# Patient Record
Sex: FEMALE | Race: WHITE | NOT HISPANIC OR LATINO | Employment: OTHER | URBAN - METROPOLITAN AREA
[De-identification: names, ages, dates, MRNs, and addresses within clinical notes are randomized per-mention and may not be internally consistent; named-entity substitution may affect disease eponyms.]

---

## 2017-01-24 ENCOUNTER — OFFICE VISIT (OUTPATIENT)
Dept: UROLOGY | Facility: CLINIC | Age: 62
End: 2017-01-24
Payer: COMMERCIAL

## 2017-01-24 VITALS
WEIGHT: 143.94 LBS | HEART RATE: 72 BPM | DIASTOLIC BLOOD PRESSURE: 64 MMHG | SYSTOLIC BLOOD PRESSURE: 108 MMHG | BODY MASS INDEX: 26.33 KG/M2

## 2017-01-24 DIAGNOSIS — R35.0 FREQUENCY OF URINATION: Primary | ICD-10-CM

## 2017-01-24 DIAGNOSIS — R30.0 DYSURIA: ICD-10-CM

## 2017-01-24 DIAGNOSIS — R39.89 SENSATION OF PRESSURE IN BLADDER AREA: ICD-10-CM

## 2017-01-24 DIAGNOSIS — R35.1 NOCTURIA: ICD-10-CM

## 2017-01-24 DIAGNOSIS — R39.14 FEELING OF INCOMPLETE BLADDER EMPTYING: ICD-10-CM

## 2017-01-24 PROCEDURE — 87086 URINE CULTURE/COLONY COUNT: CPT

## 2017-01-24 PROCEDURE — 99999 PR PBB SHADOW E&M-EST. PATIENT-LVL III: CPT | Mod: PBBFAC,,, | Performed by: PHYSICIAN ASSISTANT

## 2017-01-24 PROCEDURE — 51701 INSERT BLADDER CATHETER: CPT | Mod: S$GLB,,, | Performed by: PHYSICIAN ASSISTANT

## 2017-01-24 PROCEDURE — 1159F MED LIST DOCD IN RCRD: CPT | Mod: S$GLB,,, | Performed by: PHYSICIAN ASSISTANT

## 2017-01-24 PROCEDURE — 99203 OFFICE O/P NEW LOW 30 MIN: CPT | Mod: 25,S$GLB,, | Performed by: PHYSICIAN ASSISTANT

## 2017-01-24 PROCEDURE — 87186 SC STD MICRODIL/AGAR DIL: CPT

## 2017-01-24 PROCEDURE — 87088 URINE BACTERIA CULTURE: CPT

## 2017-01-24 PROCEDURE — 87077 CULTURE AEROBIC IDENTIFY: CPT

## 2017-01-24 RX ORDER — NITROFURANTOIN 25; 75 MG/1; MG/1
100 CAPSULE ORAL 2 TIMES DAILY
Qty: 14 CAPSULE | Refills: 0 | Status: SHIPPED | OUTPATIENT
Start: 2017-01-24 | End: 2017-01-31

## 2017-01-24 NOTE — PROGRESS NOTES
CHIEF COMPLAINT:    Mrs. Andre is a 61 y.o. female presenting for UTI symptoms.  PRESENTING ILLNESS:    Jolene Andre is a 61 y.o. female who presents for uti symptoms.  She reports frequency, incomplete bladder emptying, bladder pressure, nocturia x 4, dysuria, malodorous urine and chills x 2 weeks.  It has gotten progressively worse.      She went to an urgent care 8 weeks ago and was treated with antibiotics for an UTI.  Her symptoms resolved but then returned about 2 weeks ago.  She went to an urgent care last week.  Her culture was negative.  She was given pyridium and has been taking it over the course of 2-3 days.  Last night was the last time she took it.    She has a history of recurrent urinary tract infections.  Prior to 8 weeks ago, she hadn't had an infection in a while.  She typically gets an uti after taking a decongestant or antihistamine.      She is a previous Dr. Patino patient.  Has history of gross hematuria.  Hematuria work up in 2013 her renal ultrasound of 10/12/13 which showed normal kidneys and some thickening of bladder wall.  Cystoscopy which showed cystitis cystica and small cystocele, no evidence of bladder cancer.  She has a history of a bladder lift.     REVIEW OF SYSTEMS:  Constitutional: Positive for chills. Negative for fever.   HENT: Negative for hearing loss.   Eyes: Negative for visual disturbance.   Respiratory: Negative for shortness of breath.   Cardiovascular: Negative for chest pain.   Gastrointestinal: Negative for vomiting, and constipation.   Genitourinary: Positive for frequency, nocturia, incomplete bladder emptying, dysuria, incomplete bladder emptying  Negative for urgency, difficulty urinating, hematuria, intermittency, hesitancy, and decreased urine volume.   Neurological: Negative for dizziness.   Hematological: Does not bruise/bleed easily.   Psychiatric/Behavioral: Negative for confusion.     PATIENT HISTORY:    Past Medical History   Diagnosis Date    Allergy      Arthritis     Bipolar disorder     Headache(784.0)     Herniated disc      x4 in cervical area    History of migraine headaches     Hypothyroidism     Infections of kidney      has had multiple kidney infections in the past.    Low back pain     Migraine headache     Moderate mood disorder     Neck pain     Rotator cuff tear     Thyroid disease        Past Surgical History   Procedure Laterality Date    Hysterectomy      Tonsillectomy      Cosmetic surgery       face lift     Bladder suspension      Cystoscopy      Oophorectomy      Colonoscopy N/A 5/5/2016     Procedure: COLONOSCOPY;  Surgeon: Daron Scruggs MD;  [facility] [place] [address]);  Service: Endoscopy;  Laterality: N/A;       Family History   Problem Relation Age of Onset    Hypertension Mother     Migraines Mother     Tremor Mother     Hypertension Sister     Cancer Maternal Grandmother      uterine       Social History     Social History    Marital status:      Spouse name: N/A    Number of children: 2    Years of education: 18     Occupational History    Homemaker/Retired      Social History Main Topics    Smoking status: Never Smoker    Smokeless tobacco: Never Used    Alcohol use 1.2 oz/week     1 Glasses of wine, 1 Cans of beer per week      Comment: occasional maybe every 3 months    Drug use: No    Sexual activity: Yes     Partners: Male     Other Topics Concern    Not on file     Social History Narrative       Allergies:  Rocephin [ceftriaxone]; Codeine; Demerol [meperidine]; Erythromycin; Fioricet  [butalbital-acetaminophen-caff]; Iodine; Nsaids (non-steroidal anti-inflammatory drug); and Shellfish containing products    Medications:    Current Outpatient Prescriptions:     estradiol (VIVELLE-DOT) 0.0375 mg/24 hr, UNWRAP AND APPLY 1 PATCH TO SKIN TWICE A WEEK AS DIRECTED, Disp: 8 patch, Rfl: 0    levothyroxine (SYNTHROID) 100 MCG tablet, Take 1 tablet (100 mcg total) by mouth once daily.,  Disp: 30 tablet, Rfl: 6    lithium (LITHOBID) 300 MG CR tablet, Take 750 mg by mouth once daily. , Disp: , Rfl:     LUNESTA 2 mg Tab, , Disp: , Rfl: 2    rizatriptan (MAXALT) 10 MG tablet, TAKE 1 TABLET BY MOUTH EVERY 2 HOURS AS NEEDED FOR MIGRAINE., Disp: 20 tablet, Rfl: 2    nitrofurantoin, macrocrystal-monohydrate, (MACROBID) 100 MG capsule, Take 1 capsule (100 mg total) by mouth 2 (two) times daily., Disp: 14 capsule, Rfl: 0    PHYSICAL EXAMINATION:    Constitutional: She is oriented to person, place, and time. She appears well-developed and well-nourished.  She is in no apparent distress.    Neck: No tracheal deviation present.     Cardiovascular: Normal rate.      Pulmonary/Chest: Effort normal. No respiratory distress.     Abdominal:  She exhibits no distension.  There is no CVA tenderness.     Lymphadenopathy:          Right: No supraclavicular adenopathy present.        Left: No supraclavicular adenopathy present.     Neurological: She is alert and oriented to person, place, and time.     Skin: Skin is warm and dry.     Extremities: No pitting edema noted in lower extremities bilaterally    Psych: Cooperative with normal affect.    Genitourinary:  Normal external female genitalia  Urethral meatus is normal  Urethra and bladder are nontender to bimanual exam  Well supported anteriorly and posteriorly   No adnexal masses  Consent verbally obtained.  Hibiclens was applied to the urethral meatus. An in and out cath was performed after voiding.  The PVR was 40 ml.    Physical Exam      LABS:    U/a indicated + nitrites however, may be false positive given she took azo last night     IMPRESSION:    Encounter Diagnoses   Name Primary?    Frequency of urination Yes    Dysuria     Nocturia     Feeling of incomplete bladder emptying     Sensation of pressure in bladder area        PLAN:    Urine culture.    Will start macrobid until culture results are back.  Patient informed that antibiotics may have to be  changed if culture proves it to be resistant.    Follow up based on culture.   Monika Arndt PA-C

## 2017-01-26 ENCOUNTER — TELEPHONE (OUTPATIENT)
Dept: UROLOGY | Facility: CLINIC | Age: 62
End: 2017-01-26

## 2017-01-26 LAB — BACTERIA UR CULT: NORMAL

## 2017-01-26 NOTE — TELEPHONE ENCOUNTER
Called to inform patient that her urine culture was positive and to continue macrobid.  Left message.

## 2017-01-26 NOTE — TELEPHONE ENCOUNTER
Patient informed that culture was positive and to continue macrobid. She will follow up in 3 weeks.  She voiced understanding.

## 2017-02-01 ENCOUNTER — TELEPHONE (OUTPATIENT)
Dept: UROLOGY | Facility: CLINIC | Age: 62
End: 2017-02-01

## 2017-02-01 NOTE — TELEPHONE ENCOUNTER
----- Message from Monika Arndt PA-C sent at 1/26/2017  4:19 PM CST -----  Please schedule 3 week appt

## 2017-02-02 ENCOUNTER — LAB VISIT (OUTPATIENT)
Dept: LAB | Facility: HOSPITAL | Age: 62
End: 2017-02-02
Attending: UROLOGY
Payer: COMMERCIAL

## 2017-02-02 DIAGNOSIS — N39.0 URINARY TRACT INFECTION WITHOUT HEMATURIA, SITE UNSPECIFIED: Primary | ICD-10-CM

## 2017-02-02 DIAGNOSIS — N39.0 URINARY TRACT INFECTION WITHOUT HEMATURIA, SITE UNSPECIFIED: ICD-10-CM

## 2017-02-02 PROCEDURE — 87186 SC STD MICRODIL/AGAR DIL: CPT

## 2017-02-02 PROCEDURE — 87088 URINE BACTERIA CULTURE: CPT

## 2017-02-02 PROCEDURE — 87086 URINE CULTURE/COLONY COUNT: CPT

## 2017-02-02 PROCEDURE — 87077 CULTURE AEROBIC IDENTIFY: CPT

## 2017-02-04 DIAGNOSIS — A49.9 BACTERIAL UTI: Primary | ICD-10-CM

## 2017-02-04 DIAGNOSIS — N39.0 BACTERIAL UTI: Primary | ICD-10-CM

## 2017-02-04 LAB — BACTERIA UR CULT: NORMAL

## 2017-02-04 RX ORDER — CIPROFLOXACIN 500 MG/1
500 TABLET ORAL 2 TIMES DAILY
Qty: 14 TABLET | Refills: 0 | Status: SHIPPED | OUTPATIENT
Start: 2017-02-04 | End: 2017-02-11

## 2017-02-05 NOTE — PROGRESS NOTES
Patient informed that her urine culture is positive and that cipro was called to pharmacy.  Pharmacy verified.  She was instructed on how to take medication.

## 2017-02-14 ENCOUNTER — OFFICE VISIT (OUTPATIENT)
Dept: UROLOGY | Facility: CLINIC | Age: 62
End: 2017-02-14
Payer: COMMERCIAL

## 2017-02-14 VITALS
HEIGHT: 63 IN | SYSTOLIC BLOOD PRESSURE: 87 MMHG | WEIGHT: 143 LBS | HEART RATE: 56 BPM | BODY MASS INDEX: 25.34 KG/M2 | TEMPERATURE: 98 F | DIASTOLIC BLOOD PRESSURE: 62 MMHG

## 2017-02-14 DIAGNOSIS — N39.0 BACTERIAL UTI: Primary | ICD-10-CM

## 2017-02-14 DIAGNOSIS — A49.9 BACTERIAL UTI: Primary | ICD-10-CM

## 2017-02-14 PROCEDURE — 99999 PR PBB SHADOW E&M-EST. PATIENT-LVL III: CPT | Mod: PBBFAC,,, | Performed by: PHYSICIAN ASSISTANT

## 2017-02-14 PROCEDURE — 81002 URINALYSIS NONAUTO W/O SCOPE: CPT | Mod: S$GLB,,, | Performed by: PHYSICIAN ASSISTANT

## 2017-02-14 PROCEDURE — 99213 OFFICE O/P EST LOW 20 MIN: CPT | Mod: 25,S$GLB,, | Performed by: PHYSICIAN ASSISTANT

## 2017-02-14 NOTE — PROGRESS NOTES
CHIEF COMPLAINT:    Mrs. Andre is a 61 y.o. female presenting for UTI follow up.  PRESENTING ILLNESS:    Jolene Andre is a 61 y.o. female who presents for UTI follow up.  She was seen in clinic on 1/24/17 with UTI symptoms.  Culture was positive for e. Coli and she was treated with macrobid.  However her symptoms did not resolve.  Repeat culture was positive and she was treated with cipro. She completed the entire course of cipro.  She reports feeling better.  She was experiencing frequency, incomplete bladder emptying, bladder pressure, nocturia x 4, dysuria, malodorous urine.  All of her symptoms have resolved.  She only gets up once at night.   She still is having chills.  She has not taken her temperature.  She covers up with a blanket and she feels better.  She doesn't have any urinary complaints today.   She reports a good urinary stream and complete bladder emptying.    Eight weeks prior to the visit on 1/24/17 she was treated for UTI.  She has a history of recurrent urinary tract infections.  But had not had one in a while prior to the infection she had end of last year.     She is a previous Dr. Patino patient. Has history of gross hematuria. Hematuria work up in 2013 her renal ultrasound of 10/12/13 which showed normal kidneys and some thickening of bladder wall. Cystoscopy which showed cystitis cystica and small cystocele, no evidence of bladder cancer. She has a history of a bladder lift.     Urine culture:   1/24/17: E. Coli  2/2/17: E. Coli     REVIEW OF SYSTEMS:    Constitutional: positive for chills.  Negative for fever.   HENT: Negative for hearing loss.   Eyes: Negative for visual disturbance.   Respiratory: Negative for shortness of breath.   Cardiovascular: Negative for chest pain.   Gastrointestinal: Negative for vomiting, and constipation.   Genitourinary:  Negative for urgency, frequency, difficulty urinating, nocturia, incontinence, dysuria, hematuria, intermittency, hesitancy, incomplete  bladder emptying, and decreased urine volume.   Neurological: Negative for dizziness.   Hematological: Does not bruise/bleed easily.   Psychiatric/Behavioral: Negative for confusion.     PATIENT HISTORY:    Past Medical History   Diagnosis Date    Allergy     Arthritis     Bipolar disorder     Headache(784.0)     Herniated disc      x4 in cervical area    History of migraine headaches     Hypothyroidism     Infections of kidney      has had multiple kidney infections in the past.    Low back pain     Migraine headache     Moderate mood disorder     Neck pain     Rotator cuff tear     Thyroid disease        Past Surgical History   Procedure Laterality Date    Hysterectomy      Tonsillectomy      Cosmetic surgery       face lift     Bladder suspension      Cystoscopy      Oophorectomy      Colonoscopy N/A 5/5/2016     Procedure: COLONOSCOPY;  Surgeon: Daron Scruggs MD;  Location: [facility] ([address] [place] [address]);  Service: Endoscopy;  Laterality: N/A;       Family History   Problem Relation Age of Onset    Hypertension Mother     Migraines Mother     Tremor Mother     Hypertension Sister     Cancer Maternal Grandmother      uterine       Social History     Social History    Marital status:      Spouse name: N/A    Number of children: 2    Years of education: 18     Occupational History    Homemaker/Retired      Social History Main Topics    Smoking status: Never Smoker    Smokeless tobacco: Never Used    Alcohol use 1.2 oz/week     1 Glasses of wine, 1 Cans of beer per week      Comment: occasional maybe every 3 months    Drug use: No    Sexual activity: Yes     Partners: Male     Other Topics Concern    Not on file     Social History Narrative       Allergies:  Rocephin [ceftriaxone]; Codeine; Demerol [meperidine]; Erythromycin; Fioricet  [butalbital-acetaminophen-caff]; Iodine; Nsaids (non-steroidal anti-inflammatory drug); and Shellfish containing  products    Medications:    Current Outpatient Prescriptions:     estradiol (VIVELLE-DOT) 0.0375 mg/24 hr, UNWRAP AND APPLY 1 PATCH TO SKIN TWICE A WEEK AS DIRECTED, Disp: 8 patch, Rfl: 0    levothyroxine (SYNTHROID) 100 MCG tablet, Take 1 tablet (100 mcg total) by mouth once daily., Disp: 30 tablet, Rfl: 6    lithium (LITHOBID) 300 MG CR tablet, Take 750 mg by mouth once daily. , Disp: , Rfl:     LUNESTA 2 mg Tab, , Disp: , Rfl: 2    rizatriptan (MAXALT) 10 MG tablet, TAKE 1 TABLET BY MOUTH EVERY 2 HOURS AS NEEDED FOR MIGRAINE., Disp: 20 tablet, Rfl: 2    PHYSICAL EXAMINATION:    Constitutional: She is oriented to person, place, and time. She appears well-developed and well-nourished.  She is in no apparent distress.  She is afebrile.     Neck: No tracheal deviation present.     Cardiovascular: Normal rate.      Pulmonary/Chest: Effort normal. No respiratory distress.     Abdominal:  She exhibits no distension.  There is no CVA tenderness.     Lymphadenopathy:          Right: No supraclavicular adenopathy present.        Left: No supraclavicular adenopathy present.     Neurological: She is alert and oriented to person, place, and time.     Skin: Skin is warm and dry.     Extremities: No pitting edema noted in lower extremities bilaterally    Psych: Cooperative with normal affect.    Physical Exam      LABS:    U/A: 1.015, pH 5, negative    IMPRESSION:    Encounter Diagnoses   Name Primary?    Bacterial UTI Yes       PLAN:    UTI resolved.    Discussed today's BP.  Patient reports that today's BP is her norm.    She was instructed to follow up with her pcp for chills.      Follow up in 1 year or sooner if needed.      Monika Arndt PA-C

## 2017-04-13 RX ORDER — LEVOTHYROXINE SODIUM 100 UG/1
TABLET ORAL
Qty: 30 TABLET | Refills: 6 | Status: SHIPPED | OUTPATIENT
Start: 2017-04-13 | End: 2017-11-20 | Stop reason: SDUPTHER

## 2017-05-09 ENCOUNTER — OFFICE VISIT (OUTPATIENT)
Dept: UROLOGY | Facility: CLINIC | Age: 62
End: 2017-05-09
Payer: COMMERCIAL

## 2017-05-09 VITALS
SYSTOLIC BLOOD PRESSURE: 104 MMHG | HEIGHT: 63 IN | BODY MASS INDEX: 25.34 KG/M2 | HEART RATE: 57 BPM | DIASTOLIC BLOOD PRESSURE: 75 MMHG | WEIGHT: 143 LBS

## 2017-05-09 DIAGNOSIS — R35.1 NOCTURIA: ICD-10-CM

## 2017-05-09 DIAGNOSIS — N39.0 RECURRENT UTI: Primary | ICD-10-CM

## 2017-05-09 PROCEDURE — 99999 PR PBB SHADOW E&M-EST. PATIENT-LVL III: CPT | Mod: PBBFAC,,, | Performed by: UROLOGY

## 2017-05-09 PROCEDURE — 87086 URINE CULTURE/COLONY COUNT: CPT

## 2017-05-09 PROCEDURE — 1160F RVW MEDS BY RX/DR IN RCRD: CPT | Mod: S$GLB,,, | Performed by: UROLOGY

## 2017-05-09 PROCEDURE — 99214 OFFICE O/P EST MOD 30 MIN: CPT | Mod: 25,S$GLB,, | Performed by: UROLOGY

## 2017-05-09 PROCEDURE — 81002 URINALYSIS NONAUTO W/O SCOPE: CPT | Mod: S$GLB,,, | Performed by: UROLOGY

## 2017-05-09 RX ORDER — ESCITALOPRAM OXALATE 20 MG/1
TABLET ORAL
COMMUNITY
Start: 2017-05-08 | End: 2018-12-07

## 2017-05-09 RX ORDER — SUVOREXANT 10 MG/1
TABLET, FILM COATED ORAL
Refills: 0 | COMMUNITY
Start: 2017-03-23 | End: 2017-07-14

## 2017-05-09 RX ORDER — ALPRAZOLAM 0.25 MG/1
TABLET ORAL
Refills: 1 | COMMUNITY
Start: 2017-03-07 | End: 2019-10-21

## 2017-05-09 RX ORDER — CLONAZEPAM 0.5 MG/1
TABLET ORAL
COMMUNITY
Start: 2017-05-07 | End: 2019-10-21

## 2017-05-09 RX ORDER — ESCITALOPRAM OXALATE 10 MG/1
TABLET ORAL
Refills: 1 | COMMUNITY
Start: 2017-02-09 | End: 2017-05-09

## 2017-05-09 RX ORDER — ZOLPIDEM TARTRATE 5 MG/1
TABLET ORAL
Refills: 0 | COMMUNITY
Start: 2017-03-19 | End: 2017-11-02

## 2017-05-09 RX ORDER — LAMOTRIGINE 25 MG/1
TABLET ORAL
Refills: 0 | COMMUNITY
Start: 2017-02-09 | End: 2017-07-14

## 2017-05-09 NOTE — PATIENT INSTRUCTIONS
Limit fluids 2 hours before bedtime.   Will send urine for culture.   Drop off urine culture for future UTI symptoms.

## 2017-05-09 NOTE — PROGRESS NOTES
Subjective:       Patient ID: Jolene Andre is a 61 y.o. female.    Chief Complaint: Nocturia    HPI Comments: Jolene Andre is a 61 y.o. Female here for nocturia 6-7 times at night.   She has increased her lexapro and increase zantac.   Urinary frequency every 1 hour.     She drinks a lot of water.   No gross hematuria.   Doesn't limit fluids 2 hours before bedtime.   No sleep apnea.   No swelling in legs.   No alcohol. No caffeine late in the day.       1/17 UTI, 2/17 UTI December UTI.  Dysuria, gross hematuria, frequency    No smoker history.   CECY/BSO. Pre-cancercous.    Plays tennis at [facility]            Past Surgical History:   Procedure Laterality Date    BLADDER SUSPENSION      COLONOSCOPY N/A 5/5/2016    Procedure: COLONOSCOPY;  Surgeon: Daron Scruggs MD;  Location: [address]);  Service: Endoscopy;  Laterality: N/A;    COSMETIC SURGERY      face lift     CYSTOSCOPY      HYSTERECTOMY      OOPHORECTOMY      TONSILLECTOMY         Past Medical History:   Diagnosis Date    Allergy     Arthritis     Bipolar disorder     Headache     Herniated disc     x4 in cervical area    History of migraine headaches     Hypothyroidism     Infections of kidney     has had multiple kidney infections in the past.    Low back pain     Migraine headache     Moderate mood disorder     Neck pain     Rotator cuff tear     Thyroid disease        Social History     Social History    Marital status:      Spouse name: N/A    Number of children: 2    Years of education: 18     Occupational History    Homemaker/Retired      Social History Main Topics    Smoking status: Never Smoker    Smokeless tobacco: Never Used    Alcohol use 1.2 oz/week     1 Glasses of wine, 1 Cans of beer per week      Comment: occasional maybe every 3 months    Drug use: No    Sexual activity: Yes     Partners: Male     Other Topics Concern    Not on file     Social History Narrative       Family History   Problem  Relation Age of Onset    Hypertension Mother     Migraines Mother     Tremor Mother     Hypertension Sister     Cancer Maternal Grandmother      uterine       Current Outpatient Prescriptions   Medication Sig Dispense Refill    ranitidine (ZANTAC) 150 MG capsule Take 150 mg by mouth 3 (three) times daily.      alprazolam (XANAX) 0.25 MG tablet TK 1 TO 2 TS QID PRF ANXIETY  1    BELSOMRA 10 mg Tab TK ONE TO TWO TS PO QHS PRN  0    clonazePAM (KLONOPIN) 0.5 MG tablet       escitalopram oxalate (LEXAPRO) 20 MG tablet       estradiol (VIVELLE-DOT) 0.0375 mg/24 hr UNWRAP AND APPLY 1 PATCH TO SKIN TWICE A WEEK AS DIRECTED 8 patch 0    lamotrigine (LAMICTAL) 25 MG tablet TK 1 T PO  QD  0    lithium (LITHOBID) 300 MG CR tablet Take 750 mg by mouth once daily.       LUNESTA 2 mg Tab   2    rizatriptan (MAXALT) 10 MG tablet TAKE 1 TABLET BY MOUTH EVERY 2 HOURS AS NEEDED FOR MIGRAINE. 20 tablet 2    SYNTHROID 100 mcg tablet TAKE 1 TABLET(100 MCG) BY MOUTH EVERY DAY 30 tablet 6    zolpidem (AMBIEN) 5 MG Tab TK ONE T PO HS PRF INSOMNIA  0     No current facility-administered medications for this visit.        Review of patient's allergies indicates:   Allergen Reactions    Rocephin [ceftriaxone]      Severe migraine    Codeine      Other reaction(s): Unknown    Demerol [meperidine]      Interacts with meds    Erythromycin      Other reaction(s): Unknown    Fioricet  [butalbital-acetaminophen-caff] Other (See Comments)    Iodine      Other reaction(s): Unknown    Nsaids (non-steroidal anti-inflammatory drug)      Other reaction(s): Hives    Shellfish containing products      Other reaction(s): Unknown  Other reaction(s): Unknown       Review of Systems   Constitutional: Negative for chills, fever and unexpected weight change.   HENT: Negative for nosebleeds.    Eyes: Negative for visual disturbance.   Respiratory: Negative for chest tightness.    Cardiovascular: Negative for chest pain.    Gastrointestinal: Negative for diarrhea.   Genitourinary: Positive for frequency and nocturia. Negative for dysuria, hematuria and urgency.   Musculoskeletal: Negative for myalgias.   Skin: Negative for rash.   Neurological: Negative for seizures.   Hematological: Does not bruise/bleed easily.   Psychiatric/Behavioral: Negative for behavioral problems.       Objective:      Physical Exam   Vitals reviewed.  Constitutional: She is oriented to person, place, and time. She appears well-developed and well-nourished.   HENT:   Head: Normocephalic and atraumatic.   Eyes: No scleral icterus.   Cardiovascular: Normal rate and regular rhythm.    Pulmonary/Chest: Effort normal. No respiratory distress.   Abdominal: Soft. She exhibits no mass.   No cva ttp.   Musculoskeletal: She exhibits no tenderness.   Lymphadenopathy:     She has no cervical adenopathy.   Neurological: She is alert and oriented to person, place, and time.   Skin: Skin is warm and dry. No rash noted.     Psychiatric: She has a normal mood and affect.     urine dip clear.   Assessment:       1. Recurrent UTI    2. Nocturia        Plan:       Cysto, renal ultrasound.  Limit fluids 2 hours before bedtime.   Discussed ddavp. Would like to hold off for now.   Consider vaginal estrogen if having recurrent UTI.     I spent 25 minutes with the patient of which more than half was spent in direct consultation with the patient in regards to our treatment and plan.

## 2017-05-10 LAB — BACTERIA UR CULT: NO GROWTH

## 2017-05-11 ENCOUNTER — TELEPHONE (OUTPATIENT)
Dept: UROLOGY | Facility: CLINIC | Age: 62
End: 2017-05-11

## 2017-05-23 DIAGNOSIS — G43.109 MIGRAINE WITH AURA AND WITHOUT STATUS MIGRAINOSUS, NOT INTRACTABLE: ICD-10-CM

## 2017-05-24 ENCOUNTER — HOSPITAL ENCOUNTER (OUTPATIENT)
Dept: RADIOLOGY | Facility: HOSPITAL | Age: 62
Discharge: HOME | End: 2017-05-24
Attending: UROLOGY
Payer: COMMERCIAL

## 2017-05-24 ENCOUNTER — HOSPITAL ENCOUNTER (OUTPATIENT)
Dept: UROLOGY | Facility: HOSPITAL | Age: 62
Discharge: HOME | End: 2017-05-24
Attending: UROLOGY
Payer: COMMERCIAL

## 2017-05-24 VITALS
HEART RATE: 47 BPM | WEIGHT: 143.31 LBS | HEIGHT: 67 IN | BODY MASS INDEX: 22.49 KG/M2 | TEMPERATURE: 98 F | DIASTOLIC BLOOD PRESSURE: 71 MMHG | SYSTOLIC BLOOD PRESSURE: 102 MMHG

## 2017-05-24 DIAGNOSIS — N39.0 RECURRENT UTI: ICD-10-CM

## 2017-05-24 PROCEDURE — 76770 US EXAM ABDO BACK WALL COMP: CPT | Mod: TC

## 2017-05-24 PROCEDURE — 52000 CYSTOURETHROSCOPY: CPT | Mod: ,,, | Performed by: UROLOGY

## 2017-05-24 PROCEDURE — 76770 US EXAM ABDO BACK WALL COMP: CPT | Mod: 26,,, | Performed by: RADIOLOGY

## 2017-05-24 PROCEDURE — 52000 CYSTOURETHROSCOPY: CPT

## 2017-05-24 RX ORDER — DOXYCYCLINE HYCLATE 100 MG
100 TABLET ORAL
Status: DISCONTINUED | OUTPATIENT
Start: 2017-05-24 | End: 2017-05-24

## 2017-05-24 RX ORDER — LIDOCAINE HYDROCHLORIDE 20 MG/ML
JELLY TOPICAL
Status: COMPLETED | OUTPATIENT
Start: 2017-05-24 | End: 2017-05-24

## 2017-05-24 RX ORDER — CIPROFLOXACIN 500 MG/1
500 TABLET ORAL 2 TIMES DAILY
Qty: 6 TABLET | Refills: 0 | Status: SHIPPED | OUTPATIENT
Start: 2017-05-24 | End: 2017-05-27

## 2017-05-24 RX ORDER — CIPROFLOXACIN 500 MG/1
500 TABLET ORAL ONCE
Status: COMPLETED | OUTPATIENT
Start: 2017-05-24 | End: 2017-05-24

## 2017-05-24 RX ADMIN — Medication 100 MG: at 10:05

## 2017-05-24 RX ADMIN — CIPROFLOXACIN 500 MG: 500 TABLET ORAL at 11:05

## 2017-05-24 RX ADMIN — LIDOCAINE HYDROCHLORIDE: 20 JELLY TOPICAL at 08:05

## 2017-05-24 NOTE — PROCEDURES
Procedure: Flexible cysto-uretheroscopy    Pre Procedure Diagnosis:uti recurrent  Post Procedure Diagnosis:Normal cystoscopy    Surgeon: Sylwia Young MD    Anesthesia: 2% uro-jet lidocaine jelly for local analgesia    Flexible cysto-urethroscopy was performed after consent was obtained.  The risks and benefits were explained.    2% lidocaine urojet was used for local analgesia.  The genitalia was prepped and draped in the sterile fashion with betadine.    The flexible scope was advanced into the urethra and into the bladder.  Bilateral ureteral orifice were evaluated and noted to be normal with clear efflux.  The bladder was completely surveyed in a systematic fashion.   No bladder tumors or lesions were seen.  No strictures were noted.    The patient tolerated the procedure well without complication.    They will follow up in 1 year  cipro script for future. Black box warning discussed with patient. She wants cipro for future UTI.   3 day voiding diary to access frequency and nocturia  Consider vaginal estrogen if still having recurrent UTI

## 2017-05-24 NOTE — H&P (VIEW-ONLY)
Subjective:       Patient ID: Jolene Andre is a 61 y.o. female.    Chief Complaint: Nocturia    HPI Comments: Jolene Andre is a 61 y.o. Female here for nocturia 6-7 times at night.   She has increased her lexapro and increase zantac.   Urinary frequency every 1 hour.     She drinks a lot of water.   No gross hematuria.   Doesn't limit fluids 2 hours before bedtime.   No sleep apnea.   No swelling in legs.   No alcohol. No caffeine late in the day.       1/17 UTI, 2/17 UTI December UTI.  Dysuria, gross hematuria, frequency    No smoker history.   CECY/BSO. Pre-cancercous.    Plays tennis at [facility]            Past Surgical History:   Procedure Laterality Date    BLADDER SUSPENSION      COLONOSCOPY N/A 5/5/2016    Procedure: COLONOSCOPY;  Surgeon: Daron Scruggs MD;  Location: [address]);  Service: Endoscopy;  Laterality: N/A;    COSMETIC SURGERY      face lift     CYSTOSCOPY      HYSTERECTOMY      OOPHORECTOMY      TONSILLECTOMY         Past Medical History:   Diagnosis Date    Allergy     Arthritis     Bipolar disorder     Headache     Herniated disc     x4 in cervical area    History of migraine headaches     Hypothyroidism     Infections of kidney     has had multiple kidney infections in the past.    Low back pain     Migraine headache     Moderate mood disorder     Neck pain     Rotator cuff tear     Thyroid disease        Social History     Social History    Marital status:      Spouse name: N/A    Number of children: 2    Years of education: 18     Occupational History    Homemaker/Retired      Social History Main Topics    Smoking status: Never Smoker    Smokeless tobacco: Never Used    Alcohol use 1.2 oz/week     1 Glasses of wine, 1 Cans of beer per week      Comment: occasional maybe every 3 months    Drug use: No    Sexual activity: Yes     Partners: Male     Other Topics Concern    Not on file     Social History Narrative       Family History   Problem  Relation Age of Onset    Hypertension Mother     Migraines Mother     Tremor Mother     Hypertension Sister     Cancer Maternal Grandmother      uterine       Current Outpatient Prescriptions   Medication Sig Dispense Refill    ranitidine (ZANTAC) 150 MG capsule Take 150 mg by mouth 3 (three) times daily.      alprazolam (XANAX) 0.25 MG tablet TK 1 TO 2 TS QID PRF ANXIETY  1    BELSOMRA 10 mg Tab TK ONE TO TWO TS PO QHS PRN  0    clonazePAM (KLONOPIN) 0.5 MG tablet       escitalopram oxalate (LEXAPRO) 20 MG tablet       estradiol (VIVELLE-DOT) 0.0375 mg/24 hr UNWRAP AND APPLY 1 PATCH TO SKIN TWICE A WEEK AS DIRECTED 8 patch 0    lamotrigine (LAMICTAL) 25 MG tablet TK 1 T PO  QD  0    lithium (LITHOBID) 300 MG CR tablet Take 750 mg by mouth once daily.       LUNESTA 2 mg Tab   2    rizatriptan (MAXALT) 10 MG tablet TAKE 1 TABLET BY MOUTH EVERY 2 HOURS AS NEEDED FOR MIGRAINE. 20 tablet 2    SYNTHROID 100 mcg tablet TAKE 1 TABLET(100 MCG) BY MOUTH EVERY DAY 30 tablet 6    zolpidem (AMBIEN) 5 MG Tab TK ONE T PO HS PRF INSOMNIA  0     No current facility-administered medications for this visit.        Review of patient's allergies indicates:   Allergen Reactions    Rocephin [ceftriaxone]      Severe migraine    Codeine      Other reaction(s): Unknown    Demerol [meperidine]      Interacts with meds    Erythromycin      Other reaction(s): Unknown    Fioricet  [butalbital-acetaminophen-caff] Other (See Comments)    Iodine      Other reaction(s): Unknown    Nsaids (non-steroidal anti-inflammatory drug)      Other reaction(s): Hives    Shellfish containing products      Other reaction(s): Unknown  Other reaction(s): Unknown       Review of Systems   Constitutional: Negative for chills, fever and unexpected weight change.   HENT: Negative for nosebleeds.    Eyes: Negative for visual disturbance.   Respiratory: Negative for chest tightness.    Cardiovascular: Negative for chest pain.    Gastrointestinal: Negative for diarrhea.   Genitourinary: Positive for frequency and nocturia. Negative for dysuria, hematuria and urgency.   Musculoskeletal: Negative for myalgias.   Skin: Negative for rash.   Neurological: Negative for seizures.   Hematological: Does not bruise/bleed easily.   Psychiatric/Behavioral: Negative for behavioral problems.       Objective:      Physical Exam   Vitals reviewed.  Constitutional: She is oriented to person, place, and time. She appears well-developed and well-nourished.   HENT:   Head: Normocephalic and atraumatic.   Eyes: No scleral icterus.   Cardiovascular: Normal rate and regular rhythm.    Pulmonary/Chest: Effort normal. No respiratory distress.   Abdominal: Soft. She exhibits no mass.   No cva ttp.   Musculoskeletal: She exhibits no tenderness.   Lymphadenopathy:     She has no cervical adenopathy.   Neurological: She is alert and oriented to person, place, and time.   Skin: Skin is warm and dry. No rash noted.     Psychiatric: She has a normal mood and affect.     urine dip clear.   Assessment:       1. Recurrent UTI    2. Nocturia        Plan:       Cysto, renal ultrasound.  Limit fluids 2 hours before bedtime.   Discussed ddavp. Would like to hold off for now.   Consider vaginal estrogen if having recurrent UTI.     I spent 25 minutes with the patient of which more than half was spent in direct consultation with the patient in regards to our treatment and plan.

## 2017-05-24 NOTE — PATIENT INSTRUCTIONS
What to Expect After a Cystoscopy  For the next 24-48 hours, you may feel a mild burning when you urinate. This burning is normal and expected. Drink plenty of water to dilute the urine to help relieve the burning sensation. You may also see a small amount of blood in your urine after the procedure.    Unless you are already taking antibiotics, you may be given an antibiotic after the test to prevent infection.    Signs and Symptoms to Report  Call the [facility] at [phone] if you develop any of the following:  · Fever of 101 degrees or higher  · Chills or persistent bleeding  · Inability to urinate

## 2017-05-25 RX ORDER — RIZATRIPTAN BENZOATE 10 MG/1
TABLET ORAL
Qty: 20 TABLET | Refills: 1 | Status: SHIPPED | OUTPATIENT
Start: 2017-05-25 | End: 2017-08-29 | Stop reason: SDUPTHER

## 2017-07-13 ENCOUNTER — TELEPHONE (OUTPATIENT)
Dept: UROLOGY | Facility: CLINIC | Age: 62
End: 2017-07-13

## 2017-07-13 NOTE — TELEPHONE ENCOUNTER
----- Message from Anh Morton MA sent at 7/13/2017  8:56 AM CDT -----  Contact: [phone]  Pt is on day 2 of her voiding diary, asking how long does she need to continue doing this. Ok to leave the info on her voice mail

## 2017-07-13 NOTE — TELEPHONE ENCOUNTER
Spoke with pt. She states she was never given any papers for the voiding diary after her cysto with Dr. Young.  Patient was advised that  I would mail her a 3 day voiding diary .

## 2017-07-14 ENCOUNTER — OFFICE VISIT (OUTPATIENT)
Dept: INTERNAL MEDICINE | Facility: CLINIC | Age: 62
End: 2017-07-14
Payer: COMMERCIAL

## 2017-07-14 VITALS
OXYGEN SATURATION: 99 % | DIASTOLIC BLOOD PRESSURE: 60 MMHG | HEART RATE: 76 BPM | SYSTOLIC BLOOD PRESSURE: 120 MMHG | WEIGHT: 146.38 LBS | HEIGHT: 67 IN | BODY MASS INDEX: 22.98 KG/M2

## 2017-07-14 DIAGNOSIS — G43.909 MIGRAINE WITHOUT STATUS MIGRAINOSUS, NOT INTRACTABLE, UNSPECIFIED MIGRAINE TYPE: ICD-10-CM

## 2017-07-14 DIAGNOSIS — E03.9 UNSPECIFIED HYPOTHYROIDISM: ICD-10-CM

## 2017-07-14 DIAGNOSIS — Z00.00 WELLNESS EXAMINATION: ICD-10-CM

## 2017-07-14 DIAGNOSIS — G89.29 CHRONIC NECK PAIN: Primary | ICD-10-CM

## 2017-07-14 DIAGNOSIS — Z12.31 VISIT FOR SCREENING MAMMOGRAM: ICD-10-CM

## 2017-07-14 DIAGNOSIS — M54.2 CHRONIC NECK PAIN: Primary | ICD-10-CM

## 2017-07-14 PROCEDURE — 99999 PR PBB SHADOW E&M-EST. PATIENT-LVL IV: CPT | Mod: PBBFAC,,, | Performed by: INTERNAL MEDICINE

## 2017-07-14 PROCEDURE — 99214 OFFICE O/P EST MOD 30 MIN: CPT | Mod: S$GLB,,, | Performed by: INTERNAL MEDICINE

## 2017-07-14 RX ORDER — PREDNISONE 5 MG/1
TABLET ORAL
Refills: 0 | COMMUNITY
Start: 2017-06-20 | End: 2017-07-14

## 2017-07-14 RX ORDER — CYCLOBENZAPRINE HCL 10 MG
TABLET ORAL
Refills: 0 | COMMUNITY
Start: 2017-06-20 | End: 2017-11-02

## 2017-07-23 NOTE — PROGRESS NOTES
Subjective:       Patient ID: Jolene Andre is a 61 y.o. female.    Chief Complaint: Annual Exam    HPIPt is feeling well except for mild neck pain.  No new CP or SOB.  Still plays tennis.    Review of Systems   Respiratory: Negative for shortness of breath (PND or orthopnea).    Cardiovascular: Negative for chest pain (arm pain or jaw pain).   Gastrointestinal: Negative for abdominal pain, diarrhea, nausea and vomiting.   Genitourinary: Negative for dysuria.   Neurological: Negative for seizures, syncope and headaches.       Objective:      Physical Exam   Constitutional: She is oriented to person, place, and time. She appears well-developed and well-nourished. No distress.   HENT:   Head: Normocephalic.   Mouth/Throat: Oropharynx is clear and moist.   Neck: Neck supple. No JVD present. No thyromegaly present.   Cardiovascular: Normal rate, regular rhythm, normal heart sounds and intact distal pulses.  Exam reveals no gallop and no friction rub.    No murmur heard.  Pulmonary/Chest: Effort normal and breath sounds normal. She has no wheezes. She has no rales.   Abdominal: Soft. Bowel sounds are normal. She exhibits no distension and no mass. There is no tenderness. There is no rebound and no guarding.   Musculoskeletal: She exhibits no edema.   Lymphadenopathy:     She has no cervical adenopathy.   Neurological: She is alert and oriented to person, place, and time.   Skin: Skin is warm and dry.   Psychiatric: She has a normal mood and affect. Her behavior is normal. Judgment and thought content normal.       Assessment:       1. Chronic neck pain    2. Migraine without status migrainosus, not intractable, unspecified migraine type    3. Unspecified hypothyroidism    4. Wellness examination    5. Visit for screening mammogram        Plan:   Chronic neck pain  stable  She declines anything for it now - ?getting PT elsewhere  Migraine without status migrainosus, not intractable, unspecified migraine type  Controlled -  continue current meds    Unspecified hypothyroidism  Check lab  Wellness examination  -     Cancel: CBC auto differential; Future; Expected date: 07/14/2017  -     Cancel: Comprehensive metabolic panel; Future; Expected date: 07/14/2017  -     Cancel: Lipid panel; Future; Expected date: 07/14/2017  -     Cancel: TSH; Future; Expected date: 07/14/2017  -     Cancel: Hemoglobin A1c; Future; Expected date: 07/14/2017  -     Cancel: Vitamin D; Future; Expected date: 07/14/2017  -     Cancel: Lithium level; Future; Expected date: 07/14/2017  -     CBC auto differential; Future; Expected date: 07/14/2017  -     Comprehensive metabolic panel; Future; Expected date: 07/14/2017  -     Lipid panel; Future; Expected date: 07/14/2017  -     TSH; Future; Expected date: 07/14/2017  -     Hemoglobin A1c; Future; Expected date: 07/14/2017  -     Vitamin D; Future; Expected date: 07/14/2017  -     Lithium level; Future; Expected date: 07/14/2017  [facility]  Visit for screening mammogram  -     Mammo Digital Screening Bilat with CAD; Future; Expected date: 07/14/2017    Started with tardive dyskinesia with abilify - resolved off of it - she is doing well from mental health standpoint.

## 2017-08-15 ENCOUNTER — TELEPHONE (OUTPATIENT)
Dept: UROLOGY | Facility: CLINIC | Age: 62
End: 2017-08-15

## 2017-08-15 NOTE — TELEPHONE ENCOUNTER
----- Message from Malini Alvarado LPN sent at 8/14/2017  2:56 PM CDT -----  Contact: self  [phone]      ----- Message -----  From: Arlet Vega MA  Sent: 8/14/2017   1:22 PM  To: [facility] Staff    Knows that this may need to be called back on Tuesday, 8-15-17.    States she dropped off an urinary chart about 3-4 weeks ago and is calling for the results.      3 day voiding diary  Day 1  80 ounces in.   2-10 oz voids. 53 oz AM/26oz PM (5-7 oz)    Day 2  50 oz in  2-10 oz voids. 41 oz AM/ 50 oz PM (10 oz voids)    Day 3  58 oz in  5-10 oz voids. 44oz AM/ 40 oz PM (1- oz voids)    So nocturia 4-6 times at night.    Daytime frequency 6-7 times a day.     Blood work pending. Patient will let me know to review.   Leaning towards trying anticholinergic first.   ddavp in the future?

## 2017-08-16 LAB
25(OH)D3 SERPL-MCNC: 23 NG/ML (ref 30–100)
ALBUMIN SERPL-MCNC: 4.6 G/DL (ref 3.6–5.1)
ALBUMIN/GLOB SERPL: 2.1 (CALC) (ref 1–2.5)
ALP SERPL-CCNC: 44 U/L (ref 33–130)
ALT SERPL-CCNC: 12 U/L (ref 6–29)
AST SERPL-CCNC: 19 U/L (ref 10–35)
BASOPHILS # BLD AUTO: 48 CELLS/UL (ref 0–200)
BASOPHILS NFR BLD AUTO: 1 %
BILIRUB SERPL-MCNC: 2.1 MG/DL (ref 0.2–1.2)
BUN SERPL-MCNC: 12 MG/DL (ref 7–25)
BUN/CREAT SERPL: ABNORMAL (CALC) (ref 6–22)
CALCIUM SERPL-MCNC: 9.9 MG/DL (ref 8.6–10.4)
CHLORIDE SERPL-SCNC: 102 MMOL/L (ref 98–110)
CHOLEST SERPL-MCNC: 231 MG/DL (ref 125–200)
CHOLEST/HDLC SERPL: 2.9 (CALC)
CO2 SERPL-SCNC: 28 MMOL/L (ref 20–31)
CREAT SERPL-MCNC: 0.63 MG/DL (ref 0.5–0.99)
EOSINOPHIL # BLD AUTO: 120 CELLS/UL (ref 15–500)
EOSINOPHIL NFR BLD AUTO: 2.5 %
ERYTHROCYTE [DISTWIDTH] IN BLOOD BY AUTOMATED COUNT: 11.6 % (ref 11–15)
GFR SERPL CREATININE-BSD FRML MDRD: 97 ML/MIN/1.73M2
GLOBULIN SER CALC-MCNC: 2.2 G/DL (CALC) (ref 1.9–3.7)
GLUCOSE SERPL-MCNC: 86 MG/DL (ref 65–99)
HBA1C MFR BLD: 5 % OF TOTAL HGB
HCT VFR BLD AUTO: 44.3 % (ref 35–45)
HDLC SERPL-MCNC: 80 MG/DL
HGB BLD-MCNC: 14.5 G/DL (ref 11.7–15.5)
LDLC SERPL CALC-MCNC: 137 MG/DL (CALC)
LITHIUM SERPL-SCNC: 0.5 MMOL/L (ref 0.6–1.2)
LYMPHOCYTES # BLD AUTO: 1123 CELLS/UL (ref 850–3900)
LYMPHOCYTES NFR BLD AUTO: 23.4 %
MCH RBC QN AUTO: 30 PG (ref 27–33)
MCHC RBC AUTO-ENTMCNC: 32.7 G/DL (ref 32–36)
MCV RBC AUTO: 91.5 FL (ref 80–100)
MONOCYTES # BLD AUTO: 360 CELLS/UL (ref 200–950)
MONOCYTES NFR BLD AUTO: 7.5 %
NEUTROPHILS # BLD AUTO: 3149 CELLS/UL (ref 1500–7800)
NEUTROPHILS NFR BLD AUTO: 65.6 %
NONHDLC SERPL-MCNC: 151 MG/DL (CALC)
PLATELET # BLD AUTO: 340 THOUSAND/UL (ref 140–400)
PMV BLD REES-ECKER: 9.6 FL (ref 7.5–12.5)
POTASSIUM SERPL-SCNC: 4.7 MMOL/L (ref 3.5–5.3)
PROT SERPL-MCNC: 6.8 G/DL (ref 6.1–8.1)
RBC # BLD AUTO: 4.84 MILLION/UL (ref 3.8–5.1)
SODIUM SERPL-SCNC: 138 MMOL/L (ref 135–146)
TRIGL SERPL-MCNC: 72 MG/DL
TSH SERPL-ACNC: 2.25 MIU/L (ref 0.4–4.5)
WBC # BLD AUTO: 4.8 THOUSAND/UL (ref 3.8–10.8)

## 2017-08-21 ENCOUNTER — TELEPHONE (OUTPATIENT)
Dept: INTERNAL MEDICINE | Facility: CLINIC | Age: 62
End: 2017-08-21

## 2017-08-21 NOTE — TELEPHONE ENCOUNTER
----- Message from Eveline Rodgers sent at 8/18/2017  2:04 PM CDT -----  Contact: self/[phone]  Pt called in regards to getting her test results that she done at [facility].        Please advise

## 2017-08-29 DIAGNOSIS — G43.109 MIGRAINE WITH AURA AND WITHOUT STATUS MIGRAINOSUS, NOT INTRACTABLE: ICD-10-CM

## 2017-08-29 RX ORDER — RIZATRIPTAN BENZOATE 10 MG/1
TABLET ORAL
Qty: 20 TABLET | Refills: 0 | Status: SHIPPED | OUTPATIENT
Start: 2017-08-29 | End: 2017-10-11 | Stop reason: SDUPTHER

## 2017-08-29 NOTE — TELEPHONE ENCOUNTER
----- Message from Leigha Butt sent at 8/29/2017 12:24 PM CDT -----  Contact: Self/ [phone]   Type: Test Results    What test was performed? Blood Work     Who ordered the test? Dr. Ramirez     When and where were the test performed? 08/15/2017 at [facility]     Comments: pt is calling to receive the test results.    Type: Rx    Name of medication(s): rizatriptan (MAXALT) 10 MG tablet    Is this a refill? New rx? Refill     Who prescribed medication? Dr. Ramirez     Pharmacy Name, Phone, & Location: [facility] on file     Comments: pt is calling to have a refill on the Rx above. Please call and advise     Thank you

## 2017-09-01 ENCOUNTER — PATIENT MESSAGE (OUTPATIENT)
Dept: INTERNAL MEDICINE | Facility: CLINIC | Age: 62
End: 2017-09-01

## 2017-09-06 ENCOUNTER — TELEPHONE (OUTPATIENT)
Dept: INTERNAL MEDICINE | Facility: CLINIC | Age: 62
End: 2017-09-06

## 2017-09-06 NOTE — TELEPHONE ENCOUNTER
----- Message from Armando Tang sent at 9/5/2017 10:04 AM CDT -----  Contact: Self [phone]  The above pt is requesting a call back regarding her lab results, stated that she's made multiple calls, advice    Thanks

## 2017-09-07 NOTE — TELEPHONE ENCOUNTER
----- Message from Fabiola Staley sent at 9/6/2017 12:40 PM CDT -----  Contact: Self/[phone]  Type: Returning a call    Who left a message?Meche    When did the practice call?today 9/6/2017    Comments:pt said that she does not have a [facility] acct and would like to get her test results. Please advise        Thanks!!!

## 2017-10-11 DIAGNOSIS — G43.109 MIGRAINE WITH AURA AND WITHOUT STATUS MIGRAINOSUS, NOT INTRACTABLE: ICD-10-CM

## 2017-10-11 RX ORDER — RIZATRIPTAN BENZOATE 10 MG/1
TABLET ORAL
Qty: 20 TABLET | Refills: 0 | Status: SHIPPED | OUTPATIENT
Start: 2017-10-11 | End: 2018-05-11

## 2017-10-11 NOTE — TELEPHONE ENCOUNTER
----- Message from Leigha Butt sent at 10/11/2017 10:53 AM CDT -----  Contact: Self/ [phone]   Type: Rx    Name of medication(s): rizatriptan (MAXALT) 10 MG tablet    Is this a refill? New rx? Refill     Who prescribed medication? Dr. Ramirez     [facility] [address] [place] [address] [employer] [facility]  [phone] (Phone)  [phone] (Fax)    Comments: pt is calling to have a refill on the Rx above.     Type: Test Results    What test was performed? Blood Test     Who ordered the test? Dr. Ramirez     When and where were the test performed? 07/14/2017     Comments: pt want to go over the blood work. Please call and advise     Thank you

## 2017-10-25 ENCOUNTER — HOSPITAL ENCOUNTER (EMERGENCY)
Facility: HOSPITAL | Age: 62
Discharge: HOME | End: 2017-10-25
Attending: FAMILY MEDICINE
Payer: COMMERCIAL

## 2017-10-25 VITALS
RESPIRATION RATE: 16 BRPM | WEIGHT: 145 LBS | OXYGEN SATURATION: 100 % | DIASTOLIC BLOOD PRESSURE: 78 MMHG | HEIGHT: 67 IN | TEMPERATURE: 98 F | SYSTOLIC BLOOD PRESSURE: 121 MMHG | HEART RATE: 70 BPM | BODY MASS INDEX: 22.76 KG/M2

## 2017-10-25 DIAGNOSIS — R11.2 NAUSEA AND VOMITING, INTRACTABILITY OF VOMITING NOT SPECIFIED, UNSPECIFIED VOMITING TYPE: ICD-10-CM

## 2017-10-25 DIAGNOSIS — R42 DIZZINESS: Primary | ICD-10-CM

## 2017-10-25 DIAGNOSIS — R07.9 CHEST PAIN: ICD-10-CM

## 2017-10-25 LAB
ALBUMIN SERPL BCP-MCNC: 3.6 G/DL
ALP SERPL-CCNC: 51 U/L
ALT SERPL W/O P-5'-P-CCNC: 15 U/L
ANION GAP SERPL CALC-SCNC: 8 MMOL/L
AST SERPL-CCNC: 26 U/L
BASOPHILS # BLD AUTO: 0.04 K/UL
BASOPHILS NFR BLD: 0.4 %
BILIRUB SERPL-MCNC: 1.7 MG/DL
BNP SERPL-MCNC: 21 PG/ML
BUN SERPL-MCNC: 14 MG/DL
CALCIUM SERPL-MCNC: 9 MG/DL
CHLORIDE SERPL-SCNC: 109 MMOL/L
CO2 SERPL-SCNC: 20 MMOL/L
CREAT SERPL-MCNC: 0.7 MG/DL
D DIMER PPP IA.FEU-MCNC: 0.97 MG/L FEU
DIFFERENTIAL METHOD: ABNORMAL
EOSINOPHIL # BLD AUTO: 0 K/UL
EOSINOPHIL NFR BLD: 0.2 %
ERYTHROCYTE [DISTWIDTH] IN BLOOD BY AUTOMATED COUNT: 11.9 %
EST. GFR  (AFRICAN AMERICAN): >60 ML/MIN/1.73 M^2
EST. GFR  (NON AFRICAN AMERICAN): >60 ML/MIN/1.73 M^2
GLUCOSE SERPL-MCNC: 98 MG/DL
HCT VFR BLD AUTO: 40.8 %
HGB BLD-MCNC: 13 G/DL
IMM GRANULOCYTES # BLD AUTO: 0.06 K/UL
IMM GRANULOCYTES NFR BLD AUTO: 0.5 %
INR PPP: 1
LYMPHOCYTES # BLD AUTO: 0.5 K/UL
LYMPHOCYTES NFR BLD: 4.8 %
MCH RBC QN AUTO: 30.7 PG
MCHC RBC AUTO-ENTMCNC: 31.9 G/DL
MCV RBC AUTO: 96 FL
MONOCYTES # BLD AUTO: 0.4 K/UL
MONOCYTES NFR BLD: 3.3 %
NEUTROPHILS # BLD AUTO: 10 K/UL
NEUTROPHILS NFR BLD: 90.8 %
NRBC BLD-RTO: 0 /100 WBC
PLATELET # BLD AUTO: 266 K/UL
PMV BLD AUTO: 10 FL
POTASSIUM SERPL-SCNC: 4.8 MMOL/L
PROT SERPL-MCNC: 6.8 G/DL
PROTHROMBIN TIME: 10.3 SEC
RBC # BLD AUTO: 4.24 M/UL
SODIUM SERPL-SCNC: 137 MMOL/L
TROPONIN I SERPL DL<=0.01 NG/ML-MCNC: <0.006 NG/ML
WBC # BLD AUTO: 11 K/UL

## 2017-10-25 PROCEDURE — 85025 COMPLETE CBC W/AUTO DIFF WBC: CPT

## 2017-10-25 PROCEDURE — 63600175 PHARM REV CODE 636 W HCPCS: Performed by: FAMILY MEDICINE

## 2017-10-25 PROCEDURE — 85610 PROTHROMBIN TIME: CPT

## 2017-10-25 PROCEDURE — 99284 EMERGENCY DEPT VISIT MOD MDM: CPT | Mod: 25

## 2017-10-25 PROCEDURE — 25000003 PHARM REV CODE 250: Performed by: FAMILY MEDICINE

## 2017-10-25 PROCEDURE — 93010 ELECTROCARDIOGRAM REPORT: CPT | Mod: ,,, | Performed by: INTERNAL MEDICINE

## 2017-10-25 PROCEDURE — 96361 HYDRATE IV INFUSION ADD-ON: CPT

## 2017-10-25 PROCEDURE — 93005 ELECTROCARDIOGRAM TRACING: CPT

## 2017-10-25 PROCEDURE — 96374 THER/PROPH/DIAG INJ IV PUSH: CPT

## 2017-10-25 PROCEDURE — 96376 TX/PRO/DX INJ SAME DRUG ADON: CPT

## 2017-10-25 PROCEDURE — 84484 ASSAY OF TROPONIN QUANT: CPT

## 2017-10-25 PROCEDURE — 99284 EMERGENCY DEPT VISIT MOD MDM: CPT | Mod: ,,, | Performed by: PHYSICIAN ASSISTANT

## 2017-10-25 PROCEDURE — 80053 COMPREHEN METABOLIC PANEL: CPT

## 2017-10-25 PROCEDURE — 85379 FIBRIN DEGRADATION QUANT: CPT

## 2017-10-25 PROCEDURE — 63600175 PHARM REV CODE 636 W HCPCS: Performed by: PHYSICIAN ASSISTANT

## 2017-10-25 PROCEDURE — 83880 ASSAY OF NATRIURETIC PEPTIDE: CPT

## 2017-10-25 RX ORDER — ONDANSETRON 2 MG/ML
4 INJECTION INTRAMUSCULAR; INTRAVENOUS
Status: COMPLETED | OUTPATIENT
Start: 2017-10-25 | End: 2017-10-25

## 2017-10-25 RX ORDER — ONDANSETRON 4 MG/1
4 TABLET, FILM COATED ORAL EVERY 6 HOURS
Qty: 20 TABLET | Refills: 0 | Status: SHIPPED | OUTPATIENT
Start: 2017-10-25 | End: 2018-07-23 | Stop reason: ALTCHOICE

## 2017-10-25 RX ADMIN — ONDANSETRON 4 MG: 2 INJECTION, SOLUTION INTRAMUSCULAR; INTRAVENOUS at 11:10

## 2017-10-25 RX ADMIN — ONDANSETRON 4 MG: 2 INJECTION INTRAMUSCULAR; INTRAVENOUS at 03:10

## 2017-10-25 RX ADMIN — SODIUM CHLORIDE 1000 ML: 0.9 INJECTION, SOLUTION INTRAVENOUS at 11:10

## 2017-10-25 NOTE — ED NOTES
Rounding on the patient has been done. The patient has been updated on the plan of care and current status. Denies chest pain at this time.   Comfort positioning and restroom needs were addressed. Necessary items were placed with in reach and was advised when a reassessment would take place. The call bell remains at the bedside for any additional patient needs. The patient is resting comfortably in recliner.  Daughter at bedside. , respirations are even and unlabored, skin warm and dry. Will continue to monitor.

## 2017-10-25 NOTE — ED TRIAGE NOTES
While playing tennis today became very dizzy and started having chest pain.    GENERAL: The patient is well-developed and well-nourished in no apparent distress. Alert and oriented x4.                                                HEENT: Head is normocephalic and atraumatic. Extraocular muscles are intact. Pupils are equal, round, and reactive to light and accommodation. Nares appeared normal. Mouth is well hydrated and without lesions. Mucous membranes are moist. Posterior pharynx clear of any exudate or lesions.    NECK: Supple. No carotid bruits. No lymphadenopathy or thyromegaly.    LUNGS: Clear to auscultation.    HEART: Regular rate and rhythm without murmur.     ABDOMEN: Soft, nontender, and nondistended. Positive bowel sounds. No hepatosplenomegaly was noted.     EXTREMITIES: Without any cyanosis, clubbing, rash, lesions or edema.     NEUROLOGIC: Cranial nerves II through XII are grossly intact.     PSYCHIATRIC: Flat affect, but denies suicidal or homicidal ideations.    SKIN: No ulceration or induration present.

## 2017-10-25 NOTE — ED PROVIDER NOTES
Encounter Date: 10/25/2017       History     Chief Complaint   Patient presents with    Dizziness     started about 0915, room is spinning very nauseated,     Chest Pain     constant pain to chest about 10am, denies cardiac hx     Patient is a 61-year-old female with history of arthritis, bipolar depression, migraines, hypothyroidism who presents to the emergency department with dizziness.  Patient states she woke up this morning feeling fine.  Patient states she walked onto the Court to play tennis, when suddenly she became very dizzy.  She states she felt very off balance and as if the court was spinning.  She states she then got a weird sensation at the back of her head radiating to her for head.  She states it felt like a rush of water.  She states she then sat down and became very nauseated.  She denies any tinnitus or hearing loss.  She denies any visual disturbance.  She denies facial asymmetry, speech slur, or extremity weakness.  She states she then had a couple of episodes of vomiting.  She states she feels very shaky.  She does report intermittent chest pain since the vomiting has occurred.  She reports recent travel to [facility] in a car over the weekend.  She denies any new lower extremity edema.  She does report being on exogenous estrogen.  She denies recent illness, fevers, or chills.  She denies alcohol use.  She denies drug use.          Review of patient's allergies indicates:   Allergen Reactions    Rocephin [ceftriaxone]      Severe migraine    Abilify [aripiprazole] Other (See Comments)     Tardive dyskinesia  - do not give any other meds that affect dopamine    Codeine      Other reaction(s): Unknown    Demerol [meperidine]      Interacts with meds    Erythromycin      Other reaction(s): Unknown    Fioricet  [butalbital-acetaminophen-caff] Other (See Comments)    Iodine      Other reaction(s): Unknown    Nsaids (non-steroidal anti-inflammatory drug)      Other reaction(s): Hives     Shellfish containing products      Other reaction(s): Unknown  Other reaction(s): Unknown     Past Medical History:   Diagnosis Date    Allergy     Arthritis     Bipolar disorder     Headache(784.0)     Herniated disc     x4 in cervical area    History of migraine headaches     Hypothyroidism     Infections of kidney     has had multiple kidney infections in the past.    Low back pain     Migraine headache     Moderate mood disorder     Neck pain     Rotator cuff tear     Thyroid disease      Past Surgical History:   Procedure Laterality Date    BLADDER SUSPENSION      COLONOSCOPY N/A 5/5/2016    Procedure: COLONOSCOPY;  Surgeon: Daron Scruggs MD;  Location: [address]);  Service: Endoscopy;  Laterality: N/A;    COSMETIC SURGERY      face lift     CYSTOSCOPY      HYSTERECTOMY      OOPHORECTOMY      TONSILLECTOMY       Family History   Problem Relation Age of Onset    Hypertension Mother     Migraines Mother     Tremor Mother     Hypertension Sister     Cancer Maternal Grandmother      uterine     Social History   Substance Use Topics    Smoking status: Never Smoker    Smokeless tobacco: Never Used    Alcohol use 1.2 oz/week     1 Glasses of wine, 1 Cans of beer per week      Comment: occasional maybe every 3 months     Review of Systems   Constitutional: Negative for activity change, appetite change, chills, fatigue and fever.   HENT: Negative for congestion, ear discharge, ear pain, postnasal drip, rhinorrhea, sore throat and trouble swallowing.    Eyes: Negative for photophobia and visual disturbance.   Respiratory: Negative for cough and shortness of breath.    Cardiovascular: Positive for chest pain. Negative for palpitations and leg swelling.   Gastrointestinal: Positive for nausea and vomiting. Negative for abdominal pain, blood in stool, constipation and diarrhea.   Genitourinary: Negative for dysuria, flank pain, hematuria and vaginal bleeding.    Musculoskeletal: Negative for back pain, neck pain and neck stiffness.   Skin: Negative for rash and wound.   Neurological: Positive for dizziness, weakness (generalized), light-headedness and headaches. Negative for syncope, facial asymmetry, speech difficulty and numbness.       Physical Exam     Initial Vitals [10/25/17 1046]   BP Pulse Resp Temp SpO2   116/71 75 18 98 °F (36.7 °C) 98 %      MAP       86         Physical Exam    Nursing note and vitals reviewed.  Constitutional: She appears well-developed and well-nourished. She is not diaphoretic.  Non-toxic appearance. She appears distressed (and tremulous).   HENT:   Head: Normocephalic.   Right Ear: Hearing and external ear normal.   Left Ear: Hearing and external ear normal.   Nose: Nose normal.   Mouth/Throat: Uvula is midline, oropharynx is clear and moist and mucous membranes are normal. Mucous membranes are not pale. No trismus in the jaw. No uvula swelling. No oropharyngeal exudate.   Eyes: Conjunctivae and EOM are normal. Pupils are equal, round, and reactive to light.   Pink conjunctiva   Neck: Normal range of motion.   Cardiovascular: Normal rate, regular rhythm and normal heart sounds. Exam reveals no gallop and no friction rub.    No murmur heard.  No lower extremity edema   Pulmonary/Chest: Breath sounds normal. No respiratory distress. She has no wheezes. She has no rhonchi. She has no rales. She exhibits no tenderness.   Abdominal: Soft. Bowel sounds are normal. She exhibits no distension and no mass. There is no tenderness. There is no rebound and no guarding.   Lymphadenopathy:     She has no cervical adenopathy.   Neurological: She is alert and oriented to person, place, and time. She has normal strength. No cranial nerve deficit or sensory deficit. She displays a negative Romberg sign. Coordination and gait normal.   Mildly tremulous; normal finger to nose; normal rapid hand movements   Skin: Skin is warm and dry. Capillary refill takes  less than 2 seconds.   Psychiatric: Her mood appears anxious.         ED Course   Procedures  Labs Reviewed   D DIMER, QUANTITATIVE   CBC W/ AUTO DIFFERENTIAL   COMPREHENSIVE METABOLIC PANEL   TROPONIN I   B-TYPE NATRIURETIC PEPTIDE   PROTIME-INR             Medical Decision Making:   Initial Assessment:   Urgent evaluation of a 61-year-old female with history of arthritis, bipolar depression, migraines, hypothyroidism who presents to the emergency department with dizziness.  Patient is afebrile, nontoxic appearing, and hemodynamically stable.  She is mildly tremulous and anxious state on exam.  She does have one episode of vomiting on initial exam.  Benign abdominal exam.  Normal neuro exam.  No focal deficits.  My differential diagnosis includes but is not limited to BPV, vasovagal response, PE, ACS, CVA, TIA other intracranial abnormality.  EKG and lab work will be obtained.  CT head will be obtained.  Will obtain d-dimer.  Patient be given fluids and Zofran.  Patient be placed on cardiac monitor.  ED Management:  2:38 PM  EKG reveals no acute ischemia.  Troponin is within normal limits. Dimer is slightly elevated at 0.97.  Will obtain CTA at this time.  No kidney insufficiency or electrolyte disturbance.  No significant anemia.      Head CT reveals no acute intracranial abnormality.      3:12 PM  Pt is allergic to dye.  Will obtain VQ study instead.    5:05 PM  VQ study reveals no evidence of PE at this time.  I suspect patient had vertigo symptoms vs vasovagal response.  She is advised to follow up with PCP or return to ED with any worsening symptoms or concerns.  Case is discussed in depth with supervising physician who agrees with plan.  Other:   I have discussed this case with another health care provider.       <> Summary of the Discussion: Dr. Rain                   ED Course      Clinical Impression:   The primary encounter diagnosis was Dizziness. Diagnoses of Chest pain and Nausea and vomiting,  intractability of vomiting not specified, unspecified vomiting type were also pertinent to this visit.                           Ashanti Barrientos PA-C  10/25/17 5827

## 2017-10-26 ENCOUNTER — TELEPHONE (OUTPATIENT)
Dept: INTERNAL MEDICINE | Facility: CLINIC | Age: 62
End: 2017-10-26

## 2017-10-26 NOTE — TELEPHONE ENCOUNTER
Pt went to ED for SOB and chest pain , pt want to see cardiology and neurology please advise thanks

## 2017-10-26 NOTE — TELEPHONE ENCOUNTER
----- Message from Chelsea Christine sent at 10/26/2017  8:53 AM CDT -----  Contact: Patient [phone]  Requesting a Referral    Requesting to see: Cardiology, ENT, Neurology    Reason for appt: Went to ED for chest pain, vertigo and shortness of breath    Please call to schedule appt when referral has been placed. Patient also needs a ED follow up within the next week.    Thank You

## 2017-10-27 ENCOUNTER — TELEPHONE (OUTPATIENT)
Dept: NEUROLOGY | Facility: CLINIC | Age: 62
End: 2017-10-27

## 2017-10-27 NOTE — TELEPHONE ENCOUNTER
----- Message from Bobbi Gimenez sent at 10/27/2017 12:41 PM CDT -----  Contact: Pt. [phone]  The patient would like to speak to you regarding scheduling a hospital follow up appointment. She needs to be seen for vertigo. She was instructed to be seen within a week. Please contact the patient to discuss further.

## 2017-11-01 ENCOUNTER — TELEPHONE (OUTPATIENT)
Dept: UROLOGY | Facility: CLINIC | Age: 62
End: 2017-11-01

## 2017-11-01 NOTE — TELEPHONE ENCOUNTER
----- Message from Sylwia Young MD sent at 11/1/2017  2:15 PM CDT -----  Contact: pt [phone]  I'm not sure what she needs. Please give her a call and find out.   ----- Message -----  From: Malini Alvarado LPN  Sent: 10/30/2017   1:54 PM  To: Sylwia Young MD        ----- Message -----  From: Kathya Tang  Sent: 10/30/2017   1:01 PM  To: Hector LESLIE Staff    Pt states this summer she had a UA done and Dr Young also needed blood work done. Pt states her blood work was done in ER on Wednesday, 10/25/17. Pt states Dr Young can access results online. Please call pt

## 2017-11-02 ENCOUNTER — OFFICE VISIT (OUTPATIENT)
Dept: CARDIOLOGY | Facility: CLINIC | Age: 62
End: 2017-11-02
Payer: COMMERCIAL

## 2017-11-02 ENCOUNTER — HOSPITAL ENCOUNTER (OUTPATIENT)
Dept: CARDIOLOGY | Facility: CLINIC | Age: 62
Discharge: HOME | End: 2017-11-02
Payer: COMMERCIAL

## 2017-11-02 VITALS
BODY MASS INDEX: 24.19 KG/M2 | HEIGHT: 67 IN | WEIGHT: 154.13 LBS | DIASTOLIC BLOOD PRESSURE: 70 MMHG | OXYGEN SATURATION: 96 % | HEART RATE: 81 BPM | SYSTOLIC BLOOD PRESSURE: 104 MMHG

## 2017-11-02 DIAGNOSIS — R06.09 DOE (DYSPNEA ON EXERTION): Primary | ICD-10-CM

## 2017-11-02 DIAGNOSIS — R07.9 CHEST PAIN, UNSPECIFIED TYPE: ICD-10-CM

## 2017-11-02 LAB
DIASTOLIC DYSFUNCTION: NO
ESTIMATED PA SYSTOLIC PRESSURE: 25.47
MITRAL VALVE REGURGITATION: NORMAL
RETIRED EF AND QEF - SEE NOTES: 55 (ref 55–65)
TRICUSPID VALVE REGURGITATION: NORMAL

## 2017-11-02 PROCEDURE — 93325 DOPPLER ECHO COLOR FLOW MAPG: CPT | Mod: S$GLB,,, | Performed by: INTERNAL MEDICINE

## 2017-11-02 PROCEDURE — 99203 OFFICE O/P NEW LOW 30 MIN: CPT | Mod: S$GLB,,, | Performed by: INTERNAL MEDICINE

## 2017-11-02 PROCEDURE — 93320 DOPPLER ECHO COMPLETE: CPT | Mod: S$GLB,,, | Performed by: INTERNAL MEDICINE

## 2017-11-02 PROCEDURE — 93351 STRESS TTE COMPLETE: CPT | Mod: S$GLB,,, | Performed by: INTERNAL MEDICINE

## 2017-11-02 PROCEDURE — 99999 PR PBB SHADOW E&M-EST. PATIENT-LVL IV: CPT | Mod: PBBFAC,,, | Performed by: INTERNAL MEDICINE

## 2017-11-02 NOTE — PROGRESS NOTES
I have personally interviewed and examine the patient. I have reviewed the notes, assessments and plans performed by Dr Wolf and I CONCUR with her documentation of Jolene Andre.

## 2017-11-02 NOTE — PROGRESS NOTES
" Patient ID:  Jolene Andre is a 61 y.o. female who presents for follow-up of ED visit for atypical chest pain after vomiting, during an episode of vertigo.    HPI   Ms. Fernández is a 62 y/o F with arthritis, bipolar, depression, migraines, hypothyroidism and hypercholesterolemia who follow follow up after she presented to ER last week with vertigo and chest pain.   Last week, as pt walked onto the court to play tennis, she felt light headed and dizzy. This progressed as she started to play, so she stopped and went to the ER. At the ER, she had nausea and 1 episode of vomiting and subsequently developed shortness of breath and chest tightness in the center of her chest. In the ER, work up was negative for ACS (Trop < 0.006, ECG wnl), PE (negative V/Q scan), Head CT negative, CBC and CMP within normal limits. She was given IV fluids and zofran in the ER and asked to follow up with Cardiology, Neurology and ENT.     Since discharge home, pt developed some urinary frequency and urgency, for which she was prescribed macrobid. Otherwise, denies any further episodes of lightheadedness or dizziness. She has not gone back to the Court. She continues to have 4/10 "chest tightness" in the mid-sternum which is persistent, unchanged with respiration or external compression. Denies any trauma to the area, palpitations, tinnitus, hearing loss, visual disturbance, recent illness, fevers, chills, drug or alcohol use. She reports some PRO with exercise that has been present for few months, resolves with rest, and not associated with chest pain.    Mother has high BP. No other significant family history of CAD.     EKG reveals no acute ischemia.  Troponin is within normal limits  VQ study reveals no evidence of PE at this time.  I suspect patient had vertigo symptoms vs vasovagal response    Lab Results   Component Value Date     10/25/2017    K 4.8 10/25/2017     10/25/2017    CO2 20 (L) 10/25/2017    BUN 14 10/25/2017    " "CREATININE 0.7 10/25/2017    GLU 98 10/25/2017    HGBA1C 5.3 01/12/2010    AST 26 10/25/2017    ALT 15 10/25/2017    ALBUMIN 3.6 10/25/2017    PROT 6.8 10/25/2017    BILITOT 1.7 (H) 10/25/2017    WBC 11.00 10/25/2017    HGB 13.0 10/25/2017    HCT 40.8 10/25/2017    MCV 96 10/25/2017     10/25/2017    INR 1.0 10/25/2017    TSH 2.25 08/15/2017     Lab Results   Component Value Date    CHOL 231 (H) 08/15/2017    HDL 80 08/15/2017    TRIG 72 08/15/2017     Lab Results   Component Value Date    LDLCALC 137 (H) 08/15/2017     Past Medical History:   Diagnosis Date    Allergy     Arthritis     Bipolar disorder     Headache(784.0)     Herniated disc     x4 in cervical area    History of migraine headaches     Hypothyroidism     Infections of kidney     has had multiple kidney infections in the past.    Low back pain     Migraine headache     Moderate mood disorder     Neck pain     Rotator cuff tear     Thyroid disease      Review of Systems   Constitution: Negative for chills, decreased appetite and fever.   HENT: Negative for congestion.    Eyes: Negative for visual disturbance.   Cardiovascular: Positive for chest pain. Negative for dyspnea on exertion, leg swelling, palpitations and paroxysmal nocturnal dyspnea.   Respiratory: Negative for cough, hemoptysis and shortness of breath.    Musculoskeletal: Negative for arthritis.   Gastrointestinal: Negative for abdominal pain, constipation, diarrhea, heartburn, melena, nausea and vomiting.   Neurological: Negative for dizziness, focal weakness, headaches, light-headedness and loss of balance.   Psychiatric/Behavioral: Negative for altered mental status.       Objective:   /70 (BP Location: Left arm, Patient Position: Sitting, BP Method: Large (Automatic))   Pulse 81   Ht 5' 7" (1.702 m)   Wt 69.9 kg (154 lb 1.6 oz)   SpO2 96%   BMI 24.14 kg/m²    Physical Exam   Constitutional: She is oriented to person, place, and time. She appears " well-developed and well-nourished.   HENT:   Head: Normocephalic and atraumatic.   Eyes: Pupils are equal, round, and reactive to light.   Neck: No JVD present.   Cardiovascular: Normal rate, regular rhythm and normal heart sounds.    No murmur heard.  Pulmonary/Chest: Effort normal and breath sounds normal. No respiratory distress.   Abdominal: Soft. Bowel sounds are normal. She exhibits no distension. There is no tenderness.   Musculoskeletal: Normal range of motion. She exhibits no edema.   Neurological: She is alert and oriented to person, place, and time.   Skin: Skin is warm.   Psychiatric: She has a normal mood and affect. Her behavior is normal. Judgment and thought content normal.   Vitals reviewed.    ECG (25-OCT-2017)  Normal sinus rhythm   Rightward axis  Borderline Abnormal ECG  When compared with ECG of 12-JAN-2010 12:06,  Nonspecific T wave abnormality no longer evident in Inferior leads    I have reviewed the following:     Details / Date    [x]   Labs     [x]   Imaging     [x]   Cardiology Procedures     []   Other      Assessment and Plan:       1. PRO (dyspnea on exertion)    2. Chest pain, unspecified type - atypical for cardiac chest pain; likely strain 2/2 emesis vs. musculoskeletal strain.     Jolene Andre was seen today for chest pain    PRO (dyspnea on exertion)    Chest pain, unspecified type  -     Exercise stress echo with color flow; Future    Patient seen and plan of care discussed with Dr. Nickerson.     Katerin Wolf MD  Internal Medicine, PGY-2  [phone]

## 2017-11-03 ENCOUNTER — OFFICE VISIT (OUTPATIENT)
Dept: INTERNAL MEDICINE | Facility: CLINIC | Age: 62
End: 2017-11-03
Payer: COMMERCIAL

## 2017-11-03 ENCOUNTER — PATIENT MESSAGE (OUTPATIENT)
Dept: CARDIOLOGY | Facility: CLINIC | Age: 62
End: 2017-11-03

## 2017-11-03 VITALS
WEIGHT: 154 LBS | DIASTOLIC BLOOD PRESSURE: 71 MMHG | BODY MASS INDEX: 24.17 KG/M2 | SYSTOLIC BLOOD PRESSURE: 96 MMHG | HEIGHT: 67 IN | HEART RATE: 72 BPM

## 2017-11-03 DIAGNOSIS — N39.0 URINARY TRACT INFECTION WITHOUT HEMATURIA, SITE UNSPECIFIED: Primary | ICD-10-CM

## 2017-11-03 LAB
BILIRUB UR QL STRIP: NEGATIVE
CLARITY UR REFRACT.AUTO: CLEAR
COLOR UR AUTO: YELLOW
GLUCOSE UR QL STRIP: NEGATIVE
HGB UR QL STRIP: NEGATIVE
KETONES UR QL STRIP: NEGATIVE
LEUKOCYTE ESTERASE UR QL STRIP: NEGATIVE
NITRITE UR QL STRIP: NEGATIVE
PH UR STRIP: 5 [PH] (ref 5–8)
PROT UR QL STRIP: NEGATIVE
SP GR UR STRIP: 1.01 (ref 1–1.03)
URN SPEC COLLECT METH UR: NORMAL
UROBILINOGEN UR STRIP-ACNC: NEGATIVE EU/DL

## 2017-11-03 PROCEDURE — 81003 URINALYSIS AUTO W/O SCOPE: CPT

## 2017-11-03 PROCEDURE — 99999 PR PBB SHADOW E&M-EST. PATIENT-LVL III: CPT | Mod: PBBFAC,,, | Performed by: INTERNAL MEDICINE

## 2017-11-03 PROCEDURE — 87086 URINE CULTURE/COLONY COUNT: CPT

## 2017-11-03 PROCEDURE — 99213 OFFICE O/P EST LOW 20 MIN: CPT | Mod: S$GLB,,, | Performed by: INTERNAL MEDICINE

## 2017-11-04 ENCOUNTER — PATIENT MESSAGE (OUTPATIENT)
Dept: INTERNAL MEDICINE | Facility: CLINIC | Age: 62
End: 2017-11-04

## 2017-11-04 LAB — BACTERIA UR CULT: NO GROWTH

## 2017-11-06 ENCOUNTER — OFFICE VISIT (OUTPATIENT)
Dept: NEUROLOGY | Facility: CLINIC | Age: 62
End: 2017-11-06
Payer: COMMERCIAL

## 2017-11-06 VITALS
BODY MASS INDEX: 24.12 KG/M2 | SYSTOLIC BLOOD PRESSURE: 91 MMHG | HEART RATE: 67 BPM | WEIGHT: 153.69 LBS | DIASTOLIC BLOOD PRESSURE: 68 MMHG | HEIGHT: 67 IN

## 2017-11-06 DIAGNOSIS — G89.29 CHRONIC NECK PAIN: ICD-10-CM

## 2017-11-06 DIAGNOSIS — G43.009 MIGRAINE WITHOUT AURA AND WITHOUT STATUS MIGRAINOSUS, NOT INTRACTABLE: Primary | ICD-10-CM

## 2017-11-06 DIAGNOSIS — M54.2 CHRONIC NECK PAIN: ICD-10-CM

## 2017-11-06 PROCEDURE — 99999 PR PBB SHADOW E&M-EST. PATIENT-LVL II: CPT | Mod: PBBFAC,,, | Performed by: NEUROMUSCULOSKELETAL MEDICINE & OMM

## 2017-11-06 PROCEDURE — 99204 OFFICE O/P NEW MOD 45 MIN: CPT | Mod: S$GLB,,, | Performed by: NEUROMUSCULOSKELETAL MEDICINE & OMM

## 2017-11-06 RX ORDER — SUMATRIPTAN SUCCINATE 100 MG/1
100 TABLET ORAL
Qty: 10 TABLET | Refills: 3 | Status: SHIPPED | OUTPATIENT
Start: 2017-11-06 | End: 2017-12-06

## 2017-11-06 RX ORDER — AMITRIPTYLINE HYDROCHLORIDE 10 MG/1
10 TABLET, FILM COATED ORAL NIGHTLY PRN
Qty: 30 TABLET | Refills: 3 | Status: SHIPPED | OUTPATIENT
Start: 2017-11-06 | End: 2018-05-11

## 2017-11-06 NOTE — PROGRESS NOTES
Jolene Andre  1955  Review of patient's allergies indicates:   Allergen Reactions    Rocephin [ceftriaxone]      Severe migraine    Abilify [aripiprazole] Other (See Comments)     Tardive dyskinesia  - do not give any other meds that affect dopamine    Codeine      Other reaction(s): Unknown    Demerol [meperidine]      Interacts with meds    Erythromycin      Other reaction(s): Unknown    Fioricet  [butalbital-acetaminophen-caff] Other (See Comments)    Iodine      Other reaction(s): Unknown    Nsaids (non-steroidal anti-inflammatory drug)      Other reaction(s): Hives    Shellfish containing products      Other reaction(s): Unknown  Other reaction(s): Unknown     [unfilled]    Past Medical History:   Diagnosis Date    Allergy     Arthritis     Bipolar disorder     Headache(784.0)     Herniated disc     x4 in cervical area    History of migraine headaches     Hypothyroidism     Infections of kidney     has had multiple kidney infections in the past.    Low back pain     Migraine headache     Moderate mood disorder     Neck pain     Rotator cuff tear     Thyroid disease      Social History     Social History    Marital status:      Spouse name: N/A    Number of children: 2    Years of education: 18     Occupational History    Homemaker/Retired      Social History Main Topics    Smoking status: Never Smoker    Smokeless tobacco: Never Used    Alcohol use 1.2 oz/week     1 Glasses of wine, 1 Cans of beer per week      Comment: occasional maybe every 3 months    Drug use: No    Sexual activity: Yes     Partners: Male     Other Topics Concern    Not on file     Social History Narrative    No narrative on file     Family History   Problem Relation Age of Onset    Hypertension Mother     Migraines Mother     Tremor Mother     Hyperlipidemia Mother     Hypertension Sister     Cancer Maternal Grandmother      uterine    Heart attack Neg Hx     Heart disease Neg Hx         Review of systems:  Constitutional-negative  Eyes-negative  ENT, mouth-negative  Cardiovascular-negative  Respiratory-negative  GI-negative  - negative  Musculoskeletal-negative  Skin-negative  Neurologic-negative  Psychiatric-negative  Endocrine-negative  Hematology/lymph nodes-negative  Allergies/immunology-negative  Jolene Andre  1955  Review of patient's allergies indicates:   Allergen Reactions    Rocephin [ceftriaxone]      Severe migraine    Abilify [aripiprazole] Other (See Comments)     Tardive dyskinesia  - do not give any other meds that affect dopamine    Codeine      Other reaction(s): Unknown    Demerol [meperidine]      Interacts with meds    Erythromycin      Other reaction(s): Unknown    Fioricet  [butalbital-acetaminophen-caff] Other (See Comments)    Iodine      Other reaction(s): Unknown    Nsaids (non-steroidal anti-inflammatory drug)      Other reaction(s): Hives    Shellfish containing products      Other reaction(s): Unknown  Other reaction(s): Unknown     [unfilled]    Past Medical History:   Diagnosis Date    Allergy     Arthritis     Bipolar disorder     Headache(784.0)     Herniated disc     x4 in cervical area    History of migraine headaches     Hypothyroidism     Infections of kidney     has had multiple kidney infections in the past.    Low back pain     Migraine headache     Moderate mood disorder     Neck pain     Rotator cuff tear     Thyroid disease      Social History     Social History    Marital status:      Spouse name: N/A    Number of children: 2    Years of education: 18     Occupational History    Homemaker/Retired      Social History Main Topics    Smoking status: Never Smoker    Smokeless tobacco: Never Used    Alcohol use 1.2 oz/week     1 Glasses of wine, 1 Cans of beer per week      Comment: occasional maybe every 3 months    Drug use: No    Sexual activity: Yes     Partners: Male     Other Topics Concern    Not  on file     Social History Narrative    No narrative on file     Family History   Problem Relation Age of Onset    Hypertension Mother     Migraines Mother     Tremor Mother     Hyperlipidemia Mother     Hypertension Sister     Cancer Maternal Grandmother      uterine    Heart attack Neg Hx     Heart disease Neg Hx        Review of systems:  Constitutional-negative  Eyes-negative  ENT, mouth-negative  Cardiovascular-negative  Respiratory-negative  GI-negative  - negative  Musculoskeletal-negative  Skin-negative  Neurologic-negative  Psychiatric-negative  Endocrine-negative  Hematology/lymph nodes-negative  Allergies/immunology-negative  Gen. Appearance: Well-developed with no obvious deformities  Carotid arteries symmetrical pulses  Peripheral vascular shows symmetrical pulses with no obvious edema or tenderness  Social History : Patient is a retired .  Spends much of her time playing tournament tennis.  Present history:   This is a 61-year-old white female who presents with a history of migraine headaches since childhood.  During her teenage years she would have bad headaches with her menstrual cycle.  She presently is having 8-10 headaches per month typically responding to Maxalt in the past however no longer does this work.  Headaches very from 1-2 days.  She has had the present headache for 2-1/2 days.  She describes a headache is stabbing pain in the left eye radiating to the back of the head with for-5/10 intensity associated with photophobia, sonophobia, and occasionally nausea.  She was in the emergency room last week for dizziness, vomiting and chest pain.  CT scan was reportedly normal..  She notes that wine triggers her headaches.  She is presently on birth control patch with a history of a hysterectomy total at 33 years old.  She has occasional aura prior to the headache in the left eye described as blurriness.  She does suffer from low blood pressure and notes that she has  orthostatic hypotension     Neurological Exam:  Mental status-alert and oriented to person, place, and time; attention span and concentration is good. Fund of knowledge-patient is aware of current events and able to give detailed history of the current problem.recent and remote memory seems intact. Language function is normal with no evidence of aphasia  Cranial nerves:Visual acuity to hand chart -normal; visual fields to confrontation normal;pupils were equal and reactive to light ;no evidence of ptosis ;  funduscopic examination was normal with sharp disc margins. external ocular movements were full with no nystagmus. Facial sensation to pinprick : normal ; corneal reflexes intact; Facial muscles were symmetrical. Hearing is unimpaired symmetrical finger rub; Tongue movements - normal ; palate movements - normal ;Swallowing unimpaired. Shoulder shrug was intact with good strength Speech was normal  Motor examination: Upper : normal                                      Lower extremities - Normal;muscle tone was normal ;                  Right-handed  Sensory examination:   Upper; normal pinprick and soft touch ;   Lower extremities - normal and symmetrical.   Vibration sense: 15-20 seconds @ toes  Deep tendon reflexes: upper extremities :1-2+ symmetrical ;     lower extremities KJ- 1-2 +; AJ - 1-2+ Both plantar responses were flexor  Cerebellar examination upper: Normal finger to nose and rapid alternating movements  Gait: Steady with no ataxia;      heel and toe walk normal  Romberg test: negative       Tandem gait: Normal    Involuntary movements: Negative  TMJ - no tenderness  Cervical examination:For lateral  range of motion with mild  pain Cervical tenderness  Left positiver examination: Low back tenderness-negative                  Sciatic notchtenderness-negative            Straight leg raising : negative    Impression:Migraine with occasional aura in the left eye; Orthostatic hypotension; cervical syndrome;  history of degenerative disc disease     Recommendations/Plan Trial of Imitrex 100 mg half tablet at onset of headache.  Start amitriptyline 10 mg at night; discuss cervical syndrome in the use of Flexeril however do not want to start 2 different medications  at same time.  Keep headache diary; follow-up 1 month: wants

## 2017-11-09 NOTE — PROGRESS NOTES
Subjective:       Patient ID: Jolene Andre is a 61 y.o. female.    Chief Complaint: Follow-up    Pt felt fine but had been lifting heavy boxes in the few days prior - had also taken a 5 hour trip one way without many stops prior and did not drink much fluid and felt like she had a UTI - went to [email or web address] tennis in the heat and got progressively more dizzy then started with chest discomfort.  Went to ED - got fuids they did not eval for UTI.  No evidence of MI or PE.  - saw cardiology had an negative stress echo - heart looks great.  Probably was dehydration and UTI that started all the symptoms - she feels fine today - wants to recheck her urine - seeing Neuro for her worsening migraines.      Review of Systems   Respiratory: Negative for shortness of breath (PND or orthopnea).    Cardiovascular: Negative for chest pain (arm pain or jaw pain).   Gastrointestinal: Negative for abdominal pain, diarrhea, nausea and vomiting.   Genitourinary: Negative for dysuria.   Neurological: Positive for headaches. Negative for seizures and syncope.       Objective:      Physical Exam   Constitutional: She is oriented to person, place, and time. She appears well-developed and well-nourished. No distress.   HENT:   Head: Normocephalic.   Mouth/Throat: Oropharynx is clear and moist.   Neck: Neck supple. No JVD present. No thyromegaly present.   Cardiovascular: Normal rate, regular rhythm, normal heart sounds and intact distal pulses.  Exam reveals no gallop and no friction rub.    No murmur heard.  Pulmonary/Chest: Effort normal and breath sounds normal. She has no wheezes. She has no rales.   Abdominal: Soft. Bowel sounds are normal. She exhibits no distension and no mass. There is no tenderness. There is no rebound and no guarding.   Musculoskeletal: She exhibits no edema.   Lymphadenopathy:     She has no cervical adenopathy.   Neurological: She is alert and oriented to person, place, and time. She has normal reflexes.   Skin: Skin is  warm and dry.   Psychiatric: She has a normal mood and affect. Her behavior is normal. Judgment and thought content normal.       Assessment:       1. Urinary tract infection without hematuria, site unspecified        Plan:   Urinary tract infection without hematuria, site unspecified  -     Urinalysis  -     Urine culture    Reassured that heart and lungs are normal.  Keep Neuro appt.

## 2017-11-21 RX ORDER — LEVOTHYROXINE SODIUM 100 UG/1
TABLET ORAL
Qty: 30 TABLET | Refills: 6 | Status: SHIPPED | OUTPATIENT
Start: 2017-11-21 | End: 2018-05-11

## 2017-11-21 RX ORDER — LEVOTHYROXINE SODIUM 100 UG/1
TABLET ORAL
Qty: 30 TABLET | Refills: 0 | OUTPATIENT
Start: 2017-11-21

## 2018-01-09 ENCOUNTER — HOSPITAL ENCOUNTER (OUTPATIENT)
Dept: RADIOLOGY | Facility: HOSPITAL | Age: 63
Discharge: HOME | End: 2018-01-09
Attending: INTERNAL MEDICINE
Payer: COMMERCIAL

## 2018-01-09 ENCOUNTER — OFFICE VISIT (OUTPATIENT)
Dept: OBSTETRICS AND GYNECOLOGY | Facility: CLINIC | Age: 63
End: 2018-01-09
Payer: COMMERCIAL

## 2018-01-09 VITALS
SYSTOLIC BLOOD PRESSURE: 102 MMHG | DIASTOLIC BLOOD PRESSURE: 76 MMHG | HEIGHT: 67 IN | WEIGHT: 156.75 LBS | BODY MASS INDEX: 24.6 KG/M2

## 2018-01-09 DIAGNOSIS — Z12.31 VISIT FOR SCREENING MAMMOGRAM: ICD-10-CM

## 2018-01-09 DIAGNOSIS — Z79.890 POSTMENOPAUSAL HORMONE REPLACEMENT THERAPY: ICD-10-CM

## 2018-01-09 DIAGNOSIS — Z79.890 SURGICAL MENOPAUSE ON HORMONE REPLACEMENT THERAPY: ICD-10-CM

## 2018-01-09 DIAGNOSIS — Z86.69 HX OF MIGRAINE HEADACHES: ICD-10-CM

## 2018-01-09 DIAGNOSIS — Z01.419 VISIT FOR GYNECOLOGIC EXAMINATION: Primary | ICD-10-CM

## 2018-01-09 DIAGNOSIS — E89.40 SURGICAL MENOPAUSE ON HORMONE REPLACEMENT THERAPY: ICD-10-CM

## 2018-01-09 DIAGNOSIS — N76.2 ACUTE VULVITIS: ICD-10-CM

## 2018-01-09 DIAGNOSIS — N39.0 URINARY TRACT INFECTION WITHOUT HEMATURIA, SITE UNSPECIFIED: ICD-10-CM

## 2018-01-09 LAB
BACTERIA #/AREA URNS AUTO: ABNORMAL /HPF
BILIRUB UR QL STRIP: NEGATIVE
CLARITY UR REFRACT.AUTO: ABNORMAL
COLOR UR AUTO: YELLOW
GLUCOSE UR QL STRIP: NEGATIVE
HGB UR QL STRIP: ABNORMAL
KETONES UR QL STRIP: NEGATIVE
LEUKOCYTE ESTERASE UR QL STRIP: ABNORMAL
MICROSCOPIC COMMENT: ABNORMAL
NITRITE UR QL STRIP: POSITIVE
PH UR STRIP: 5 [PH] (ref 5–8)
PROT UR QL STRIP: NEGATIVE
RBC #/AREA URNS AUTO: 5 /HPF (ref 0–4)
SP GR UR STRIP: 1.01 (ref 1–1.03)
SQUAMOUS #/AREA URNS AUTO: 4 /HPF
URN SPEC COLLECT METH UR: ABNORMAL
UROBILINOGEN UR STRIP-ACNC: NEGATIVE EU/DL
WBC #/AREA URNS AUTO: >100 /HPF (ref 0–5)

## 2018-01-09 PROCEDURE — 99999 PR PBB SHADOW E&M-EST. PATIENT-LVL III: CPT | Mod: PBBFAC,,, | Performed by: NURSE PRACTITIONER

## 2018-01-09 PROCEDURE — 77067 SCR MAMMO BI INCL CAD: CPT | Mod: TC

## 2018-01-09 PROCEDURE — 87186 SC STD MICRODIL/AGAR DIL: CPT

## 2018-01-09 PROCEDURE — 77067 SCR MAMMO BI INCL CAD: CPT | Mod: 26,,, | Performed by: RADIOLOGY

## 2018-01-09 PROCEDURE — 99386 PREV VISIT NEW AGE 40-64: CPT | Mod: S$GLB,,, | Performed by: NURSE PRACTITIONER

## 2018-01-09 PROCEDURE — 87077 CULTURE AEROBIC IDENTIFY: CPT

## 2018-01-09 PROCEDURE — 81001 URINALYSIS AUTO W/SCOPE: CPT

## 2018-01-09 PROCEDURE — 87086 URINE CULTURE/COLONY COUNT: CPT

## 2018-01-09 PROCEDURE — 87088 URINE BACTERIA CULTURE: CPT

## 2018-01-09 RX ORDER — ESTRADIOL 0.04 MG/D
1 FILM, EXTENDED RELEASE TRANSDERMAL
Qty: 24 PATCH | Refills: 4 | Status: SHIPPED | OUTPATIENT
Start: 2018-01-11 | End: 2019-05-15 | Stop reason: SDUPTHER

## 2018-01-09 RX ORDER — FLUCONAZOLE 150 MG/1
150 TABLET ORAL ONCE
Qty: 1 TABLET | Refills: 1 | Status: SHIPPED | OUTPATIENT
Start: 2018-01-09 | End: 2018-01-09

## 2018-01-09 RX ORDER — SUMATRIPTAN SUCCINATE 100 MG/1
TABLET ORAL
Refills: 2 | COMMUNITY
Start: 2017-12-30 | End: 2018-03-12 | Stop reason: SDUPTHER

## 2018-01-09 RX ORDER — PHENAZOPYRIDINE HYDROCHLORIDE 200 MG/1
200 TABLET, FILM COATED ORAL 3 TIMES DAILY PRN
Qty: 20 TABLET | Refills: 0 | Status: SHIPPED | OUTPATIENT
Start: 2018-01-09 | End: 2018-05-11

## 2018-01-09 RX ORDER — NITROFURANTOIN 25; 75 MG/1; MG/1
100 CAPSULE ORAL 2 TIMES DAILY
Qty: 14 CAPSULE | Refills: 0 | Status: SHIPPED | OUTPATIENT
Start: 2018-01-09 | End: 2018-01-16

## 2018-01-09 NOTE — PROGRESS NOTES
HISTORY OF PRESENT ILLNESS:    Jolene Andre is a 62 y.o. female, [id number]  presents today for her routine visit, ERT refills and may have a UTI.  -Here to establish care.  -Is working with her Neurologist to find a better treatment for her migraines, but seems like every time she doesn't change her ERT patch on time, she gets a migraine.   -Needs refills.   -Also c/o vaginal burning, dysuria and frequency not associated with fever, back or pelvic pain, odor, itching, vaginal discharge, bleeding.   -Gets frequent UTIs and has been worked up by Dr. Young.      Past Medical History:   Diagnosis Date    Allergy     Arthritis     Bipolar disorder     Headache(784.0)     Herniated disc     x4 in cervical area    History of migraine headaches     Hypothyroidism     Infections of kidney     has had multiple kidney infections in the past.    Low back pain     Migraine headache     Moderate mood disorder     Neck pain     Rotator cuff tear     Thyroid disease        Past Surgical History:   Procedure Laterality Date    BLADDER SUSPENSION      COLONOSCOPY N/A 2016    Procedure: COLONOSCOPY;  Surgeon: Daron Scruggs MD;  Location: [address];  Service: Endoscopy;  Laterality: N/A;    COSMETIC SURGERY      face lift     CYSTOSCOPY      HYSTERECTOMY      age 33 CECY/BSO (pain, bleeding)    OOPHORECTOMY      TONSILLECTOMY         MEDICATIONS AND ALLERGIES:      Current Outpatient Prescriptions:     escitalopram oxalate (LEXAPRO) 20 MG tablet, , Disp: , Rfl:     [START ON 2018] estradiol (VIVELLE-DOT) 0.0375 mg/24 hr, Place 1 patch onto the skin twice a week., Disp: 24 patch, Rfl: 4    levothyroxine (SYNTHROID) 100 MCG tablet, TAKE 1 TABLET(100 MCG) BY MOUTH EVERY DAY, Disp: 30 tablet, Rfl: 6    lithium (LITHOBID) 300 MG CR tablet, Take 750 mg by mouth once daily. , Disp: , Rfl:     sumatriptan (IMITREX) 100 MG tablet, TK 1 T PO Q 2 H PRF MIGRAINE, Disp: , Rfl: 2    alprazolam  (XANAX) 0.25 MG tablet, TK 1 TO 2 TS QID PRF ANXIETY, Disp: , Rfl: 1    amitriptyline (ELAVIL) 10 MG tablet, Take 1 tablet (10 mg total) by mouth nightly as needed for Insomnia., Disp: 30 tablet, Rfl: 3    clonazePAM (KLONOPIN) 0.5 MG tablet, , Disp: , Rfl:     fluconazole (DIFLUCAN) 150 MG Tab, Take 1 tablet (150 mg total) by mouth once. REFILL AND RE-DOSE IF SYMPTOMS RECUR, Disp: 1 tablet, Rfl: 1    nitrofurantoin, macrocrystal-monohydrate, (MACROBID) 100 MG capsule, Take 1 capsule (100 mg total) by mouth 2 (two) times daily., Disp: 14 capsule, Rfl: 0    ondansetron (ZOFRAN) 4 MG tablet, Take 1 tablet (4 mg total) by mouth every 6 (six) hours., Disp: 20 tablet, Rfl: 0    phenazopyridine (PYRIDIUM) 200 MG tablet, Take 1 tablet (200 mg total) by mouth 3 (three) times daily as needed for Pain., Disp: 20 tablet, Rfl: 0    ranitidine (ZANTAC) 150 MG capsule, Take 150 mg by mouth 3 (three) times daily., Disp: , Rfl:     rizatriptan (MAXALT) 10 MG tablet, TAKE 1 TABLET BY MOUTH EVERY 2 HOURS AS NEEDED FOR MIGRAINE, Disp: 20 tablet, Rfl: 0    Review of patient's allergies indicates:   Allergen Reactions    Rocephin [ceftriaxone]      Severe migraine    Abilify [aripiprazole] Other (See Comments)     Tardive dyskinesia  - do not give any other meds that affect dopamine    Codeine      Other reaction(s): Unknown    Demerol [meperidine]      Interacts with meds    Erythromycin      Other reaction(s): Unknown    Fioricet  [butalbital-acetaminophen-caff] Other (See Comments)    Iodine      Other reaction(s): Unknown    Nsaids (non-steroidal anti-inflammatory drug)      Other reaction(s): Hives    Shellfish containing products      Other reaction(s): Unknown  Other reaction(s): Unknown       Family History   Problem Relation Age of Onset    Hypertension Mother     Migraines Mother     Tremor Mother     Hyperlipidemia Mother     Hypertension Sister     Uterine cancer Maternal Grandmother     Breast cancer  "Maternal Aunt     Colon cancer Neg Hx     Ovarian cancer Neg Hx        Social History     Social History    Marital status:      Spouse name: N/A    Number of children: 2    Years of education: 18     Occupational History    Homemaker/Retired      Social History Main Topics    Smoking status: Never Smoker    Smokeless tobacco: Never Used    Alcohol use 1.2 oz/week     1 Glasses of wine, 1 Cans of beer per week      Comment: occasional maybe every 3 months    Drug use: No    Sexual activity: Yes     Partners: Male     Other Topics Concern    Not on file     Social History Narrative    No narrative on file       OB HISTORY: Number of vaginal deliveries:2     COMPREHENSIVE GYN HISTORY:  PAP History: Denies abnormal Paps.  Infection History: Denies STDs. Denies PID.  Benign History: Denies uterine fibroids. Denies ovarian cysts. Denies endometriosis. Denies other conditions.  Cancer History: Denies cervical cancer. Denies uterine cancer or hyperplasia. Denies ovarian cancer. Denies vulvar cancer or pre-cancer. Denies vaginal cancer or pre-cancer. Denies breast cancer. Denies colon cancer.  Sexual Activity History: Reports currently being sexually active  Menstrual History: Denies menses. Pt is  not on ERT.     ROS:  GENERAL: No weight changes. No swelling. No fatigue. No fever. No vasomotor symptoms.  CARDIOVASCULAR: No chest pain. No shortness of breath. No leg cramps.   NEUROLOGICAL: No headaches. No vision changes.  BREASTS: No pain. No lumps. No discharge.  ABDOMEN: No pain. No nausea. No vomiting. No diarrhea. No constipation.  REPRODUCTIVE: No abnormal bleeding.  VULVA: No pain. No lesions. + BURNING.  VAGINA: No relaxation. No itching. No odor. No discharge. No lesions.  URINARY: No incontinence. No nocturia.+ FREQUENCY AND DYSURIA.    /76   Ht 5' 7" (1.702 m)   Wt 71.1 kg (156 lb 12 oz)   BMI 24.55 kg/m²     PE:  APPEARANCE: Well nourished, well developed, in no acute " distress.  AFFECT: WNL, alert and oriented x 3.  SKIN: No acne or hirsutism.  NECK: Neck symmetric without masses or thyromegaly.  NODES: No inguinal, cervical, axillary or femoral lymph node enlargement.  CHEST: Good respiratory effort.   ABDOMEN: Soft. No tenderness or masses. No CVA tenderness.  BREASTS: Symmetrical, no skin changes or visible lesions. No palpable masses, nipple discharge bilaterally.  PELVIC: ERYTHEMATOUS ATROPHIC EXTERNAL FEMALE GENITALIA without lesions. Normal hair distribution. Adequate perineal body, normal urethral meatus. VAGINA DRY without lesions or discharge. No significant cystocele or rectocele. Bimanual exam shows CERVIX and UTERUS to be SURGICALLY ABSENT. Adnexa without masses or tenderness.  EXTREMITIES: No edema.    PROCEDURES:  Urine dipstick with + Nitrites    DIAGNOSIS:  1. Visit for gynecologic examination    2. Surgical menopause    3. Hx of migraine headaches    4. Postmenopausal hormone replacement therapy    5. Urinary tract infection without hematuria, site unspecified    6. Acute vulvitis        Orders Placed This Encounter    Urine culture    Urinalysis    estradiol (VIVELLE-DOT) 0.0375 mg/24 hr    nitrofurantoin, macrocrystal-monohydrate, (MACROBID) 100 MG capsule    phenazopyridine (PYRIDIUM) 200 MG tablet    fluconazole (DIFLUCAN) 150 MG Tab   Up to date on mammogram, BMD and colonoscopy    COUNSELING:  The patient was counseled today on:  -HRT risks and benefits including the increased risks of CVD, MI, VTE, CVA , and Invasive Breast Cancer on Prempro and the increased risk of CVA with Premarin as reported by the [employer] Studies;  benefits of HRT/ERT including improvement in vasomotor and other climacteric symptoms such as possible improvements in sleep and mood and reduction of risk for osteoporosis; her personal risks;  types and routes of HRT and that the transdermal route has a decreased stroke risk due to avoidance of 1st pass through the liver; ACOG's  recommendation to use hormone replacement therapy for the relief of hot flashes alone and to be on the lowest dose possible for the shortest amount of time; and alternative therapies for vasomotor symptoms (not FDA approved) including soy, black cohosh, Vit E and avoidance of triggers such as cigarette smoking, alcohol, humidity, stress and caffeine and alternative Rxs for treatment of menopause symptoms such as antidepressants or Clonidine;  -UTI prevention including   a. perineal hygiene, wipe front to back,  b. drink water prior to intercourse and urinate afterwards and avoid certain positions which could increase likelyhood of UTIs,  c. establish regular bladder habits,   d. avoid vulvovaginal irritants,   e. increase fluids and avoid caffeine and alcohol;  -signs of pylenephritis and to go to the ED if develops fever, chills, n/v, back pain, worsening dyuria, hematuria;   -pelvic rest until symptoms resolve;  -Macrobid, Pyridium, Diflucan use and potential side effects;  -osteoporosis prevention, calcium supplementation, regular weight bearing exercise;  -ACS PAP guidelines (no paps), with recommendations for pelvic exams every 3-5 years as she had her cervix, uterus and ovaries removed for benign reasons;  -recommendation for yearly mammogram;  -to see her primary care physician for all other health maintenance.    FOLLOW-UP with me for next routine visit.

## 2018-01-11 LAB — BACTERIA UR CULT: NORMAL

## 2018-01-26 ENCOUNTER — OFFICE VISIT (OUTPATIENT)
Dept: NEUROLOGY | Facility: CLINIC | Age: 63
End: 2018-01-26
Payer: COMMERCIAL

## 2018-01-26 VITALS
HEIGHT: 67 IN | WEIGHT: 159.38 LBS | SYSTOLIC BLOOD PRESSURE: 92 MMHG | HEART RATE: 91 BPM | BODY MASS INDEX: 25.01 KG/M2 | DIASTOLIC BLOOD PRESSURE: 65 MMHG

## 2018-01-26 DIAGNOSIS — G43.009 MIGRAINE WITHOUT AURA AND WITHOUT STATUS MIGRAINOSUS, NOT INTRACTABLE: Primary | ICD-10-CM

## 2018-01-26 PROCEDURE — 99999 PR PBB SHADOW E&M-EST. PATIENT-LVL III: CPT | Mod: PBBFAC,,, | Performed by: NEUROMUSCULOSKELETAL MEDICINE & OMM

## 2018-01-26 PROCEDURE — 99213 OFFICE O/P EST LOW 20 MIN: CPT | Mod: S$GLB,,, | Performed by: NEUROMUSCULOSKELETAL MEDICINE & OMM

## 2018-01-26 NOTE — PROGRESS NOTES
Social History : Patient is a retired .  Spends much of her time playing tournament tennis.  Present history: Patient presents for follow-up for her migraines.  She typically takes half of a 100 mg Imitrex with good results most of the time however occasionally needs to repeat a half.  She complains of amitriptyline 10 mg at night has caused her to gain 10 pounds and she no with longer wishes to take that.  She does notice triggers include alcohol and high sugar content.  Previous note: 11-6-17: This is a 61-year-old white female who presents with a history of migraine headaches since childhood.  During her teenage years she would have bad headaches with her menstrual cycle.  She presently is having 8-10 headaches per month typically responding to Maxalt in the past however no longer does this work.  Headaches very from 1-2 days.  She has had the present headache for 2-1/2 days.  She describes a headache is stabbing pain in the left eye radiating to the back of the head with for-5/10 intensity associated with photophobia, sonophobia, and occasionally nausea.  She was in the emergency room last week for dizziness, vomiting and chest pain.  CT scan was reportedly normal..  She notes that wine triggers her headaches.  She is presently on birth control patch with a history of a hysterectomy total at 33 years old.  She has occasional aura prior to the headache in the left eye described as blurriness.  She does suffer from low blood pressure and notes that she has orthostatic hypotension      Neurological Exam:  Mental status-alert and oriented to person, place, and time; attention span and concentration is good. Fund of knowledge-patient is aware of current events and able to give detailed history of the current problem.recent and remote memory seems intact. Language function is normal with no evidence of aphasia  Cranial nerves:Visual acuity to hand chart -normal; visual fields to confrontation normal;pupils  were equal and reactive to light ;no evidence of ptosis ;  funduscopic examination was normal with sharp disc margins. external ocular movements were full with no nystagmus. Facial sensation to pinprick : normal ; corneal reflexes intact; Facial muscles were symmetrical. Hearing is unimpaired symmetrical finger rub; Tongue movements - normal ; palate movements - normal ;Swallowing unimpaired. Shoulder shrug was intact with good strength Speech was normal  Motor examination: Upper : normal                                      Lower extremities - Normal;muscle tone was normal ;                  Right-handed  Sensory examination:   Upper; normal pinprick and soft touch ;   Lower extremities - normal and symmetrical.   Vibration sense: 15-20 seconds @ toes  Deep tendon reflexes: upper extremities :1-2+ symmetrical ;     lower extremities KJ- 1-2 +; AJ - 1-2+ Both plantar responses were flexor  Cerebellar examination upper: Normal finger to nose and rapid alternating movements  Gait: Steady with no ataxia;      heel and toe walk normal  Romberg test: negative       Tandem gait: Normal    Involuntary movements: Negative  TMJ - no tenderness  Cervical examination:For lateral  range of motion with mild  pain Cervical tenderness  Left positiver examination: Low back tenderness-negative                  Sciatic notchtenderness-negative            Straight leg raising : negative     Impression:Migraine with occasional aura in the left eye; Orthostatic hypotension; cervical syndrome; history of degenerative disc disease      Recommendations/Plan increase to Imitrex 100 mg  tablet at onset of headache.   stop amitriptyline 10 mg every other night for 3 nights and then stop.  Discussed possibility of using Trokendi however suggested getting off the amitriptyline for a week or so first and keep a headache diary as to the frequency of the headaches to determine whether or not she should be on preventive medication at this point..   Keep headache diary; follow-u 2 month:

## 2018-01-30 ENCOUNTER — TELEPHONE (OUTPATIENT)
Dept: UROLOGY | Facility: CLINIC | Age: 63
End: 2018-01-30

## 2018-01-30 NOTE — TELEPHONE ENCOUNTER
----- Message from Kathya Tang sent at 1/30/2018  8:54 AM CST -----  Contact: pt [phone]  Pt states she is is still having the same UTI symptoms even after being treated. Pt scheduled an appointment 02/05/18 with Monika but states she would rather see Dr Young. Pt is asking to speak with the nurse regarding being seen sooner than first available appointment on 03/06/18.

## 2018-02-05 ENCOUNTER — OFFICE VISIT (OUTPATIENT)
Dept: UROLOGY | Facility: CLINIC | Age: 63
End: 2018-02-05
Payer: COMMERCIAL

## 2018-02-05 VITALS — BODY MASS INDEX: 24.86 KG/M2 | WEIGHT: 158.75 LBS

## 2018-02-05 DIAGNOSIS — R82.90 MALODOROUS URINE: ICD-10-CM

## 2018-02-05 DIAGNOSIS — R30.0 DYSURIA: Primary | ICD-10-CM

## 2018-02-05 DIAGNOSIS — R35.0 FREQUENCY OF URINATION: ICD-10-CM

## 2018-02-05 PROCEDURE — 87186 SC STD MICRODIL/AGAR DIL: CPT

## 2018-02-05 PROCEDURE — 87088 URINE BACTERIA CULTURE: CPT

## 2018-02-05 PROCEDURE — 3008F BODY MASS INDEX DOCD: CPT | Mod: S$GLB,,, | Performed by: PHYSICIAN ASSISTANT

## 2018-02-05 PROCEDURE — 51701 INSERT BLADDER CATHETER: CPT | Mod: S$GLB,,, | Performed by: PHYSICIAN ASSISTANT

## 2018-02-05 PROCEDURE — 99213 OFFICE O/P EST LOW 20 MIN: CPT | Mod: 25,S$GLB,, | Performed by: PHYSICIAN ASSISTANT

## 2018-02-05 PROCEDURE — 81002 URINALYSIS NONAUTO W/O SCOPE: CPT | Mod: S$GLB,,, | Performed by: PHYSICIAN ASSISTANT

## 2018-02-05 PROCEDURE — 99999 PR PBB SHADOW E&M-EST. PATIENT-LVL III: CPT | Mod: PBBFAC,,, | Performed by: PHYSICIAN ASSISTANT

## 2018-02-05 PROCEDURE — 87086 URINE CULTURE/COLONY COUNT: CPT

## 2018-02-05 PROCEDURE — 87077 CULTURE AEROBIC IDENTIFY: CPT

## 2018-02-05 NOTE — PROGRESS NOTES
CHIEF COMPLAINT:    Mrs. Andre is a 62 y.o. female presenting for UTI.  PRESENTING ILLNESS:    Jolene Andre is a 62 y.o. female with a PMH of recurrent UTI who presents for UTI.  She reports being treated for an UTI last month with macrobid.  She denies improvement.  She reports dysuria, malodorous urine and frequency.  Prior to last month she presented to the urgent care twice for UTI symptoms.   She denies gross hematuria.      She denies fevers and chills.    She reports left sided back pain.      She had a recurrent UTI work up May 2017.    Cysto 05/2017:   Bilateral ureteral orifice were evaluated and noted to be normal with clear efflux.  The bladder was completely surveyed in a systematic fashion.   No bladder tumors or lesions were seen.  No strictures were noted.    Renal u/s 5/24/17: There is no focal abnormality, nephrolithiasis, or hydronephrosis bilaterally.      Urine cultures:   1/9/2018: e. coli  2/2/2017: E. coli   1/24/2017: E. Coli    REVIEW OF SYSTEMS:    Constitutional: Negative for fever and chills.   HENT: Negative for hearing loss.   Eyes: Negative for visual disturbance.   Respiratory: Negative for shortness of breath.   Cardiovascular: Negative for chest pain.   Gastrointestinal: Negative for vomiting, and constipation.   Genitourinary:  See HPI.   Neurological: Negative for dizziness.   Hematological: Does not bruise/bleed easily.   Psychiatric/Behavioral: Negative for confusion.     PATIENT HISTORY:    Past Medical History:   Diagnosis Date    Allergy     Arthritis     Bipolar disorder     Headache(784.0)     Herniated disc     x4 in cervical area    History of migraine headaches     Hypothyroidism     Infections of kidney     has had multiple kidney infections in the past.    Low back pain     Migraine headache     Moderate mood disorder     Neck pain     Rotator cuff tear     Thyroid disease        Past Surgical History:   Procedure Laterality Date    BLADDER SUSPENSION       COLONOSCOPY N/A 5/5/2016    Procedure: COLONOSCOPY;  Surgeon: Daron Scruggs MD;  Location: [facility] ([address] [place] [address]);  Service: Endoscopy;  Laterality: N/A;    COSMETIC SURGERY      face lift     CYSTOSCOPY      HYSTERECTOMY  1989    age 33 CECY/BSO (pain, bleeding)    OOPHORECTOMY      TONSILLECTOMY         Family History   Problem Relation Age of Onset    Hypertension Mother     Migraines Mother     Tremor Mother     Hyperlipidemia Mother     Hypertension Sister     Uterine cancer Maternal Grandmother     Breast cancer Maternal Aunt     Colon cancer Neg Hx     Ovarian cancer Neg Hx        Social History     Social History    Marital status:      Spouse name: N/A    Number of children: 2    Years of education: 18     Occupational History    Homemaker/Retired      Social History Main Topics    Smoking status: Never Smoker    Smokeless tobacco: Never Used    Alcohol use 1.2 oz/week     1 Glasses of wine, 1 Cans of beer per week      Comment: occasional maybe every 3 months    Drug use: No    Sexual activity: Yes     Partners: Male     Other Topics Concern    Not on file     Social History Narrative    No narrative on file       Allergies:  Rocephin [ceftriaxone]; Abilify [aripiprazole]; Codeine; Demerol [meperidine]; Erythromycin; Fioricet  [butalbital-acetaminophen-caff]; Iodine; Nsaids (non-steroidal anti-inflammatory drug); and Shellfish containing products    Medications:    Current Outpatient Prescriptions:     alprazolam (XANAX) 0.25 MG tablet, TK 1 TO 2 TS QID PRF ANXIETY, Disp: , Rfl: 1    clonazePAM (KLONOPIN) 0.5 MG tablet, , Disp: , Rfl:     escitalopram oxalate (LEXAPRO) 20 MG tablet, , Disp: , Rfl:     estradiol (VIVELLE-DOT) 0.0375 mg/24 hr, Place 1 patch onto the skin twice a week., Disp: 24 patch, Rfl: 4    levothyroxine (SYNTHROID) 100 MCG tablet, TAKE 1 TABLET(100 MCG) BY MOUTH EVERY DAY, Disp: 30 tablet, Rfl: 6    lithium (LITHOBID) 300 MG CR tablet, Take  750 mg by mouth once daily. , Disp: , Rfl:     ranitidine (ZANTAC) 150 MG capsule, Take 150 mg by mouth 3 (three) times daily., Disp: , Rfl:     rizatriptan (MAXALT) 10 MG tablet, TAKE 1 TABLET BY MOUTH EVERY 2 HOURS AS NEEDED FOR MIGRAINE, Disp: 20 tablet, Rfl: 0    sumatriptan (IMITREX) 100 MG tablet, TK 1 T PO Q 2 H PRF MIGRAINE, Disp: , Rfl: 2    amitriptyline (ELAVIL) 10 MG tablet, Take 1 tablet (10 mg total) by mouth nightly as needed for Insomnia., Disp: 30 tablet, Rfl: 3    doxycycline (VIBRAMYCIN) 100 MG Cap, Take 1 capsule (100 mg total) by mouth every 12 (twelve) hours. Take with food, Disp: 14 capsule, Rfl: 0    ondansetron (ZOFRAN) 4 MG tablet, Take 1 tablet (4 mg total) by mouth every 6 (six) hours., Disp: 20 tablet, Rfl: 0    phenazopyridine (PYRIDIUM) 200 MG tablet, Take 1 tablet (200 mg total) by mouth 3 (three) times daily as needed for Pain., Disp: 20 tablet, Rfl: 0    PHYSICAL EXAMINATION:    Constitutional: She appears well-developed and well-nourished.  She is in no apparent distress.    Eyes: No scleral icterus noted bilaterally.  No discharge noted bilaterally.    Cardiovascular: Normal rate.  No pitting edema noted in lower extremities bilaterally    Pulmonary/Chest: Effort normal. No respiratory distress.     Abdominal:  She exhibits no distension.  There is no CVA tenderness.     Lymphadenopathy:          Right: No supraclavicular adenopathy present.        Left: No supraclavicular adenopathy present.     Neurological: She is alert and oriented to person, place, and time.     Skin: Skin is warm and dry.     Psych: Cooperative with normal affect.    Genitourinary:  Normal external female genitalia  Urethral meatus is normal  Consent verbally obtained.  Hibiclens was applied to the urethral meatus. An in and out cath was performed after voiding.  The PVR was 120 ml.        Physical Exam   Musculoskeletal:        Back:          LABS:    U/a: 1.0202, pH 5,  negative.    IMPRESSION:    Encounter Diagnoses   Name Primary?    Dysuria Yes    Malodorous urine     Frequency of urination        PLAN:  U/a negative for infection.  However given her persistent urinary symptoms.  Will send urine for urine culture.    follow up based on culture.    Monika Arndt PA-C

## 2018-02-07 LAB — BACTERIA UR CULT: NORMAL

## 2018-02-08 ENCOUNTER — TELEPHONE (OUTPATIENT)
Dept: UROLOGY | Facility: CLINIC | Age: 63
End: 2018-02-08

## 2018-02-08 DIAGNOSIS — N39.0 BACTERIAL UTI: Primary | ICD-10-CM

## 2018-02-08 DIAGNOSIS — A49.9 BACTERIAL UTI: Primary | ICD-10-CM

## 2018-02-08 RX ORDER — DOXYCYCLINE 100 MG/1
100 CAPSULE ORAL EVERY 12 HOURS
Qty: 14 CAPSULE | Refills: 0 | Status: SHIPPED | OUTPATIENT
Start: 2018-02-08 | End: 2018-02-15

## 2018-02-08 NOTE — TELEPHONE ENCOUNTER
----- Message from Tiffanie Iglesias LPN sent at 2/8/2018  8:49 AM CST -----  Contact: pt [phone]      ----- Message -----  From: Kathya Tang  Sent: 2/7/2018   4:43 PM  To: Galen Frakn Staff    Pt states she is returning Monika's call. Pt states you can leave her a vm with the reason for the call and she will call back if she has anymore questions.

## 2018-02-08 NOTE — TELEPHONE ENCOUNTER
Returning call to clinic. Message left regarding positive urine culture and antibiotics sent to pharmacy on file.

## 2018-02-19 ENCOUNTER — TELEPHONE (OUTPATIENT)
Dept: UROLOGY | Facility: CLINIC | Age: 63
End: 2018-02-19

## 2018-02-19 NOTE — TELEPHONE ENCOUNTER
I sent her culture on 2/5/18 and it was positive.  I treated her with doxycycline.    I can call her to discuss starting topical estrogen and D-mannose.    Do you want me to have her to come in to see you?

## 2018-02-19 NOTE — TELEPHONE ENCOUNTER
Left message for patient to call back.   I don't see where a culture got sent.   Should we do vaginal estrogen? That was my plan last time.   D Mannose 2grams daily would be good to add.   You might need to have her come back and get a repeat culture or I can see her as well.

## 2018-02-22 ENCOUNTER — LAB VISIT (OUTPATIENT)
Dept: LAB | Facility: HOSPITAL | Age: 63
End: 2018-02-22
Attending: UROLOGY
Payer: COMMERCIAL

## 2018-02-22 DIAGNOSIS — N39.0 RECURRENT UTI: ICD-10-CM

## 2018-02-22 PROCEDURE — 87086 URINE CULTURE/COLONY COUNT: CPT

## 2018-02-23 LAB — BACTERIA UR CULT: NO GROWTH

## 2018-02-26 ENCOUNTER — PATIENT MESSAGE (OUTPATIENT)
Dept: UROLOGY | Facility: CLINIC | Age: 63
End: 2018-02-26

## 2018-03-12 RX ORDER — SUMATRIPTAN SUCCINATE 100 MG/1
TABLET ORAL
Qty: 10 TABLET | Refills: 2 | Status: SHIPPED | OUTPATIENT
Start: 2018-03-12 | End: 2018-05-11 | Stop reason: SDUPTHER

## 2018-03-18 ENCOUNTER — OFFICE VISIT (OUTPATIENT)
Dept: URGENT CARE | Facility: CLINIC | Age: 63
End: 2018-03-18
Payer: COMMERCIAL

## 2018-03-18 VITALS
HEART RATE: 80 BPM | OXYGEN SATURATION: 96 % | RESPIRATION RATE: 18 BRPM | HEIGHT: 67 IN | BODY MASS INDEX: 24.8 KG/M2 | TEMPERATURE: 99 F | WEIGHT: 158 LBS | SYSTOLIC BLOOD PRESSURE: 102 MMHG | DIASTOLIC BLOOD PRESSURE: 75 MMHG

## 2018-03-18 DIAGNOSIS — R05.9 COUGH: ICD-10-CM

## 2018-03-18 DIAGNOSIS — G43.909 MIGRAINE WITHOUT STATUS MIGRAINOSUS, NOT INTRACTABLE, UNSPECIFIED MIGRAINE TYPE: ICD-10-CM

## 2018-03-18 DIAGNOSIS — R30.0 DYSURIA: ICD-10-CM

## 2018-03-18 DIAGNOSIS — J06.9 VIRAL URI WITH COUGH: ICD-10-CM

## 2018-03-18 DIAGNOSIS — N30.00 ACUTE CYSTITIS WITHOUT HEMATURIA: Primary | ICD-10-CM

## 2018-03-18 LAB
BILIRUB UR QL STRIP: NEGATIVE
CTP QC/QA: YES
FLUAV AG NPH QL: NEGATIVE
FLUBV AG NPH QL: NEGATIVE
GLUCOSE UR QL STRIP: NEGATIVE
KETONES UR QL STRIP: NEGATIVE
LEUKOCYTE ESTERASE UR QL STRIP: POSITIVE
PH, POC UA: 6 (ref 5–8)
POC BLOOD, URINE: NEGATIVE
POC NITRATES, URINE: POSITIVE
PROT UR QL STRIP: NEGATIVE
SP GR UR STRIP: 1.01 (ref 1–1.03)
UROBILINOGEN UR STRIP-ACNC: NORMAL (ref 0.1–1.1)

## 2018-03-18 PROCEDURE — 87804 INFLUENZA ASSAY W/OPTIC: CPT | Mod: 59,QW,S$GLB, | Performed by: PHYSICIAN ASSISTANT

## 2018-03-18 PROCEDURE — 96372 THER/PROPH/DIAG INJ SC/IM: CPT | Mod: S$GLB,,, | Performed by: EMERGENCY MEDICINE

## 2018-03-18 PROCEDURE — 99215 OFFICE O/P EST HI 40 MIN: CPT | Mod: 25,S$GLB,, | Performed by: PHYSICIAN ASSISTANT

## 2018-03-18 PROCEDURE — 81003 URINALYSIS AUTO W/O SCOPE: CPT | Mod: QW,S$GLB,, | Performed by: PHYSICIAN ASSISTANT

## 2018-03-18 RX ORDER — BETAMETHASONE SODIUM PHOSPHATE AND BETAMETHASONE ACETATE 3; 3 MG/ML; MG/ML
6 INJECTION, SUSPENSION INTRA-ARTICULAR; INTRALESIONAL; INTRAMUSCULAR; SOFT TISSUE
Status: COMPLETED | OUTPATIENT
Start: 2018-03-18 | End: 2018-03-18

## 2018-03-18 RX ORDER — NITROFURANTOIN 25; 75 MG/1; MG/1
100 CAPSULE ORAL 2 TIMES DAILY
Qty: 14 CAPSULE | Refills: 0 | Status: SHIPPED | OUTPATIENT
Start: 2018-03-18 | End: 2018-03-25

## 2018-03-18 RX ORDER — KETOROLAC TROMETHAMINE 30 MG/ML
30 INJECTION, SOLUTION INTRAMUSCULAR; INTRAVENOUS
Status: COMPLETED | OUTPATIENT
Start: 2018-03-18 | End: 2018-03-18

## 2018-03-18 RX ADMIN — KETOROLAC TROMETHAMINE 30 MG: 30 INJECTION, SOLUTION INTRAMUSCULAR; INTRAVENOUS at 11:03

## 2018-03-18 RX ADMIN — BETAMETHASONE SODIUM PHOSPHATE AND BETAMETHASONE ACETATE 6 MG: 3; 3 INJECTION, SUSPENSION INTRA-ARTICULAR; INTRALESIONAL; INTRAMUSCULAR; SOFT TISSUE at 11:03

## 2018-03-18 NOTE — PROGRESS NOTES
"Subjective:       Patient ID: Jolene Andre is a 62 y.o. female.    Vitals:  height is 5' 7" (1.702 m) and weight is 71.7 kg (158 lb). Her oral temperature is 98.9 °F (37.2 °C). Her blood pressure is 102/75 and her pulse is 80. Her respiration is 18 and oxygen saturation is 96%.     Chief Complaint: Cough    Cough   This is a new problem. The current episode started yesterday. The problem has been gradually worsening. The cough is non-productive. Associated symptoms include ear pain, headaches, myalgias, nasal congestion and a sore throat. Pertinent negatives include no chest pain, chills, eye redness, fever, shortness of breath, sweats or wheezing. She has tried nothing for the symptoms. Her past medical history is significant for environmental allergies.   Migraine    This is a new problem. The current episode started today. The problem occurs constantly. The problem has been unchanged. The pain is located in the frontal region. The pain does not radiate. The pain quality is similar to prior headaches. The quality of the pain is described as throbbing. The pain is at a severity of 6/10. The pain is moderate. Associated symptoms include coughing, ear pain and a sore throat. Pertinent negatives include no abdominal pain, blurred vision, dizziness, eye redness, facial sweating, fever, hearing loss, insomnia, loss of balance, muscle aches, nausea, numbness, scalp tenderness, seizures, sinus pressure, tingling, tinnitus, vomiting or weakness. The symptoms are aggravated by noise and bright light. She has tried nothing for the symptoms.   Dysuria    This is a new problem. The current episode started acute onset. The problem occurs every urination. The quality of the pain is described as burning. The pain is at a severity of 6/10. The pain is moderate. There has been no fever. Associated symptoms include frequency, hesitancy and urgency. Pertinent negatives include no chills, hematuria, nausea, sweats or vomiting. She has " tried nothing for the symptoms. Her past medical history is significant for recurrent UTIs.     Review of Systems   Constitution: Negative for chills, fever, weakness and malaise/fatigue.   HENT: Positive for congestion, ear pain and sore throat. Negative for hearing loss, hoarse voice, sinus pressure and tinnitus.    Eyes: Negative for blurred vision, discharge and redness.   Cardiovascular: Negative for chest pain, dyspnea on exertion and leg swelling.   Respiratory: Positive for cough. Negative for shortness of breath, sputum production and wheezing.    Musculoskeletal: Positive for myalgias.   Gastrointestinal: Negative for abdominal pain, nausea and vomiting.   Genitourinary: Positive for dysuria, frequency, hesitancy and urgency. Negative for hematuria.   Neurological: Positive for headaches. Negative for dizziness, loss of balance, numbness, seizures and tingling.   Psychiatric/Behavioral: The patient does not have insomnia.    Allergic/Immunologic: Positive for environmental allergies.       Objective:      Physical Exam   Constitutional: She is oriented to person, place, and time. She appears well-developed and well-nourished. She is cooperative.  Non-toxic appearance. She does not appear ill. No distress.   HENT:   Head: Normocephalic and atraumatic.   Right Ear: Hearing, tympanic membrane, external ear and ear canal normal.   Left Ear: Hearing, tympanic membrane, external ear and ear canal normal.   Nose: Rhinorrhea present. No mucosal edema or nasal deformity. No epistaxis. Right sinus exhibits no maxillary sinus tenderness and no frontal sinus tenderness. Left sinus exhibits no maxillary sinus tenderness and no frontal sinus tenderness.   Mouth/Throat: Uvula is midline, oropharynx is clear and moist and mucous membranes are normal. No trismus in the jaw. Normal dentition. No uvula swelling. No posterior oropharyngeal erythema.   No temporal TTP    No scalp tenderness   Eyes: Conjunctivae, EOM and lids  are normal. Pupils are equal, round, and reactive to light. No scleral icterus.   Sclera clear bilat   Neck: Trachea normal, normal range of motion, full passive range of motion without pain and phonation normal. Neck supple. No neck rigidity.   Cardiovascular: Normal rate, regular rhythm, normal heart sounds, intact distal pulses and normal pulses.    Pulmonary/Chest: Effort normal and breath sounds normal. No respiratory distress.   Abdominal: Soft. Normal appearance and bowel sounds are normal. She exhibits no distension, no abdominal bruit and no mass. There is no hepatosplenomegaly. There is no tenderness. There is no rigidity, no rebound, no guarding, no CVA tenderness, no tenderness at McBurney's point and negative Richards's sign.   Musculoskeletal: Normal range of motion. She exhibits no edema or deformity.   Neurological: She is alert and oriented to person, place, and time. No cranial nerve deficit or sensory deficit. She exhibits normal muscle tone. Coordination normal.   Skin: Skin is warm, dry and intact. She is not diaphoretic. No pallor.   Psychiatric: She has a normal mood and affect. Her speech is normal and behavior is normal. Judgment and thought content normal. Cognition and memory are normal.   Nursing note and vitals reviewed.      Assessment:       1. Acute cystitis without hematuria    2. Viral URI with cough    3. Migraine without status migrainosus, not intractable, unspecified migraine type    4. Cough    5. Dysuria        Plan:         Acute cystitis without hematuria  -     nitrofurantoin, macrocrystal-monohydrate, (MACROBID) 100 MG capsule; Take 1 capsule (100 mg total) by mouth 2 (two) times daily.  Dispense: 14 capsule; Refill: 0    Viral URI with cough  -     betamethasone acetate-betamethasone sodium phosphate injection 6 mg; Inject 1 mL (6 mg total) into the muscle one time.    Migraine without status migrainosus, not intractable, unspecified migraine type  -     ketorolac injection  30 mg; Inject 1 mL (30 mg total) into the muscle one time.    Cough  -     POCT Influenza A/B    Dysuria  -     POCT Urinalysis, Dipstick, Automated, W/O Scope  -     Urine culture  -     Urine culture        Viral Upper Respiratory Illness (Adult)  You have a viral upper respiratory illness (URI), which is another term for the common cold. This illness is contagious during the first few days. It is spread through the air by coughing and sneezing. It may also be spread by direct contact (touching the sick person and then touching your own eyes, nose, or mouth). Frequent handwashing will decrease risk of spread. Most viral illnesses go away within 7 to 10 days with rest and simple home remedies. Sometimes the illness may last for several weeks. Antibiotics will not kill a virus, and they are generally not prescribed for this condition.    Home care  · If symptoms are severe, rest at home for the first 2 to 3 days. When you resume activity, don't let yourself get too tired.  · Avoid being exposed to cigarette smoke (yours or others).  · You may use acetaminophen or ibuprofen to control pain and fever, unless another medicine was prescribed. (Note: If you have chronic liver or kidney disease, have ever had a stomach ulcer or gastrointestinal bleeding, or are taking blood-thinning medicines, talk with your healthcare provider before using these medicines.) Aspirin should never be given to anyone under 18 years of age who is ill with a viral infection or fever. It may cause severe liver or brain damage.  · Your appetite may be poor, so a light diet is fine. Avoid dehydration by drinking 6 to 8 glasses of fluids per day (water, soft drinks, juices, tea, or soup). Extra fluids will help loosen secretions in the nose and lungs.  · Over-the-counter cold medicines will not shorten the length of time youre sick, but they may be helpful for the following symptoms: cough, sore throat, and nasal and sinus congestion. (Note: Do  not use decongestants if you have high blood pressure.)  Follow-up care  Follow up with your healthcare provider, or as advised.  When to seek medical advice  Call your healthcare provider right away if any of these occur:  · Cough with lots of colored sputum (mucus)  · Severe headache; face, neck, or ear pain  · Difficulty swallowing due to throat pain  · Fever of 100.4°F (38°C)  Call 911, or get immediate medical care  Call emergency services right away if any of these occur:  · Chest pain, shortness of breath, wheezing, or difficulty breathing  · Coughing up blood  · Inability to swallow due to throat pain  Date Last Reviewed: 9/13/2015  © 5744-9949 [employer]. [address] [place] [address], [place] [address]. All rights reserved. This information is not intended as a substitute for professional medical care. Always follow your healthcare professional's instructions.        Preventing Migraine Headaches: Medicines and Lifestyle Changes     Going to bed and getting up at the same time each day, including weekends, may help prevent migraines.     A migraine is a type of severe headache. Having a migraine can be very painful. But there are steps you can take to help prevent migraines.  Medicines to help prevent migraines  · Your healthcare provider may prescribe certain medicines to help prevent migraines. These medicines may need to be taken daily. Or they may only need to be taken at times when youre likely to have a migraine.  · Common medicines used to help prevent migraines include:  ¨ Triptans (serotonin receptor agonists)  ¨ Nonsteroidal anti-inflammatory drugs (available over-the-counter)  ¨ Beta-blockers  ¨ Anticonvulsants  ¨ Tricyclic antidepressants  ¨ Calcium channel blockers  ¨ Certain vitamins, minerals, and plant extracts  ¨ Botulinum toxin injection (Botox) for certain chronic migraines   ¨ CGRP (calcitonin gene-related peptide) agnonists are being reviewed by the Food and Drug  "Administration (FDA)  Lifestyle changes for long-term prevention  Here are some suggestions:  · Exercise. Regular exercise can help prevent migraines and improve your health. (If exercise triggers your migraines, talk to your healthcare provider.)  · Keep regular habits. Dont skip or delay meals. Drink plenty of water. And go to bed and get up at about the same time each day. This includes weekends.  · Try alternative treatments. These are treatments that do not involve the use of medicines or surgery. They may help relieve symptoms and prevent migraines. Some treatment options include biofeedback and acupuncture. Ask your healthcare provider to tell you more about these treatments if you have questions.  · Limit caffeine. You may find that caffeine helps relieve pain during an attack. But too much caffeine can also trigger migraines. So, limit the amount of caffeine you consume.  Date Last Reviewed: 10/11/2015  © 2796-5050 [employer]. [address] [place] [address]. All rights reserved. This information is not intended as a substitute for professional medical care. Always follow your healthcare professional's instructions.        Bladder Infection, Female (Adult)    Urine is normally doesn't have any bacteria in it. But bacteria can get into the urinary tract from the skin around the rectum. Or they can travel in the blood from elsewhere in the body. Once they are in your urinary tract, they can cause infection in the urethra (urethritis), the bladder (cystitis), or the kidneys (pyelonephritis).  The most common place for an infection is in the bladder. This is called a bladder infection. This is one of the most common infections in women. Most bladder infections are easily treated. They are not serious unless the infection spreads to the kidney.  The phrases "bladder infection," "UTI," and "cystitis" are often used to describe the same thing. But they are not always the same. Cystitis is " an inflammation of the bladder. The most common cause of cystitis is an infection.  Symptoms  The infection causes inflammation in the urethra and bladder. This causes many of the symptoms. The most common symptoms of a bladder infection are:  · Pain or burning when urinating  · Having to urinate more often than usual  · Urgent need to urinate  · Only a small amount of urine comes out  · Blood in urine  · Abdominal discomfort. This is usually in the lower abdomen above the pubic bone.  · Cloudy urine  · Strong- or bad-smelling urine  · Unable to urinate (urinary retention)  · Unable to hold urine in (urinary incontinence)  · Fever  · Loss of appetite  · Confusion (in older adults)  Causes  Bladder infections are not contagious. You can't get one from someone else, from a toilet seat, or from sharing a bath.  The most common cause of bladder infections is bacteria from the bowels. The bacteria get onto the skin around the opening of the urethra. From there, they can get into the urine and travel up to the bladder, causing inflammation and infection. This usually happens because of:  · Wiping improperly after urinating. Always wipe from front to back.  · Bowel incontinence  · Pregnancy  · Procedures such as having a catheter inserted  · Older age  · Not emptying your bladder. This can allow bacteria a chance to grow in your urine.  · Dehydration  · Constipation  · Sex  · Use of a diaphragm for birth control   Treatment  Bladder infections are diagnosed by a urine test. They are treated with antibiotics and usually clear up quickly without complications. Treatment helps prevent a more serious kidney infection.  Medicines  Medicines can help in the treatment of a bladder infection:  · Take antibiotics until they are used up, even if you feel better. It is important to finish them to make sure the infection has cleared.  · You can use acetaminophen or ibuprofen for pain, fever, or discomfort, unless another medicine was  prescribed. If you have chronic liver or kidney disease, talk with your healthcare provider before using these medicines. Also talk with your provider if you've ever had a stomach ulcer or gastrointestinal bleeding, or are taking blood-thinner medicines.  · If you are given phenazopydridine to reduce burning with urination, it will cause your urine to become a bright orange color. This can stain clothing.  Care and prevention  These self-care steps can help prevent future infections:  · Drink plenty of fluids to prevent dehydration and flush out your bladder. Do this unless you must restrict fluids for other health reasons, or your doctor told you not to.  · Proper cleaning after going to the bathroom is important. Wipe from front to back after using the toilet to prevent the spread of bacteria.  · Urinate more often. Don't try to hold urine in for a long time.  · Wear loose-fitting clothes and cotton underwear. Avoid tight-fitting pants.  · Improve your diet and prevent constipation. Eat more fresh fruit and vegetables, and fiber, and less junk and fatty foods.  · Avoid sex until your symptoms are gone.  · Avoid caffeine, alcohol, and spicy foods. These can irritate your bladder.  · Urinate right after intercourse to flush out your bladder.  · If you use birth control pills and have frequent bladder infections, discuss it with your doctor.  Follow-up care  Call your healthcare provider if all symptoms are not gone after 3 days of treatment. This is especially important if you have repeat infections.  If a culture was done, you will be told if your treatment needs to be changed. If directed, you can call to find out the results.  If X-rays were done, you will be told if the results will affect your treatment.  Call 911  Call 911 if any of the following occur:  · Trouble breathing  · Hard to wake up or confusion  · Fainting or loss of consciousness  · Rapid heart rate  When to seek medical advice  Call your  healthcare provider right away if any of these occur:  · Fever of 100.4ºF (38.0ºC) or higher, or as directed by your healthcare provider  · Symptoms are not better by the third day of treatment  · Back or belly (abdominal) pain that gets worse  · Repeated vomiting, or unable to keep medicine down  · Weakness or dizziness  · Vaginal discharge  · Pain, redness, or swelling in the outer vaginal area (labia)  Date Last Reviewed: 10/1/2016  © 2267-9930 [employer]. [address] [place] [address]. All rights reserved. This information is not intended as a substitute for professional medical care. Always follow your healthcare professional's instructions.      Please follow up with your Primary care provider within 2-5 days if your signs and symptoms have not resolved or worsen.     If your condition worsens or fails to improve we recommend that you receive another evaluation at the emergency room immediately or contact your primary medical clinic to discuss your concerns.   You must understand that you have received an Urgent Care treatment only and that you may be released before all of your medical problems are known or treated. You, the patient, will arrange for follow up care as instructed.

## 2018-03-18 NOTE — PATIENT INSTRUCTIONS
Viral Upper Respiratory Illness (Adult)  You have a viral upper respiratory illness (URI), which is another term for the common cold. This illness is contagious during the first few days. It is spread through the air by coughing and sneezing. It may also be spread by direct contact (touching the sick person and then touching your own eyes, nose, or mouth). Frequent handwashing will decrease risk of spread. Most viral illnesses go away within 7 to 10 days with rest and simple home remedies. Sometimes the illness may last for several weeks. Antibiotics will not kill a virus, and they are generally not prescribed for this condition.    Home care  · If symptoms are severe, rest at home for the first 2 to 3 days. When you resume activity, don't let yourself get too tired.  · Avoid being exposed to cigarette smoke (yours or others).  · You may use acetaminophen or ibuprofen to control pain and fever, unless another medicine was prescribed. (Note: If you have chronic liver or kidney disease, have ever had a stomach ulcer or gastrointestinal bleeding, or are taking blood-thinning medicines, talk with your healthcare provider before using these medicines.) Aspirin should never be given to anyone under 18 years of age who is ill with a viral infection or fever. It may cause severe liver or brain damage.  · Your appetite may be poor, so a light diet is fine. Avoid dehydration by drinking 6 to 8 glasses of fluids per day (water, soft drinks, juices, tea, or soup). Extra fluids will help loosen secretions in the nose and lungs.  · Over-the-counter cold medicines will not shorten the length of time youre sick, but they may be helpful for the following symptoms: cough, sore throat, and nasal and sinus congestion. (Note: Do not use decongestants if you have high blood pressure.)  Follow-up care  Follow up with your healthcare provider, or as advised.  When to seek medical advice  Call your healthcare provider right away if any  of these occur:  · Cough with lots of colored sputum (mucus)  · Severe headache; face, neck, or ear pain  · Difficulty swallowing due to throat pain  · Fever of 100.4°F (38°C)  Call 911, or get immediate medical care  Call emergency services right away if any of these occur:  · Chest pain, shortness of breath, wheezing, or difficulty breathing  · Coughing up blood  · Inability to swallow due to throat pain  Date Last Reviewed: 9/13/2015 © 2000-2017 [employer]. [address] [place] [address]. All rights reserved. This information is not intended as a substitute for professional medical care. Always follow your healthcare professional's instructions.        Preventing Migraine Headaches: Medicines and Lifestyle Changes     Going to bed and getting up at the same time each day, including weekends, may help prevent migraines.     A migraine is a type of severe headache. Having a migraine can be very painful. But there are steps you can take to help prevent migraines.  Medicines to help prevent migraines  · Your healthcare provider may prescribe certain medicines to help prevent migraines. These medicines may need to be taken daily. Or they may only need to be taken at times when youre likely to have a migraine.  · Common medicines used to help prevent migraines include:  ¨ Triptans (serotonin receptor agonists)  ¨ Nonsteroidal anti-inflammatory drugs (available over-the-counter)  ¨ Beta-blockers  ¨ Anticonvulsants  ¨ Tricyclic antidepressants  ¨ Calcium channel blockers  ¨ Certain vitamins, minerals, and plant extracts  ¨ Botulinum toxin injection (Botox) for certain chronic migraines   ¨ CGRP (calcitonin gene-related peptide) agnonists are being reviewed by the Food and Drug Administration (FDA)  Lifestyle changes for long-term prevention  Here are some suggestions:  · Exercise. Regular exercise can help prevent migraines and improve your health. (If exercise triggers your migraines, talk to  "your healthcare provider.)  · Keep regular habits. Dont skip or delay meals. Drink plenty of water. And go to bed and get up at about the same time each day. This includes weekends.  · Try alternative treatments. These are treatments that do not involve the use of medicines or surgery. They may help relieve symptoms and prevent migraines. Some treatment options include biofeedback and acupuncture. Ask your healthcare provider to tell you more about these treatments if you have questions.  · Limit caffeine. You may find that caffeine helps relieve pain during an attack. But too much caffeine can also trigger migraines. So, limit the amount of caffeine you consume.  Date Last Reviewed: 10/11/2015  © 2946-0407 "[employer]. [address] [place] [address], [place] [address]. All rights reserved. This information is not intended as a substitute for professional medical care. Always follow your healthcare professional's instructions.        Bladder Infection, Female (Adult)    Urine is normally doesn't have any bacteria in it. But bacteria can get into the urinary tract from the skin around the rectum. Or they can travel in the blood from elsewhere in the body. Once they are in your urinary tract, they can cause infection in the urethra (urethritis), the bladder (cystitis), or the kidneys (pyelonephritis).  The most common place for an infection is in the bladder. This is called a bladder infection. This is one of the most common infections in women. Most bladder infections are easily treated. They are not serious unless the infection spreads to the kidney.  The phrases "bladder infection," "UTI," and "cystitis" are often used to describe the same thing. But they are not always the same. Cystitis is an inflammation of the bladder. The most common cause of cystitis is an infection.  Symptoms  The infection causes inflammation in the urethra and bladder. This causes many of the symptoms. The most common symptoms of a " bladder infection are:  · Pain or burning when urinating  · Having to urinate more often than usual  · Urgent need to urinate  · Only a small amount of urine comes out  · Blood in urine  · Abdominal discomfort. This is usually in the lower abdomen above the pubic bone.  · Cloudy urine  · Strong- or bad-smelling urine  · Unable to urinate (urinary retention)  · Unable to hold urine in (urinary incontinence)  · Fever  · Loss of appetite  · Confusion (in older adults)  Causes  Bladder infections are not contagious. You can't get one from someone else, from a toilet seat, or from sharing a bath.  The most common cause of bladder infections is bacteria from the bowels. The bacteria get onto the skin around the opening of the urethra. From there, they can get into the urine and travel up to the bladder, causing inflammation and infection. This usually happens because of:  · Wiping improperly after urinating. Always wipe from front to back.  · Bowel incontinence  · Pregnancy  · Procedures such as having a catheter inserted  · Older age  · Not emptying your bladder. This can allow bacteria a chance to grow in your urine.  · Dehydration  · Constipation  · Sex  · Use of a diaphragm for birth control   Treatment  Bladder infections are diagnosed by a urine test. They are treated with antibiotics and usually clear up quickly without complications. Treatment helps prevent a more serious kidney infection.  Medicines  Medicines can help in the treatment of a bladder infection:  · Take antibiotics until they are used up, even if you feel better. It is important to finish them to make sure the infection has cleared.  · You can use acetaminophen or ibuprofen for pain, fever, or discomfort, unless another medicine was prescribed. If you have chronic liver or kidney disease, talk with your healthcare provider before using these medicines. Also talk with your provider if you've ever had a stomach ulcer or gastrointestinal bleeding, or  are taking blood-thinner medicines.  · If you are given phenazopydridine to reduce burning with urination, it will cause your urine to become a bright orange color. This can stain clothing.  Care and prevention  These self-care steps can help prevent future infections:  · Drink plenty of fluids to prevent dehydration and flush out your bladder. Do this unless you must restrict fluids for other health reasons, or your doctor told you not to.  · Proper cleaning after going to the bathroom is important. Wipe from front to back after using the toilet to prevent the spread of bacteria.  · Urinate more often. Don't try to hold urine in for a long time.  · Wear loose-fitting clothes and cotton underwear. Avoid tight-fitting pants.  · Improve your diet and prevent constipation. Eat more fresh fruit and vegetables, and fiber, and less junk and fatty foods.  · Avoid sex until your symptoms are gone.  · Avoid caffeine, alcohol, and spicy foods. These can irritate your bladder.  · Urinate right after intercourse to flush out your bladder.  · If you use birth control pills and have frequent bladder infections, discuss it with your doctor.  Follow-up care  Call your healthcare provider if all symptoms are not gone after 3 days of treatment. This is especially important if you have repeat infections.  If a culture was done, you will be told if your treatment needs to be changed. If directed, you can call to find out the results.  If X-rays were done, you will be told if the results will affect your treatment.  Call 911  Call 911 if any of the following occur:  · Trouble breathing  · Hard to wake up or confusion  · Fainting or loss of consciousness  · Rapid heart rate  When to seek medical advice  Call your healthcare provider right away if any of these occur:  · Fever of 100.4ºF (38.0ºC) or higher, or as directed by your healthcare provider  · Symptoms are not better by the third day of treatment  · Back or belly (abdominal) pain  that gets worse  · Repeated vomiting, or unable to keep medicine down  · Weakness or dizziness  · Vaginal discharge  · Pain, redness, or swelling in the outer vaginal area (labia)  Date Last Reviewed: 10/1/2016  © 4834-8653 [employer]. [address] [place] [address]. All rights reserved. This information is not intended as a substitute for professional medical care. Always follow your healthcare professional's instructions.      Please follow up with your Primary care provider within 2-5 days if your signs and symptoms have not resolved or worsen.     If your condition worsens or fails to improve we recommend that you receive another evaluation at the emergency room immediately or contact your primary medical clinic to discuss your concerns.   You must understand that you have received an Urgent Care treatment only and that you may be released before all of your medical problems are known or treated. You, the patient, will arrange for follow up care as instructed.

## 2018-03-23 ENCOUNTER — TELEPHONE (OUTPATIENT)
Dept: URGENT CARE | Facility: CLINIC | Age: 63
End: 2018-03-23

## 2018-03-23 LAB
BACTERIA UR CULT: ABNORMAL
BACTERIA UR CULT: ABNORMAL
OTHER ANTIBIOTIC SUSC ISLT: ABNORMAL

## 2018-03-23 NOTE — TELEPHONE ENCOUNTER
----- Message from Ellyn Elam PA-C sent at 3/23/2018  9:28 AM CDT -----  Please contact patient with urine culture results. She was treated appropriately. Inquire about her symptoms. Ensure she has proper follow up with her urologist.

## 2018-03-23 NOTE — TELEPHONE ENCOUNTER
Notify patient with her urine test. Patient states she still are having symptoms. Patient  states she have an appointment on Tuesday to see her urolgist. Patient states she do not have any questions  at the present time.

## 2018-03-27 ENCOUNTER — OFFICE VISIT (OUTPATIENT)
Dept: UROLOGY | Facility: CLINIC | Age: 63
End: 2018-03-27
Payer: COMMERCIAL

## 2018-03-27 VITALS
SYSTOLIC BLOOD PRESSURE: 95 MMHG | BODY MASS INDEX: 25.6 KG/M2 | WEIGHT: 163.13 LBS | HEART RATE: 78 BPM | HEIGHT: 67 IN | DIASTOLIC BLOOD PRESSURE: 69 MMHG

## 2018-03-27 DIAGNOSIS — R35.0 URINARY FREQUENCY: ICD-10-CM

## 2018-03-27 DIAGNOSIS — N39.0 RECURRENT UTI: ICD-10-CM

## 2018-03-27 DIAGNOSIS — N30.80 CYSTITIS CYSTICA: Primary | ICD-10-CM

## 2018-03-27 LAB
BILIRUB SERPL-MCNC: NORMAL MG/DL
BLOOD URINE, POC: NORMAL
COLOR, POC UA: NORMAL
GLUCOSE UR QL STRIP: NORMAL
KETONES UR QL STRIP: NORMAL
LEUKOCYTE ESTERASE URINE, POC: NORMAL
NITRITE, POC UA: NORMAL
PH, POC UA: 5
PROTEIN, POC: NORMAL
SPECIFIC GRAVITY, POC UA: 1.01
UROBILINOGEN, POC UA: NORMAL

## 2018-03-27 PROCEDURE — 99999 PR PBB SHADOW E&M-EST. PATIENT-LVL III: CPT | Mod: PBBFAC,,, | Performed by: UROLOGY

## 2018-03-27 PROCEDURE — 81001 URINALYSIS AUTO W/SCOPE: CPT | Mod: S$GLB,,, | Performed by: UROLOGY

## 2018-03-27 PROCEDURE — 99214 OFFICE O/P EST MOD 30 MIN: CPT | Mod: 25,S$GLB,, | Performed by: UROLOGY

## 2018-03-27 RX ORDER — OXYBUTYNIN CHLORIDE 5 MG/1
5 TABLET, EXTENDED RELEASE ORAL DAILY
Qty: 30 TABLET | Refills: 11 | Status: SHIPPED | OUTPATIENT
Start: 2018-03-27 | End: 2018-05-11

## 2018-03-27 RX ORDER — ESTRADIOL 0.1 MG/G
CREAM VAGINAL
Qty: 42.5 G | Refills: 6 | Status: SHIPPED | OUTPATIENT
Start: 2018-03-27 | End: 2020-01-07

## 2018-03-27 NOTE — PATIENT INSTRUCTIONS
D-mannose 2 grams daily.  Align probiotic daily.  Vaginal estrogen.   Cranberry.   Fluid take.     Elyse Montez at [facility].

## 2018-03-27 NOTE — PROGRESS NOTES
Subjective:       Patient ID: Jolene Andre is a 62 y.o. female.    Chief Complaint: Urinary Tract Infection (here for results to the 72 hour urine sample she collected) and Urinary Frequency      Jolene Andre is a 62 y.o. female with a PMH of recurrent UTI who presents for follow up.    E.coli 3/18 resist to quinolones and amp  2/18 negative  2/5 positive e.coli resist to bactrim  1/18 positive e.coli pan sens  She had a recurrent UTI work up May 2017.    Cysto 05/2017:   Bilateral ureteral orifice were evaluated and noted to be normal with clear efflux.  The bladder was completely surveyed in a systematic fashion.   No bladder tumors or lesions were seen.  No strictures were noted.    Renal u/s 5/24/17: There is no focal abnormality, nephrolithiasis, or hydronephrosis bilaterally.      Urine cultures:   1/9/2018: e. coli  2/2/2017: E. coli   1/24/2017: E. Coli    Fatigue.     Probiotic resistance.     Nocturia 3-4 times. Some feet swelling. No sleep apnea. No caffeine after 3. Tries to limit fluids 2 hours before bedtime.   Drinking frequently.   Every 1 hour frequency.   Small volumes.   No incontinence.     H/o MMK.         Past Surgical History:   Procedure Laterality Date    BLADDER SUSPENSION      COLONOSCOPY N/A 5/5/2016    Procedure: COLONOSCOPY;  Surgeon: Daron Scruggs MD;  Location: [facility] ([address] [place] [address]);  Service: Endoscopy;  Laterality: N/A;    COSMETIC SURGERY      face lift     CYSTOSCOPY      HYSTERECTOMY  1989    age 33 CECY/BSO (pain, bleeding)    OOPHORECTOMY      TONSILLECTOMY         Past Medical History:   Diagnosis Date    Allergy     Arthritis     Bipolar disorder     Headache(784.0)     Herniated disc     x4 in cervical area    History of migraine headaches     Hypothyroidism     Infections of kidney     has had multiple kidney infections in the past.    Low back pain     Migraine headache     Moderate mood disorder     Neck pain     Rotator cuff tear     Thyroid disease         Social History     Social History    Marital status:      Spouse name: N/A    Number of children: 2    Years of education: 18     Occupational History    Homemaker/Retired      Social History Main Topics    Smoking status: Never Smoker    Smokeless tobacco: Never Used    Alcohol use 1.2 oz/week     1 Glasses of wine, 1 Cans of beer per week      Comment: occasional maybe every 3 months    Drug use: No    Sexual activity: Yes     Partners: Male     Other Topics Concern    Not on file     Social History Narrative    No narrative on file       Family History   Problem Relation Age of Onset    Hypertension Mother     Migraines Mother     Tremor Mother     Hyperlipidemia Mother     Hypertension Sister     Uterine cancer Maternal Grandmother     Breast cancer Maternal Aunt     Colon cancer Neg Hx     Ovarian cancer Neg Hx        Current Outpatient Prescriptions   Medication Sig Dispense Refill    alprazolam (XANAX) 0.25 MG tablet TK 1 TO 2 TS QID PRF ANXIETY  1    clonazePAM (KLONOPIN) 0.5 MG tablet       escitalopram oxalate (LEXAPRO) 20 MG tablet       estradiol (VIVELLE-DOT) 0.0375 mg/24 hr Place 1 patch onto the skin twice a week. 24 patch 4    levothyroxine (SYNTHROID) 100 MCG tablet TAKE 1 TABLET(100 MCG) BY MOUTH EVERY DAY 30 tablet 6    lithium (LITHOBID) 300 MG CR tablet Take 750 mg by mouth once daily.       ondansetron (ZOFRAN) 4 MG tablet Take 1 tablet (4 mg total) by mouth every 6 (six) hours. 20 tablet 0    phenazopyridine (PYRIDIUM) 200 MG tablet Take 1 tablet (200 mg total) by mouth 3 (three) times daily as needed for Pain. 20 tablet 0    ranitidine (ZANTAC) 150 MG capsule Take 150 mg by mouth 3 (three) times daily.      rizatriptan (MAXALT) 10 MG tablet TAKE 1 TABLET BY MOUTH EVERY 2 HOURS AS NEEDED FOR MIGRAINE 20 tablet 0    sumatriptan (IMITREX) 100 MG tablet TK 1 T PO Q 2 H PRF MIGRAINE 10 tablet 2    amitriptyline (ELAVIL) 10 MG tablet Take 1 tablet (10  mg total) by mouth nightly as needed for Insomnia. 30 tablet 3     No current facility-administered medications for this visit.        Review of patient's allergies indicates:   Allergen Reactions    Rocephin [ceftriaxone]      Severe migraine    Abilify [aripiprazole] Other (See Comments)     Tardive dyskinesia  - do not give any other meds that affect dopamine    Codeine      Other reaction(s): Unknown    Demerol [meperidine]      Interacts with meds    Doxycycline      headaches    Erythromycin      Other reaction(s): Unknown    Fioricet  [butalbital-acetaminophen-caff] Other (See Comments)    Iodine      Other reaction(s): Unknown    Nsaids (non-steroidal anti-inflammatory drug)      Other reaction(s): Hives    Shellfish containing products      Other reaction(s): Unknown  Other reaction(s): Unknown        BMP  Lab Results   Component Value Date     10/25/2017    K 4.8 10/25/2017     10/25/2017    CO2 20 (L) 10/25/2017    BUN 14 10/25/2017    CREATININE 0.7 10/25/2017    CALCIUM 9.0 10/25/2017    ANIONGAP 8 10/25/2017    ESTGFRAFRICA >60.0 10/25/2017    EGFRNONAA >60.0 10/25/2017       Review of Systems   Constitutional: Positive for fatigue. Negative for chills, fever and unexpected weight change.   HENT: Negative for nosebleeds.    Eyes: Negative for visual disturbance.   Respiratory: Negative for chest tightness.    Cardiovascular: Negative for chest pain.   Gastrointestinal: Positive for diarrhea.   Genitourinary: Positive for dysuria, frequency, nocturia and urgency. Negative for hematuria.   Musculoskeletal: Negative for myalgias.   Skin: Negative for rash.   Neurological: Negative for seizures.   Hematological: Does not bruise/bleed easily.   Psychiatric/Behavioral: Negative for behavioral problems.     Objective:      Physical Exam   Constitutional: She is oriented to person, place, and time. She appears well-developed and well-nourished.   HENT:   Head: Normocephalic and  atraumatic.   Eyes: No scleral icterus.   Cardiovascular: Normal rate.    Pulmonary/Chest: Effort normal.   Musculoskeletal: She exhibits no edema.   Neurological: She is alert and oriented to person, place, and time.   Skin: Skin is warm and dry.     Psychiatric: She has a normal mood and affect.     urine dip clear  Assessment:       1. Cystitis cystica    2. Urinary frequency    3. Recurrent UTI        Plan:   Jolene was seen today for urinary tract infection and urinary frequency.    Diagnoses and all orders for this visit:    Cystitis cystica  -     POCT urinalysis, dipstick or tablet reag    Urinary frequency  -     oxybutynin (DITROPAN-XL) 5 MG TR24; Take 1 tablet (5 mg total) by mouth once daily.    Recurrent UTI  -     estradiol (ESTRACE) 0.01 % (0.1 mg/gram) vaginal cream; Pea size amount to urethra area once a day for 2 weeks, then 2x/week  -     Urine culture; Future  -     Urine culture; Future    D-mannose 2 grams daily.  Align probiotic daily.  Vaginal estrogen trial.  Cranberry.   Fluid take.     Elyse Shelley Laser at [facility] discussed as well.     In future, trial of oxybutynin XR 5mg. Currently will hold off, but script given. Risks and benefits discussed.     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  She was told to message me if she has problems getting into my clinic.   F/u 3 months.   I spent 25 minutes with the patient of which more than half was spent in direct consultation with the patient in regards to our treatment and plan.

## 2018-04-17 ENCOUNTER — HOSPITAL ENCOUNTER (EMERGENCY)
Facility: HOSPITAL | Age: 63
Discharge: HOME | End: 2018-04-17
Attending: EMERGENCY MEDICINE
Payer: COMMERCIAL

## 2018-04-17 VITALS
BODY MASS INDEX: 24.33 KG/M2 | HEIGHT: 67 IN | TEMPERATURE: 99 F | SYSTOLIC BLOOD PRESSURE: 117 MMHG | DIASTOLIC BLOOD PRESSURE: 77 MMHG | OXYGEN SATURATION: 100 % | RESPIRATION RATE: 16 BRPM | HEART RATE: 64 BPM | WEIGHT: 155 LBS

## 2018-04-17 DIAGNOSIS — R42 VERTIGO: Primary | ICD-10-CM

## 2018-04-17 LAB
BASOPHILS # BLD AUTO: 0.03 K/UL
BASOPHILS NFR BLD: 0.5 %
BILIRUB UR QL STRIP: NEGATIVE
CLARITY UR REFRACT.AUTO: ABNORMAL
COLOR UR AUTO: YELLOW
DIFFERENTIAL METHOD: ABNORMAL
EOSINOPHIL # BLD AUTO: 0.3 K/UL
EOSINOPHIL NFR BLD: 5.1 %
ERYTHROCYTE [DISTWIDTH] IN BLOOD BY AUTOMATED COUNT: 12.4 %
GLUCOSE UR QL STRIP: NEGATIVE
HCT VFR BLD AUTO: 44.3 %
HGB BLD-MCNC: 14 G/DL
HGB UR QL STRIP: NEGATIVE
IMM GRANULOCYTES # BLD AUTO: 0.03 K/UL
IMM GRANULOCYTES NFR BLD AUTO: 0.5 %
KETONES UR QL STRIP: NEGATIVE
LEUKOCYTE ESTERASE UR QL STRIP: NEGATIVE
LITHIUM SERPL-SCNC: 0.7 MMOL/L
LYMPHOCYTES # BLD AUTO: 1.3 K/UL
LYMPHOCYTES NFR BLD: 22.2 %
MCH RBC QN AUTO: 31 PG
MCHC RBC AUTO-ENTMCNC: 31.6 G/DL
MCV RBC AUTO: 98 FL
MONOCYTES # BLD AUTO: 0.4 K/UL
MONOCYTES NFR BLD: 6.3 %
NEUTROPHILS # BLD AUTO: 4 K/UL
NEUTROPHILS NFR BLD: 65.4 %
NITRITE UR QL STRIP: NEGATIVE
NRBC BLD-RTO: 0 /100 WBC
PH UR STRIP: 5 [PH] (ref 5–8)
PLATELET # BLD AUTO: 321 K/UL
PMV BLD AUTO: 9.1 FL
PROT UR QL STRIP: NEGATIVE
RBC # BLD AUTO: 4.51 M/UL
SP GR UR STRIP: 1.02 (ref 1–1.03)
T4 FREE SERPL-MCNC: 1.13 NG/DL
TSH SERPL DL<=0.005 MIU/L-ACNC: 6.23 UIU/ML
URN SPEC COLLECT METH UR: ABNORMAL
UROBILINOGEN UR STRIP-ACNC: NEGATIVE EU/DL
WBC # BLD AUTO: 6.04 K/UL

## 2018-04-17 PROCEDURE — 99284 EMERGENCY DEPT VISIT MOD MDM: CPT | Mod: 25

## 2018-04-17 PROCEDURE — 99284 EMERGENCY DEPT VISIT MOD MDM: CPT | Mod: ,,, | Performed by: EMERGENCY MEDICINE

## 2018-04-17 PROCEDURE — 81003 URINALYSIS AUTO W/O SCOPE: CPT

## 2018-04-17 PROCEDURE — 80178 ASSAY OF LITHIUM: CPT

## 2018-04-17 PROCEDURE — 85025 COMPLETE CBC W/AUTO DIFF WBC: CPT

## 2018-04-17 PROCEDURE — 84443 ASSAY THYROID STIM HORMONE: CPT

## 2018-04-17 PROCEDURE — 93010 ELECTROCARDIOGRAM REPORT: CPT | Mod: ,,, | Performed by: INTERNAL MEDICINE

## 2018-04-17 PROCEDURE — 84439 ASSAY OF FREE THYROXINE: CPT

## 2018-04-17 PROCEDURE — 93005 ELECTROCARDIOGRAM TRACING: CPT

## 2018-04-17 RX ORDER — AMOXICILLIN 500 MG/1
500 CAPSULE ORAL
COMMUNITY
End: 2018-05-11

## 2018-04-17 NOTE — ED PROVIDER NOTES
Encounter Date: 4/17/2018    SCRIBE #1 NOTE: I, Mateo Milligan, am scribing for, and in the presence of,  Dr. Chao. I have scribed the entire note.       History     Chief Complaint   Patient presents with    Medication Reaction     on lithium, poss toxic,     Altered Mental Status     off balance for few days with confusion     61 y/o female with co-morbidities of thyroid disease, and hx of bipolar disorder and multiple kidney infections. She was sent here by her psychiatrist for possible lithium toxicity. Pt has been on lithium for 10 years and the normal is in the range of 0.5 and 0.6. Pt had oral surgery two weeks ago and was discharged with 800 mg ibuprofen TID. Pt has been taking ibuprofen for these two weeks. She has been off balance and dizzy for few days and also has been confused (like she is getting forgetful). She also gets tired more than usual with exertion. Pt stated that since ibuprofen is not compatible with lithium and since she is sensitive to the lithium , her psychiatrist sent her here to be evaluated for possible toxicity. Pt also notes of headache and suprapubic tenderness today.       The history is provided by the patient and medical records.     Review of patient's allergies indicates:   Allergen Reactions    Rocephin [ceftriaxone]      Severe migraine    Abilify [aripiprazole] Other (See Comments)     Tardive dyskinesia  - do not give any other meds that affect dopamine    Codeine      Other reaction(s): Unknown    Demerol [meperidine]      Interacts with meds    Doxycycline      headaches    Erythromycin      Other reaction(s): Unknown    Fioricet  [butalbital-acetaminophen-caff] Other (See Comments)    Iodine      Other reaction(s): Unknown    Nsaids (non-steroidal anti-inflammatory drug)      Other reaction(s): Hives    Shellfish containing products      Other reaction(s): Unknown  Other reaction(s): Unknown     Past Medical History:   Diagnosis Date    Allergy      Arthritis     Bipolar disorder     Headache(784.0)     Herniated disc     x4 in cervical area    History of migraine headaches     Hypothyroidism     Infections of kidney     has had multiple kidney infections in the past.    Low back pain     Migraine headache     Moderate mood disorder     Neck pain     Rotator cuff tear     Thyroid disease      Past Surgical History:   Procedure Laterality Date    BLADDER SUSPENSION      COLONOSCOPY N/A 5/5/2016    Procedure: COLONOSCOPY;  Surgeon: Daron Scruggs MD;  Location: [address]);  Service: Endoscopy;  Laterality: N/A;    COSMETIC SURGERY      face lift     CYSTOSCOPY      HYSTERECTOMY  1989    age 33 CECY/BSO (pain, bleeding)    OOPHORECTOMY      TONSILLECTOMY       Family History   Problem Relation Age of Onset    Hypertension Mother     Migraines Mother     Tremor Mother     Hyperlipidemia Mother     Hypertension Sister     Uterine cancer Maternal Grandmother     Breast cancer Maternal Aunt     Colon cancer Neg Hx     Ovarian cancer Neg Hx      Social History   Substance Use Topics    Smoking status: Never Smoker    Smokeless tobacco: Never Used    Alcohol use 1.2 oz/week     1 Glasses of wine, 1 Cans of beer per week      Comment: occasional maybe every 3 months     Review of Systems   Constitutional: Positive for fatigue. Negative for fever.   HENT: Negative for sore throat.    Respiratory: Negative for shortness of breath.    Cardiovascular: Negative for chest pain.   Gastrointestinal: Positive for abdominal pain. Negative for nausea.   Genitourinary: Negative for dysuria.   Musculoskeletal: Positive for gait problem. Negative for back pain.   Skin: Negative for rash.   Neurological: Positive for dizziness and headaches. Negative for weakness.   Hematological: Does not bruise/bleed easily.   Psychiatric/Behavioral: Positive for confusion.       Physical Exam     Initial Vitals [04/17/18 1209]   BP Pulse Resp Temp SpO2    122/80 66 16 98.6 °F (37 °C) 99 %      MAP       94         Physical Exam    Nursing note and vitals reviewed.  Constitutional: She appears well-developed and well-nourished.   HENT:   Head: Normocephalic and atraumatic.   Mouth/Throat: Oropharynx is clear and moist.   Eyes: Conjunctivae and EOM are normal. Pupils are equal, round, and reactive to light.   Neck: Normal range of motion. Neck supple.   Cardiovascular: Normal rate, regular rhythm and normal heart sounds.   No murmur heard.  Pulmonary/Chest: Breath sounds normal. No stridor. No respiratory distress. She has no wheezes. She has no rhonchi. She has no rales.   Abdominal: Soft. Bowel sounds are normal. She exhibits no distension. There is no rebound.   Suprapubic tenderness.    Musculoskeletal: Normal range of motion. She exhibits no edema or tenderness.   Neurological: She is alert and oriented to person, place, and time. She has normal strength. No cranial nerve deficit or sensory deficit.   Skin: Skin is warm and dry. No pallor.   Psychiatric: She has a normal mood and affect. Her behavior is normal. Judgment and thought content normal.         ED Course   Procedures  Labs Reviewed   URINALYSIS, REFLEX TO URINE CULTURE - Abnormal; Notable for the following:        Result Value    Appearance, UA Hazy (*)     All other components within normal limits    Narrative:     Preferred Collection Type->Urine, Clean Catch   TSH - Abnormal; Notable for the following:     TSH 6.233 (*)     All other components within normal limits   CBC W/ AUTO DIFFERENTIAL - Abnormal; Notable for the following:     MCHC 31.6 (*)     MPV 9.1 (*)     All other components within normal limits   LITHIUM LEVEL   T4, FREE     EKG Readings: (Independently Interpreted)   Non specific T wave abnormality. Sinus Bradycardia at 57 bpm. WI interval of 172 ms. QRS duration of 80 ms.          Medical Decision Making:   History:   Old Medical Records: I decided to obtain old medical  records.  Independently Interpreted Test(s):   I have ordered and independently interpreted EKG Reading(s) - see prior notes  Clinical Tests:   Lab Tests: Ordered and Reviewed  Medical Tests: Ordered and Reviewed            Scribe Attestation:   Scribe #1: I performed the above scribed service and the documentation accurately describes the services I performed. I attest to the accuracy of the note.    Attending Attestation:           Physician Attestation for Scribe:      Comments: I, Dr. Chao, personally performed the services described in this documentation. All medical record entries made by the scribe were at my direction and in my presence.  I have reviewed the chart and agree that the record reflects my personal performance and is accurate and complete.                 Clinical Impression:   The encounter diagnosis was Vertigo.                           Gilberto Chao MD  04/17/18 2004

## 2018-04-17 NOTE — ED NOTES
.  Patient identifiers for Jolene Andre 62 y.o. female checked and correct.  Chief Complaint   Patient presents with    Medication Reaction     on lithium, poss toxic,     Altered Mental Status     off balance for few days with confusion     Past Medical History:   Diagnosis Date    Allergy     Arthritis     Bipolar disorder     Headache(784.0)     Herniated disc     x4 in cervical area    History of migraine headaches     Hypothyroidism     Infections of kidney     has had multiple kidney infections in the past.    Low back pain     Migraine headache     Moderate mood disorder     Neck pain     Rotator cuff tear     Thyroid disease      Allergies reported:   Review of patient's allergies indicates:   Allergen Reactions    Rocephin [ceftriaxone]      Severe migraine    Abilify [aripiprazole] Other (See Comments)     Tardive dyskinesia  - do not give any other meds that affect dopamine    Codeine      Other reaction(s): Unknown    Demerol [meperidine]      Interacts with meds    Doxycycline      headaches    Erythromycin      Other reaction(s): Unknown    Fioricet  [butalbital-acetaminophen-caff] Other (See Comments)    Iodine      Other reaction(s): Unknown    Nsaids (non-steroidal anti-inflammatory drug)      Other reaction(s): Hives    Shellfish containing products      Other reaction(s): Unknown  Other reaction(s): Unknown         LOC: Patient is awake, alert, and aware of environment with an appropriate affect. Patient is oriented x 3 and speaking appropriately.  APPEARANCE: Patient resting comfortably and in no acute distress. Patient is clean and well groomed, patient's clothing is properly fastened.  SKIN: The skin is warm and dry. Patient has normal skin turgor and moist mucus membranes. Skin is intact; no bruising or breakdown noted.  MUSKULOSKELETAL: Patient is moving all extremities well, no obvious deformities noted. Pulses intact.   RESPIRATORY: Airway is open and patent.  Respirations are spontaneous and non-labored with normal effort and rate, BBS=clear  CARDIAC: Patient has a normal rate and rhythm. NSR  on cardiac monitor,No peripheral edema noted.   ABDOMEN: No distention noted. Bowel sounds active in all 4 quadrants. Soft and non-tender upon palpation.  NEUROLOGICAL: , PERRL. Facial expression is symmetrical. Hand grasps are equal bilaterally. Normal sensation in all extremities when touched with finger. Pt reports feeling fatigue for the last few days, pt reports this is how she feels when her lithium gets off level. Headache currently, took 800 mg ibuprofen this am.

## 2018-04-17 NOTE — ED TRIAGE NOTES
Pt presents c/o confusion, feeling off balance, dizziness for 3 days, Pt has chronic headaches, was given toradol for migraine 3 wks ago, had a dental procedure 2 wks ago and was given hydrocodone and 800 mg ibuprofen. Has been on lithium for 10 yrs.

## 2018-04-25 ENCOUNTER — TELEPHONE (OUTPATIENT)
Dept: INTERNAL MEDICINE | Facility: CLINIC | Age: 63
End: 2018-04-25

## 2018-04-25 DIAGNOSIS — Z00.00 WELLNESS EXAMINATION: Primary | ICD-10-CM

## 2018-04-25 NOTE — TELEPHONE ENCOUNTER
Patient was recently in the ER.Patient stated they found her thyroid levels were very high. And they suggested to change her Rx for Synthroid. Patient would like this called in today, because she will be out of town tomorrow.    Please Advise  Thank You

## 2018-04-25 NOTE — TELEPHONE ENCOUNTER
----- Message from Mylene Michelle sent at 4/24/2018  2:59 PM CDT -----  Contact: self [phone]  Patient requesting a call from the office in regards to her hospital visit , stated she would like to discuss blood work she had done . Please advise, Thanks

## 2018-04-25 NOTE — TELEPHONE ENCOUNTER
Suyapa   call the patient    let her know I looked at the test results          the thyroid levels were not very high     there was one test known as TSH that was slightly elevated which actually is a indirect measurement thus suggesting that the thyroid function could be slightly low     however the free T4 or thyroid level in the blood was normal     I would recommend staying on the same dosage      there is an appointment with Dr. FLOWERS  on May 11 and  the test could be repeated    If there was to be any change on the current thyroid dose, it would be to make a slight increase in the dosage

## 2018-04-26 NOTE — TELEPHONE ENCOUNTER
Informed patient of Dr. Senior advisement. She was not satisfied with them. Would like you to review. Please see previous message.     Please Advise  Thank You

## 2018-04-27 NOTE — TELEPHONE ENCOUNTER
Please advise her to redo her lab so we can recheck it - we can do it here or does she need to go to an outside lab?  Orders are in for here.

## 2018-04-27 NOTE — TELEPHONE ENCOUNTER
Patient stated she will come some time next week for her blood work, I left the orders in so they can be scheduled when she comes.

## 2018-04-30 ENCOUNTER — TELEPHONE (OUTPATIENT)
Dept: INTERNAL MEDICINE | Facility: CLINIC | Age: 63
End: 2018-04-30

## 2018-04-30 NOTE — TELEPHONE ENCOUNTER
----- Message from Lenny Tucker sent at 4/30/2018 10:58 AM CDT -----  Contact: self [phone]  Patient would like to speak to nurse regarding thyroid test     Please advise

## 2018-04-30 NOTE — TELEPHONE ENCOUNTER
Spoke with patient, she wants to clarify that she needs to re-do the blood work, because she would rather not. Also she has been taking a higher dose of synthroid and thinks this will affect the test. Could not clarify what dose she was taking.     Please Advise  Thank You

## 2018-05-04 ENCOUNTER — LAB VISIT (OUTPATIENT)
Dept: LAB | Facility: HOSPITAL | Age: 63
End: 2018-05-04
Attending: INTERNAL MEDICINE
Payer: COMMERCIAL

## 2018-05-04 DIAGNOSIS — Z00.00 WELLNESS EXAMINATION: ICD-10-CM

## 2018-05-04 LAB
25(OH)D3+25(OH)D2 SERPL-MCNC: 29 NG/ML
ALBUMIN SERPL BCP-MCNC: 3.8 G/DL
ALP SERPL-CCNC: 50 U/L
ALT SERPL W/O P-5'-P-CCNC: 34 U/L
ANION GAP SERPL CALC-SCNC: 6 MMOL/L
AST SERPL-CCNC: 28 U/L
BASOPHILS # BLD AUTO: 0.02 K/UL
BASOPHILS NFR BLD: 0.4 %
BILIRUB SERPL-MCNC: 1.5 MG/DL
BUN SERPL-MCNC: 13 MG/DL
CALCIUM SERPL-MCNC: 9.6 MG/DL
CHLORIDE SERPL-SCNC: 104 MMOL/L
CHOLEST SERPL-MCNC: 206 MG/DL
CHOLEST/HDLC SERPL: 3.3 {RATIO}
CO2 SERPL-SCNC: 27 MMOL/L
CREAT SERPL-MCNC: 0.7 MG/DL
DIFFERENTIAL METHOD: ABNORMAL
EOSINOPHIL # BLD AUTO: 0.1 K/UL
EOSINOPHIL NFR BLD: 1.3 %
ERYTHROCYTE [DISTWIDTH] IN BLOOD BY AUTOMATED COUNT: 11.9 %
EST. GFR  (AFRICAN AMERICAN): >60 ML/MIN/1.73 M^2
EST. GFR  (NON AFRICAN AMERICAN): >60 ML/MIN/1.73 M^2
ESTIMATED AVG GLUCOSE: 100 MG/DL
GLUCOSE SERPL-MCNC: 79 MG/DL
HBA1C MFR BLD HPLC: 5.1 %
HCT VFR BLD AUTO: 42.2 %
HDLC SERPL-MCNC: 63 MG/DL
HDLC SERPL: 30.6 %
HGB BLD-MCNC: 13.5 G/DL
IMM GRANULOCYTES # BLD AUTO: 0.01 K/UL
IMM GRANULOCYTES NFR BLD AUTO: 0.2 %
LDLC SERPL CALC-MCNC: 120 MG/DL
LITHIUM SERPL-SCNC: 0.6 MMOL/L
LYMPHOCYTES # BLD AUTO: 2.2 K/UL
LYMPHOCYTES NFR BLD: 39.7 %
MCH RBC QN AUTO: 31.3 PG
MCHC RBC AUTO-ENTMCNC: 32 G/DL
MCV RBC AUTO: 98 FL
MONOCYTES # BLD AUTO: 0.4 K/UL
MONOCYTES NFR BLD: 6.3 %
NEUTROPHILS # BLD AUTO: 2.9 K/UL
NEUTROPHILS NFR BLD: 52.1 %
NONHDLC SERPL-MCNC: 143 MG/DL
NRBC BLD-RTO: 0 /100 WBC
PLATELET # BLD AUTO: 271 K/UL
PMV BLD AUTO: 10.1 FL
POTASSIUM SERPL-SCNC: 4.2 MMOL/L
PROT SERPL-MCNC: 6.8 G/DL
RBC # BLD AUTO: 4.31 M/UL
SODIUM SERPL-SCNC: 137 MMOL/L
TRIGL SERPL-MCNC: 115 MG/DL
TSH SERPL DL<=0.005 MIU/L-ACNC: 1.09 UIU/ML
WBC # BLD AUTO: 5.52 K/UL

## 2018-05-04 PROCEDURE — 82306 VITAMIN D 25 HYDROXY: CPT

## 2018-05-04 PROCEDURE — 80178 ASSAY OF LITHIUM: CPT

## 2018-05-04 PROCEDURE — 83036 HEMOGLOBIN GLYCOSYLATED A1C: CPT

## 2018-05-04 PROCEDURE — 36415 COLL VENOUS BLD VENIPUNCTURE: CPT

## 2018-05-04 PROCEDURE — 80061 LIPID PANEL: CPT

## 2018-05-04 PROCEDURE — 85025 COMPLETE CBC W/AUTO DIFF WBC: CPT

## 2018-05-04 PROCEDURE — 84443 ASSAY THYROID STIM HORMONE: CPT

## 2018-05-04 PROCEDURE — 80053 COMPREHEN METABOLIC PANEL: CPT

## 2018-05-11 ENCOUNTER — OFFICE VISIT (OUTPATIENT)
Dept: INTERNAL MEDICINE | Facility: CLINIC | Age: 63
End: 2018-05-11
Payer: COMMERCIAL

## 2018-05-11 VITALS
OXYGEN SATURATION: 97 % | WEIGHT: 163.56 LBS | HEIGHT: 67 IN | DIASTOLIC BLOOD PRESSURE: 70 MMHG | TEMPERATURE: 99 F | HEART RATE: 64 BPM | SYSTOLIC BLOOD PRESSURE: 120 MMHG | BODY MASS INDEX: 25.67 KG/M2

## 2018-05-11 DIAGNOSIS — Z51.81 ENCOUNTER FOR LITHIUM MONITORING: Primary | ICD-10-CM

## 2018-05-11 DIAGNOSIS — Z79.899 ENCOUNTER FOR LITHIUM MONITORING: Primary | ICD-10-CM

## 2018-05-11 DIAGNOSIS — E03.9 HYPOTHYROIDISM, UNSPECIFIED TYPE: ICD-10-CM

## 2018-05-11 DIAGNOSIS — F39 MOOD DISORDER: ICD-10-CM

## 2018-05-11 PROCEDURE — 99214 OFFICE O/P EST MOD 30 MIN: CPT | Mod: S$GLB,,, | Performed by: INTERNAL MEDICINE

## 2018-05-11 PROCEDURE — 99999 PR PBB SHADOW E&M-EST. PATIENT-LVL IV: CPT | Mod: PBBFAC,,, | Performed by: INTERNAL MEDICINE

## 2018-05-11 RX ORDER — SUMATRIPTAN SUCCINATE 100 MG/1
TABLET ORAL
Qty: 10 TABLET | Refills: 2 | Status: SHIPPED | OUTPATIENT
Start: 2018-05-11 | End: 2018-08-05 | Stop reason: SDUPTHER

## 2018-05-11 RX ORDER — LEVOTHYROXINE SODIUM 112 UG/1
112 TABLET ORAL DAILY
Qty: 30 TABLET | Refills: 11 | Status: SHIPPED | OUTPATIENT
Start: 2018-05-11 | End: 2019-04-22 | Stop reason: SDUPTHER

## 2018-05-11 RX ORDER — TOPIRAMATE 25 MG/1
25 CAPSULE, EXTENDED RELEASE ORAL DAILY
Qty: 30 CAPSULE | Refills: 1 | Status: SHIPPED | OUTPATIENT
Start: 2018-05-11 | End: 2019-10-21

## 2018-05-15 ENCOUNTER — TELEPHONE (OUTPATIENT)
Dept: NEUROLOGY | Facility: CLINIC | Age: 63
End: 2018-05-15

## 2018-05-15 NOTE — TELEPHONE ENCOUNTER
----- Message from Pina Ruvalcaba sent at 5/15/2018  1:14 PM CDT -----  Contact: self @ [phone]  Pt says Dr Estrada gave her some info on medication.  Pt says she would like a prescription for topamax instead of the topiramate (TROKENDI XR) 25 mg Cp24.  Pt says she has not started the topiramate yet.  Pls call.       [facility] [address] [place] [address] [address] [facility]  [address] [facility] [address]  Phone: [phone] Fax: [phone]

## 2018-05-18 ENCOUNTER — OFFICE VISIT (OUTPATIENT)
Dept: NEUROLOGY | Facility: CLINIC | Age: 63
End: 2018-05-18
Payer: COMMERCIAL

## 2018-05-18 VITALS
HEIGHT: 67 IN | WEIGHT: 158 LBS | BODY MASS INDEX: 24.8 KG/M2 | SYSTOLIC BLOOD PRESSURE: 105 MMHG | HEART RATE: 64 BPM | DIASTOLIC BLOOD PRESSURE: 71 MMHG

## 2018-05-18 DIAGNOSIS — G43.009 MIGRAINE WITHOUT AURA AND WITHOUT STATUS MIGRAINOSUS, NOT INTRACTABLE: Primary | ICD-10-CM

## 2018-05-18 PROCEDURE — 99999 PR PBB SHADOW E&M-EST. PATIENT-LVL III: CPT | Mod: PBBFAC,,, | Performed by: NEUROMUSCULOSKELETAL MEDICINE & OMM

## 2018-05-18 PROCEDURE — 99214 OFFICE O/P EST MOD 30 MIN: CPT | Mod: S$GLB,,, | Performed by: NEUROMUSCULOSKELETAL MEDICINE & OMM

## 2018-05-18 RX ORDER — LEVOTHYROXINE SODIUM 100 UG/1
TABLET ORAL
Refills: 6 | COMMUNITY
Start: 2018-04-23 | End: 2018-12-07

## 2018-05-18 RX ORDER — PENICILLIN V POTASSIUM 500 MG/1
TABLET, FILM COATED ORAL
Refills: 0 | COMMUNITY
Start: 2018-04-02 | End: 2018-07-23 | Stop reason: ALTCHOICE

## 2018-05-18 RX ORDER — TOPIRAMATE 25 MG/1
25 TABLET ORAL 2 TIMES DAILY
Qty: 60 TABLET | Refills: 2 | Status: SHIPPED | OUTPATIENT
Start: 2018-05-18 | End: 2020-05-27

## 2018-05-18 RX ORDER — PREDNISONE 20 MG/1
TABLET ORAL
Refills: 0 | COMMUNITY
Start: 2018-05-02 | End: 2018-07-23 | Stop reason: ALTCHOICE

## 2018-05-18 NOTE — PROGRESS NOTES
Social History : Patient is a retired .  Spends much of her time playing tournament tennis.  Present history: Patient presents for follow-up for her headaches.  She is presently having up to 12-to 15 headaches per month.  She continues to use a hormone patch.  She also continues to have neck pain intermittently.  She was on Lexapro and this is being weaned off.  She is scheduled to start Viibryd for depression however wants to try the Topamax for the headache prevention.  We discussed checking with her psychiatrist in reference to the Topamax.  Would probably suggest starting the Topamax for headache prevention and mood stabilization to see if that will help before starting the Viibryd.    Previous note: 1-26-18: Patient presents for follow-up for her migraines.  She typically takes half of a 100 mg Imitrex with good results most of the time however occasionally needs to repeat a half.  She complains of amitriptyline 10 mg at night has caused her to gain 10 pounds and she no with longer wishes to take that.  She does notice triggers include alcohol and high sugar content.  Previous note: 11-6-17: This is a 61-year-old white female who presents with a history of migraine headaches since childhood.  During her teenage years she would have bad headaches with her menstrual cycle.  She presently is having 8-10 headaches per month typically responding to Maxalt in the past however no longer does this work.  Headaches very from 1-2 days.  She has had the present headache for 2-1/2 days.  She describes a headache is stabbing pain in the left eye radiating to the back of the head with for-5/10 intensity associated with photophobia, sonophobia, and occasionally nausea.  She was in the emergency room last week for dizziness, vomiting and chest pain.  CT scan was reportedly normal..  She notes that wine triggers her headaches.  She is presently on birth control patch with a history of a hysterectomy total at 33 years  old.  She has occasional aura prior to the headache in the left eye described as blurriness.  She does suffer from low blood pressure and notes that she has orthostatic hypotension      Neurological Exam:  Mental status-alert and oriented to person, place, and time; attention span and concentration is good. Fund of knowledge-patient is aware of current events and able to give detailed history of the current problem.recent and remote memory seems intact. Language function is normal with no evidence of aphasia  Cranial nerves:Visual acuity to hand chart -normal; visual fields to confrontation normal;pupils were equal and reactive to light ;no evidence of ptosis ;  funduscopic examination was normal with sharp disc margins. external ocular movements were full with no nystagmus. Facial sensation to pinprick : normal ; corneal reflexes intact; Facial muscles were symmetrical. Hearing is unimpaired symmetrical finger rub; Tongue movements - normal ; palate movements - normal ;Swallowing unimpaired. Shoulder shrug was intact with good strength Speech was normal  Motor examination: Upper : normal                                      Lower extremities - Normal;muscle tone was normal ;                  Right-handed  Sensory examination:   Upper; normal pinprick and soft touch ;   Lower extremities - normal and symmetrical.   Vibration sense: 15-20 seconds @ toes  Deep tendon reflexes: upper extremities :1-2+ symmetrical ;     lower extremities KJ- 1-2 +; AJ - 1-2+ Both plantar responses were flexor  Cerebellar examination upper: Normal finger to nose and rapid alternating movements  Gait: Steady with no ataxia;      heel and toe walk normal  Romberg test: negative       Tandem gait: Normal    Involuntary movements: Negative  TMJ - no tenderness  Cervical examination:For lateral  range of motion with mild  pain Cervical tenderness  Left positiver examination: Low back tenderness-negative                  Sciatic  notchtenderness-negative            Straight leg raising : negative     Impression:Migraine with occasional aura in the left eye; Orthostatic hypotension; cervical syndrome; history of degenerative disc disease ; major depression     Recommendations/Plan continue Imitrex 100 mg  tablet at onset of headache.  She requested well prescribed Topamax 25 mg at night.  However suggested not starting it at the same time as the Viibryd.  Keep headache diary; follow-u 2 month

## 2018-05-21 NOTE — PROGRESS NOTES
Subjective:       Patient ID: Jolene Andre is a 62 y.o. female.    Chief Complaint: Hospital Follow Up    HPIPt was in ED for taking high dose ibuprofen with her Lithium.  She has been on Lithium for about 13 years.  No CP or SOB.  Migraines are worse - wants to try something else.  Pt also reports weight gain.  Review of Systems   Constitutional: Positive for activity change and unexpected weight change.   HENT: Positive for rhinorrhea and trouble swallowing. Negative for hearing loss.    Eyes: Negative for discharge and visual disturbance.   Respiratory: Negative for chest tightness, shortness of breath (PND or orthopnea) and wheezing.    Cardiovascular: Negative for chest pain and palpitations.   Gastrointestinal: Positive for diarrhea and vomiting. Negative for abdominal pain, blood in stool, constipation and nausea.   Endocrine: Positive for polyuria. Negative for polydipsia.   Genitourinary: Positive for dysuria. Negative for difficulty urinating, hematuria and menstrual problem.   Musculoskeletal: Positive for arthralgias, joint swelling and neck pain.   Neurological: Positive for weakness and headaches. Negative for seizures and syncope.   Psychiatric/Behavioral: Positive for confusion and dysphoric mood.       Objective:      Physical Exam   Constitutional: She is oriented to person, place, and time. She appears well-developed and well-nourished. No distress.   HENT:   Head: Normocephalic.   Mouth/Throat: Oropharynx is clear and moist.   Neck: Neck supple. No JVD present. No thyromegaly present.   Cardiovascular: Normal rate, regular rhythm, normal heart sounds and intact distal pulses.  Exam reveals no gallop and no friction rub.    No murmur heard.  Pulmonary/Chest: Effort normal and breath sounds normal. She has no wheezes. She has no rales.   Abdominal: Soft. Bowel sounds are normal. She exhibits no distension and no mass. There is no tenderness. There is no rebound and no guarding.   Musculoskeletal:  She exhibits no edema.   Lymphadenopathy:     She has no cervical adenopathy.   Neurological: She is alert and oriented to person, place, and time. She has normal reflexes.   Skin: Skin is warm and dry.   Psychiatric: She has a normal mood and affect. Her behavior is normal. Judgment and thought content normal.       Assessment:       1. Encounter for lithium monitoring    2. Hypothyroidism, unspecified type    3. Mood disorder        Plan:   Encounter for lithium monitoring  -     Lithium level; Future; Expected date: 05/11/2018    Hypothyroidism, unspecified type  -     TSH; Future; Expected date: 05/11/2018  -     SYNTHROID 112 mcg tablet; Take 1 tablet (112 mcg total) by mouth once daily.  Dispense: 30 tablet; Refill: 11  Check TSH in 6 weeks  Mood disorder  -     Ambulatory consult to Psychiatry  Dr Snider to look at other options than Lithium  Migraine prophylaxis and treatment  -     topiramate (TROKENDI XR) 25 mg Cp24; Take 25 mg by mouth once daily.  Dispense: 30 capsule; Refill: 1  -     sumatriptan (IMITREX) 100 MG tablet; TK 1 T PO Q 2 H PRF MIGRAINE  Dispense: 10 tablet; Refill: 2

## 2018-07-23 ENCOUNTER — OFFICE VISIT (OUTPATIENT)
Dept: URGENT CARE | Facility: CLINIC | Age: 63
End: 2018-07-23
Payer: COMMERCIAL

## 2018-07-23 VITALS — WEIGHT: 155 LBS | HEIGHT: 67 IN | BODY MASS INDEX: 24.33 KG/M2

## 2018-07-23 DIAGNOSIS — N30.00 ACUTE CYSTITIS WITHOUT HEMATURIA: ICD-10-CM

## 2018-07-23 DIAGNOSIS — R30.0 DYSURIA: Primary | ICD-10-CM

## 2018-07-23 LAB
BILIRUB UR QL STRIP: NEGATIVE
GLUCOSE UR QL STRIP: NEGATIVE
KETONES UR QL STRIP: NEGATIVE
LEUKOCYTE ESTERASE UR QL STRIP: POSITIVE
PH, POC UA: 5.5 (ref 5–8)
POC BLOOD, URINE: NEGATIVE
POC NITRATES, URINE: NEGATIVE
PROT UR QL STRIP: NEGATIVE
SP GR UR STRIP: 1.01 (ref 1–1.03)
UROBILINOGEN UR STRIP-ACNC: NORMAL (ref 0.1–1.1)

## 2018-07-23 PROCEDURE — 81003 URINALYSIS AUTO W/O SCOPE: CPT | Mod: QW,S$GLB,, | Performed by: NURSE PRACTITIONER

## 2018-07-23 PROCEDURE — 99214 OFFICE O/P EST MOD 30 MIN: CPT | Mod: 25,S$GLB,, | Performed by: NURSE PRACTITIONER

## 2018-07-23 RX ORDER — PHENAZOPYRIDINE HYDROCHLORIDE 200 MG/1
200 TABLET, FILM COATED ORAL 3 TIMES DAILY
Qty: 6 TABLET | Refills: 0 | Status: SHIPPED | OUTPATIENT
Start: 2018-07-23 | End: 2018-07-25

## 2018-07-23 RX ORDER — NITROFURANTOIN 25; 75 MG/1; MG/1
100 CAPSULE ORAL 2 TIMES DAILY
Qty: 14 CAPSULE | Refills: 0 | Status: SHIPPED | OUTPATIENT
Start: 2018-07-23 | End: 2018-07-30

## 2018-07-23 NOTE — PROGRESS NOTES
"Subjective:       Patient ID: Jolene Andre is a 62 y.o. female.    Vitals:  height is 5' 7" (1.702 m) and weight is 70.3 kg (155 lb).     Chief Complaint: Dysuria    Patient presents urinary frequency and dysuria x 1 week. Patient with foul urine odor. Patient denies hematuria. Patient with chills; no fever. Patient has tried otc azo cranberry and has had no improvement with medication.       Dysuria    This is a new problem. The problem occurs intermittently. The problem has been waxing and waning. The quality of the pain is described as burning. There has been no fever. There is a history of pyelonephritis. Associated symptoms include frequency and urgency. Pertinent negatives include no chills, hematuria, nausea or vomiting.     Review of Systems   Constitution: Negative for chills and fever.   Skin: Negative for itching.   Musculoskeletal: Positive for back pain.   Gastrointestinal: Negative for abdominal pain, nausea and vomiting.   Genitourinary: Positive for dysuria, frequency and urgency. Negative for genital sores, hematuria, missed menses and non-menstrual bleeding.   All other systems reviewed and are negative.      Objective:      Physical Exam   Constitutional: She is oriented to person, place, and time. She appears well-developed and well-nourished.   HENT:   Head: Normocephalic and atraumatic.   Right Ear: External ear normal.   Left Ear: External ear normal.   Nose: Nose normal.   Eyes: Lids are normal.   Neck: Trachea normal, normal range of motion and phonation normal. Neck supple.   Cardiovascular: Normal pulses.    Pulmonary/Chest: Effort normal.   Abdominal: Soft. Normal appearance and bowel sounds are normal. She exhibits no distension. There is no tenderness. There is no CVA tenderness.   Neurological: She is alert and oriented to person, place, and time.   Skin: Skin is warm, dry and intact.   Psychiatric: She has a normal mood and affect. Her speech is normal and behavior is normal. Cognition " and memory are normal.   Nursing note and vitals reviewed.      Assessment:       1. Dysuria    2. Acute cystitis without hematuria        Plan:     Pt states she has taken Pyridium in the past without any issues.     Dysuria  -     POCT Urinalysis, Dipstick, Automated, W/O Scope  -     phenazopyridine (PYRIDIUM) 200 MG tablet; Take 1 tablet (200 mg total) by mouth 3 (three) times daily. for 2 days  Dispense: 6 tablet; Refill: 0    Acute cystitis without hematuria  -     nitrofurantoin, macrocrystal-monohydrate, (MACROBID) 100 MG capsule; Take 1 capsule (100 mg total) by mouth 2 (two) times daily. for 7 days  Dispense: 14 capsule; Refill: 0

## 2018-07-23 NOTE — PATIENT INSTRUCTIONS

## 2018-08-06 RX ORDER — SUMATRIPTAN SUCCINATE 100 MG/1
TABLET ORAL
Qty: 10 TABLET | Refills: 0 | Status: SHIPPED | OUTPATIENT
Start: 2018-08-06 | End: 2018-12-07

## 2018-09-04 DIAGNOSIS — G43.109 MIGRAINE WITH AURA AND WITHOUT STATUS MIGRAINOSUS, NOT INTRACTABLE: ICD-10-CM

## 2018-09-07 RX ORDER — SUMATRIPTAN SUCCINATE 100 MG/1
TABLET ORAL
Qty: 10 TABLET | Refills: 0 | Status: SHIPPED | OUTPATIENT
Start: 2018-09-07 | End: 2018-12-07 | Stop reason: SDUPTHER

## 2018-09-07 RX ORDER — RIZATRIPTAN BENZOATE 10 MG/1
TABLET ORAL
Qty: 20 TABLET | Refills: 0 | OUTPATIENT
Start: 2018-09-07

## 2018-09-24 ENCOUNTER — TELEPHONE (OUTPATIENT)
Dept: INTERNAL MEDICINE | Facility: CLINIC | Age: 63
End: 2018-09-24

## 2018-09-24 NOTE — TELEPHONE ENCOUNTER
----- Message from Suyapa Rivas MA sent at 9/17/2018  1:59 PM CDT -----  Contact: [place] [phone]      ----- Message -----  From: Chelsea Christine  Sent: 9/17/2018  12:48 PM  To: Ashley VILLALOBOS Staff    Prior Authorization Needed    Medication: sumatriptan (IMITREX) 100 MG tablet    Pharmacy Info: [id number] - [facility] [address] AT [address]  & [facility]    Plan does not cover this medication. Please call plan at [phone] to initiate prior authorization or call/fax pharmacy to change medication. Patient ID#[id number]    Note chart when prior authorization has been submitted.    Please notify pharmacy when prior authorization has been approved.    Thank You

## 2018-10-08 ENCOUNTER — OFFICE VISIT (OUTPATIENT)
Dept: URGENT CARE | Facility: CLINIC | Age: 63
End: 2018-10-08
Payer: COMMERCIAL

## 2018-10-08 VITALS
HEART RATE: 54 BPM | BODY MASS INDEX: 24.33 KG/M2 | HEIGHT: 67 IN | SYSTOLIC BLOOD PRESSURE: 103 MMHG | TEMPERATURE: 98 F | RESPIRATION RATE: 18 BRPM | OXYGEN SATURATION: 99 % | WEIGHT: 155 LBS | DIASTOLIC BLOOD PRESSURE: 76 MMHG

## 2018-10-08 DIAGNOSIS — N30.01 ACUTE CYSTITIS WITH HEMATURIA: Primary | ICD-10-CM

## 2018-10-08 LAB
BILIRUB UR QL STRIP: NEGATIVE
GLUCOSE UR QL STRIP: NEGATIVE
KETONES UR QL STRIP: NEGATIVE
LEUKOCYTE ESTERASE UR QL STRIP: POSITIVE
PH, POC UA: 6.5 (ref 5–8)
POC BLOOD, URINE: POSITIVE
POC NITRATES, URINE: NEGATIVE
PROT UR QL STRIP: NEGATIVE
SP GR UR STRIP: 1.01 (ref 1–1.03)
UROBILINOGEN UR STRIP-ACNC: NORMAL (ref 0.1–1.1)

## 2018-10-08 PROCEDURE — 99214 OFFICE O/P EST MOD 30 MIN: CPT | Mod: 25,S$GLB,, | Performed by: FAMILY MEDICINE

## 2018-10-08 PROCEDURE — 81003 URINALYSIS AUTO W/O SCOPE: CPT | Mod: QW,S$GLB,, | Performed by: FAMILY MEDICINE

## 2018-10-08 RX ORDER — NITROFURANTOIN 25; 75 MG/1; MG/1
100 CAPSULE ORAL 2 TIMES DAILY
Qty: 10 CAPSULE | Refills: 0 | Status: SHIPPED | OUTPATIENT
Start: 2018-10-08 | End: 2018-10-13

## 2018-10-08 RX ORDER — PHENAZOPYRIDINE HYDROCHLORIDE 200 MG/1
200 TABLET, FILM COATED ORAL 3 TIMES DAILY PRN
Qty: 6 TABLET | Refills: 0 | Status: SHIPPED | OUTPATIENT
Start: 2018-10-08 | End: 2018-10-10

## 2018-10-08 NOTE — PROGRESS NOTES
"Subjective:       Patient ID: Jolene Andre is a 62 y.o. female.    Vitals:  height is 5' 7" (1.702 m) and weight is 70.3 kg (155 lb). Her oral temperature is 97.6 °F (36.4 °C). Her blood pressure is 103/76 and her pulse is 54 (abnormal). Her respiration is 18 and oxygen saturation is 99%.     Chief Complaint: Urinary Tract Infection    Pt states she is experiencing burning, pain, discomfort, urgency, low back pain, and odor starting 7-10 days ago. Pt states she has used azo and cranberry pills without relief.       Urinary Tract Infection    This is a new problem. The current episode started 1 to 4 weeks ago. The problem has been gradually worsening. The quality of the pain is described as aching and burning. The pain is at a severity of 6/10. The pain is moderate. Associated symptoms include frequency and urgency. Pertinent negatives include no chills, nausea, vomiting or rash. She has tried home medications for the symptoms. The treatment provided no relief.     Review of Systems   Constitution: Negative for chills and fever.   HENT: Negative for sore throat.    Eyes: Negative for blurred vision.   Cardiovascular: Negative for chest pain.   Respiratory: Negative for shortness of breath.    Skin: Negative for rash.   Musculoskeletal: Negative for back pain and joint pain.   Gastrointestinal: Negative for abdominal pain, diarrhea, nausea and vomiting.   Genitourinary: Positive for frequency, incomplete emptying, pelvic pain and urgency.   Neurological: Negative for headaches.   Psychiatric/Behavioral: The patient is not nervous/anxious.        Objective:      Physical Exam   Constitutional: She is oriented to person, place, and time. She appears well-developed and well-nourished.   HENT:   Head: Normocephalic and atraumatic.   Right Ear: External ear normal.   Left Ear: External ear normal.   Nose: Nose normal. No nasal deformity. No epistaxis.   Mouth/Throat: Oropharynx is clear and moist and mucous membranes are " normal.   Eyes: Conjunctivae and lids are normal.   Neck: Trachea normal, normal range of motion and phonation normal. Neck supple.   Cardiovascular: Normal rate, regular rhythm, normal heart sounds and normal pulses.   Pulmonary/Chest: Effort normal and breath sounds normal.   Abdominal: Soft. Normal appearance and bowel sounds are normal. She exhibits no distension and no mass. There is no hepatosplenomegaly. There is tenderness in the right lower quadrant, suprapubic area and left lower quadrant. There is no rigidity, no rebound, no guarding, no CVA tenderness and negative Richards's sign.   Neurological: She is alert and oriented to person, place, and time.   Skin: Skin is warm, dry and intact.   Psychiatric: She has a normal mood and affect. Her speech is normal and behavior is normal. Cognition and memory are normal.   Nursing note and vitals reviewed.      Results for orders placed or performed in visit on 10/08/18   POCT Urinalysis, Dipstick, Automated, W/O Scope   Result Value Ref Range    POC Blood, Urine Positive (A) Negative    POC Bilirubin, Urine Negative Negative    POC Urobilinogen, Urine normal 0.1 - 1.1    POC Ketones, Urine Negative Negative    POC Protein, Urine Negative Negative    POC Nitrates, Urine Negative Negative    POC Glucose, Urine Negative Negative    pH, UA 6.5 5 - 8    POC Specific Gravity, Urine 1.015 1.003 - 1.029    POC Leukocytes, Urine Positive (A) Negative       Assessment:       1. Acute cystitis with hematuria        Plan:         Acute cystitis with hematuria  -     POCT Urinalysis, Dipstick, Automated, W/O Scope  -     nitrofurantoin, macrocrystal-monohydrate, (MACROBID) 100 MG capsule; Take 1 capsule (100 mg total) by mouth 2 (two) times daily. for 5 days  Dispense: 10 capsule; Refill: 0  -     phenazopyridine (PYRIDIUM) 200 MG tablet; Take 1 tablet (200 mg total) by mouth 3 (three) times daily as needed for Pain.  Dispense: 6 tablet; Refill: 0          Patient Instructions  "  PLEASE READ YOUR DISCHARGE INSTRUCTIONS ENTIRELY AS IT CONTAINS IMPORTANT INFORMATION.      Take the antibiotics to completion.     Drink plenty of fluids, wipe front to back, take showers not baths, no scented soaps, wear breathable cotton underwear, urinate after sexual intercourse.     Take the pyridium three times a day with meals. It will turn your urine orange.     Please go to the ER for worsening symptoms including fever, worsening flank pain, vomiting, etc.       Please return or see your primary care doctor if you develop new or worsening symptoms.     You must understand that you have received an Urgent Care treatment only and that you may be released before all of your medical problems are known or treated.      Bladder Infection, Female (Adult)    Urine is normally doesn't have any bacteria in it. But bacteria can get into the urinary tract from the skin around the rectum. Or they can travel in the blood from elsewhere in the body. Once they are in your urinary tract, they can cause infection in the urethra (urethritis), the bladder (cystitis), or the kidneys (pyelonephritis).  The most common place for an infection is in the bladder. This is called a bladder infection. This is one of the most common infections in women. Most bladder infections are easily treated. They are not serious unless the infection spreads to the kidney.  The phrases "bladder infection," "UTI," and "cystitis" are often used to describe the same thing. But they are not always the same. Cystitis is an inflammation of the bladder. The most common cause of cystitis is an infection.  Symptoms  The infection causes inflammation in the urethra and bladder. This causes many of the symptoms. The most common symptoms of a bladder infection are:  · Pain or burning when urinating  · Having to urinate more often than usual  · Urgent need to urinate  · Only a small amount of urine comes out  · Blood in urine  · Abdominal discomfort. This is " usually in the lower abdomen above the pubic bone.  · Cloudy urine  · Strong- or bad-smelling urine  · Unable to urinate (urinary retention)  · Unable to hold urine in (urinary incontinence)  · Fever  · Loss of appetite  · Confusion (in older adults)  Causes  Bladder infections are not contagious. You can't get one from someone else, from a toilet seat, or from sharing a bath.  The most common cause of bladder infections is bacteria from the bowels. The bacteria get onto the skin around the opening of the urethra. From there, they can get into the urine and travel up to the bladder, causing inflammation and infection. This usually happens because of:  · Wiping improperly after urinating. Always wipe from front to back.  · Bowel incontinence  · Pregnancy  · Procedures such as having a catheter inserted  · Older age  · Not emptying your bladder. This can allow bacteria a chance to grow in your urine.  · Dehydration  · Constipation  · Sex  · Use of a diaphragm for birth control   Treatment  Bladder infections are diagnosed by a urine test. They are treated with antibiotics and usually clear up quickly without complications. Treatment helps prevent a more serious kidney infection.  Medicines  Medicines can help in the treatment of a bladder infection:  · Take antibiotics until they are used up, even if you feel better. It is important to finish them to make sure the infection has cleared.  · You can use acetaminophen or ibuprofen for pain, fever, or discomfort, unless another medicine was prescribed. If you have chronic liver or kidney disease, talk with your healthcare provider before using these medicines. Also talk with your provider if you've ever had a stomach ulcer or gastrointestinal bleeding, or are taking blood-thinner medicines.  · If you are given phenazopydridine to reduce burning with urination, it will cause your urine to become a bright orange color. This can stain clothing.  Care and prevention  These  self-care steps can help prevent future infections:  · Drink plenty of fluids to prevent dehydration and flush out your bladder. Do this unless you must restrict fluids for other health reasons, or your doctor told you not to.  · Proper cleaning after going to the bathroom is important. Wipe from front to back after using the toilet to prevent the spread of bacteria.  · Urinate more often. Don't try to hold urine in for a long time.  · Wear loose-fitting clothes and cotton underwear. Avoid tight-fitting pants.  · Improve your diet and prevent constipation. Eat more fresh fruit and vegetables, and fiber, and less junk and fatty foods.  · Avoid sex until your symptoms are gone.  · Avoid caffeine, alcohol, and spicy foods. These can irritate your bladder.  · Urinate right after intercourse to flush out your bladder.  · If you use birth control pills and have frequent bladder infections, discuss it with your doctor.  Follow-up care  Call your healthcare provider if all symptoms are not gone after 3 days of treatment. This is especially important if you have repeat infections.  If a culture was done, you will be told if your treatment needs to be changed. If directed, you can call to find out the results.  If X-rays were done, you will be told if the results will affect your treatment.  Call 911  Call 911 if any of the following occur:  · Trouble breathing  · Hard to wake up or confusion  · Fainting or loss of consciousness  · Rapid heart rate  When to seek medical advice  Call your healthcare provider right away if any of these occur:  · Fever of 100.4ºF (38.0ºC) or higher, or as directed by your healthcare provider  · Symptoms are not better by the third day of treatment  · Back or belly (abdominal) pain that gets worse  · Repeated vomiting, or unable to keep medicine down  · Weakness or dizziness  · Vaginal discharge  · Pain, redness, or swelling in the outer vaginal area (labia)  Date Last Reviewed: 10/1/2016  ©  7970-9702 The [employer] [address] [place] [address], [place] [address]. All rights reserved. This information is not intended as a substitute for professional medical care. Always follow your healthcare professional's instructions.

## 2018-10-08 NOTE — PATIENT INSTRUCTIONS
"PLEASE READ YOUR DISCHARGE INSTRUCTIONS ENTIRELY AS IT CONTAINS IMPORTANT INFORMATION.      Take the antibiotics to completion.     Drink plenty of fluids, wipe front to back, take showers not baths, no scented soaps, wear breathable cotton underwear, urinate after sexual intercourse.     Take the pyridium three times a day with meals. It will turn your urine orange.     Please go to the ER for worsening symptoms including fever, worsening flank pain, vomiting, etc.       Please return or see your primary care doctor if you develop new or worsening symptoms.     You must understand that you have received an Urgent Care treatment only and that you may be released before all of your medical problems are known or treated.      Bladder Infection, Female (Adult)    Urine is normally doesn't have any bacteria in it. But bacteria can get into the urinary tract from the skin around the rectum. Or they can travel in the blood from elsewhere in the body. Once they are in your urinary tract, they can cause infection in the urethra (urethritis), the bladder (cystitis), or the kidneys (pyelonephritis).  The most common place for an infection is in the bladder. This is called a bladder infection. This is one of the most common infections in women. Most bladder infections are easily treated. They are not serious unless the infection spreads to the kidney.  The phrases "bladder infection," "UTI," and "cystitis" are often used to describe the same thing. But they are not always the same. Cystitis is an inflammation of the bladder. The most common cause of cystitis is an infection.  Symptoms  The infection causes inflammation in the urethra and bladder. This causes many of the symptoms. The most common symptoms of a bladder infection are:  · Pain or burning when urinating  · Having to urinate more often than usual  · Urgent need to urinate  · Only a small amount of urine comes out  · Blood in urine  · Abdominal discomfort. This is " usually in the lower abdomen above the pubic bone.  · Cloudy urine  · Strong- or bad-smelling urine  · Unable to urinate (urinary retention)  · Unable to hold urine in (urinary incontinence)  · Fever  · Loss of appetite  · Confusion (in older adults)  Causes  Bladder infections are not contagious. You can't get one from someone else, from a toilet seat, or from sharing a bath.  The most common cause of bladder infections is bacteria from the bowels. The bacteria get onto the skin around the opening of the urethra. From there, they can get into the urine and travel up to the bladder, causing inflammation and infection. This usually happens because of:  · Wiping improperly after urinating. Always wipe from front to back.  · Bowel incontinence  · Pregnancy  · Procedures such as having a catheter inserted  · Older age  · Not emptying your bladder. This can allow bacteria a chance to grow in your urine.  · Dehydration  · Constipation  · Sex  · Use of a diaphragm for birth control   Treatment  Bladder infections are diagnosed by a urine test. They are treated with antibiotics and usually clear up quickly without complications. Treatment helps prevent a more serious kidney infection.  Medicines  Medicines can help in the treatment of a bladder infection:  · Take antibiotics until they are used up, even if you feel better. It is important to finish them to make sure the infection has cleared.  · You can use acetaminophen or ibuprofen for pain, fever, or discomfort, unless another medicine was prescribed. If you have chronic liver or kidney disease, talk with your healthcare provider before using these medicines. Also talk with your provider if you've ever had a stomach ulcer or gastrointestinal bleeding, or are taking blood-thinner medicines.  · If you are given phenazopydridine to reduce burning with urination, it will cause your urine to become a bright orange color. This can stain clothing.  Care and prevention  These  self-care steps can help prevent future infections:  · Drink plenty of fluids to prevent dehydration and flush out your bladder. Do this unless you must restrict fluids for other health reasons, or your doctor told you not to.  · Proper cleaning after going to the bathroom is important. Wipe from front to back after using the toilet to prevent the spread of bacteria.  · Urinate more often. Don't try to hold urine in for a long time.  · Wear loose-fitting clothes and cotton underwear. Avoid tight-fitting pants.  · Improve your diet and prevent constipation. Eat more fresh fruit and vegetables, and fiber, and less junk and fatty foods.  · Avoid sex until your symptoms are gone.  · Avoid caffeine, alcohol, and spicy foods. These can irritate your bladder.  · Urinate right after intercourse to flush out your bladder.  · If you use birth control pills and have frequent bladder infections, discuss it with your doctor.  Follow-up care  Call your healthcare provider if all symptoms are not gone after 3 days of treatment. This is especially important if you have repeat infections.  If a culture was done, you will be told if your treatment needs to be changed. If directed, you can call to find out the results.  If X-rays were done, you will be told if the results will affect your treatment.  Call 911  Call 911 if any of the following occur:  · Trouble breathing  · Hard to wake up or confusion  · Fainting or loss of consciousness  · Rapid heart rate  When to seek medical advice  Call your healthcare provider right away if any of these occur:  · Fever of 100.4ºF (38.0ºC) or higher, or as directed by your healthcare provider  · Symptoms are not better by the third day of treatment  · Back or belly (abdominal) pain that gets worse  · Repeated vomiting, or unable to keep medicine down  · Weakness or dizziness  · Vaginal discharge  · Pain, redness, or swelling in the outer vaginal area (labia)  Date Last Reviewed: 10/1/2016  ©  6237-1765 The [employer]. [address] [place] [address], [place] [address]. All rights reserved. This information is not intended as a substitute for professional medical care. Always follow your healthcare professional's instructions.

## 2018-10-11 ENCOUNTER — TELEPHONE (OUTPATIENT)
Dept: URGENT CARE | Facility: CLINIC | Age: 63
End: 2018-10-11

## 2018-10-15 ENCOUNTER — OFFICE VISIT (OUTPATIENT)
Dept: URGENT CARE | Facility: CLINIC | Age: 63
End: 2018-10-15
Payer: COMMERCIAL

## 2018-10-15 VITALS
WEIGHT: 155 LBS | HEART RATE: 57 BPM | BODY MASS INDEX: 24.33 KG/M2 | SYSTOLIC BLOOD PRESSURE: 103 MMHG | TEMPERATURE: 98 F | OXYGEN SATURATION: 98 % | DIASTOLIC BLOOD PRESSURE: 77 MMHG | RESPIRATION RATE: 18 BRPM | HEIGHT: 67 IN

## 2018-10-15 DIAGNOSIS — R30.0 DYSURIA: Primary | ICD-10-CM

## 2018-10-15 LAB
BILIRUB UR QL STRIP: NEGATIVE
GLUCOSE UR QL STRIP: NEGATIVE
KETONES UR QL STRIP: NEGATIVE
LEUKOCYTE ESTERASE UR QL STRIP: NEGATIVE
PH, POC UA: 5 (ref 5–8)
POC BLOOD, URINE: NEGATIVE
POC NITRATES, URINE: NEGATIVE
PROT UR QL STRIP: NEGATIVE
SP GR UR STRIP: 1.02 (ref 1–1.03)
UROBILINOGEN UR STRIP-ACNC: NORMAL (ref 0.1–1.1)

## 2018-10-15 PROCEDURE — 99214 OFFICE O/P EST MOD 30 MIN: CPT | Mod: 25,S$GLB,, | Performed by: NURSE PRACTITIONER

## 2018-10-15 PROCEDURE — 81003 URINALYSIS AUTO W/O SCOPE: CPT | Mod: QW,S$GLB,, | Performed by: NURSE PRACTITIONER

## 2018-10-15 NOTE — PATIENT INSTRUCTIONS
"  Dysuria     Painful urination (dysuria) is often caused by a problem in the urinary tract.   Dysuria is pain felt during urination. It is often described as a burning. Learn more about this problem and how it can be treated.  What causes dysuria?  Possible causes include:  · Infection with a bacteria or virus such as a urinary tract infection (UTI or a sexually transmitted infection (STI)  · Sensitivity or allergy to chemicals such as those found in lotions and other products  · Prostate or bladder problems  · Radiation therapy to the pelvic area  How is dysuria diagnosed?  Your healthcare provider will examine you. He or she will ask about your symptoms and health. After talking with you and doing a physical exam, your healthcare provider may know what is causing your dysuria. He or she will usually request  a sample of your urine. Tests of your urine, or a "urinalysis," are done. A urinalysis may include:  · Looking at the urine sample (visual exam)  · Checking for substances (chemical exam)  · Looking at a small amount under a microscope (microscopic exam)  Some parts of the urinalysis may be done in the provider's office and some in a lab. And, the urine sample may be checked for bacteria and yeast (urine culture). Your healthcare provider will tell you more about these tests if they are needed.  How is dysuria treated?  Treatment depends on the cause. If you have a bacterial infection, you may need antibiotics. You may be given medicines to make it easier for you to urinate and help relieve pain. Your healthcare provider can tell you more about your treatment options. Untreated, symptoms may get worse.  When to call your healthcare provider  Call the healthcare provider right away if you have any of the following:  · Fever of 100.4°F (38°C) or higher   · No improvement after three days of treatment  · Trouble urinating because of pain  · New or increased discharge from the vagina or penis  · Rash or joint " pain  · Increased back or abdominal pain  · Enlarged painful lymph nodes (lumps) in the groin   Date Last Reviewed: 1/1/2017  © 6865-8979 [employer]. [address] [place] [address], [place] [address]. All rights reserved. This information is not intended as a substitute for professional medical care. Always follow your healthcare professional's instructions.    Please arrange follow up with your primary medical clinic as soon as possible. You must understand that you've received an Urgent Care treatment only and that you may be released before all of your medical problems are known or treated. You, the patient, will arrange for follow up as instructed. If your symptoms worsen or fail to improve you should go to the Emergency Room.

## 2018-10-15 NOTE — PROGRESS NOTES
"Subjective:       Patient ID: Jolene Andre is a 62 y.o. female.    Vitals:  height is 5' 7" (1.702 m) and weight is 70.3 kg (155 lb). Her tympanic temperature is 98 °F (36.7 °C). Her blood pressure is 103/77 and her pulse is 57 (abnormal). Her respiration is 18 and oxygen saturation is 98%.     Chief Complaint: Dysuria    Patient presents with c/o dysuria x 1 week. Pt was seen here on 10/08/18 for acute cystitis with hematuria. Placed on Macrobid. Macrobid helped with urinary sxs but they are not fully gone. Patient is requesting to be placed on a different medication for UTI tx. + hx of pyelo. Denies back pain.     Pt has been being treated by Urology for recurrent UTI's as far back as 2012 according to EMR. Pt states she has not f/u with Urology for this episode of dysuria. UA negative in clinic today. Urine culture sent. Pt states she has Pyridium at home and will f/u with Urology for further evaluation.       Dysuria    This is a chronic problem. The current episode started 1 to 4 weeks ago. The problem occurs intermittently. The problem has been gradually improving. The quality of the pain is described as burning. The pain is at a severity of 1/10. The pain is mild. There has been no fever. There is a history of pyelonephritis. Associated symptoms include frequency and urgency. Pertinent negatives include no chills, hematuria, nausea or vomiting. She has tried antibiotics for the symptoms. The treatment provided moderate relief. Her past medical history is significant for recurrent UTIs.     Review of Systems   Constitution: Negative for chills and fever.   Skin: Negative for itching.   Musculoskeletal: Negative for back pain.   Gastrointestinal: Negative for abdominal pain, nausea and vomiting.   Genitourinary: Positive for dysuria, frequency and urgency. Negative for genital sores, hematuria, missed menses and non-menstrual bleeding.   All other systems reviewed and are negative.      Objective:      Physical " Exam   Constitutional: She is oriented to person, place, and time. She appears well-developed and well-nourished.   HENT:   Head: Normocephalic and atraumatic.   Right Ear: External ear normal.   Left Ear: External ear normal.   Nose: Nose normal. No nasal deformity. No epistaxis.   Mouth/Throat: Oropharynx is clear and moist and mucous membranes are normal.   Eyes: Conjunctivae and lids are normal.   Neck: Trachea normal, normal range of motion and phonation normal. Neck supple.   Cardiovascular: Normal rate, regular rhythm, normal heart sounds and normal pulses.   Pulmonary/Chest: Effort normal and breath sounds normal.   Abdominal: Soft. Normal appearance and bowel sounds are normal. She exhibits no distension and no mass. There is no tenderness. There is no CVA tenderness.   Neurological: She is alert and oriented to person, place, and time.   Skin: Skin is warm, dry and intact.   Psychiatric: She has a normal mood and affect. Her speech is normal and behavior is normal. Cognition and memory are normal.   Nursing note and vitals reviewed.      Assessment:       1. Dysuria        Plan:     Follow up with Urology. Urine culture sent, will contact pt with results in 5-7 days.     Dysuria  -     POCT Urinalysis, Dipstick, Automated, W/O Scope  -     Urine culture

## 2018-10-18 ENCOUNTER — TELEPHONE (OUTPATIENT)
Dept: URGENT CARE | Facility: CLINIC | Age: 63
End: 2018-10-18

## 2018-10-18 LAB
BACTERIA UR CULT: NO GROWTH
BACTERIA UR CULT: NORMAL

## 2018-10-18 NOTE — TELEPHONE ENCOUNTER
----- Message from Jeancarlos Whitney NP sent at 10/18/2018  1:33 PM CDT -----  Okay to call pt with result: urine culture negative, follow up with urology if still having symptoms

## 2018-10-25 ENCOUNTER — TELEPHONE (OUTPATIENT)
Dept: URGENT CARE | Facility: CLINIC | Age: 63
End: 2018-10-25

## 2018-10-25 NOTE — TELEPHONE ENCOUNTER
Left msg for patient to call back. Attempting to give negative urine cx results to patient. [facility]

## 2018-10-30 RX ORDER — SUMATRIPTAN SUCCINATE 100 MG/1
TABLET ORAL
Qty: 10 TABLET | Refills: 0 | Status: SHIPPED | OUTPATIENT
Start: 2018-10-30 | End: 2018-12-07

## 2018-11-13 RX ORDER — SUMATRIPTAN SUCCINATE 100 MG/1
TABLET ORAL
Qty: 10 TABLET | Refills: 0 | Status: SHIPPED | OUTPATIENT
Start: 2018-11-13 | End: 2018-12-07

## 2018-11-29 ENCOUNTER — HOSPITAL ENCOUNTER (OUTPATIENT)
Dept: RADIOLOGY | Facility: HOSPITAL | Age: 63
Discharge: HOME | End: 2018-11-29
Attending: STUDENT IN AN ORGANIZED HEALTH CARE EDUCATION/TRAINING PROGRAM
Payer: COMMERCIAL

## 2018-11-29 ENCOUNTER — OFFICE VISIT (OUTPATIENT)
Dept: PAIN MEDICINE | Facility: CLINIC | Age: 63
End: 2018-11-29
Attending: ANESTHESIOLOGY
Payer: COMMERCIAL

## 2018-11-29 VITALS
BODY MASS INDEX: 24.56 KG/M2 | HEIGHT: 67 IN | DIASTOLIC BLOOD PRESSURE: 66 MMHG | SYSTOLIC BLOOD PRESSURE: 94 MMHG | HEART RATE: 70 BPM | WEIGHT: 156.5 LBS | RESPIRATION RATE: 18 BRPM | TEMPERATURE: 98 F

## 2018-11-29 DIAGNOSIS — M47.819 SPONDYLOSIS WITHOUT MYELOPATHY: ICD-10-CM

## 2018-11-29 DIAGNOSIS — M54.2 NECK PAIN: ICD-10-CM

## 2018-11-29 DIAGNOSIS — M50.30 DDD (DEGENERATIVE DISC DISEASE), CERVICAL: Primary | ICD-10-CM

## 2018-11-29 DIAGNOSIS — M54.12 CERVICAL RADICULOPATHY: ICD-10-CM

## 2018-11-29 DIAGNOSIS — M47.9 OSTEOARTHRITIS OF SPINE, UNSPECIFIED SPINAL OSTEOARTHRITIS COMPLICATION STATUS, UNSPECIFIED SPINAL REGION: ICD-10-CM

## 2018-11-29 PROCEDURE — 72141 MRI NECK SPINE W/O DYE: CPT | Mod: TC

## 2018-11-29 PROCEDURE — 99204 OFFICE O/P NEW MOD 45 MIN: CPT | Mod: S$GLB,,, | Performed by: ANESTHESIOLOGY

## 2018-11-29 PROCEDURE — 72141 MRI NECK SPINE W/O DYE: CPT | Mod: 26,,, | Performed by: RADIOLOGY

## 2018-11-29 PROCEDURE — 99999 PR PBB SHADOW E&M-EST. PATIENT-LVL IV: CPT | Mod: PBBFAC,,, | Performed by: ANESTHESIOLOGY
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
## 2018-11-30 ENCOUNTER — TELEPHONE (OUTPATIENT)
Dept: PAIN MEDICINE | Facility: CLINIC | Age: 63
End: 2018-11-30

## 2018-11-30 DIAGNOSIS — M50.30 DDD (DEGENERATIVE DISC DISEASE), CERVICAL: ICD-10-CM

## 2018-11-30 DIAGNOSIS — M54.12 CERVICAL RADICULOPATHY: Primary | ICD-10-CM

## 2018-11-30 DIAGNOSIS — R93.89 ABNORMAL MRI: ICD-10-CM

## 2018-11-30 NOTE — TELEPHONE ENCOUNTER
Contacted and spoke with pt regarding MRI and medication. Pt was informed f/u visit is for image review and treatment plan.     Pt verbalized understanding

## 2018-11-30 NOTE — TELEPHONE ENCOUNTER
----- Message from Divina Doe sent at 11/30/2018  1:46 PM CST -----  Name of Who is Calling: DI CONWAY [[id number]]    What is the request in detail: Pt would like a call back with MRI results. Pt also states [facility] did not receive the Rx.      Can the clinic reply by [facility]:    No      [facility]: [phone]

## 2018-12-03 ENCOUNTER — TELEPHONE (OUTPATIENT)
Dept: PAIN MEDICINE | Facility: CLINIC | Age: 63
End: 2018-12-03

## 2018-12-03 RX ORDER — GABAPENTIN 300 MG/1
300 CAPSULE ORAL 3 TIMES DAILY
Qty: 90 CAPSULE | Refills: 2 | Status: SHIPPED | OUTPATIENT
Start: 2018-12-03 | End: 2019-10-21

## 2018-12-07 ENCOUNTER — OFFICE VISIT (OUTPATIENT)
Dept: NEUROLOGY | Facility: CLINIC | Age: 63
End: 2018-12-07
Payer: COMMERCIAL

## 2018-12-07 VITALS
HEIGHT: 67 IN | HEART RATE: 62 BPM | WEIGHT: 156 LBS | SYSTOLIC BLOOD PRESSURE: 110 MMHG | BODY MASS INDEX: 24.48 KG/M2 | DIASTOLIC BLOOD PRESSURE: 70 MMHG

## 2018-12-07 DIAGNOSIS — M47.22 OSTEOARTHRITIS OF SPINE WITH RADICULOPATHY, CERVICAL REGION: Primary | ICD-10-CM

## 2018-12-07 DIAGNOSIS — G43.009 MIGRAINE WITHOUT AURA AND WITHOUT STATUS MIGRAINOSUS, NOT INTRACTABLE: ICD-10-CM

## 2018-12-07 PROCEDURE — 99999 PR PBB SHADOW E&M-EST. PATIENT-LVL III: CPT | Mod: PBBFAC,,, | Performed by: NEUROMUSCULOSKELETAL MEDICINE & OMM

## 2018-12-07 PROCEDURE — 99214 OFFICE O/P EST MOD 30 MIN: CPT | Mod: S$GLB,,, | Performed by: NEUROMUSCULOSKELETAL MEDICINE & OMM

## 2018-12-07 RX ORDER — IBUPROFEN 800 MG/1
TABLET ORAL
Refills: 0 | COMMUNITY
Start: 2018-11-26 | End: 2019-10-21

## 2018-12-07 RX ORDER — SUMATRIPTAN SUCCINATE 100 MG/1
TABLET ORAL
Qty: 10 TABLET | Refills: 0 | Status: SHIPPED | OUTPATIENT
Start: 2018-12-07 | End: 2020-06-09

## 2018-12-07 RX ORDER — ESCITALOPRAM OXALATE 10 MG/1
TABLET ORAL
Refills: 2 | COMMUNITY
Start: 2018-11-18 | End: 2020-06-09

## 2018-12-07 NOTE — PROGRESS NOTES
Progress Notes        Social History : Patient is a retired .  Spends much of her time playing tournament tennis.  Present history:  patient presents for follow-up for headaches.  She has had 6 significant headaches in the last 6 weeks.  Imitrex 100 mg helps.  She is stop the amitriptyline.  She continues with neck pain.  MRI shows significant cervical spondylosis with severe C3-C4 and C4-C5 arthropathy approaching fusion.  See detailed report in Epic  The  Previous note:  5-18-18:  Patient presents for follow-up for her headaches.  She is presently having up to 12-to 15 headaches per month.  She continues to use a hormone patch.  She also continues to have neck pain intermittently.  She was on Lexapro and this is being weaned off.  She is scheduled to start Viibryd for depression however wants to try the Topamax for the headache prevention.  We discussed checking with her psychiatrist in reference to the Topamax.  Would probably suggest starting the Topamax for headache prevention and mood stabilization to see if that will help before starting the Viibryd.     Previous note: 1-26-18: Patient presents for follow-up for her migraines.  She typically takes half of a 100 mg Imitrex with good results most of the time however occasionally needs to repeat a half.  She complains of amitriptyline 10 mg at night has caused her to gain 10 pounds and she no with longer wishes to take that.  She does notice triggers include alcohol and high sugar content.  Previous note: 11-6-17: This is a 61-year-old white female who presents with a history of migraine headaches since childhood.  During her teenage years she would have bad headaches with her menstrual cycle.  She presently is having 8-10 headaches per month typically responding to Maxalt in the past however no longer does this work.  Headaches very from 1-2 days.  She has had the present headache for 2-1/2 days.  She describes a headache is stabbing pain in the left  eye radiating to the back of the head with for-5/10 intensity associated with photophobia, sonophobia, and occasionally nausea.  She was in the emergency room last week for dizziness, vomiting and chest pain.  CT scan was reportedly normal..  She notes that wine triggers her headaches.  She is presently on birth control patch with a history of a hysterectomy total at 33 years old.  She has occasional aura prior to the headache in the left eye described as blurriness.  She does suffer from low blood pressure and notes that she has orthostatic hypotension      Neurological Exam:  Mental status-alert and oriented to person, place, and time; attention span and concentration is good. Fund of knowledge-patient is aware of current events and able to give detailed history of the current problem.recent and remote memory seems intact. Language function is normal with no evidence of aphasia  Cranial nerves:Visual acuity to hand chart -normal; visual fields to confrontation normal;pupils were equal and reactive to light ;no evidence of ptosis ;  funduscopic examination was normal with sharp disc margins. external ocular movements were full with no nystagmus. Facial sensation to pinprick : normal ; corneal reflexes intact; Facial muscles were symmetrical. Hearing is unimpaired symmetrical finger rub; Tongue movements - normal ; palate movements - normal ;Swallowing unimpaired. Shoulder shrug was intact with good strength Speech was normal  Motor examination: Upper : normal                                      Lower extremities - Normal;muscle tone was normal ;                  Right-handed  Sensory examination:   Upper; normal pinprick and soft touch ;   Lower extremities - normal and symmetrical.   Vibration sense: 15-20 seconds @ toes  Deep tendon reflexes: upper extremities :1-2+ symmetrical ;     lower extremities KJ- 1-2 +; AJ - 1-2+ Both plantar responses were flexor  Cerebellar examination upper: Normal finger to nose and  rapid alternating movements  Gait: Steady with no ataxia;      heel and toe walk normal  Romberg test: negative       Tandem gait: Normal    Involuntary movements: Negative  TMJ - no tenderness  Cervical examination:For lateral  range of motion with mild  pain Cervical tenderness  Left positive examination: Low back tenderness-negative                  Sciatic notchtenderness-negative            Straight leg raising : negative     Impression:Migraine with occasional aura in the left eye; Orthostatic hypotension; cervical syndrome; history of degenerative disc disease ; major depression     Recommendations/Plan continue Imitrex 100 mg  tablet at onset of headache.   we discussed starting Trokendi XR 25 mg at night.  She will follow-up with orthopedics for her MRI of the cervical spine.  She will also inquire as to the need for CT of the cervical spine for a questionable vertebral body lesion (hemangioma).  .  Keep headache diary; follow-up 3 month

## 2018-12-18 ENCOUNTER — OFFICE VISIT (OUTPATIENT)
Dept: PAIN MEDICINE | Facility: CLINIC | Age: 63
End: 2018-12-18
Payer: COMMERCIAL

## 2018-12-18 VITALS
HEIGHT: 67 IN | TEMPERATURE: 98 F | WEIGHT: 159.81 LBS | SYSTOLIC BLOOD PRESSURE: 104 MMHG | BODY MASS INDEX: 25.08 KG/M2 | DIASTOLIC BLOOD PRESSURE: 77 MMHG | HEART RATE: 69 BPM

## 2018-12-18 DIAGNOSIS — M47.9 OSTEOARTHRITIS OF SPINE, UNSPECIFIED SPINAL OSTEOARTHRITIS COMPLICATION STATUS, UNSPECIFIED SPINAL REGION: ICD-10-CM

## 2018-12-18 DIAGNOSIS — D18.09 HEMANGIOMA OF SPINE: ICD-10-CM

## 2018-12-18 DIAGNOSIS — M54.12 CERVICAL RADICULOPATHY: ICD-10-CM

## 2018-12-18 DIAGNOSIS — M50.30 DDD (DEGENERATIVE DISC DISEASE), CERVICAL: ICD-10-CM

## 2018-12-18 DIAGNOSIS — M47.819 SPONDYLOSIS WITHOUT MYELOPATHY: Primary | ICD-10-CM

## 2018-12-18 PROCEDURE — 99213 OFFICE O/P EST LOW 20 MIN: CPT | Mod: S$GLB,,, | Performed by: NURSE PRACTITIONER

## 2018-12-18 PROCEDURE — 99999 PR PBB SHADOW E&M-EST. PATIENT-LVL III: CPT | Mod: PBBFAC,,, | Performed by: NURSE PRACTITIONER

## 2018-12-18 NOTE — PROGRESS NOTES
Chronic Pain - Established Visit    Referring Physician: No ref. provider found    Chief Complaint:   No chief complaint on file.       SUBJECTIVE:    Jolene Andre presents to the clinic for the follow up of neck and head pain.  She was evaluated by Dr. Macias on 12/7/18 and a cervical MRI was ordered.  Results show Nonspecific T2 hyperintense lesion within the posterosuperior aspect of the T1 vertebral body. Finding may represent an atypical hemangioma however cannot exclude neoplastic process.  Dr. Macias ordered a cervical CT with contrast, however, the patient was not scheduled for this.  Additionally, she is allergic to contrast as well as Benadryl.  I spoke with radiology physician regarding recommendation of CT.  He stated that this is likely not needed, specifically given her allergies (contrast, benadryl). Recommendation is to follow up with another imaging in 6 months.  She continues to report pain to the neck, worse on the right side.  She has intermittent numbness into the right hand.  Her pain is worse in the morning and with turning her head.  She reports having ESIs in the past but does not think they were very helpful.  Her pain today is 3/10.    Physical Therapy/Home Exercise: yes      Pain Disability Index Review:  Last 3 PDI Scores 12/18/2018 11/29/2018   Pain Disability Index (PDI) 0 29       Pain Medications:     report:  Reviewed and consistent with medication use as prescribed.    Pain Procedures: Two cervical JUSTIN several years ago. Has received steroid injections in her right arm 3-4x year for the last 10 years due to a torn rotator cuff.    Imaging:     Narrative     EXAMINATION:  MRI CERVICAL SPINE WITHOUT CONTRAST    CLINICAL HISTORY:  Neck pain, first study Cervicalgia    TECHNIQUE:  Multiplanar, multisequence MR images of the cervical spine were performed without the administration of contrast.    COMPARISON:  None.    FINDINGS:  Alignment: Minimal retrolisthesis noted at  C2-3.    Vertebrae: Vertebral body heights are well maintained.  No fracture.  There is diffuse heterogeneity of T1 marrow signal, likely red marrow hyperplasia.  Nonspecific 0.9 cm T2 hyperintense and T1 isointense lesion within the posterosuperior aspect of the T1 vertebral body.    Discs: Normal height.  Multilevel disc desiccation throughout the cervical spine.    Cord: Normal.    Skull base and craniocervical junction: Within normal limits.    Degenerative findings:    C2-C3: Facet arthropathy noted.  No spinal canal stenosis or neural foraminal narrowing.    C3-C4: There is severe right facet arthropathy, possibly with fusion.  No spinal canal stenosis or neural foraminal narrowing.    C4-C5: There is severe right facet arthropathy, possibly with fusion.  Uncovertebral spurring and facet arthropathy resulting in mild right neural foraminal narrowing.  No spinal canal stenosis.    C5-C6: Posterior disc osteophyte complex and facet arthropathy resulting in mild spinal canal stenosis and mild bilateral neural foraminal narrowing.    C6-C7: Posterior disc osteophyte complex and facet arthropathy without spinal canal stenosis or neural foraminal narrowing.    C7-T1: Right-sided facet arthropathy.  No spinal canal stenosis or neural foraminal narrowing.    Paraspinal muscles & soft tissues: Unremarkable.      Impression       Cervical spondylosis, resulting in mild spinal canal stenosis at C5-C6 and mild neural foraminal narrowing at C4-C5 and C5-C6.    Nonspecific T2 hyperintense lesion within the posterosuperior aspect of the T1 vertebral body. Finding may represent an atypical hemangioma however cannot exclude neoplastic process.  Recommend CT cervical spine for further evaluation.         Past Medical History:   Diagnosis Date    Allergy     Arthritis     Bipolar disorder     Headache(784.0)     Herniated disc     x4 in cervical area    History of migraine headaches     Hypothyroidism     Infections of  kidney     has had multiple kidney infections in the past.    Low back pain     Migraine headache     Moderate mood disorder     Neck pain     Rotator cuff tear     Thyroid disease      Past Surgical History:   Procedure Laterality Date    BLADDER SUSPENSION      COLONOSCOPY N/A 5/5/2016    Procedure: COLONOSCOPY;  Surgeon: Daron Scruggs MD;  Location: [facility] (4TH FLR);  Service: Endoscopy;  Laterality: N/A;    COLONOSCOPY N/A 5/5/2016    Performed by Daron Scruggs MD at [facility] (4TH FLR)    COSMETIC SURGERY      face lift     CYSTOSCOPY      HYSTERECTOMY  1989    age 33 CECY/BSO (pain, bleeding)    OOPHORECTOMY      TONSILLECTOMY       Social History     Socioeconomic History    Marital status:      Spouse name: Not on file    Number of children: 2    Years of education: 18    Highest education level: Not on file   Social Needs    Financial resource strain: Not on file    Food insecurity - worry: Not on file    Food insecurity - inability: Not on file    Transportation needs - medical: Not on file    Transportation needs - non-medical: Not on file   Occupational History    Occupation: Homemaker/Retired   Tobacco Use    Smoking status: Never Smoker    Smokeless tobacco: Never Used   Substance and Sexual Activity    Alcohol use: No     Frequency: Never    Drug use: No    Sexual activity: Yes     Partners: Male   Other Topics Concern    Not on file   Social History Narrative    Not on file     Family History   Problem Relation Age of Onset    Hypertension Mother     Migraines Mother     Tremor Mother     Hyperlipidemia Mother     Hypertension Sister     Uterine cancer Maternal Grandmother     Breast cancer Maternal Aunt     Colon cancer Neg Hx     Ovarian cancer Neg Hx        Review of patient's allergies indicates:   Allergen Reactions    Rocephin [ceftriaxone]      Severe migraine    Abilify [aripiprazole] Other (See Comments)     Tardive dyskinesia  -  do not give any other meds that affect dopamine    Codeine      Other reaction(s): Unknown    Demerol [meperidine]      Interacts with meds    Doxycycline      headaches    Erythromycin      Other reaction(s): Unknown    Fioricet  [butalbital-acetaminophen-caff] Other (See Comments)    Iodine      Other reaction(s): Unknown    Nsaids (non-steroidal anti-inflammatory drug)      Other reaction(s): Hives    Shellfish containing products      Other reaction(s): Unknown  Other reaction(s): Unknown       Current Outpatient Medications   Medication Sig    alprazolam (XANAX) 0.25 MG tablet TK 1 TO 2 TS QID PRF ANXIETY    clonazePAM (KLONOPIN) 0.5 MG tablet     escitalopram oxalate (LEXAPRO) 10 MG tablet TK 1 T PO  QD    estradiol (ESTRACE) 0.01 % (0.1 mg/gram) vaginal cream Pea size amount to urethra area once a day for 2 weeks, then 2x/week    estradiol (VIVELLE-DOT) 0.0375 mg/24 hr Place 1 patch onto the skin twice a week.    gabapentin (NEURONTIN) 300 MG capsule Take 1 capsule (300 mg total) by mouth 3 (three) times daily.    ibuprofen (ADVIL,MOTRIN) 800 MG tablet TK 1 T PO Q 8 H FOR PAIN    lithium (LITHOBID) 300 MG CR tablet Take 750 mg by mouth once daily.     ranitidine (ZANTAC) 150 MG capsule Take 150 mg by mouth 3 (three) times daily.    sumatriptan (IMITREX) 100 MG tablet TAKE 1 TABLET BY MOUTH EVERY 2 HOURS AS NEEDED FOR MIGRAINE    SYNTHROID 112 mcg tablet Take 1 tablet (112 mcg total) by mouth once daily.    topiramate (TROKENDI XR) 25 mg Cp24 Take 25 mg by mouth once daily.     No current facility-administered medications for this visit.        REVIEW OF SYSTEMS:    GENERAL:  No weight loss, malaise or fevers.  HEENT:  Negative for frequent or significant headaches.  NECK:  Negative for lumps, goiter, pain and significant neck swelling.  RESPIRATORY:  Negative for cough, wheezing or shortness of breath.  CARDIOVASCULAR:  Negative for chest pain, leg swelling or palpitations.  GI:  Negative  "for abdominal discomfort, blood in stools or black stools or change in bowel habits.  MUSCULOSKELETAL:  See HPI.  SKIN:  Negative for lesions, rash, and itching.  PSYCH:  Negative for sleep disturbance, mood disorder and recent psychosocial stressors.  HEMATOLOGY/LYMPHOLOGY:  Negative for prolonged bleeding, bruising easily or swollen nodes.  NEURO:   + headaches. No syncope, paralysis, seizures or tremors.  All other reviewed and negative other than HPI.    OBJECTIVE:    /77   Pulse 69   Temp 97.5 °F (36.4 °C)   Ht 5' 7" (1.702 m)   Wt 72.5 kg (159 lb 13.3 oz)   BMI 25.03 kg/m²       PHYSICAL EXAMINATION:    General appearance: Well appearing, in no acute distress, alert and oriented x3.  Psych:  Mood and affect appropriate.  Skin: Skin color, texture, turgor normal, no rashes or lesions, in both upper and lower body.  Head/face:  Normocephalic, atraumatic. No palpable lymph nodes.  Neck: No pain to palpation over the cervical paraspinous muscles. Spurling Negative.  Pain with palpation to cervical facet joints bilaterally.  Painful extension and lateral rotation.  Positive facet loading bilaterally, R>L.  Cor: RRR  Pulm: CTA  GI:  Soft and non-tender.  Musculoskeletal:  Bilateral upper and lower extremity strength is normal and symmetric.  No atrophy or tone abnormalities are noted.  Neuro: Bilateral upper and lower extremity coordination and muscle stretch reflexes are physiologic and symmetric.  Plantar response are downgoing.  Normal sensation.  Gait: normal.      ASSESSMENT: 63 y.o. year old female with neck pain, consistent with the following diagnoses:    1. Spondylosis without myelopathy     2. DDD (degenerative disc disease), cervical     3. Osteoarthritis of spine, unspecified spinal osteoarthritis complication status, unspecified spinal region     4. Cervical radiculopathy     5. Hemangioma of spine           PLAN:     - I spoke with radiology physician regarding recommendation of CT.  He " stated that this is likely not needed, specifically given her allergies (contrast, benadryl). Recommendation is to follow up with another imaging in 6 months.    - Consider bilateral C3,4,5,6 MBB given imaging.  She also does not think that previous cervical JUSTIN was very helpful.  She will consider and can call to schedule.    - The patient will continue a home exercise routine to help with pain and strengthening.      - RTC PRN.      The above plan and management options were discussed at length with patient. Patient is in agreement with the above and verbalized understanding.    KENIA Cuellar  12/20/2018

## 2018-12-19 ENCOUNTER — TELEPHONE (OUTPATIENT)
Dept: PAIN MEDICINE | Facility: CLINIC | Age: 63
End: 2018-12-19

## 2018-12-19 NOTE — TELEPHONE ENCOUNTER
Spoke with radiology physician regarding recommendation of CT.  He stated that this is likely not needed, specifically given her allergies (contrast, benadryl). Recommendation is to follow up with another imaging in 6 months.  I left a VM for the patient stating this and for her to return my call.

## 2018-12-28 ENCOUNTER — TELEPHONE (OUTPATIENT)
Dept: PAIN MEDICINE | Facility: CLINIC | Age: 63
End: 2018-12-28

## 2018-12-28 ENCOUNTER — HOSPITAL ENCOUNTER (OUTPATIENT)
Dept: RADIOLOGY | Facility: OTHER | Age: 63
Discharge: HOME | End: 2018-12-28
Attending: ANESTHESIOLOGY
Payer: COMMERCIAL

## 2018-12-28 DIAGNOSIS — M54.12 CERVICAL RADICULOPATHY: ICD-10-CM

## 2018-12-28 DIAGNOSIS — M50.30 DDD (DEGENERATIVE DISC DISEASE), CERVICAL: ICD-10-CM

## 2018-12-28 DIAGNOSIS — R93.89 ABNORMAL MRI: ICD-10-CM

## 2018-12-28 PROCEDURE — 72125 CT NECK SPINE W/O DYE: CPT | Mod: 26,,, | Performed by: RADIOLOGY

## 2018-12-28 PROCEDURE — 72125 CT NECK SPINE W/O DYE: CPT | Mod: TC

## 2018-12-28 NOTE — TELEPHONE ENCOUNTER
----- Message from Red Jimenez sent at 12/28/2018  2:43 PM CST -----  Contact: DI CONWAY [[id number]]  Name of Who is Calling: DI CONWAY [[id number]]        What is the request in detail: Pt called requesting CT results from today. She also has additional questions/concerns.  She will be in a meeting from 3-4:30pm. Please advise at your earliest convenience.  Thanks-          Can the clinic reply by [facility]: Y    What Number to Call Back if not in [facility]: [phone]

## 2018-12-28 NOTE — TELEPHONE ENCOUNTER
Contacted and left message on pt's voice mail regarding CT scan results. Pt was informed via voicemail Dr. Macias and Mason is out of the office today. However, a message will be forward to mason to advise on CT scan results.

## 2019-01-03 ENCOUNTER — TELEPHONE (OUTPATIENT)
Dept: PAIN MEDICINE | Facility: CLINIC | Age: 64
End: 2019-01-03

## 2019-01-03 NOTE — TELEPHONE ENCOUNTER
----- Message from Galilea Pat sent at 1/3/2019 11:38 AM CST -----  Contact: Self            Name of Who is Calling: DI CONWAY [[id number]]      What is the request in detail: Pt states she is requesting her test results for her CT. Pt states she is unavailable from 12-3. Pt is available from 3:30-5. Pt states she called yesterday and has not had a return call from the clinical staff. Please contact to further discuss and advise.        Can the clinic reply by [facility]: N      What Number to Call Back [facility] not [facility]: [phone]

## 2019-01-09 DIAGNOSIS — M47.812 OSTEOARTHRITIS OF CERVICAL SPINE, UNSPECIFIED SPINAL OSTEOARTHRITIS COMPLICATION STATUS: Primary | ICD-10-CM

## 2019-01-10 ENCOUNTER — TELEPHONE (OUTPATIENT)
Dept: SPINE | Facility: CLINIC | Age: 64
End: 2019-01-10

## 2019-01-29 ENCOUNTER — OFFICE VISIT (OUTPATIENT)
Dept: ALLERGY | Facility: CLINIC | Age: 64
End: 2019-01-29
Payer: COMMERCIAL

## 2019-01-29 VITALS
DIASTOLIC BLOOD PRESSURE: 70 MMHG | HEIGHT: 67 IN | SYSTOLIC BLOOD PRESSURE: 114 MMHG | WEIGHT: 162.5 LBS | BODY MASS INDEX: 25.51 KG/M2

## 2019-01-29 DIAGNOSIS — M54.12 CERVICAL RADICULOPATHY: ICD-10-CM

## 2019-01-29 DIAGNOSIS — Z91.013 H/O ALLERGY TO SHELLFISH: Primary | ICD-10-CM

## 2019-01-29 DIAGNOSIS — T88.7XXA MEDICATION SIDE EFFECT: ICD-10-CM

## 2019-01-29 PROCEDURE — 99244 OFF/OP CNSLTJ NEW/EST MOD 40: CPT | Mod: S$GLB,,, | Performed by: ALLERGY & IMMUNOLOGY

## 2019-01-29 PROCEDURE — 99244 PR OFFICE CONSULTATION,LEVEL IV: ICD-10-PCS | Mod: S$GLB,,, | Performed by: ALLERGY & IMMUNOLOGY

## 2019-01-29 PROCEDURE — 99999 PR PBB SHADOW E&M-EST. PATIENT-LVL III: CPT | Mod: PBBFAC,,, | Performed by: ALLERGY & IMMUNOLOGY

## 2019-01-29 PROCEDURE — 99999 PR PBB SHADOW E&M-EST. PATIENT-LVL III: ICD-10-PCS | Mod: PBBFAC,,, | Performed by: ALLERGY & IMMUNOLOGY

## 2019-01-29 RX ORDER — PREDNISONE 50 MG/1
TABLET ORAL
Qty: 3 TABLET | Refills: 0 | Status: SHIPPED | OUTPATIENT
Start: 2019-01-29 | End: 2019-10-21

## 2019-01-29 RX ORDER — ESTRADIOL 0.03 MG/D
1 FILM, EXTENDED RELEASE TRANSDERMAL
COMMUNITY
End: 2020-01-07

## 2019-01-29 NOTE — PROGRESS NOTES
"Subjective:       Patient ID: Jolene Andre is a 63 y.o. female.    Referred by Dr. Macias    Chief Complaint:  Other (patient would like to know if she is really allergic to IVP dyes and benadryl) and Allergies (patient stated that she had hives with boiled crabs years ago but has eaten crabs recently without hives)      HPI    Pt presents for re-evaluation of hx of possible radiocontrast allergy. Pt recalls that about 30 years ago she was diagnosed w suspected shellfish allergy (hx of hives poss assoc w shellfish.)  She does not recall every having an adverse reaction to radiocontrast media. She recalls that "iodine" was placed on her allergy card only because of her history of shellfish allergy.  She recalls tolerating what sounds like a retrograde pyelogram with contrast about 30 years ago without any suspicion of allergic reaction.    Currently, as part of evaluation for neck pain, recent MRI showed poss atypical hemangioma vs neoplastic process a T1 vertebral body. Cervical CT w contrast has been recommended for further evaluation.    Pt also reports hx of hyperactivity, agitation w benadryl. Reports similar sx's w oral decongestants. Denies any assoc urticaria or resp distress.      Pt now tolerates shellfish without problem. No recent suspicion of shellfish allergy    Denies hx asthma, [place]    Past Medical History:   Diagnosis Date    Allergy     Arthritis     Bipolar disorder     Headache(784.0)     Herniated disc     x4 in cervical area    History of migraine headaches     Hypothyroidism     Infections of kidney     has had multiple kidney infections in the past.    Low back pain     Migraine headache     Moderate mood disorder     Neck pain     Rotator cuff tear     Thyroid disease        Family History   Problem Relation Age of Onset    Hypertension Mother     Migraines Mother     Tremor Mother     Hyperlipidemia Mother     Allergies Mother     Hypertension Sister     Uterine cancer " Maternal Grandmother     Breast cancer Maternal Aunt     Asthma Brother     Allergies Son     Asthma Son     Colon cancer Neg Hx     Ovarian cancer Neg Hx          Review of Systems   Constitutional: Negative for activity change, fatigue and fever.   HENT: Negative for congestion, postnasal drip, rhinorrhea, sinus pressure and sneezing.    Eyes: Negative for discharge, redness and itching.   Respiratory: Negative for cough, shortness of breath and wheezing.    Cardiovascular: Negative for chest pain.   Gastrointestinal: Negative for diarrhea, nausea and vomiting.   Genitourinary: Negative for dysuria.   Musculoskeletal: Negative for arthralgias and joint swelling.   Skin: Negative for rash.   Neurological: Positive for headaches.   Hematological: Does not bruise/bleed easily.   Psychiatric/Behavioral: Negative for behavioral problems and sleep disturbance.        Objective:   Physical Exam   Constitutional: She is oriented to person, place, and time. She appears well-developed and well-nourished. No distress.   HENT:   Head: Normocephalic.   Right Ear: Tympanic membrane and external ear normal.   Left Ear: Tympanic membrane and external ear normal.   Nose: No mucosal edema, rhinorrhea, sinus tenderness or septal deviation. Right sinus exhibits no maxillary sinus tenderness and no frontal sinus tenderness. Left sinus exhibits no maxillary sinus tenderness and no frontal sinus tenderness.   Mouth/Throat: Uvula is midline, oropharynx is clear and moist and mucous membranes are normal. No uvula swelling.   Eyes: Conjunctivae are normal. Right eye exhibits no discharge. Left eye exhibits no discharge.   Neck: Normal range of motion. Neck supple.   Cardiovascular: Normal rate and regular rhythm.   Pulmonary/Chest: Effort normal and breath sounds normal. No respiratory distress. She has no wheezes.   Abdominal: Soft. Bowel sounds are normal. There is no tenderness.   Musculoskeletal: She exhibits no edema or  tenderness.   Lymphadenopathy:     She has no cervical adenopathy.   Neurological: She is alert and oriented to person, place, and time.   Skin: Skin is warm. No rash noted. No erythema.   Psychiatric: She has a normal mood and affect. Her behavior is normal. Judgment normal.   Nursing note and vitals reviewed.        Assessment:       1. H/O allergy to shellfish. At present is not allergic to shellfish.   No clear hx of previous adverse reaction to radiocontrast media    2. Cervical radiculopathy    3. Medication side effect --hx is consistent w physiologic side effect of benadryl. Hx not consistent w allergy to benadryl        Plan:       Jolene was seen today for other and allergies.    Diagnoses and all orders for this visit:    H/O allergy to shellfish    Cervical radiculopathy    Medication side effect      Counseled that hx shellfish allergy in not a contraindication to receiving radiocontrast media, so I see no clear contra-indication to her receiving IV radiocontrast.    D/t pt anxiety and slight uncertainty about her distant medical history, I think it is reasonable to, out of an abundance of caution, premedicate her prior to upcoming contrast study with 50 mg prednisone at 13, 7, and 1 hours prior to contrast administration, and benadryl 50 mg po or IV 1 hour prior to contrast administration.  If frequent contrast studies are needed, may consider giving contrast w/o premedication.    Counseled that reported agitation assoc w benadryl is more c/w w physiologic SE of benadryl rather than allergy, and in many situations, benefits of benadryl may outweigh risks of associated agitation. Pt understands.

## 2019-01-31 ENCOUNTER — TELEPHONE (OUTPATIENT)
Dept: PAIN MEDICINE | Facility: CLINIC | Age: 64
End: 2019-01-31

## 2019-01-31 NOTE — TELEPHONE ENCOUNTER
----- Message from Ken Doherty sent at 1/31/2019  9:30 AM CST -----  Contact: DI CONWAY [[id number]]  Name of Who is Calling: DI CONWAY [[id number]]      What is the request in detail: Patient would like to speak with staff in regards to moving forward with Cat Scan with contrast. Please advise      Can the clinic reply by [facility]: no      What Number to Call Back [facility] not [facility]: [phone]

## 2019-02-01 ENCOUNTER — TELEPHONE (OUTPATIENT)
Dept: ADMINISTRATIVE | Facility: OTHER | Age: 64
End: 2019-02-01

## 2019-02-01 DIAGNOSIS — D18.09 HEMANGIOMA OF SPINE: ICD-10-CM

## 2019-02-01 DIAGNOSIS — M47.812 OSTEOARTHRITIS OF CERVICAL SPINE, UNSPECIFIED SPINAL OSTEOARTHRITIS COMPLICATION STATUS: Primary | ICD-10-CM

## 2019-02-01 NOTE — TELEPHONE ENCOUNTER
----- Message from KENIA Botello sent at 2/1/2019  1:13 PM CST -----  Patient would like to reschedule cervical MBBs that she cancelled.

## 2019-02-05 ENCOUNTER — TELEPHONE (OUTPATIENT)
Dept: PAIN MEDICINE | Facility: CLINIC | Age: 64
End: 2019-02-05

## 2019-02-05 ENCOUNTER — PATIENT MESSAGE (OUTPATIENT)
Dept: PAIN MEDICINE | Facility: CLINIC | Age: 64
End: 2019-02-05

## 2019-02-05 NOTE — TELEPHONE ENCOUNTER
----- Message from Divina Doe sent at 2/5/2019  2:07 PM CST -----  Name of Who is Calling: Dr sage ([facility])    What is the request in detail: Asking to speak to mason in reference to the pt      Can the clinic reply by [facility]:    No       What Number to Call Back if not in [facility]: ext [phone] or ext [phone] ([place], [place])

## 2019-02-05 NOTE — TELEPHONE ENCOUNTER
----- Message from Lynda Fu sent at 2/5/2019  3:12 PM CST -----  Contact: Pt  Name of Who is Calling:       What is the request in detail: Pt is calling in regards to getting order put in for blood work  Pt also state that she need have done before appt on 02/7/19 Please contact to further discuss and advise.       Can the clinic reply by [facility]: Y      What Number to Call Back if not in [facility]: [phone]

## 2019-02-07 ENCOUNTER — HOSPITAL ENCOUNTER (OUTPATIENT)
Dept: RADIOLOGY | Facility: OTHER | Age: 64
Discharge: HOME | End: 2019-02-07
Attending: NURSE PRACTITIONER
Payer: COMMERCIAL

## 2019-02-07 DIAGNOSIS — M47.812 OSTEOARTHRITIS OF CERVICAL SPINE, UNSPECIFIED SPINAL OSTEOARTHRITIS COMPLICATION STATUS: ICD-10-CM

## 2019-02-07 DIAGNOSIS — D18.09 HEMANGIOMA OF SPINE: ICD-10-CM

## 2019-02-07 PROCEDURE — 25500020 PHARM REV CODE 255: Performed by: NURSE PRACTITIONER

## 2019-02-07 PROCEDURE — 72126 CT CERVICAL SPINE WITH CONTRAST: ICD-10-PCS | Mod: 26,,, | Performed by: RADIOLOGY

## 2019-02-07 PROCEDURE — 72126 CT NECK SPINE W/DYE: CPT | Mod: TC

## 2019-02-07 PROCEDURE — 72126 CT NECK SPINE W/DYE: CPT | Mod: 26,,, | Performed by: RADIOLOGY

## 2019-02-07 RX ADMIN — IOHEXOL 75 ML: 350 INJECTION, SOLUTION INTRAVENOUS at 09:02

## 2019-02-12 ENCOUNTER — TELEPHONE (OUTPATIENT)
Dept: NEUROLOGY | Facility: CLINIC | Age: 64
End: 2019-02-12

## 2019-02-12 NOTE — TELEPHONE ENCOUNTER
----- Message from Marleni Barragan sent at 2/12/2019  9:06 AM CST -----  Contact: pt at [phone]  Sean pt-  Pt wants Dr. Estrada to review Mri and 2 cat scansthat she h ad done here at [facility].  Would like his opinion.  Pt is asking for Dr. Estrada's diagnosis of these tests.  Wants diagnosis in addition to the pain management opinion.  Pt also needs an appt for 2 spots on her cervicle spine but  wants   Sean to review her other reports before she makes an appt to see Sean.  Also wants him to have read the reports.  Results were  2 were in 2018 and one in 2019.  Please call pt with update.  Thanks.  Pt suffers from migraines also

## 2019-02-13 ENCOUNTER — TELEPHONE (OUTPATIENT)
Dept: NEUROLOGY | Facility: CLINIC | Age: 64
End: 2019-02-13

## 2019-02-13 NOTE — TELEPHONE ENCOUNTER
Patient opted to cancel her f/u appt after I explained that Sean is not an expert in reading CT scans particularly of the cervical area.

## 2019-02-13 NOTE — TELEPHONE ENCOUNTER
----- Message from Marleni Barragan sent at 2/12/2019  9:06 AM CST -----  Contact: pt at [phone]  Sean pt-  Pt wants Dr. Estrada to review Mri and 2 cat scansthat she h ad done here at [facility].  Would like his opinion.  Pt is asking for Dr. Estraad's diagnosis of these tests.  Wants diagnosis in addition to the pain management opinion.  Pt also needs an appt for 2 spots on her cervicle spine but  wants   Sean to review her other reports before she makes an appt to see Sean.  Also wants him to have read the reports.  Results were  2 were in 2018 and one in 2019.  Please call pt with update.  Thanks.  Pt suffers from migraines also

## 2019-02-15 ENCOUNTER — TELEPHONE (OUTPATIENT)
Dept: PAIN MEDICINE | Facility: CLINIC | Age: 64
End: 2019-02-15

## 2019-02-15 NOTE — TELEPHONE ENCOUNTER
Patient would like to cancel her procedure that is scheduled for 2/20/19.    Staff tried to contact patient to inform her regarding wanting to  MRI/CT scan images.    Staff left a message letting the patient know she can  her MRI/CT Scan from Medical Records and was given the number to call at [phone].

## 2019-02-15 NOTE — TELEPHONE ENCOUNTER
----- Message from Red Jimenez sent at 2/15/2019  4:08 PM CST -----  Contact: DI CONWAY [[id number]]  Name of Who is Calling:DI CONWAY [[id number]]        What is the request in detail: Pt requesting to cancel 2.20.19 procedure. She is also requesting to  MRI/CT scan images. Contact pt once images are ready. Thanks-          Can the clinic reply by [facility]: Y    What Number to Call Back [facility] not in [facility]: [phone]

## 2019-02-27 NOTE — TELEPHONE ENCOUNTER
----- Message from Pina Ruvalcaba sent at 2/27/2019 11:07 AM CST -----  Contact: self @ [phone]  Pt is req a refill for sumatriptan (IMITREX) 100 MG tablet.  Pt says she needs it sent in asap because she is going out of town today.  Pls call pt asap to let her know when it is sent in.       [facility] [place] [address] [facility] [phone] (Phone)        [phone] (Fax)

## 2019-02-28 RX ORDER — SUMATRIPTAN SUCCINATE 100 MG/1
TABLET ORAL
Qty: 10 TABLET | Refills: 0 | Status: SHIPPED | OUTPATIENT
Start: 2019-02-28 | End: 2020-06-09

## 2019-04-23 RX ORDER — LEVOTHYROXINE SODIUM 112 UG/1
TABLET ORAL
Qty: 30 TABLET | Refills: 1 | Status: SHIPPED | OUTPATIENT
Start: 2019-04-23 | End: 2019-07-17 | Stop reason: SDUPTHER

## 2019-05-15 DIAGNOSIS — Z79.890 POSTMENOPAUSAL HORMONE REPLACEMENT THERAPY: ICD-10-CM

## 2019-05-19 RX ORDER — ESTRADIOL 0.04 MG/D
FILM, EXTENDED RELEASE TRANSDERMAL
Qty: 8 PATCH | Refills: 0 | Status: SHIPPED | OUTPATIENT
Start: 2019-05-19 | End: 2019-06-16 | Stop reason: SDUPTHER

## 2019-05-20 NOTE — TELEPHONE ENCOUNTER
Spoke with patient, let her know the orders for Mammogram are in, she will schedule when she is ready

## 2019-06-16 DIAGNOSIS — Z79.890 POSTMENOPAUSAL HORMONE REPLACEMENT THERAPY: ICD-10-CM

## 2019-06-18 ENCOUNTER — HOSPITAL ENCOUNTER (OUTPATIENT)
Dept: RADIOLOGY | Facility: HOSPITAL | Age: 64
Discharge: HOME | End: 2019-06-18
Attending: INTERNAL MEDICINE
Payer: COMMERCIAL

## 2019-06-18 DIAGNOSIS — Z79.890 POSTMENOPAUSAL HORMONE REPLACEMENT THERAPY: ICD-10-CM

## 2019-06-18 PROCEDURE — 77067 SCR MAMMO BI INCL CAD: CPT | Mod: TC

## 2019-06-18 PROCEDURE — 77067 SCR MAMMO BI INCL CAD: CPT | Mod: 26,,, | Performed by: RADIOLOGY

## 2019-06-18 PROCEDURE — 77067 MAMMO DIGITAL SCREENING BILAT WITH TOMOSYNTHESIS_CAD: ICD-10-PCS | Mod: 26,,, | Performed by: RADIOLOGY

## 2019-06-18 PROCEDURE — 77063 BREAST TOMOSYNTHESIS BI: CPT | Mod: 26,,, | Performed by: RADIOLOGY

## 2019-06-18 PROCEDURE — 77063 MAMMO DIGITAL SCREENING BILAT WITH TOMOSYNTHESIS_CAD: ICD-10-PCS | Mod: 26,,, | Performed by: RADIOLOGY

## 2019-06-19 DIAGNOSIS — Z79.890 POSTMENOPAUSAL HORMONE REPLACEMENT THERAPY: ICD-10-CM

## 2019-06-23 RX ORDER — ESTRADIOL 0.04 MG/D
FILM, EXTENDED RELEASE TRANSDERMAL
Qty: 8 PATCH | Refills: 0 | OUTPATIENT
Start: 2019-06-23

## 2019-06-23 RX ORDER — ESTRADIOL 0.04 MG/D
FILM, EXTENDED RELEASE TRANSDERMAL
Qty: 8 PATCH | Refills: 10 | Status: SHIPPED | OUTPATIENT
Start: 2019-06-23 | End: 2020-01-07

## 2019-07-18 RX ORDER — LEVOTHYROXINE SODIUM 112 UG/1
TABLET ORAL
Qty: 30 TABLET | Refills: 1 | Status: SHIPPED | OUTPATIENT
Start: 2019-07-18 | End: 2019-09-24 | Stop reason: SDUPTHER

## 2019-09-25 RX ORDER — LEVOTHYROXINE SODIUM 112 UG/1
TABLET ORAL
Qty: 30 TABLET | Refills: 1 | Status: SHIPPED | OUTPATIENT
Start: 2019-09-25 | End: 2020-10-19

## 2019-09-25 NOTE — TELEPHONE ENCOUNTER
----- Message from Donna Howell sent at 9/25/2019 10:05 AM CDT -----  Contact: Pt self Mobile [phone]  Patient is calling for an RX refill or new RX.  Is this a refill or new RX:  Refill  RX name and strength: SYNTHROID 112 mcg tablet  Directions (copy/paste from chart):  N/A  Is this a 30 day or 90 day RX:  30  Local pharmacy or [id number] [address]  Pharmacy name and phone # [facility] Phone# [phone], Fax#  [phone]   Comments:  Patient said that she's been out of medication for two days now.

## 2019-10-03 ENCOUNTER — TELEPHONE (OUTPATIENT)
Dept: INTERNAL MEDICINE | Facility: CLINIC | Age: 64
End: 2019-10-03

## 2019-10-03 NOTE — TELEPHONE ENCOUNTER
----- Message from Nafisa Lopez sent at 10/3/2019  4:08 PM CDT -----  Contact: Roseann with [facility]  [phone]   Pharmacy is calling to clarify an RX.  RX name:  SYNTHROID 112 mcg tablet  What do they need to clarify:  PA for medication faxed on 9/26 then again 9/30  Comments:

## 2019-10-21 ENCOUNTER — OFFICE VISIT (OUTPATIENT)
Dept: INTERNAL MEDICINE | Facility: CLINIC | Age: 64
End: 2019-10-21
Payer: COMMERCIAL

## 2019-10-21 VITALS
DIASTOLIC BLOOD PRESSURE: 64 MMHG | BODY MASS INDEX: 24.92 KG/M2 | HEIGHT: 67 IN | SYSTOLIC BLOOD PRESSURE: 94 MMHG | HEART RATE: 69 BPM | WEIGHT: 158.75 LBS | OXYGEN SATURATION: 97 % | TEMPERATURE: 98 F

## 2019-10-21 DIAGNOSIS — G43.009 MIGRAINE WITHOUT AURA AND WITHOUT STATUS MIGRAINOSUS, NOT INTRACTABLE: ICD-10-CM

## 2019-10-21 DIAGNOSIS — K21.9 GASTROESOPHAGEAL REFLUX DISEASE, ESOPHAGITIS PRESENCE NOT SPECIFIED: Primary | ICD-10-CM

## 2019-10-21 DIAGNOSIS — F39 MOOD DISORDER IN FULL REMISSION: ICD-10-CM

## 2019-10-21 DIAGNOSIS — E03.9 HYPOTHYROIDISM, UNSPECIFIED TYPE: ICD-10-CM

## 2019-10-21 PROCEDURE — 99396 PR PREVENTIVE VISIT,EST,40-64: ICD-10-PCS | Mod: S$GLB,,, | Performed by: INTERNAL MEDICINE

## 2019-10-21 PROCEDURE — 99999 PR PBB SHADOW E&M-EST. PATIENT-LVL IV: ICD-10-PCS | Mod: PBBFAC,,, | Performed by: INTERNAL MEDICINE

## 2019-10-21 PROCEDURE — 99396 PREV VISIT EST AGE 40-64: CPT | Mod: S$GLB,,, | Performed by: INTERNAL MEDICINE

## 2019-10-21 PROCEDURE — 99999 PR PBB SHADOW E&M-EST. PATIENT-LVL IV: CPT | Mod: PBBFAC,,, | Performed by: INTERNAL MEDICINE

## 2019-10-21 RX ORDER — ALPRAZOLAM 0.25 MG/1
TABLET ORAL
Qty: 5 TABLET | Refills: 0 | Status: SHIPPED | OUTPATIENT
Start: 2019-10-21

## 2019-10-22 ENCOUNTER — TELEPHONE (OUTPATIENT)
Dept: INTERNAL MEDICINE | Facility: CLINIC | Age: 64
End: 2019-10-22

## 2019-10-22 NOTE — TELEPHONE ENCOUNTER
----- Message from Eveline Rodgers sent at 10/22/2019 12:35 PM CDT -----  Contact: self/[phone]  Pt called in regards to talking to the dr about her blood work that was faxed to the office.    Please advise

## 2019-10-22 NOTE — TELEPHONE ENCOUNTER
----- Message from Makenna Azevedo sent at 10/22/2019  1:58 PM CDT -----  Contact: [phone]  Type: Returning a call    Who left a message? Sade     When did the practice call?    Comments:please advise, thanks .

## 2019-10-23 ENCOUNTER — TELEPHONE (OUTPATIENT)
Dept: INTERNAL MEDICINE | Facility: CLINIC | Age: 64
End: 2019-10-23

## 2019-10-23 NOTE — TELEPHONE ENCOUNTER
----- Message from Hosea Duran LPN sent at 10/22/2019  4:53 PM CDT -----  Contact: Pt self Mobile [phone]      ----- Message -----  From: Donna Howell  Sent: 10/22/2019   3:53 PM CDT  To: Ashley VILLALOBOS Staff    Patient is calling in regards to her saying that a fax was suppose to be sent over to you from her doctor Dr. Padmini Zee for her blood work results that was done on 09/04/2019. She would like for you to give her a call back to see if you have received the fax?

## 2019-10-27 NOTE — PROGRESS NOTES
Subjective:       Patient ID: Jolene Andre is a 63 y.o. female.    Chief Complaint: Follow-up (continuos migraine)    HPIPt doing okay but had a protracted migraine last month lasted about one month.  No CP or SOB.  Had hand surgery - has not played tennis recently.  Review of Systems   Respiratory: Negative for shortness of breath (PND or orthopnea).    Cardiovascular: Negative for chest pain (arm pain or jaw pain).   Gastrointestinal: Negative for abdominal pain, diarrhea, nausea and vomiting.   Genitourinary: Negative for dysuria.   Neurological: Negative for seizures, syncope and headaches.       Objective:      Physical Exam   Constitutional: She is oriented to person, place, and time. She appears well-developed and well-nourished. No distress.   HENT:   Head: Normocephalic.   Mouth/Throat: Oropharynx is clear and moist.   Neck: Neck supple. No JVD present. No thyromegaly present.   Cardiovascular: Normal rate, regular rhythm, normal heart sounds and intact distal pulses. Exam reveals no gallop and no friction rub.   No murmur heard.  Pulmonary/Chest: Effort normal and breath sounds normal. She has no wheezes. She has no rales.   Breasts: No masses, discharge or adenopathy bilaterally     Abdominal: Soft. Bowel sounds are normal. She exhibits no distension and no mass. There is no tenderness. There is no rebound and no guarding.   Musculoskeletal: She exhibits no edema.   Lymphadenopathy:     She has no cervical adenopathy.   Neurological: She is alert and oriented to person, place, and time. She has normal reflexes.   Skin: Skin is warm and dry.   Psychiatric: She has a normal mood and affect. Her behavior is normal. Judgment and thought content normal.       Assessment:       1. Gastroesophageal reflux disease, esophagitis presence not specified    2. Migraine without aura and without status migrainosus, not intractable    3. Mood disorder in full remission    4. Hypothyroidism, unspecified type        Plan:    Gastroesophageal reflux disease, esophagitis presence not specified  -     Case request GI: ESOPHAGOGASTRODUODENOSCOPY (EGD)    Migraine without aura and without status migrainosus, not intractable  Just started aimovig  Mood disorder in full remission  stable   Discussed long term lithium as long as level stable and no renal dysfunction  Hypothyroidism, unspecified type  Check lab with next blood draw  Other orders  -     erenumab-aooe (AIMOVIG AUTOINJECTOR) 70 mg/mL injection; Inject 1 mL (70 mg total) into the skin every 28 days.  Dispense: 6 mL; Refill: 0  -     ALPRAZolam (XANAX) 0.25 MG tablet; One-two during a flight for anxiety  Dispense: 5 tablet; Refill: 0

## 2019-11-11 ENCOUNTER — TELEPHONE (OUTPATIENT)
Dept: INTERNAL MEDICINE | Facility: CLINIC | Age: 64
End: 2019-11-11

## 2019-11-11 NOTE — TELEPHONE ENCOUNTER
----- Message from Mylene Michelle sent at 11/11/2019  8:01 AM CST -----  Contact: self [phone]  Patient is calling to check status of her lab work stated she need an call back today . Please call and advise, Thanks

## 2019-11-19 ENCOUNTER — TELEPHONE (OUTPATIENT)
Dept: INTERNAL MEDICINE | Facility: CLINIC | Age: 64
End: 2019-11-19

## 2019-11-21 ENCOUNTER — TELEPHONE (OUTPATIENT)
Dept: INTERNAL MEDICINE | Facility: CLINIC | Age: 64
End: 2019-11-21

## 2019-11-21 DIAGNOSIS — F39 MOOD DISORDER IN FULL REMISSION: ICD-10-CM

## 2019-11-21 DIAGNOSIS — E55.9 MILD VITAMIN D DEFICIENCY: ICD-10-CM

## 2019-11-21 DIAGNOSIS — E03.9 HYPOTHYROIDISM, UNSPECIFIED TYPE: Primary | ICD-10-CM

## 2019-11-21 DIAGNOSIS — R73.9 HYPERGLYCEMIA: ICD-10-CM

## 2019-11-21 NOTE — TELEPHONE ENCOUNTER
Please fax to [facility] [address].  She will do lab next week.  She will send message so we can look for the results.

## 2019-11-25 ENCOUNTER — PATIENT OUTREACH (OUTPATIENT)
Dept: ADMINISTRATIVE | Facility: HOSPITAL | Age: 64
End: 2019-11-25

## 2019-12-05 RX ORDER — LEVOTHYROXINE SODIUM 112 UG/1
TABLET ORAL
Qty: 30 TABLET | Refills: 3 | Status: SHIPPED | OUTPATIENT
Start: 2019-12-05 | End: 2020-04-11

## 2019-12-10 ENCOUNTER — PATIENT MESSAGE (OUTPATIENT)
Dept: INTERNAL MEDICINE | Facility: CLINIC | Age: 64
End: 2019-12-10

## 2019-12-10 ENCOUNTER — TELEPHONE (OUTPATIENT)
Dept: INTERNAL MEDICINE | Facility: CLINIC | Age: 64
End: 2019-12-10

## 2019-12-10 LAB
25(OH)D3 SERPL-MCNC: 30 NG/ML (ref 30–100)
ALBUMIN SERPL-MCNC: 4.2 G/DL (ref 3.6–5.1)
ALBUMIN/GLOB SERPL: 1.8 (CALC) (ref 1–2.5)
ALP SERPL-CCNC: 47 U/L (ref 33–130)
ALT SERPL-CCNC: 21 U/L (ref 6–29)
AST SERPL-CCNC: 22 U/L (ref 10–35)
BILIRUB SERPL-MCNC: 1.6 MG/DL (ref 0.2–1.2)
BUN SERPL-MCNC: 16 MG/DL (ref 7–25)
BUN/CREAT SERPL: ABNORMAL (CALC) (ref 6–22)
CALCIUM SERPL-MCNC: 9.7 MG/DL (ref 8.6–10.4)
CHLORIDE SERPL-SCNC: 104 MMOL/L (ref 98–110)
CO2 SERPL-SCNC: 29 MMOL/L (ref 20–32)
CREAT SERPL-MCNC: 0.72 MG/DL (ref 0.5–0.99)
GFRSERPLBLD MDRD-ARVRAT: 88 ML/MIN/1.73M2
GLOBULIN SER CALC-MCNC: 2.4 G/DL (CALC) (ref 1.9–3.7)
GLUCOSE SERPL-MCNC: 87 MG/DL (ref 65–99)
HBA1C MFR BLD: 5 % OF TOTAL HGB
LITHIUM SERPL-SCNC: 0.8 MMOL/L (ref 0.6–1.2)
POTASSIUM SERPL-SCNC: 5 MMOL/L (ref 3.5–5.3)
PROT SERPL-MCNC: 6.6 G/DL (ref 6.1–8.1)
SODIUM SERPL-SCNC: 138 MMOL/L (ref 135–146)
TSH SERPL-ACNC: 0.87 MIU/L (ref 0.4–4.5)

## 2019-12-10 NOTE — TELEPHONE ENCOUNTER
----- Message from Eveline Crocker sent at 12/10/2019  9:58 AM CST -----  Contact: patient [phone]  Patient had labs drawn at [facility] on Monday and they were going to fax the results to you. Please call patient with results.

## 2019-12-16 NOTE — TELEPHONE ENCOUNTER
----- Message from Makenna Azevedo sent at 12/16/2019  4:28 PM CST -----  Contact: [phone]  Patient is requesting a call from the office in regards to her thyroids.  Please advise, thanks .

## 2020-01-06 ENCOUNTER — PATIENT OUTREACH (OUTPATIENT)
Dept: ADMINISTRATIVE | Facility: OTHER | Age: 65
End: 2020-01-06

## 2020-01-07 ENCOUNTER — OFFICE VISIT (OUTPATIENT)
Dept: UROLOGY | Facility: CLINIC | Age: 65
End: 2020-01-07
Payer: COMMERCIAL

## 2020-01-07 ENCOUNTER — TELEPHONE (OUTPATIENT)
Dept: ENDOSCOPY | Facility: HOSPITAL | Age: 65
End: 2020-01-07

## 2020-01-07 VITALS
WEIGHT: 163.13 LBS | HEIGHT: 67 IN | HEART RATE: 66 BPM | BODY MASS INDEX: 25.6 KG/M2 | DIASTOLIC BLOOD PRESSURE: 76 MMHG | SYSTOLIC BLOOD PRESSURE: 107 MMHG

## 2020-01-07 DIAGNOSIS — R35.0 FREQUENCY OF URINATION: ICD-10-CM

## 2020-01-07 DIAGNOSIS — R31.0 GROSS HEMATURIA: ICD-10-CM

## 2020-01-07 DIAGNOSIS — N39.0 RECURRENT UTI: Primary | ICD-10-CM

## 2020-01-07 PROCEDURE — 99214 PR OFFICE/OUTPT VISIT, EST, LEVL IV, 30-39 MIN: ICD-10-PCS | Mod: S$GLB,,, | Performed by: UROLOGY

## 2020-01-07 PROCEDURE — 99214 OFFICE O/P EST MOD 30 MIN: CPT | Mod: S$GLB,,, | Performed by: UROLOGY

## 2020-01-07 PROCEDURE — 87086 URINE CULTURE/COLONY COUNT: CPT

## 2020-01-07 PROCEDURE — 99999 PR PBB SHADOW E&M-EST. PATIENT-LVL III: CPT | Mod: PBBFAC,,, | Performed by: UROLOGY

## 2020-01-07 PROCEDURE — 87481 CANDIDA DNA AMP PROBE: CPT | Mod: 59

## 2020-01-07 PROCEDURE — 87661 TRICHOMONAS VAGINALIS AMPLIF: CPT

## 2020-01-07 PROCEDURE — 99999 PR PBB SHADOW E&M-EST. PATIENT-LVL III: ICD-10-PCS | Mod: PBBFAC,,, | Performed by: UROLOGY

## 2020-01-07 RX ORDER — CIPROFLOXACIN 500 MG/1
TABLET ORAL
COMMUNITY
Start: 2020-01-04 | End: 2020-06-09

## 2020-01-07 NOTE — PROGRESS NOTES
Subjective:       Patient ID: Jolene Andre is a 64 y.o. female.    Chief Complaint: Urinary Tract Infection      Jolene Andre is a 64 y.o. female with a PMH of recurrent UTI who presents for follow up.   Having chills, nausea and some lower back pain and feeling awful.   No previous history of kidney stones. No fever.     E.coli 3/18 resist to quinolones and amp  2/18 negative  2/5 positive e.coli resist to bactrim  1/18 positive e.coli pan sens  She had a recurrent UTI work up May 2017.    Cysto 05/2017:   Bilateral ureteral orifice were evaluated and noted to be normal with clear efflux.  The bladder was completely surveyed in a systematic fashion.   No bladder tumors or lesions were seen.  No strictures were noted.    Renal u/s 5/24/17: There is no focal abnormality, nephrolithiasis, or hydronephrosis bilaterally.      Urine cultures:   1/9/2018: e. coli  2/2/2017: E. coli   1/24/2017: E. Coli    Fatigue.     UTI symptoms:   Nocturia 3-4 times. Some feet swelling. No sleep apnea. No caffeine after 3. Tries to limit fluids 2 hours before bedtime.   Drinking frequently.   Every 1 hour frequency.   Small volumes.   No incontinence.     H/o MMK.     Urinary Tract Infection    Associated symptoms include chills, frequency and urgency. Pertinent negatives include no hematuria, constipation or rash.       Past Surgical History:   Procedure Laterality Date    BLADDER SUSPENSION      COLONOSCOPY N/A 5/5/2016    Procedure: COLONOSCOPY;  Surgeon: Daron Scruggs MD;  Location: [address];  Service: Endoscopy;  Laterality: N/A;    COSMETIC SURGERY      face lift     CYSTOSCOPY      HYSTERECTOMY  1989    age 33 CECY/BSO (pain, bleeding)    OOPHORECTOMY      TONSILLECTOMY         Past Medical History:   Diagnosis Date    Allergy     Arthritis     Bipolar disorder     Headache(784.0)     Herniated disc     x4 in cervical area    History of migraine headaches     Hypothyroidism     Infections of kidney      has had multiple kidney infections in the past.    Low back pain     Migraine headache     Moderate mood disorder     Neck pain     Rotator cuff tear     Thyroid disease        Social History     Socioeconomic History    Marital status:      Spouse name: Not on file    Number of children: 2    Years of education: 18    Highest education level: Not on file   Occupational History    Occupation: Homemaker/Retired   Social Needs    Financial resource strain: Not on file    Food insecurity:     Worry: Not on file     Inability: Not on file    Transportation needs:     Medical: Not on file     Non-medical: Not on file   Tobacco Use    Smoking status: Never Smoker    Smokeless tobacco: Never Used   Substance and Sexual Activity    Alcohol use: No     Frequency: Never    Drug use: No    Sexual activity: Yes     Partners: Male   Lifestyle    Physical activity:     Days per week: Not on file     Minutes per session: Not on file    Stress: Not on file   Relationships    Social connections:     Talks on phone: Not on file     Gets together: Not on file     Attends Episcopal service: Not on file     Active member of club or organization: Not on file     Attends meetings of clubs or organizations: Not on file     Relationship status: Not on file   Other Topics Concern    Not on file   Social History Narrative    Not on file       Family History   Problem Relation Age of Onset    Hypertension Mother     Migraines Mother     Tremor Mother     Hyperlipidemia Mother     Allergies Mother     Hypertension Sister     Uterine cancer Maternal Grandmother     Breast cancer Maternal Aunt     Asthma Brother     Allergies Son     Asthma Son     Colon cancer Neg Hx     Ovarian cancer Neg Hx        Current Outpatient Medications   Medication Sig Dispense Refill    ALPRAZolam (XANAX) 0.25 MG tablet One-two during a flight for anxiety 5 tablet 0    ciprofloxacin HCl (CIPRO) 500 MG tablet TK 1 T PO   BID FOR 7 DAYS      erenumab-aooe (AIMOVIG AUTOINJECTOR) 70 mg/mL injection Inject 1 mL (70 mg total) into the skin every 28 days. 6 mL 0    escitalopram oxalate (LEXAPRO) 10 MG tablet TK 1 T PO  QD  2    lithium (LITHOBID) 300 MG CR tablet Take 600 mg by mouth once daily.      sumatriptan (IMITREX) 100 MG tablet TAKE 1 TABLET BY MOUTH EVERY 2 HOURS AS NEEDED FOR MIGRAINE 10 tablet 0    sumatriptan (IMITREX) 100 MG tablet TAKE 1 TABLET BY MOUTH EVERY 2 HOURS AS NEEDED FOR MIGRAINE 10 tablet 0    SYNTHROID 112 mcg tablet TAKE 1 TABLET(112 MCG) BY MOUTH EVERY DAY 30 tablet 1    SYNTHROID 112 mcg tablet TAKE 1 TABLET(112 MCG) BY MOUTH EVERY DAY 30 tablet 3     No current facility-administered medications for this visit.        Review of patient's allergies indicates:   Allergen Reactions    Rocephin [ceftriaxone]      Severe migraine    Abilify [aripiprazole] Other (See Comments)     Tardive dyskinesia  - do not give any other meds that affect dopamine    Codeine      Other reaction(s): Unknown    Demerol [meperidine]      Interacts with meds    Doxycycline      headaches    Erythromycin      Other reaction(s): Unknown    Fioricet  [butalbital-acetaminophen-caff] Other (See Comments)    Iodine      Other reaction(s): Unknown    Nsaids (non-steroidal anti-inflammatory drug)      Other reaction(s): Hives    Shellfish containing products      Other reaction(s): Unknown  Other reaction(s): Unknown        BMP  Lab Results   Component Value Date     12/09/2019    K 5.0 12/09/2019     12/09/2019    CO2 29 12/09/2019    BUN 16 12/09/2019    CREATININE 0.72 12/09/2019    CALCIUM 9.7 12/09/2019    ANIONGAP 6 (L) 05/04/2018    ESTGFRAFRICA 103 12/09/2019    EGFRNONAA 88 12/09/2019       Review of Systems   Constitutional: Positive for chills and fatigue. Negative for fever and unexpected weight change.   HENT: Negative for nosebleeds.    Eyes: Negative for visual disturbance.   Respiratory: Negative  for chest tightness.    Cardiovascular: Negative for chest pain.   Gastrointestinal: Negative for constipation and diarrhea.   Genitourinary: Positive for dysuria, frequency, nocturia and urgency. Negative for hematuria.   Musculoskeletal: Negative for myalgias.   Skin: Negative for rash.   Neurological: Positive for headaches. Negative for seizures.   Hematological: Does not bruise/bleed easily.   Psychiatric/Behavioral: Negative for behavioral problems.     Objective:      Physical Exam   Constitutional: She is oriented to person, place, and time. She appears well-developed and well-nourished.   HENT:   Head: Normocephalic and atraumatic.   Eyes: No scleral icterus.   Cardiovascular: Normal rate.    Pulmonary/Chest: Effort normal.   Genitourinary:   Genitourinary Comments: The external genitalia were normal. No rash. Atrophic vaginitis was present. The urethral showed no evidence of a urethral diverticulum.  And in and out cath was performed under sterile conditions immediately after voiding. The PVR was 180 ml.    Perineum normal. External hemorrhoids were not present. Levator was not tender to palpation.   The uterus was not present. No prolapse.   Musculoskeletal: She exhibits no edema.   Neurological: She is alert and oriented to person, place, and time.   Skin: Skin is warm and dry.     Psychiatric: She has a normal mood and affect.     urine dip clear  Assessment:       1. Recurrent UTI    2. Frequency of urination        Plan:   Jolene was seen today for urinary tract infection.    Diagnoses and all orders for this visit:    Recurrent UTI  -     Cystoscopy; Future  -     Cancel: US Kidney; Future  -     Urine culture  -     Vaginosis Screen by DNA Probe  -     CBC Without Differential; Future  -     Comprehensive metabolic panel; Future  -     US Kidney; Future    Frequency of urination  -     CBC Without Differential; Future  -     Comprehensive metabolic panel; Future    Gross hematuria  -     Cancel: CT  Urogram Abd Pelvis W WO; Future  -     CBC Without Differential; Future  -     Comprehensive metabolic panel; Future    D-mannose 2 grams daily.  Align probiotic daily.  Vaginal estrogen trial. Patient never took, but will take now.   Fluid take.     In past discussed trial of oxybutynin XR 5mg. Currently will hold off.    Jolene plays tennis at [employer].  She was told to message me if she has problems getting into my clinic.

## 2020-01-07 NOTE — LETTER
January 7, 2020      Kell Ramirez MD  [address]  [facility] [address]           [facility] - Urology 4th Floor  [address]  [facility] [address]  Phone: [phone]          Patient: Jolene Andre   MR Number: [id number]   YOB: 1955   Date of Visit: 1/7/2020       Dear Dr. Kell Ramirez:    Thank you for referring Jolene Andre to me for evaluation. Attached you will find relevant portions of my assessment and plan of care.    If you have questions, please do not hesitate to call me. I look forward to following Jolene Andre along with you.    Sincerely,    Sylwia Young MD    Enclosure  CC:  No Recipients    If you would like to receive this communication electronically, please contact [email or web address] or [phone] to request more information on [email or web address] Link access.    For providers and/or their staff who would like to refer a patient to [facility], please contact us through our one-stop-shop provider referral line, [facility], at [phone].    If you feel you have received this communication in error or would no longer like to receive these types of communications, please e-mail [email or web address]

## 2020-01-07 NOTE — TELEPHONE ENCOUNTER
Patient not in lobby when called to office to schedule EGD procedure.   Called patient via phone.   No answer.   Left voicemail message with my direct contact number for patient to return my call to schedule procedure.

## 2020-01-07 NOTE — H&P (VIEW-ONLY)
Subjective:       Patient ID: Jolene Andre is a 64 y.o. female.    Chief Complaint: Urinary Tract Infection      Jolene Andre is a 64 y.o. female with a PMH of recurrent UTI who presents for follow up.   Having chills, nausea and some lower back pain and feeling awful.   No previous history of kidney stones. No fever.     E.coli 3/18 resist to quinolones and amp  2/18 negative  2/5 positive e.coli resist to bactrim  1/18 positive e.coli pan sens  She had a recurrent UTI work up May 2017.    Cysto 05/2017:   Bilateral ureteral orifice were evaluated and noted to be normal with clear efflux.  The bladder was completely surveyed in a systematic fashion.   No bladder tumors or lesions were seen.  No strictures were noted.    Renal u/s 5/24/17: There is no focal abnormality, nephrolithiasis, or hydronephrosis bilaterally.      Urine cultures:   1/9/2018: e. coli  2/2/2017: E. coli   1/24/2017: E. Coli    Fatigue.     UTI symptoms:   Nocturia 3-4 times. Some feet swelling. No sleep apnea. No caffeine after 3. Tries to limit fluids 2 hours before bedtime.   Drinking frequently.   Every 1 hour frequency.   Small volumes.   No incontinence.     H/o MMK.     Urinary Tract Infection    Associated symptoms include chills, frequency and urgency. Pertinent negatives include no hematuria, constipation or rash.       Past Surgical History:   Procedure Laterality Date    BLADDER SUSPENSION      COLONOSCOPY N/A 5/5/2016    Procedure: COLONOSCOPY;  Surgeon: Daron Scruggs MD;  Location: [address];  Service: Endoscopy;  Laterality: N/A;    COSMETIC SURGERY      face lift     CYSTOSCOPY      HYSTERECTOMY  1989    age 33 CECY/BSO (pain, bleeding)    OOPHORECTOMY      TONSILLECTOMY         Past Medical History:   Diagnosis Date    Allergy     Arthritis     Bipolar disorder     Headache(784.0)     Herniated disc     x4 in cervical area    History of migraine headaches     Hypothyroidism     Infections of kidney      has had multiple kidney infections in the past.    Low back pain     Migraine headache     Moderate mood disorder     Neck pain     Rotator cuff tear     Thyroid disease        Social History     Socioeconomic History    Marital status:      Spouse name: Not on file    Number of children: 2    Years of education: 18    Highest education level: Not on file   Occupational History    Occupation: Homemaker/Retired   Social Needs    Financial resource strain: Not on file    Food insecurity:     Worry: Not on file     Inability: Not on file    Transportation needs:     Medical: Not on file     Non-medical: Not on file   Tobacco Use    Smoking status: Never Smoker    Smokeless tobacco: Never Used   Substance and Sexual Activity    Alcohol use: No     Frequency: Never    Drug use: No    Sexual activity: Yes     Partners: Male   Lifestyle    Physical activity:     Days per week: Not on file     Minutes per session: Not on file    Stress: Not on file   Relationships    Social connections:     Talks on phone: Not on file     Gets together: Not on file     Attends Restorationism service: Not on file     Active member of club or organization: Not on file     Attends meetings of clubs or organizations: Not on file     Relationship status: Not on file   Other Topics Concern    Not on file   Social History Narrative    Not on file       Family History   Problem Relation Age of Onset    Hypertension Mother     Migraines Mother     Tremor Mother     Hyperlipidemia Mother     Allergies Mother     Hypertension Sister     Uterine cancer Maternal Grandmother     Breast cancer Maternal Aunt     Asthma Brother     Allergies Son     Asthma Son     Colon cancer Neg Hx     Ovarian cancer Neg Hx        Current Outpatient Medications   Medication Sig Dispense Refill    ALPRAZolam (XANAX) 0.25 MG tablet One-two during a flight for anxiety 5 tablet 0    ciprofloxacin HCl (CIPRO) 500 MG tablet TK 1 T PO   BID FOR 7 DAYS      erenumab-aooe (AIMOVIG AUTOINJECTOR) 70 mg/mL injection Inject 1 mL (70 mg total) into the skin every 28 days. 6 mL 0    escitalopram oxalate (LEXAPRO) 10 MG tablet TK 1 T PO  QD  2    lithium (LITHOBID) 300 MG CR tablet Take 600 mg by mouth once daily.      sumatriptan (IMITREX) 100 MG tablet TAKE 1 TABLET BY MOUTH EVERY 2 HOURS AS NEEDED FOR MIGRAINE 10 tablet 0    sumatriptan (IMITREX) 100 MG tablet TAKE 1 TABLET BY MOUTH EVERY 2 HOURS AS NEEDED FOR MIGRAINE 10 tablet 0    SYNTHROID 112 mcg tablet TAKE 1 TABLET(112 MCG) BY MOUTH EVERY DAY 30 tablet 1    SYNTHROID 112 mcg tablet TAKE 1 TABLET(112 MCG) BY MOUTH EVERY DAY 30 tablet 3     No current facility-administered medications for this visit.        Review of patient's allergies indicates:   Allergen Reactions    Rocephin [ceftriaxone]      Severe migraine    Abilify [aripiprazole] Other (See Comments)     Tardive dyskinesia  - do not give any other meds that affect dopamine    Codeine      Other reaction(s): Unknown    Demerol [meperidine]      Interacts with meds    Doxycycline      headaches    Erythromycin      Other reaction(s): Unknown    Fioricet  [butalbital-acetaminophen-caff] Other (See Comments)    Iodine      Other reaction(s): Unknown    Nsaids (non-steroidal anti-inflammatory drug)      Other reaction(s): Hives    Shellfish containing products      Other reaction(s): Unknown  Other reaction(s): Unknown        BMP  Lab Results   Component Value Date     12/09/2019    K 5.0 12/09/2019     12/09/2019    CO2 29 12/09/2019    BUN 16 12/09/2019    CREATININE 0.72 12/09/2019    CALCIUM 9.7 12/09/2019    ANIONGAP 6 (L) 05/04/2018    ESTGFRAFRICA 103 12/09/2019    EGFRNONAA 88 12/09/2019       Review of Systems   Constitutional: Positive for chills and fatigue. Negative for fever and unexpected weight change.   HENT: Negative for nosebleeds.    Eyes: Negative for visual disturbance.   Respiratory: Negative  for chest tightness.    Cardiovascular: Negative for chest pain.   Gastrointestinal: Negative for constipation and diarrhea.   Genitourinary: Positive for dysuria, frequency, nocturia and urgency. Negative for hematuria.   Musculoskeletal: Negative for myalgias.   Skin: Negative for rash.   Neurological: Positive for headaches. Negative for seizures.   Hematological: Does not bruise/bleed easily.   Psychiatric/Behavioral: Negative for behavioral problems.     Objective:      Physical Exam   Constitutional: She is oriented to person, place, and time. She appears well-developed and well-nourished.   HENT:   Head: Normocephalic and atraumatic.   Eyes: No scleral icterus.   Cardiovascular: Normal rate.    Pulmonary/Chest: Effort normal.   Genitourinary:   Genitourinary Comments: The external genitalia were normal. No rash. Atrophic vaginitis was present. The urethral showed no evidence of a urethral diverticulum.  And in and out cath was performed under sterile conditions immediately after voiding. The PVR was 180 ml.    Perineum normal. External hemorrhoids were not present. Levator was not tender to palpation.   The uterus was not present. No prolapse.   Musculoskeletal: She exhibits no edema.   Neurological: She is alert and oriented to person, place, and time.   Skin: Skin is warm and dry.     Psychiatric: She has a normal mood and affect.     urine dip clear  Assessment:       1. Recurrent UTI    2. Frequency of urination        Plan:   Jolene was seen today for urinary tract infection.    Diagnoses and all orders for this visit:    Recurrent UTI  -     Cystoscopy; Future  -     Cancel: US Kidney; Future  -     Urine culture  -     Vaginosis Screen by DNA Probe  -     CBC Without Differential; Future  -     Comprehensive metabolic panel; Future  -     US Kidney; Future    Frequency of urination  -     CBC Without Differential; Future  -     Comprehensive metabolic panel; Future    Gross hematuria  -     Cancel: CT  Urogram Abd Pelvis W WO; Future  -     CBC Without Differential; Future  -     Comprehensive metabolic panel; Future    D-mannose 2 grams daily.  Align probiotic daily.  Vaginal estrogen trial. Patient never took, but will take now.   Fluid take.     In past discussed trial of oxybutynin XR 5mg. Currently will hold off.    Jolene plays tennis at [employer].  She was told to message me if she has problems getting into my clinic.

## 2020-01-08 ENCOUNTER — PATIENT MESSAGE (OUTPATIENT)
Dept: UROLOGY | Facility: CLINIC | Age: 65
End: 2020-01-08

## 2020-01-08 ENCOUNTER — TELEPHONE (OUTPATIENT)
Dept: ENDOSCOPY | Facility: HOSPITAL | Age: 65
End: 2020-01-08

## 2020-01-08 LAB
BACTERIA UR CULT: NO GROWTH
BACTERIAL VAGINOSIS DNA: NEGATIVE
CANDIDA GLABRATA DNA: NEGATIVE
CANDIDA KRUSEI DNA: NEGATIVE
CANDIDA RRNA VAG QL PROBE: NEGATIVE
T VAGINALIS RRNA GENITAL QL PROBE: NEGATIVE

## 2020-01-08 RX ORDER — ESTRADIOL 0.04 MG/D
1 FILM, EXTENDED RELEASE TRANSDERMAL
COMMUNITY
End: 2020-06-18

## 2020-01-09 ENCOUNTER — PATIENT MESSAGE (OUTPATIENT)
Dept: UROLOGY | Facility: CLINIC | Age: 65
End: 2020-01-09

## 2020-01-14 ENCOUNTER — HOSPITAL ENCOUNTER (OUTPATIENT)
Dept: RADIOLOGY | Facility: HOSPITAL | Age: 65
Discharge: HOME | End: 2020-01-14
Attending: UROLOGY
Payer: COMMERCIAL

## 2020-01-14 DIAGNOSIS — N39.0 RECURRENT UTI: ICD-10-CM

## 2020-01-14 PROCEDURE — 76770 US EXAM ABDO BACK WALL COMP: CPT | Mod: TC

## 2020-01-14 PROCEDURE — 76770 US EXAM ABDO BACK WALL COMP: CPT | Mod: 26,,, | Performed by: RADIOLOGY

## 2020-01-14 PROCEDURE — 76770 US KIDNEY: ICD-10-PCS | Mod: 26,,, | Performed by: RADIOLOGY

## 2020-01-15 ENCOUNTER — HOSPITAL ENCOUNTER (OUTPATIENT)
Dept: UROLOGY | Facility: HOSPITAL | Age: 65
Discharge: HOME | End: 2020-01-15
Attending: UROLOGY
Payer: COMMERCIAL

## 2020-01-15 VITALS
WEIGHT: 167.13 LBS | DIASTOLIC BLOOD PRESSURE: 75 MMHG | HEIGHT: 67 IN | BODY MASS INDEX: 26.23 KG/M2 | SYSTOLIC BLOOD PRESSURE: 115 MMHG | RESPIRATION RATE: 17 BRPM | TEMPERATURE: 99 F | HEART RATE: 72 BPM

## 2020-01-15 DIAGNOSIS — N30.80 CYSTITIS CYSTICA: ICD-10-CM

## 2020-01-15 DIAGNOSIS — N39.0 RECURRENT UTI: Primary | ICD-10-CM

## 2020-01-15 PROCEDURE — 52000 PR CYSTOURETHROSCOPY: ICD-10-PCS | Mod: ,,, | Performed by: UROLOGY

## 2020-01-15 PROCEDURE — 88112 CYTOPATH CELL ENHANCE TECH: CPT | Mod: 26,,, | Performed by: PATHOLOGY

## 2020-01-15 PROCEDURE — 52000 CYSTOURETHROSCOPY: CPT

## 2020-01-15 PROCEDURE — 88112 PR  CYTOPATH, CELL ENHANCE TECH: ICD-10-PCS | Mod: 26,,, | Performed by: PATHOLOGY

## 2020-01-15 PROCEDURE — 52000 CYSTOURETHROSCOPY: CPT | Mod: ,,, | Performed by: UROLOGY

## 2020-01-15 PROCEDURE — 88112 CYTOPATH CELL ENHANCE TECH: CPT | Performed by: PATHOLOGY

## 2020-01-15 RX ORDER — LIDOCAINE HYDROCHLORIDE 20 MG/ML
JELLY TOPICAL
Status: COMPLETED | OUTPATIENT
Start: 2020-01-15 | End: 2020-01-15

## 2020-01-15 RX ORDER — SULFAMETHOXAZOLE AND TRIMETHOPRIM 800; 160 MG/1; MG/1
1 TABLET ORAL
Status: COMPLETED | OUTPATIENT
Start: 2020-01-15 | End: 2020-01-15

## 2020-01-15 RX ADMIN — LIDOCAINE HYDROCHLORIDE: 20 JELLY TOPICAL at 09:01

## 2020-01-15 RX ADMIN — SULFAMETHOXAZOLE AND TRIMETHOPRIM 1 TABLET: 800; 160 TABLET ORAL at 09:01

## 2020-01-15 NOTE — PROCEDURES
Procedure: Flexible cysto-uretheroscopy    Pre Procedure Diagnosis:recurrent uti    Post Procedure Diagnosis: same    Surgeon: Sylwia Young MD    Anesthesia: 2% uro-jet lidocaine jelly for local analgesia    Flexible cysto-urethroscopy was performed after consent was obtained.  The risks and benefits were explained.    2% lidocaine urojet was used for local analgesia.  The genitalia was prepped and draped in the sterile fashion with betadine    The flexible scope was advanced into the urethra and into the bladder.  Bilateral ureteral orifice were evaluated and noted to be normal with clear efflux.  The bladder was completely surveyed in a systematic fashion.   No bladder tumors or lesions were seen. Some cystitis cystica.  No strictures were noted.    The patient tolerated the procedure well without complication.    They will follow up in 6 months.   Start vaginal estrogen.  D-mannose  Hydration  Urine cytology

## 2020-01-15 NOTE — PATIENT INSTRUCTIONS
What to Expect After a Cystoscopy  For the next 24-48 hours, you may feel a mild burning when you urinate. This burning is normal and expected. Drink plenty of water to dilute the urine to help relieve the burning sensation. You may also see a small amount of blood in your urine after the procedure.    Unless you are already taking antibiotics, you may be given an antibiotic after the test to prevent infection.    Signs and Symptoms to Report  Call the [facility] at [phone] if you develop any of the following:  · Fever of 101 degrees or higher  · Chills or persistent bleeding  · Inability to urinate

## 2020-01-21 LAB — FINAL PATHOLOGIC DIAGNOSIS: NORMAL

## 2020-01-23 ENCOUNTER — ANESTHESIA EVENT (OUTPATIENT)
Dept: ENDOSCOPY | Facility: HOSPITAL | Age: 65
End: 2020-01-23
Payer: COMMERCIAL

## 2020-01-24 ENCOUNTER — ANESTHESIA (OUTPATIENT)
Dept: ENDOSCOPY | Facility: HOSPITAL | Age: 65
End: 2020-01-24
Payer: COMMERCIAL

## 2020-01-27 ENCOUNTER — PATIENT MESSAGE (OUTPATIENT)
Dept: UROLOGY | Facility: CLINIC | Age: 65
End: 2020-01-27

## 2020-02-18 ENCOUNTER — HOSPITAL ENCOUNTER (OUTPATIENT)
Facility: HOSPITAL | Age: 65
Discharge: HOME | End: 2020-02-18
Attending: INTERNAL MEDICINE | Admitting: INTERNAL MEDICINE
Payer: COMMERCIAL

## 2020-02-18 VITALS
OXYGEN SATURATION: 100 % | BODY MASS INDEX: 25.11 KG/M2 | WEIGHT: 160 LBS | TEMPERATURE: 98 F | DIASTOLIC BLOOD PRESSURE: 62 MMHG | HEIGHT: 67 IN | RESPIRATION RATE: 64 BRPM | HEART RATE: 61 BPM | SYSTOLIC BLOOD PRESSURE: 106 MMHG

## 2020-02-18 DIAGNOSIS — K21.9 GERD (GASTROESOPHAGEAL REFLUX DISEASE): ICD-10-CM

## 2020-02-18 DIAGNOSIS — K21.9 GASTROESOPHAGEAL REFLUX DISEASE, ESOPHAGITIS PRESENCE NOT SPECIFIED: Primary | ICD-10-CM

## 2020-02-18 PROCEDURE — 43239 EGD BIOPSY SINGLE/MULTIPLE: CPT | Performed by: INTERNAL MEDICINE

## 2020-02-18 PROCEDURE — 27201012 HC FORCEPS, HOT/COLD, DISP: Performed by: INTERNAL MEDICINE

## 2020-02-18 PROCEDURE — 43239 EGD BIOPSY SINGLE/MULTIPLE: CPT | Mod: ,,, | Performed by: INTERNAL MEDICINE

## 2020-02-18 PROCEDURE — 37000009 HC ANESTHESIA EA ADD 15 MINS: Performed by: INTERNAL MEDICINE

## 2020-02-18 PROCEDURE — E9220 PRA ENDO ANESTHESIA: ICD-10-PCS | Mod: ,,, | Performed by: NURSE ANESTHETIST, CERTIFIED REGISTERED

## 2020-02-18 PROCEDURE — 88305 TISSUE EXAM BY PATHOLOGIST: ICD-10-PCS | Mod: 26,,, | Performed by: PATHOLOGY

## 2020-02-18 PROCEDURE — E9220 PRA ENDO ANESTHESIA: HCPCS | Mod: ,,, | Performed by: NURSE ANESTHETIST, CERTIFIED REGISTERED

## 2020-02-18 PROCEDURE — 43239 PR EGD, FLEX, W/BIOPSY, SGL/MULTI: ICD-10-PCS | Mod: ,,, | Performed by: INTERNAL MEDICINE

## 2020-02-18 PROCEDURE — 63600175 PHARM REV CODE 636 W HCPCS: Performed by: NURSE ANESTHETIST, CERTIFIED REGISTERED

## 2020-02-18 PROCEDURE — 88305 TISSUE EXAM BY PATHOLOGIST: CPT | Performed by: PATHOLOGY

## 2020-02-18 PROCEDURE — 88305 TISSUE EXAM BY PATHOLOGIST: CPT | Mod: 26,,, | Performed by: PATHOLOGY

## 2020-02-18 PROCEDURE — 25000003 PHARM REV CODE 250: Performed by: NURSE ANESTHETIST, CERTIFIED REGISTERED

## 2020-02-18 PROCEDURE — 63600175 PHARM REV CODE 636 W HCPCS: Performed by: INTERNAL MEDICINE

## 2020-02-18 PROCEDURE — 37000008 HC ANESTHESIA 1ST 15 MINUTES: Performed by: INTERNAL MEDICINE

## 2020-02-18 RX ORDER — PROPOFOL 10 MG/ML
VIAL (ML) INTRAVENOUS
Status: DISCONTINUED | OUTPATIENT
Start: 2020-02-18 | End: 2020-02-18

## 2020-02-18 RX ORDER — LIDOCAINE HCL/PF 100 MG/5ML
SYRINGE (ML) INTRAVENOUS
Status: DISCONTINUED | OUTPATIENT
Start: 2020-02-18 | End: 2020-02-18

## 2020-02-18 RX ORDER — SODIUM CHLORIDE 9 MG/ML
INJECTION, SOLUTION INTRAVENOUS CONTINUOUS
Status: DISCONTINUED | OUTPATIENT
Start: 2020-02-18 | End: 2020-02-18 | Stop reason: HOSPADM

## 2020-02-18 RX ORDER — GLYCOPYRROLATE 0.2 MG/ML
INJECTION INTRAMUSCULAR; INTRAVENOUS
Status: DISCONTINUED | OUTPATIENT
Start: 2020-02-18 | End: 2020-02-18

## 2020-02-18 RX ORDER — PANTOPRAZOLE SODIUM 40 MG/1
40 TABLET, DELAYED RELEASE ORAL DAILY
Qty: 30 TABLET | Refills: 12 | Status: SHIPPED | OUTPATIENT
Start: 2020-02-18 | End: 2021-02-18 | Stop reason: SDUPTHER

## 2020-02-18 RX ADMIN — GLYCOPYRROLATE 0.1 MG: 0.2 INJECTION, SOLUTION INTRAMUSCULAR; INTRAVENOUS at 11:02

## 2020-02-18 RX ADMIN — Medication 100 MG: at 11:02

## 2020-02-18 RX ADMIN — PROPOFOL 20 MG: 10 INJECTION, EMULSION INTRAVENOUS at 11:02

## 2020-02-18 RX ADMIN — PROPOFOL 80 MG: 10 INJECTION, EMULSION INTRAVENOUS at 11:02

## 2020-02-18 RX ADMIN — SODIUM CHLORIDE: 0.9 INJECTION, SOLUTION INTRAVENOUS at 10:02

## 2020-02-18 NOTE — DISCHARGE INSTRUCTIONS

## 2020-02-18 NOTE — [employer] PATIENT INSTRUCTIONS
Discharge Summary/Instructions after an Endoscopic Procedure  Patient Name: Jolene Andre  Patient MRN: [id number]  Patient YOB: 1955 Tuesday, February 18, 2020  Rip Davis MD  RESTRICTIONS:  During your procedure today, you received medications for sedation.  These   medications may affect your judgment, balance and coordination.  Therefore,   for 24 hours, you have the following restrictions:   - DO NOT drive a car, operate machinery, make legal/financial decisions,   sign important papers or drink alcohol.    ACTIVITY:  Today: no heavy lifting, straining or running due to procedural   sedation/anesthesia.  The following day: return to full activity including work.  DIET:  Eat and drink normally unless instructed otherwise.     TREATMENT FOR COMMON SIDE EFFECTS:  - Mild abdominal pain, nausea, belching, bloating or excessive gas:  rest,   eat lightly and use a heating pad.  - Sore Throat: treat with throat lozenges and/or gargle with warm salt   water.  - Because air was used during the procedure, expelling large amounts of air   from your rectum or belching is normal.  - If a bowel prep was taken, you may not have a bowel movement for 1-3 days.    This is normal.  SYMPTOMS TO WATCH FOR AND REPORT TO YOUR PHYSICIAN:  1. Abdominal pain or bloating, other than gas cramps.  2. Chest pain.  3. Back pain.  4. Signs of infection such as: chills or fever occurring within 24 hours   after the procedure.  5. Rectal bleeding, which would show as bright red, maroon, or black stools.   (A tablespoon of blood from the rectum is not serious, especially if   hemorrhoids are present.)  6. Vomiting.  7. Weakness or dizziness.  GO DIRECTLY TO THE NEAREST EMERGENCY ROOM IF YOU HAVE ANY OF THE FOLLOWING:      Difficulty breathing              Chills and/or fever over 101 F   Persistent vomiting and/or vomiting blood   Severe abdominal pain   Severe chest pain   Black, tarry stools   Bleeding- more than one tablespoon   Any  other symptom or condition that you feel may need urgent attention  Your doctor recommends these additional instructions:  If any biopsies were taken, your doctors clinic will contact you in 1 to 2   weeks with any results.  - Patient has a contact number available for emergencies.  The signs and   symptoms of potential delayed complications were discussed with the   patient.  Return to normal activities tomorrow.  Written discharge   instructions were provided to the patient.   - Discharge patient to home (ambulatory).   - Resume previous diet.   - Continue present medications.   - Use Protonix (pantoprazole) 40 mg PO daily for 2 months.  For questions, problems or results please call your physician - Rip Davis MD at Work:  [phone].  [facility], EMERGENCY ROOM PHONE NUMBER: [phone]  IF A COMPLICATION OR EMERGENCY SITUATION ARISES AND YOU ARE UNABLE TO REACH   YOUR PHYSICIAN - GO DIRECTLY TO THE EMERGENCY ROOM.  Rip Davis MD  2/18/2020 11:10:46 AM  This report has been verified and signed electronically.  [employer]

## 2020-02-18 NOTE — ANESTHESIA POSTPROCEDURE EVALUATION
Anesthesia Post Evaluation    Patient: Jolene Andre    Procedure(s) Performed: Procedure(s) (LRB):  ESOPHAGOGASTRODUODENOSCOPY (EGD) (N/A)    Final Anesthesia Type: general    Patient location during evaluation: GI PACU  Patient participation: Yes- Able to Participate  Level of consciousness: awake and alert and oriented  Post-procedure vital signs: reviewed and stable  Pain management: adequate  Airway patency: patent    PONV status at discharge: No PONV  Anesthetic complications: no      Cardiovascular status: blood pressure returned to baseline and hemodynamically stable  Respiratory status: unassisted, spontaneous ventilation and room air  Hydration status: euvolemic  Follow-up not needed.          Vitals Value Taken Time   /62 2/18/2020 11:41 AM   Temp 36.7 °C (98.1 °F) 2/18/2020 11:14 AM   Pulse 61 2/18/2020 11:41 AM   Resp 64 2/18/2020 11:41 AM   SpO2 100 % 2/18/2020 11:41 AM         Event Time     Out of Recovery 11:51:12          Pain/Obi Score: Obi Score: 10 (2/18/2020 11:30 AM)

## 2020-02-18 NOTE — TRANSFER OF CARE
"Anesthesia Transfer of Care Note    Patient: Jolene Andre    Procedure(s) Performed: Procedure(s) (LRB):  ESOPHAGOGASTRODUODENOSCOPY (EGD) (N/A)    Patient location: PACU    Anesthesia Type: general    Transport from OR: Transported from OR on room air with adequate spontaneous ventilation    Post pain: adequate analgesia    Post assessment: no apparent anesthetic complications and tolerated procedure well    Post vital signs: stable    Level of consciousness: awake    Nausea/Vomiting: no nausea/vomiting    Complications: none    Transfer of care protocol was followed      Last vitals:   Visit Vitals  /74 (BP Location: Left arm, Patient Position: Lying)   Pulse 65   Temp 36.9 °C (98.4 °F) (Temporal)   Resp 17   Ht 5' 7" (1.702 m)   Wt 72.6 kg (160 lb)   SpO2 99%   Breastfeeding? No   BMI 25.06 kg/m²     "

## 2020-02-18 NOTE — ANESTHESIA PREPROCEDURE EVALUATION
02/18/2020  Jolene Andre is a 64 y.o., female.  Patient Active Problem List   Diagnosis    Unspecified hypothyroidism    Mood disorder in full remission    Migraine headache    Chronic neck pain    Lumbago    Cystitis cystica    Nocturia    Chest pain    Osteoarthritis of spine with radiculopathy, cervical region     Past Medical History:   Diagnosis Date    Allergy     Arthritis     Bipolar disorder     Headache(784.0)     Herniated disc     x4 in cervical area    History of migraine headaches     Hypothyroidism     Infections of kidney     has had multiple kidney infections in the past.    Low back pain     Migraine headache     Moderate mood disorder     Neck pain     Rotator cuff tear     Thyroid disease      Past Surgical History:   Procedure Laterality Date    BLADDER SUSPENSION      COLONOSCOPY N/A 5/5/2016    Procedure: COLONOSCOPY;  Surgeon: Daron Scruggs MD;  Location: [address]);  Service: Endoscopy;  Laterality: N/A;    COSMETIC SURGERY      face lift     CYSTOSCOPY      HYSTERECTOMY  1989    age 33 CECY/BSO (pain, bleeding)    OOPHORECTOMY      TONSILLECTOMY           Anesthesia Evaluation    I have reviewed the Patient Summary Reports.     I have reviewed the Medications.     Review of Systems  Anesthesia Hx:  No problems with previous Anesthesia  Denies Family Hx of Anesthesia complications.   Denies Personal Hx of Anesthesia complications.   Hematology/Oncology:  Hematology Normal   Oncology Normal     EENT/Dental:EENT/Dental Normal   Cardiovascular:   Exercise tolerance: good    Pulmonary:  Pulmonary Normal    Renal/:   Chronic Renal Disease    Hepatic/GI:  Hepatic/GI Normal    Musculoskeletal:   Arthritis     Neurological:   Neuromuscular Disease, Headaches    Endocrine:   Hypothyroidism    Dermatological:  Skin Normal    Psych:   Psychiatric  History anxiety          Physical Exam  General:  Well nourished    Airway/Jaw/Neck:  Airway Findings: Mouth Opening: Normal Tongue: Normal  General Airway Assessment: Adult  TM Distance: Normal, at least 6 cm       Chest/Lungs:  Chest/Lungs Clear    Heart/Vascular:  Heart Findings: Normal Heart murmur: negative            Anesthesia Plan  Type of Anesthesia, risks & benefits discussed:  Anesthesia Type:  general  Patient's Preference:   Intra-op Monitoring Plan: standard ASA monitors  Intra-op Monitoring Plan Comments:   Post Op Pain Control Plan:   Post Op Pain Control Plan Comments:   Induction:   IV  Beta Blocker:  Patient is not currently on a Beta-Blocker (No further documentation required).       Informed Consent: Patient understands risks and agrees with Anesthesia plan.  Questions answered. Anesthesia consent signed with patient.  ASA Score: 3     Day of Surgery Review of History & Physical:    H&P update referred to the surgeon.         Ready For Surgery From Anesthesia Perspective.

## 2020-02-18 NOTE — H&P
[facility]  History & Physical    Subjective:      Chief Complaint/Reason for Admission: reflux    Jolene Andre is a 64 y.o. female.    Past Medical History:   Diagnosis Date    Allergy     Arthritis     Bipolar disorder     Headache(784.0)     Herniated disc     x4 in cervical area    History of migraine headaches     Hypothyroidism     Infections of kidney     has had multiple kidney infections in the past.    Low back pain     Migraine headache     Moderate mood disorder     Neck pain     Rotator cuff tear     Thyroid disease      Past Surgical History:   Procedure Laterality Date    BLADDER SUSPENSION      COLONOSCOPY N/A 5/5/2016    Procedure: COLONOSCOPY;  Surgeon: Daron Scruggs MD;  Location: [address]);  Service: Endoscopy;  Laterality: N/A;    COSMETIC SURGERY      face lift     CYSTOSCOPY      HYSTERECTOMY  1989    age 33 CECY/BSO (pain, bleeding)    OOPHORECTOMY      TONSILLECTOMY       Family History   Problem Relation Age of Onset    Hypertension Mother     Migraines Mother     Tremor Mother     Hyperlipidemia Mother     Allergies Mother     Hypertension Sister     Uterine cancer Maternal Grandmother     Breast cancer Maternal Aunt     Asthma Brother     Allergies Son     Asthma Son     Colon cancer Neg Hx     Ovarian cancer Neg Hx      Social History     Tobacco Use    Smoking status: Never Smoker    Smokeless tobacco: Never Used   Substance Use Topics    Alcohol use: No     Frequency: Never    Drug use: No       PTA Medications   Medication Sig    ALPRAZolam (XANAX) 0.25 MG tablet One-two during a flight for anxiety    erenumab-aooe (AIMOVIG AUTOINJECTOR) 70 mg/mL injection Inject 1 mL (70 mg total) into the skin every 28 days.    escitalopram oxalate (LEXAPRO) 10 MG tablet TK 1 T PO  QD    estradiol (VIVELLE-DOT) 0.0375 mg/24 hr Place 1 patch onto the skin twice a week.    lithium (LITHOBID) 300 MG CR tablet Take 600 mg by  mouth once daily.    SYNTHROID 112 mcg tablet TAKE 1 TABLET(112 MCG) BY MOUTH EVERY DAY    ciprofloxacin HCl (CIPRO) 500 MG tablet TK 1 T PO  BID FOR 7 DAYS    sumatriptan (IMITREX) 100 MG tablet TAKE 1 TABLET BY MOUTH EVERY 2 HOURS AS NEEDED FOR MIGRAINE    sumatriptan (IMITREX) 100 MG tablet TAKE 1 TABLET BY MOUTH EVERY 2 HOURS AS NEEDED FOR MIGRAINE    SYNTHROID 112 mcg tablet TAKE 1 TABLET(112 MCG) BY MOUTH EVERY DAY     Review of patient's allergies indicates:   Allergen Reactions    Rocephin [ceftriaxone]      Severe migraine    Abilify [aripiprazole] Other (See Comments)     Tardive dyskinesia  - do not give any other meds that affect dopamine    Clindamycin Other (See Comments)     nausea    Codeine      Other reaction(s): Unknown    Demerol [meperidine]      Interacts with meds    Doxycycline      headaches    Erythromycin      Other reaction(s): Unknown    Fioricet  [butalbital-acetaminophen-caff] Other (See Comments)    Nsaids (non-steroidal anti-inflammatory drug)      Alters lithium metabolism        Review of Systems   Respiratory: Negative.    Cardiovascular: Negative.        Objective:      Vital Signs (Most Recent)  Temp: 98.4 °F (36.9 °C) (02/18/20 1044)  Pulse: 65 (02/18/20 1044)  Resp: 17 (02/18/20 1044)  BP: 111/74 (02/18/20 1044)  SpO2: 99 % (02/18/20 1044)    Vital Signs Range (Last 24H):  Temp:  [98.4 °F (36.9 °C)]   Pulse:  [65]   Resp:  [17]   BP: (111)/(74)   SpO2:  [99 %]     Physical Exam   Cardiovascular: Normal rate and regular rhythm.   Pulmonary/Chest: Effort normal and breath sounds normal.       Data Review:     ECG: .     Assessment:      There are no hospital problems to display for this patient.      Plan:    Indication for procedure:    ASA:II  Airway normal  Malampati class:per anes    Personal and family history negative for anesthesia problems    Plan:egd  Anesthesia plan: general

## 2020-02-20 LAB
FINAL PATHOLOGIC DIAGNOSIS: NORMAL
GROSS: NORMAL

## 2020-04-11 RX ORDER — LEVOTHYROXINE SODIUM 112 UG/1
TABLET ORAL
Qty: 30 TABLET | Refills: 3 | Status: SHIPPED | OUTPATIENT
Start: 2020-04-11 | End: 2020-07-16 | Stop reason: SDUPTHER

## 2020-05-27 RX ORDER — TOPIRAMATE 25 MG/1
TABLET ORAL
Qty: 60 TABLET | Refills: 2 | Status: SHIPPED | OUTPATIENT
Start: 2020-05-27 | End: 2020-10-19

## 2020-06-09 ENCOUNTER — OFFICE VISIT (OUTPATIENT)
Dept: INTERNAL MEDICINE | Facility: CLINIC | Age: 65
End: 2020-06-09
Payer: COMMERCIAL

## 2020-06-09 ENCOUNTER — TELEPHONE (OUTPATIENT)
Dept: INTERNAL MEDICINE | Facility: CLINIC | Age: 65
End: 2020-06-09

## 2020-06-09 VITALS
BODY MASS INDEX: 26.33 KG/M2 | DIASTOLIC BLOOD PRESSURE: 78 MMHG | SYSTOLIC BLOOD PRESSURE: 120 MMHG | WEIGHT: 167.75 LBS | HEIGHT: 67 IN | OXYGEN SATURATION: 98 % | HEART RATE: 71 BPM

## 2020-06-09 DIAGNOSIS — G44.89 CHRONIC MIXED HEADACHE SYNDROME: ICD-10-CM

## 2020-06-09 DIAGNOSIS — Z00.00 WELLNESS EXAMINATION: Primary | ICD-10-CM

## 2020-06-09 LAB
AMPHET+METHAMPHET UR QL: NEGATIVE
BARBITURATES UR QL SCN>200 NG/ML: NEGATIVE
BENZODIAZ UR QL SCN>200 NG/ML: NEGATIVE
BILIRUB UR QL STRIP: NEGATIVE
BZE UR QL SCN: NEGATIVE
CANNABINOIDS UR QL SCN: NEGATIVE
CLARITY UR REFRACT.AUTO: CLEAR
COLOR UR AUTO: YELLOW
CREAT UR-MCNC: 53 MG/DL (ref 15–325)
ETHANOL UR-MCNC: <10 MG/DL
GLUCOSE UR QL STRIP: NEGATIVE
HGB UR QL STRIP: NEGATIVE
KETONES UR QL STRIP: NEGATIVE
LEUKOCYTE ESTERASE UR QL STRIP: NEGATIVE
METHADONE UR QL SCN>300 NG/ML: NEGATIVE
NITRITE UR QL STRIP: NEGATIVE
OPIATES UR QL SCN: NEGATIVE
PCP UR QL SCN>25 NG/ML: NEGATIVE
PH UR STRIP: 5 [PH] (ref 5–8)
PROT UR QL STRIP: NEGATIVE
SP GR UR STRIP: 1.01 (ref 1–1.03)
TOXICOLOGY INFORMATION: NORMAL
URN SPEC COLLECT METH UR: NORMAL

## 2020-06-09 PROCEDURE — 87086 URINE CULTURE/COLONY COUNT: CPT

## 2020-06-09 PROCEDURE — 80307 DRUG TEST PRSMV CHEM ANLYZR: CPT

## 2020-06-09 PROCEDURE — 99396 PREV VISIT EST AGE 40-64: CPT | Mod: S$GLB,,, | Performed by: INTERNAL MEDICINE

## 2020-06-09 PROCEDURE — 99999 PR PBB SHADOW E&M-EST. PATIENT-LVL III: CPT | Mod: PBBFAC,,, | Performed by: INTERNAL MEDICINE

## 2020-06-09 PROCEDURE — 81003 URINALYSIS AUTO W/O SCOPE: CPT

## 2020-06-09 PROCEDURE — 99999 PR PBB SHADOW E&M-EST. PATIENT-LVL III: ICD-10-PCS | Mod: PBBFAC,,, | Performed by: INTERNAL MEDICINE

## 2020-06-09 PROCEDURE — 99396 PR PREVENTIVE VISIT,EST,40-64: ICD-10-PCS | Mod: S$GLB,,, | Performed by: INTERNAL MEDICINE

## 2020-06-09 NOTE — TELEPHONE ENCOUNTER
----- Message from Donna Howell sent at 6/9/2020  9:53 AM CDT -----  Contact: Pt self Mobile [phone]  Patient would like a call back in regards to her wanting to speak with you about a physical evaluation form that she spoke with you about please.

## 2020-06-10 ENCOUNTER — HOSPITAL ENCOUNTER (OUTPATIENT)
Dept: RADIOLOGY | Facility: HOSPITAL | Age: 65
Discharge: HOME | End: 2020-06-10
Attending: INTERNAL MEDICINE
Payer: COMMERCIAL

## 2020-06-10 DIAGNOSIS — G44.89 CHRONIC MIXED HEADACHE SYNDROME: ICD-10-CM

## 2020-06-10 PROCEDURE — 70551 MRI BRAIN STEM W/O DYE: CPT | Mod: 26,,, | Performed by: RADIOLOGY

## 2020-06-10 PROCEDURE — 70551 MRI BRAIN STEM W/O DYE: CPT | Mod: TC

## 2020-06-10 PROCEDURE — 70551 MRI BRAIN WITHOUT CONTRAST: ICD-10-PCS | Mod: 26,,, | Performed by: RADIOLOGY

## 2020-06-11 LAB — BACTERIA UR CULT: NO GROWTH

## 2020-06-14 NOTE — PROGRESS NOTES
Subjective:       Patient ID: Jolene Andre is a 64 y.o. female.    Chief Complaint: Follow-up    HPIPt is concerned about being on Lithium and is going to an eval in [place] to see about changing her meds.  She needs specific blood tests and an MRI to prepare for her visit.  SHe feels well except for increased migraines.  Still playing tennis.  No CP or SOB.  Review of Systems   Respiratory: Negative for shortness of breath (PND or orthopnea).    Cardiovascular: Negative for chest pain (arm pain or jaw pain).   Gastrointestinal: Negative for abdominal pain, diarrhea, nausea and vomiting.   Genitourinary: Negative for dysuria.   Neurological: Negative for seizures, syncope and headaches.       Objective:      Physical Exam  Constitutional:       General: She is not in acute distress.     Appearance: She is well-developed.   HENT:      Head: Normocephalic.   Eyes:      Pupils: Pupils are equal, round, and reactive to light.   Neck:      Musculoskeletal: Neck supple.      Thyroid: No thyromegaly.      Vascular: No JVD.   Cardiovascular:      Rate and Rhythm: Normal rate and regular rhythm.      Heart sounds: Normal heart sounds. No murmur. No friction rub. No gallop.    Pulmonary:      Effort: Pulmonary effort is normal.      Breath sounds: Normal breath sounds. No wheezing or rales.      Comments: Breasts: No masses, discharge or adenopathy bilaterally    Abdominal:      General: Bowel sounds are normal. There is no distension.      Palpations: Abdomen is soft. There is no mass.      Tenderness: There is no abdominal tenderness. There is no guarding or rebound.   Genitourinary:     Vagina: Normal.   Lymphadenopathy:      Cervical: No cervical adenopathy.   Skin:     General: Skin is warm and dry.   Neurological:      Mental Status: She is alert and oriented to person, place, and time.      Deep Tendon Reflexes: Reflexes are normal and symmetric.   Psychiatric:         Behavior: Behavior normal.         Thought Content:  Thought content normal.         Judgment: Judgment normal.         Assessment:       1. Wellness examination    2. Chronic mixed headache syndrome        Plan:   Wellness examination  -     Mammo Digital Screening Bilat w/ Philippe; Future; Expected date: 12/09/2020  -     CBC auto differential; Future; Expected date: 06/09/2020  -     Comprehensive metabolic panel; Future; Expected date: 06/09/2020  -     Lipid Panel; Future; Expected date: 06/09/2020  -     TSH; Future; Expected date: 06/09/2020  -     Hemoglobin A1C; Future; Expected date: 06/09/2020  -     Vitamin D; Future; Expected date: 06/09/2020  -     Urinalysis  -     Urine culture  -     Gamma GT; Future; Expected date: 06/09/2020  -     Vitamin B12; Future; Expected date: 07/09/2020  -     Folate; Future; Expected date: 06/09/2020  -     Sedimentation rate; Future; Expected date: 06/09/2020  -     TOXICOLOGY SCREEN, URINE, RANDOM (COMPLIANCE)  -     HIV 1/2 Ag/Ab (4th Gen); Future; Expected date: 06/09/2020  -     RPR; Future; Expected date: 06/09/2020  -     Hepatitis Panel, Acute; Future; Expected date: 06/09/2020  -     Lithium level; Future; Expected date: 06/09/2020  -     TOPIRAMATE LEVEL; Future; Expected date: 06/09/2020    Chronic mixed headache syndrome  -      MRI Brain W WO Contrast; Future; Expected date: 06/09/2020

## 2020-07-20 RX ORDER — LEVOTHYROXINE SODIUM 112 UG/1
TABLET ORAL
Qty: 30 TABLET | Refills: 3 | Status: SHIPPED | OUTPATIENT
Start: 2020-07-20 | End: 2020-11-18 | Stop reason: SDUPTHER

## 2020-08-18 ENCOUNTER — OFFICE VISIT (OUTPATIENT)
Dept: INTERNAL MEDICINE | Facility: CLINIC | Age: 65
End: 2020-08-18
Payer: COMMERCIAL

## 2020-08-18 VITALS
BODY MASS INDEX: 25.88 KG/M2 | DIASTOLIC BLOOD PRESSURE: 72 MMHG | SYSTOLIC BLOOD PRESSURE: 118 MMHG | WEIGHT: 164.88 LBS | HEART RATE: 68 BPM | HEIGHT: 67 IN | TEMPERATURE: 98 F | OXYGEN SATURATION: 98 % | RESPIRATION RATE: 15 BRPM

## 2020-08-18 DIAGNOSIS — Z01.818 PRE-OP EVALUATION: Primary | ICD-10-CM

## 2020-08-18 PROCEDURE — 99214 PR OFFICE/OUTPT VISIT, EST, LEVL IV, 30-39 MIN: ICD-10-PCS | Mod: S$GLB,,, | Performed by: STUDENT IN AN ORGANIZED HEALTH CARE EDUCATION/TRAINING PROGRAM

## 2020-08-18 PROCEDURE — 99999 PR PBB SHADOW E&M-EST. PATIENT-LVL III: CPT | Mod: PBBFAC,,, | Performed by: STUDENT IN AN ORGANIZED HEALTH CARE EDUCATION/TRAINING PROGRAM

## 2020-08-18 PROCEDURE — 99999 PR PBB SHADOW E&M-EST. PATIENT-LVL III: ICD-10-PCS | Mod: PBBFAC,,, | Performed by: STUDENT IN AN ORGANIZED HEALTH CARE EDUCATION/TRAINING PROGRAM

## 2020-08-18 PROCEDURE — 99214 OFFICE O/P EST MOD 30 MIN: CPT | Mod: S$GLB,,, | Performed by: STUDENT IN AN ORGANIZED HEALTH CARE EDUCATION/TRAINING PROGRAM

## 2020-08-18 NOTE — PROGRESS NOTES
[facility]  CLINIC NOTE    Patient Name: Jolene Andre  YOB: 1955    PRESENTING HISTORY       History of Present Illness:  Ms. Jolene Andre is a 64 y.o. female w/ an active medical problem list including GERD, migraines and mood disorder presenting for a preop evaluation of cataract surgery on 8/27/2020. Patient is feeling well and has no major complaints today. She has been slightly short of breath the past few months but attributes to not exercising in a while. She is able to do her daily tasks without problems and can walk 3 flights of stairs well. She does not drink alcohol, smoke or use illicit drugs. She denies any chest pain, wheezing, abdominal pain, n/v/d.     Review of Systems   Constitutional: Negative for chills, fever, malaise/fatigue and weight loss.   HENT: Negative for congestion and sore throat.    Eyes: Negative for blurred vision.   Respiratory: Negative for cough, sputum production and shortness of breath.    Cardiovascular: Negative for chest pain, palpitations, orthopnea, claudication and leg swelling.   Gastrointestinal: Negative for abdominal pain, blood in stool, constipation, diarrhea, heartburn, melena, nausea and vomiting.   Genitourinary: Negative for dysuria and frequency.   Musculoskeletal: Negative for back pain and myalgias.   Skin: Negative for rash.   Neurological: Negative for dizziness, tingling, focal weakness and loss of consciousness.   Psychiatric/Behavioral: Negative for depression and substance abuse. The patient is not nervous/anxious and does not have insomnia.        PAST HISTORY:     Past Medical History:   Diagnosis Date    Allergy     Arthritis     Bipolar disorder     Headache(784.0)     Herniated disc     x4 in cervical area    History of migraine headaches     Hypothyroidism     Infections of kidney     has had multiple kidney infections in the past.    Low back pain     Migraine headache     Moderate mood disorder      Neck pain     Rotator cuff tear     Thyroid disease        Past Surgical History:   Procedure Laterality Date    BLADDER SUSPENSION      COLONOSCOPY N/A 5/5/2016    Procedure: COLONOSCOPY;  Surgeon: Daron Scruggs MD;  [facility] ([facility]);  Service: Endoscopy;  Laterality: N/A;    COSMETIC SURGERY      face lift     CYSTOSCOPY      ESOPHAGOGASTRODUODENOSCOPY N/A 2/18/2020    Procedure: ESOPHAGOGASTRODUODENOSCOPY (EGD);  Surgeon: Rip Davis MD;  Location: [facility] ([facility]);  Service: Endoscopy;  Laterality: N/A;  [id number]    HYSTERECTOMY  1989    age 33 CECY/BSO (pain, bleeding)    OOPHORECTOMY      TONSILLECTOMY         Family History   Problem Relation Age of Onset    Hypertension Mother     Migraines Mother     Tremor Mother     Hyperlipidemia Mother     Allergies Mother     Hypertension Sister     Uterine cancer Maternal Grandmother     Breast cancer Maternal Aunt     Asthma Brother     Allergies Son     Asthma Son     Colon cancer Neg Hx     Ovarian cancer Neg Hx        Social History     Socioeconomic History    Marital status:      Spouse name: Not on file    Number of children: 2    Years of education: 18    Highest education level: Not on file   Occupational History    Occupation: Homemaker/Retired   Social Needs    Financial resource strain: Not on file    Food insecurity     Worry: Not on file     Inability: Not on file    Transportation needs     Medical: Not on file     Non-medical: Not on file   Tobacco Use    Smoking status: Never Smoker    Smokeless tobacco: Never Used   Substance and Sexual Activity    Alcohol use: No     Frequency: Never    Drug use: No    Sexual activity: Yes     Partners: Male   Lifestyle    Physical activity     Days per week: Not on file     Minutes per session: Not on file    Stress: Not on file   Relationships    Social connections     Talks on phone: Not on file     Gets together: Not on file  "    Attends [employer] service: Not on file     Active member of club or organization: Not on file     Attends meetings of clubs or organizations: Not on file     Relationship status: Not on file   Other Topics Concern    Not on file   Social History Narrative    Not on file       MEDICATIONS & ALLERGIES:     Current Outpatient Medications on File Prior to Visit   Medication Sig    ALPRAZolam (XANAX) 0.25 MG tablet One-two during a flight for anxiety    estradioL (VIVELLE-DOT) 0.0375 mg/24 hr APPLY 1 PATCH EXTERNALLY TO THE SKIN 2 TIMES A WEEK    lithium (LITHOBID) 300 MG CR tablet Take 600 mg by mouth once daily.    pantoprazole (PROTONIX) 40 MG tablet Take 1 tablet (40 mg total) by mouth once daily.    SYNTHROID 112 mcg tablet TAKE 1 TABLET(112 MCG) BY MOUTH EVERY DAY    SYNTHROID 112 mcg tablet TAKE 1 TABLET(112 MCG) BY MOUTH EVERY DAY    topiramate (TOPAMAX) 25 MG tablet TAKE 1 TABLET(25 MG) BY MOUTH TWICE DAILY     No current facility-administered medications on file prior to visit.        Review of patient's allergies indicates:   Allergen Reactions    Rocephin [ceftriaxone]      Severe migraine    Abilify [aripiprazole] Other (See Comments)     Tardive dyskinesia  - do not give any other meds that affect dopamine    Clindamycin Other (See Comments)     nausea    Codeine      Other reaction(s): Unknown    Demerol [meperidine]      Interacts with meds    Doxycycline      headaches    Erythromycin      Other reaction(s): Unknown    Fioricet  [butalbital-acetaminophen-caff] Other (See Comments)    Nsaids (non-steroidal anti-inflammatory drug)      Alters lithium metabolism       OBJECTIVE:   Vital Signs:  Vitals:    08/18/20 1512   BP: 118/72   Pulse: 68   Resp: 15   Temp: 98 °F (36.7 °C)   SpO2: 98%   Weight: 74.8 kg (164 lb 14.5 oz)   Height: 5' 7" (1.702 m)       No results found for this or any previous visit (from the past 24 hour(s)).      Physical Exam   Constitutional: She is oriented to " person, place, and time and well-developed, well-nourished, and in no distress. No distress.   HENT:   Head: Normocephalic and atraumatic.   Eyes: Conjunctivae and EOM are normal.   Neck: Normal range of motion.   Cardiovascular: Normal rate and regular rhythm. Exam reveals no gallop.   No murmur heard.  Pulmonary/Chest: Effort normal and breath sounds normal. No respiratory distress. She has no wheezes. She has no rales.   Abdominal: Soft. She exhibits no distension. There is no abdominal tenderness. There is no rebound and no guarding.   Musculoskeletal: Normal range of motion.         General: Edema present.   Neurological: She is alert and oriented to person, place, and time.   Skin: Skin is warm and dry. She is not diaphoretic.   Psychiatric: Mood and affect normal.       Laboratory  Lab Results   Component Value Date    WBC 4.41 06/10/2020    HGB 13.7 06/10/2020    HCT 45.2 06/10/2020    MCV 99 (H) 06/10/2020     06/10/2020     @[id number](GLU,NA,K,Cl,CO2,BUN,Creatinine,Calcium,MG)@  Lab Results   Component Value Date    INR 1.0 10/25/2017    INR 1.0 01/12/2010     Lab Results   Component Value Date    HGBA1C 5.1 06/10/2020     No results for input(s): POCTGLUCOSE in the last 72 hours.      There are no diagnoses linked to this encounter.       Surgical Risk Assessment   Active cardiac issues:  Active decompensated heart failure? No   Unstable angina?  No   Significant uncontrolled arrhythmias? No   Severe valvular heart disease-Aortic or Mitral Stenosis? No   Recent MI or coronary revascularization < 30 days? No     Cardiac Risk Factors  History of CAD/ischemic heart disease? No   History of cerebrovascular disease? No   History of compensated heart failure? No   Type 2 diabetes requiring insulin? No   Serum Creatinine > 2? No   Total cardiac risk factors 0     Functional mets 6  < 4* METs -unable to walk > 2 blocks on level ground without stopping due to symptoms  - eating, dressing, toileting,  walking indoors, light housework. POOR   > 4* METs -climbing > 1 flight of stairs without stopping  -walking up hill > 1-2 blocks  -scrubbing floors  -moving furniture  - golf, bowling, dancing or tennis  -running short distance MODERATE to EXCELLENT   * performance of any one of the activities     Assessment/Plan:   Cardiovascular Risk Assessment:  Non-emergent surgery.  No active cardiac problems (such as unstable angina, decompensated heart failure, significant uncontrolled arrhythmias or severe valvular disease).  Surgery is low risk  Functional Status: able to climb a flight of stairs (> 4 METS)    Revised Cardiac Risk Index   1. History of ischemic heart disease   2. History of congestive heart failure   3. History of cerebrovascular disease (stroke or transient ischemic attack)   4. History of diabetes requiring preoperative insulin use 5. Chronic kidney disease (creatinine > 2 mg/dL)   5. Undergoing suprainguinal vascular, intraperitoneal, or intrathoracic surgery Risk for cardiac death, nonfatal myocardial infarction, and nonfatal cardiac arrest     0 predictors = 0.4%, 1 predictor = 0.9%, 2 predictors = 6.6%, ?3 predictors = >11%    RCRI Calculator Class and Risk percentage:             0.4%           Recommendation:  1. No further cardiac intervention indicated. She is low risk for surgery, no more diagnostic testing indicated. Proceed to Surgery.          Discussed with Dr. Baker  - staff attestation to follow    Rohith Chaney MD  Internal Medicine PGY-1

## 2020-08-18 NOTE — PROGRESS NOTES
Preceptor note    Patient's history and physical discussed, please refer to resident physician's note for specific details. Pt seen with resident physician. I agree with resident's assessment and plan.

## 2020-09-08 ENCOUNTER — HOSPITAL ENCOUNTER (OUTPATIENT)
Dept: RADIOLOGY | Facility: HOSPITAL | Age: 65
Discharge: HOME | End: 2020-09-08
Attending: INTERNAL MEDICINE
Payer: COMMERCIAL

## 2020-09-08 DIAGNOSIS — Z00.00 WELLNESS EXAMINATION: ICD-10-CM

## 2020-09-08 PROCEDURE — 77067 SCR MAMMO BI INCL CAD: CPT | Mod: 26,,, | Performed by: RADIOLOGY

## 2020-09-08 PROCEDURE — 77063 BREAST TOMOSYNTHESIS BI: CPT | Mod: 26,,, | Performed by: RADIOLOGY

## 2020-09-08 PROCEDURE — 77067 SCR MAMMO BI INCL CAD: CPT | Mod: TC

## 2020-09-08 PROCEDURE — 77067 MAMMO DIGITAL SCREENING BILAT WITH TOMOSYNTHESIS_CAD: ICD-10-PCS | Mod: 26,,, | Performed by: RADIOLOGY

## 2020-09-08 PROCEDURE — 77063 MAMMO DIGITAL SCREENING BILAT WITH TOMOSYNTHESIS_CAD: ICD-10-PCS | Mod: 26,,, | Performed by: RADIOLOGY

## 2020-10-19 ENCOUNTER — OFFICE VISIT (OUTPATIENT)
Dept: URGENT CARE | Facility: CLINIC | Age: 65
End: 2020-10-19
Payer: COMMERCIAL

## 2020-10-19 VITALS
OXYGEN SATURATION: 99 % | HEART RATE: 87 BPM | WEIGHT: 164 LBS | TEMPERATURE: 97 F | BODY MASS INDEX: 25.74 KG/M2 | HEIGHT: 67 IN | SYSTOLIC BLOOD PRESSURE: 115 MMHG | DIASTOLIC BLOOD PRESSURE: 76 MMHG

## 2020-10-19 DIAGNOSIS — H00.015 HORDEOLUM EXTERNUM OF LEFT LOWER EYELID: ICD-10-CM

## 2020-10-19 DIAGNOSIS — N39.0 URINARY TRACT INFECTION WITHOUT HEMATURIA, SITE UNSPECIFIED: Primary | ICD-10-CM

## 2020-10-19 LAB
BILIRUB UR QL STRIP: NEGATIVE
GLUCOSE UR QL STRIP: NEGATIVE
KETONES UR QL STRIP: NEGATIVE
LEUKOCYTE ESTERASE UR QL STRIP: POSITIVE
PH, POC UA: 5 (ref 5–8)
POC BLOOD, URINE: NEGATIVE
POC NITRATES, URINE: NEGATIVE
PROT UR QL STRIP: NEGATIVE
SP GR UR STRIP: 1.02 (ref 1–1.03)
UROBILINOGEN UR STRIP-ACNC: NORMAL (ref 0.1–1.1)

## 2020-10-19 PROCEDURE — 87077 CULTURE AEROBIC IDENTIFY: CPT

## 2020-10-19 PROCEDURE — 99214 OFFICE O/P EST MOD 30 MIN: CPT | Mod: 25,S$GLB,, | Performed by: NURSE PRACTITIONER

## 2020-10-19 PROCEDURE — 99214 PR OFFICE/OUTPT VISIT, EST, LEVL IV, 30-39 MIN: ICD-10-PCS | Mod: 25,S$GLB,, | Performed by: NURSE PRACTITIONER

## 2020-10-19 PROCEDURE — 87186 SC STD MICRODIL/AGAR DIL: CPT

## 2020-10-19 PROCEDURE — 87086 URINE CULTURE/COLONY COUNT: CPT

## 2020-10-19 PROCEDURE — 87088 URINE BACTERIA CULTURE: CPT

## 2020-10-19 PROCEDURE — 81003 POCT URINALYSIS, DIPSTICK, AUTOMATED, W/O SCOPE: ICD-10-PCS | Mod: QW,S$GLB,, | Performed by: NURSE PRACTITIONER

## 2020-10-19 PROCEDURE — 81003 URINALYSIS AUTO W/O SCOPE: CPT | Mod: QW,S$GLB,, | Performed by: NURSE PRACTITIONER

## 2020-10-19 RX ORDER — CIPROFLOXACIN 500 MG/1
500 TABLET ORAL EVERY 12 HOURS
Qty: 10 TABLET | Refills: 0 | Status: SHIPPED | OUTPATIENT
Start: 2020-10-19 | End: 2020-10-24

## 2020-10-19 RX ORDER — CIPROFLOXACIN 500 MG/1
500 TABLET ORAL EVERY 12 HOURS
Qty: 6 TABLET | Refills: 0 | Status: SHIPPED | OUTPATIENT
Start: 2020-10-19 | End: 2020-10-19

## 2020-10-19 NOTE — PROGRESS NOTES
"Subjective:       Patient ID: Jolene Andre is a 64 y.o. female.    Vitals:  height is 5' 7" (1.702 m) and weight is 74.4 kg (164 lb). Her temperature is 97.4 °F (36.3 °C). Her blood pressure is 115/76 and her pulse is 87. Her oxygen saturation is 99%.     Chief Complaint: Eye Problem and Urinary Tract Infection    Eye Problem   The left eye is affected. This is a new problem. Episode onset: small bump near left eye. There was no injury mechanism. The pain is mild. There is no known exposure to pink eye. She does not wear contacts. Pertinent negatives include no fever, nausea or vomiting. She has tried nothing for the symptoms.   Urinary Tract Infection   This is a new problem. The current episode started 1 to 4 weeks ago (10 days ago). The problem occurs every urination. The problem has been gradually worsening. The quality of the pain is described as burning. The pain is moderate. There is no history of pyelonephritis. Associated symptoms include frequency. Pertinent negatives include no chills, hematuria, nausea, urgency, vomiting or rash. She has tried increased fluids (azo) for the symptoms. The treatment provided mild relief. Her past medical history is significant for recurrent UTIs.       Constitution: Negative for chills and fever.   Neck: Negative for painful lymph nodes.   Gastrointestinal: Positive for abdominal pain. Negative for nausea and vomiting.   Genitourinary: Positive for dysuria and frequency. Negative for urgency, urine decreased, hematuria, history of kidney stones, painful menstruation, irregular menstruation, missed menses, heavy menstrual bleeding, ovarian cysts, genital trauma, vaginal pain, vaginal discharge, vaginal bleeding, vaginal odor, painful intercourse, genital sore, painful ejaculation and pelvic pain.   Musculoskeletal: Negative for back pain.   Skin: Negative for rash and lesion.   Hematologic/Lymphatic: Negative for swollen lymph nodes.       Objective:      Physical Exam "   Constitutional: She is oriented to person, place, and time. She appears well-developed. She is cooperative.  Non-toxic appearance. She does not appear ill. No distress.   HENT:   Head: Normocephalic and atraumatic.   Ears:   Right Ear: Hearing, tympanic membrane, external ear and ear canal normal.   Left Ear: Hearing, tympanic membrane, external ear and ear canal normal.   Nose: Nose normal. No mucosal edema, rhinorrhea or nasal deformity. No epistaxis. Right sinus exhibits no maxillary sinus tenderness and no frontal sinus tenderness. Left sinus exhibits no maxillary sinus tenderness and no frontal sinus tenderness.   Mouth/Throat: Uvula is midline, oropharynx is clear and moist and mucous membranes are normal. No trismus in the jaw. Normal dentition. No uvula swelling. No posterior oropharyngeal erythema.   Eyes: Conjunctivae and lids are normal. Right eye exhibits no discharge. Left eye exhibits hordeolum. Left eye exhibits no discharge. No scleral icterus. Right eye exhibits normal extraocular motion. Left eye exhibits normal extraocular motion.       Neck: Trachea normal, normal range of motion, full passive range of motion without pain and phonation normal. Neck supple.   Cardiovascular: Normal rate, regular rhythm, normal heart sounds and normal pulses.   Pulmonary/Chest: Effort normal and breath sounds normal. No respiratory distress.   Abdominal: Soft. Normal appearance and bowel sounds are normal. She exhibits no distension, no pulsatile midline mass and no mass. There is no abdominal tenderness.   Musculoskeletal: Normal range of motion.         General: No deformity.   Neurological: She is alert and oriented to person, place, and time. She exhibits normal muscle tone. Coordination normal.   Skin: Skin is warm, dry, intact, not diaphoretic and not pale. Psychiatric: Her speech is normal and behavior is normal. Judgment and thought content normal.   Nursing note and vitals reviewed.          Results for  orders placed or performed in visit on 10/19/20   POCT Urinalysis, Dipstick, Automated, W/O Scope   Result Value Ref Range    POC Blood, Urine Negative Negative    POC Bilirubin, Urine Negative Negative    POC Urobilinogen, Urine Normal 0.1 - 1.1    POC Ketones, Urine Negative Negative    POC Protein, Urine Negative Negative    POC Nitrates, Urine Negative Negative    POC Glucose, Urine Negative Negative    pH, UA 5.0 5 - 8    POC Specific Gravity, Urine 1.025 1.003 - 1.029    POC Leukocytes, Urine Positive (A) Negative       Assessment:       1. Urinary tract infection without hematuria, site unspecified    2. Hordeolum externum of left lower eyelid        Plan:         Urinary tract infection without hematuria, site unspecified  -     POCT Urinalysis, Dipstick, Automated, W/O Scope  -     Culture, Urine  -     Discontinue: ciprofloxacin HCl (CIPRO) 500 MG tablet; Take 1 tablet (500 mg total) by mouth every 12 (twelve) hours. for 3 days  Dispense: 6 tablet; Refill: 0  -     ciprofloxacin HCl (CIPRO) 500 MG tablet; Take 1 tablet (500 mg total) by mouth every 12 (twelve) hours. for 5 days  Dispense: 10 tablet; Refill: 0    Hordeolum externum of left lower eyelid

## 2020-10-19 NOTE — PATIENT INSTRUCTIONS
Return to Urgent Care or go to ER if symptoms worsen or fail to improve.  Follow up with PCP as recommended for further management.   We will contact you in 3-5 days with your urine culture results.       Sty (or Stye)  A sty is an infection of the oil gland of the eyelid. It may develop into a small pocket of pus (abscess). This can cause pain, redness, and swelling. In early stages, styes are treated with antibiotic cream, eye drops, or warm packs (small towels soaked in warm water). More severe cases may need to be opened and drained by a health care provider.  Home care  · Eye drops or ointment are usually prescribed to treat the infection. Use these as directed.   ¨ Artificial tears may also be used to lubricate the eye and make it more comfortable. These may be purchased without a prescription.   ¨ Talk to your health care provider before using any over-the-counter treatment for a sty.  · Apply a warm, damp towel to the affected eye for at least 5 minutes, 3 to 4 times a day for a week. Warm compresses open the pores and speed the healing. If the compresses are too hot, they may burn your eyelid.  · Sometimes the sty will drain with this treatment alone. If this happens, continue the antibiotic until all the redness and swelling are gone.  · Wash your hands before and after touching the infected eye to avoid spreading the infection.  · Do not squeeze or try to puncture the sty.  Follow-up care  Follow up with your health care provider, or as advised.   When to seek medical advice  Call your health care provider right away if you have:  · Increase in swelling or redness around the eyelid after 48 to 72 hours  · Increase in eye pain or the eyelid blisters  · Increase in warmth--the eyelid feels hot  · Drainage of blood or thick pus from the sty  · Blister on the eyelid  · Inability to open the eyelid due to swelling  · Fever  ¨ 1 degree above your normal temperature lasting for 24 to 48 hours, or  ¨ Whatever  "your health care provider told you to report based on your medical condition  · Vision changes  · Headache or stiff neck  · Recurrence of the sty  Date Last Reviewed: 6/14/2015  © 8047-6262 [employer]. [address] [place] [address], [place] [address]. All rights reserved. This information is not intended as a substitute for professional medical care. Always follow your healthcare professional's instructions.        Bladder Infection, Female (Adult)    Urine is normally doesn't have any bacteria in it. But bacteria can get into the urinary tract from the skin around the rectum. Or they can travel in the blood from elsewhere in the body. Once they are in your urinary tract, they can cause infection in the urethra (urethritis), the bladder (cystitis), or the kidneys (pyelonephritis).  The most common place for an infection is in the bladder. This is called a bladder infection. This is one of the most common infections in women. Most bladder infections are easily treated. They are not serious unless the infection spreads to the kidney.  The phrases "bladder infection," "UTI," and "cystitis" are often used to describe the same thing. But they are not always the same. Cystitis is an inflammation of the bladder. The most common cause of cystitis is an infection.  Symptoms  The infection causes inflammation in the urethra and bladder. This causes many of the symptoms. The most common symptoms of a bladder infection are:  · Pain or burning when urinating  · Having to urinate more often than usual  · Urgent need to urinate  · Only a small amount of urine comes out  · Blood in urine  · Abdominal discomfort. This is usually in the lower abdomen above the pubic bone.  · Cloudy urine  · Strong- or bad-smelling urine  · Unable to urinate (urinary retention)  · Unable to hold urine in (urinary incontinence)  · Fever  · Loss of appetite  · Confusion (in older adults)  Causes  Bladder infections are not contagious. You can't " get one from someone else, from a toilet seat, or from sharing a bath.  The most common cause of bladder infections is bacteria from the bowels. The bacteria get onto the skin around the opening of the urethra. From there, they can get into the urine and travel up to the bladder, causing inflammation and infection. This usually happens because of:  · Wiping improperly after urinating. Always wipe from front to back.  · Bowel incontinence  · Pregnancy  · Procedures such as having a catheter inserted  · Older age  · Not emptying your bladder. This can allow bacteria a chance to grow in your urine.  · Dehydration  · Constipation  · Sex  · Use of a diaphragm for birth control   Treatment  Bladder infections are diagnosed by a urine test. They are treated with antibiotics and usually clear up quickly without complications. Treatment helps prevent a more serious kidney infection.  Medicines  Medicines can help in the treatment of a bladder infection:  · Take antibiotics until they are used up, even if you feel better. It is important to finish them to make sure the infection has cleared.  · You can use acetaminophen or ibuprofen for pain, fever, or discomfort, unless another medicine was prescribed. If you have chronic liver or kidney disease, talk with your healthcare provider before using these medicines. Also talk with your provider if you've ever had a stomach ulcer or gastrointestinal bleeding, or are taking blood-thinner medicines.  · If you are given phenazopydridine to reduce burning with urination, it will cause your urine to become a bright orange color. This can stain clothing.  Care and prevention  These self-care steps can help prevent future infections:  · Drink plenty of fluids to prevent dehydration and flush out your bladder. Do this unless you must restrict fluids for other health reasons, or your doctor told you not to.  · Proper cleaning after going to the bathroom is important. Wipe from front to back  after using the toilet to prevent the spread of bacteria.  · Urinate more often. Don't try to hold urine in for a long time.  · Wear loose-fitting clothes and cotton underwear. Avoid tight-fitting pants.  · Improve your diet and prevent constipation. Eat more fresh fruit and vegetables, and fiber, and less junk and fatty foods.  · Avoid sex until your symptoms are gone.  · Avoid caffeine, alcohol, and spicy foods. These can irritate your bladder.  · Urinate right after intercourse to flush out your bladder.  · If you use birth control pills and have frequent bladder infections, discuss it with your doctor.  Follow-up care  Call your healthcare provider if all symptoms are not gone after 3 days of treatment. This is especially important if you have repeat infections.  If a culture was done, you will be told if your treatment needs to be changed. If directed, you can call to find out the results.  If X-rays were done, you will be told if the results will affect your treatment.  Call 911  Call 911 if any of the following occur:  · Trouble breathing  · Hard to wake up or confusion  · Fainting or loss of consciousness  · Rapid heart rate  When to seek medical advice  Call your healthcare provider right away if any of these occur:  · Fever of 100.4ºF (38.0ºC) or higher, or as directed by your healthcare provider  · Symptoms are not better by the third day of treatment  · Back or belly (abdominal) pain that gets worse  · Repeated vomiting, or unable to keep medicine down  · Weakness or dizziness  · Vaginal discharge  · Pain, redness, or swelling in the outer vaginal area (labia)  Date Last Reviewed: 10/1/2016  © 1298-4764 [employer]. [address] [place] [address]. All rights reserved. This information is not intended as a substitute for professional medical care. Always follow your healthcare professional's instructions.

## 2020-10-20 ENCOUNTER — OFFICE VISIT (OUTPATIENT)
Dept: INTERNAL MEDICINE | Facility: CLINIC | Age: 65
End: 2020-10-20
Payer: COMMERCIAL

## 2020-10-20 VITALS
DIASTOLIC BLOOD PRESSURE: 76 MMHG | WEIGHT: 164.88 LBS | OXYGEN SATURATION: 97 % | HEART RATE: 81 BPM | BODY MASS INDEX: 25.88 KG/M2 | SYSTOLIC BLOOD PRESSURE: 118 MMHG | HEIGHT: 67 IN

## 2020-10-20 DIAGNOSIS — R06.00 DYSPNEA, UNSPECIFIED TYPE: Primary | ICD-10-CM

## 2020-10-20 PROCEDURE — 99213 PR OFFICE/OUTPT VISIT, EST, LEVL III, 20-29 MIN: ICD-10-PCS | Mod: S$GLB,,, | Performed by: INTERNAL MEDICINE

## 2020-10-20 PROCEDURE — 99999 PR PBB SHADOW E&M-EST. PATIENT-LVL III: CPT | Mod: PBBFAC,,, | Performed by: INTERNAL MEDICINE

## 2020-10-20 PROCEDURE — 99213 OFFICE O/P EST LOW 20 MIN: CPT | Mod: S$GLB,,, | Performed by: INTERNAL MEDICINE

## 2020-10-20 PROCEDURE — 99999 PR PBB SHADOW E&M-EST. PATIENT-LVL III: ICD-10-PCS | Mod: PBBFAC,,, | Performed by: INTERNAL MEDICINE

## 2020-10-21 ENCOUNTER — HOSPITAL ENCOUNTER (OUTPATIENT)
Dept: RADIOLOGY | Facility: HOSPITAL | Age: 65
Discharge: HOME | End: 2020-10-21
Attending: INTERNAL MEDICINE
Payer: COMMERCIAL

## 2020-10-21 DIAGNOSIS — R06.00 DYSPNEA, UNSPECIFIED TYPE: ICD-10-CM

## 2020-10-21 PROCEDURE — 71046 XR CHEST PA AND LATERAL: ICD-10-PCS | Mod: 26,,, | Performed by: RADIOLOGY

## 2020-10-21 PROCEDURE — 71046 X-RAY EXAM CHEST 2 VIEWS: CPT | Mod: 26,,, | Performed by: RADIOLOGY

## 2020-10-21 PROCEDURE — 71046 X-RAY EXAM CHEST 2 VIEWS: CPT | Mod: TC

## 2020-10-23 LAB — BACTERIA UR CULT: ABNORMAL

## 2020-10-26 ENCOUNTER — HOSPITAL ENCOUNTER (OUTPATIENT)
Dept: CARDIOLOGY | Facility: HOSPITAL | Age: 65
Discharge: HOME | End: 2020-10-26
Attending: INTERNAL MEDICINE
Payer: COMMERCIAL

## 2020-10-26 VITALS — BODY MASS INDEX: 25.11 KG/M2 | HEIGHT: 67 IN | WEIGHT: 160 LBS

## 2020-10-26 DIAGNOSIS — R06.00 DYSPNEA, UNSPECIFIED TYPE: ICD-10-CM

## 2020-10-26 LAB
ASCENDING AORTA: 2.85 CM
AV INDEX (PROSTH): 0.92
AV MEAN GRADIENT: 3 MMHG
AV PEAK GRADIENT: 5 MMHG
AV VALVE AREA: 3.3 CM2
AV VELOCITY RATIO: 0.86
BSA FOR ECHO PROCEDURE: 1.85 M2
CV ECHO LV RWT: 0.31 CM
CV STRESS BASE HR: 64 BPM
DIASTOLIC BLOOD PRESSURE: 66 MMHG
DOP CALC AO PEAK VEL: 1.13 M/S
DOP CALC AO VTI: 21.6 CM
DOP CALC LVOT AREA: 3.6 CM2
DOP CALC LVOT DIAMETER: 2.14 CM
DOP CALC LVOT PEAK VEL: 0.97 M/S
DOP CALC LVOT STROKE VOLUME: 71.18 CM3
DOP CALCLVOT PEAK VEL VTI: 19.8 CM
E WAVE DECELERATION TIME: 232.45 MSEC
E/A RATIO: 1.49
E/E' RATIO: 9.13 M/S
ECHO LV POSTERIOR WALL: 0.76 CM (ref 0.6–1.1)
FRACTIONAL SHORTENING: 34 % (ref 28–44)
INTERVENTRICULAR SEPTUM: 0.8 CM (ref 0.6–1.1)
IVRT: 111.32 MSEC
LA MAJOR: 5.71 CM
LA MINOR: 5.7 CM
LA WIDTH: 3.76 CM
LEFT ATRIUM SIZE: 3.57 CM
LEFT ATRIUM VOLUME INDEX: 35.4 ML/M2
LEFT ATRIUM VOLUME: 65.09 CM3
LEFT INTERNAL DIMENSION IN SYSTOLE: 3.23 CM (ref 2.1–4)
LEFT VENTRICLE DIASTOLIC VOLUME INDEX: 60.26 ML/M2
LEFT VENTRICLE DIASTOLIC VOLUME: 110.83 ML
LEFT VENTRICLE MASS INDEX: 68 G/M2
LEFT VENTRICLE SYSTOLIC VOLUME INDEX: 22.8 ML/M2
LEFT VENTRICLE SYSTOLIC VOLUME: 41.99 ML
LEFT VENTRICULAR INTERNAL DIMENSION IN DIASTOLE: 4.86 CM (ref 3.5–6)
LEFT VENTRICULAR MASS: 125.25 G
LV LATERAL E/E' RATIO: 7.3 M/S
LV SEPTAL E/E' RATIO: 12.17 M/S
MV PEAK A VEL: 0.49 M/S
MV PEAK E VEL: 0.73 M/S
MV STENOSIS PRESSURE HALF TIME: 67.41 MS
MV VALVE AREA P 1/2 METHOD: 3.26 CM2
OHS CV CPX 1 MINUTE RECOVERY HEART RATE: 98 BPM
OHS CV CPX 85 PERCENT MAX PREDICTED HEART RATE MALE: 127
OHS CV CPX ESTIMATED METS: 12
OHS CV CPX MAX PREDICTED HEART RATE: 150
OHS CV CPX PATIENT IS FEMALE: 1
OHS CV CPX PATIENT IS MALE: 0
OHS CV CPX PEAK DIASTOLIC BLOOD PRESSURE: 67 MMHG
OHS CV CPX PEAK HEAR RATE: 141 BPM
OHS CV CPX PEAK RATE PRESSURE PRODUCT: NORMAL
OHS CV CPX PEAK SYSTOLIC BLOOD PRESSURE: 131 MMHG
OHS CV CPX PERCENT MAX PREDICTED HEART RATE ACHIEVED: 94
OHS CV CPX RATE PRESSURE PRODUCT PRESENTING: 6208
PISA TR MAX VEL: 2.26 M/S
PULM VEIN S/D RATIO: 1.06
PV PEAK D VEL: 0.51 M/S
PV PEAK S VEL: 0.54 M/S
RA MAJOR: 5.4 CM
RA PRESSURE: 3 MMHG
RA WIDTH: 3.76 CM
RIGHT VENTRICULAR END-DIASTOLIC DIMENSION: 2.94 CM
RV TISSUE DOPPLER FREE WALL SYSTOLIC VELOCITY 1 (APICAL 4 CHAMBER VIEW): 7.77 CM/S
SINUS: 3.18 CM
STJ: 2.98 CM
STRESS ECHO POST EXERCISE DUR MIN: 7 MINUTES
STRESS ECHO POST EXERCISE DUR SEC: 3 SECONDS
SYSTOLIC BLOOD PRESSURE: 97 MMHG
TDI LATERAL: 0.1 M/S
TDI SEPTAL: 0.06 M/S
TDI: 0.08 M/S
TR MAX PG: 20 MMHG
TRICUSPID ANNULAR PLANE SYSTOLIC EXCURSION: 1.82 CM
TV REST PULMONARY ARTERY PRESSURE: 23 MMHG

## 2020-10-26 PROCEDURE — 93325 STRESS ECHO (CUPID ONLY): ICD-10-PCS | Mod: 26,,, | Performed by: INTERNAL MEDICINE

## 2020-10-26 PROCEDURE — 93351 STRESS TTE COMPLETE: CPT | Mod: 26,,, | Performed by: INTERNAL MEDICINE

## 2020-10-26 PROCEDURE — 93325 DOPPLER ECHO COLOR FLOW MAPG: CPT

## 2020-10-26 PROCEDURE — 93325 DOPPLER ECHO COLOR FLOW MAPG: CPT | Mod: 26,,, | Performed by: INTERNAL MEDICINE

## 2020-10-26 PROCEDURE — 93320 STRESS ECHO (CUPID ONLY): ICD-10-PCS | Mod: 26,,, | Performed by: INTERNAL MEDICINE

## 2020-10-26 PROCEDURE — 93351 STRESS ECHO (CUPID ONLY): ICD-10-PCS | Mod: 26,,, | Performed by: INTERNAL MEDICINE

## 2020-10-26 PROCEDURE — 93320 DOPPLER ECHO COMPLETE: CPT | Mod: 26,,, | Performed by: INTERNAL MEDICINE

## 2020-10-26 RX ORDER — ESTRADIOL 0.04 MG/D
FILM, EXTENDED RELEASE TRANSDERMAL
Qty: 8 PATCH | Refills: 6 | Status: SHIPPED | OUTPATIENT
Start: 2020-10-26 | End: 2021-06-02 | Stop reason: SDUPTHER

## 2020-10-28 ENCOUNTER — TELEPHONE (OUTPATIENT)
Dept: URGENT CARE | Facility: CLINIC | Age: 65
End: 2020-10-28

## 2020-10-28 NOTE — TELEPHONE ENCOUNTER
----- Message from Silvia Cesar,  sent at 10/23/2020  9:18 AM CDT -----  Okay to call pt with result: urine culture did show an infection which was  treated correctly with an antibiotic that was effective for that type of infection.  Please take that until finished and follow up with pcp if still having symptoms

## 2020-11-02 ENCOUNTER — TELEPHONE (OUTPATIENT)
Dept: INTERNAL MEDICINE | Facility: CLINIC | Age: 65
End: 2020-11-02

## 2020-11-02 NOTE — TELEPHONE ENCOUNTER
----- Message from Chelsea Christine sent at 11/2/2020  1:17 PM CST -----  Contact: Patient [phone]  Calling to get test results.  Name of test: Stress Echo, XRAY and labs  Date of test: 10/21 & 10/23  Where was the test performed: main campus  Comments:     Please call and advise.    Thank You

## 2020-11-03 ENCOUNTER — OFFICE VISIT (OUTPATIENT)
Dept: URGENT CARE | Facility: CLINIC | Age: 65
End: 2020-11-03
Payer: COMMERCIAL

## 2020-11-03 VITALS
BODY MASS INDEX: 25.11 KG/M2 | HEIGHT: 67 IN | HEART RATE: 69 BPM | RESPIRATION RATE: 16 BRPM | DIASTOLIC BLOOD PRESSURE: 81 MMHG | WEIGHT: 160 LBS | SYSTOLIC BLOOD PRESSURE: 116 MMHG | OXYGEN SATURATION: 97 % | TEMPERATURE: 97 F
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
## 2020-11-03 DIAGNOSIS — T63.484A LOCAL REACTION TO INSECT STING, UNDETERMINED INTENT, INITIAL ENCOUNTER: ICD-10-CM

## 2020-11-03 DIAGNOSIS — S70.361A INSECT BITE OF RIGHT THIGH, INITIAL ENCOUNTER: Primary | ICD-10-CM

## 2020-11-03 PROCEDURE — 99214 PR OFFICE/OUTPT VISIT, EST, LEVL IV, 30-39 MIN: ICD-10-PCS | Mod: S$GLB,,, | Performed by: NURSE PRACTITIONER

## 2020-11-03 PROCEDURE — 99214 OFFICE O/P EST MOD 30 MIN: CPT | Mod: S$GLB,,, | Performed by: NURSE PRACTITIONER

## 2020-11-03 RX ORDER — SULFAMETHOXAZOLE AND TRIMETHOPRIM 800; 160 MG/1; MG/1
1 TABLET ORAL 2 TIMES DAILY
Qty: 14 TABLET | Refills: 0 | Status: SHIPPED | OUTPATIENT
Start: 2020-11-03 | End: 2020-11-10

## 2020-11-03 NOTE — PATIENT INSTRUCTIONS
Alternate ibuprofen and tylenol for pain.     Use cool compresses.     Start antibiotic take as directed    Follow up immediately if symptoms persist or worsen.     You must understand that you've received an Urgent Care treatment only and that you may be released before all your medical problems are known or treated. You, the patient, will arrange for follow up care as instructed.  Follow up with your PCP or specialty clinic as directed in the next 1-2 weeks if not improved or as needed.  You can call [phone] to schedule an appointment with the appropriate provider.  If your condition worsens we recommend that you receive another evaluation at the emergency room immediately or contact your primary medical clinics after hours call service to discuss your concerns.  Please return here or go to the Emergency Department for any concerns or worsening of condition.      Insect Sting Allergy, Generalized  You are having an allergic reaction to an insect sting. This may occur after a sting by a wasp, honeybee, yellow jacket, or other insect. This may cause an itchy rash and swelling in the face or other parts of the body. A more severe reaction may cause you to feel dizzy, faint, or have trouble breathing or swallowing. Other warning signs are listed below.  Symptoms can include:  · Rash, hives, redness, welts, or blisters in areas other than the sting site  · Itching, burning, stinging, pain in areas other than the sting site  · Dry, flaky, cracking, scaly skin  · Swelling in areas other than the sting site   · Stomach pain or cramps  More severe symptoms include:  · Swelling of the face or lips or drooling  · Trouble swallowing, feeling like your throat is closing  · Trouble breathing, wheezing  · Dizziness or a sudden decrease in blood pressure  · Hoarse voice or trouble speaking  · Severe nausea, vomiting, or diarrhea  · Feeling faint or lightheaded  · Rapid heart rate  Home care  Medicine  The [facility]  provider may prescribe medicines to relieve swelling, itching, and pain. Follow the providers instructions when taking these medicines.  · If you had a severe reaction, the provider may prescribe an injectable epinephrine kit. Epinephrine will stop the progression of an allergic reaction. Before you leave the hospital, be sure that you understand when and how to use this medicine.  · Oral diphenhydramine is an over-the-counter antihistamine available at pharmacies and grocery stores. Unless a prescription antihistamine was given, diphenhydramine may be used to reduce itching if large areas of the skin are involved. It may make you sleepy, so be careful using it in the daytime or when going to school, working, or driving. Note: Dont use diphenhydramine if you have glaucoma or if you are a man with trouble urinating due to an enlarged prostate. There are other antihistamines that cause less drowsiness and are good choices for daytime use. Ask your pharmacist for suggestions.  · Dont use diphenhydramine cream on your skin. It can cause a further reaction in some people.  · Calamine lotion or oatmeal baths sometimes help with itching.  · You may use acetaminophen or ibuprofen to control pain, unless another pain medicine was prescribed. Note: If you have chronic liver or kidney disease or ever had a stomach ulcer or gastrointestinal bleeding, talk with your provider before using these medicines.    General care  Avoid tight clothing and things that heat up your skin (such as hot showers or baths, or direct sunlight). Heat makes the itching worse.  An ice pack will relieve local areas of intense itching and redness. Apply 5 to 10 minutes. To make an ice pack, put ice cubes in a plastic bag that seals at the top. Wrap the bag in a clean, thin towel or cloth. Dont put ice directly on the skin.  Ticks  If you try to remove a tick, do the following:  · Use a set of fine tweezers and  the tick as close to the skin as  is possible.  · Pull upwards, using even, steady pressure. Dont jerk or twist the tick. The ticks bodily fluids may contain infection-causing organisms. So dont squeeze, crush, or puncture the body of the tick. Dont use a smoldering match or cigarette, nail polish, petroleum jelly, liquid soap, or kerosene. They may irritate the tick.  · If any mouthparts of the tick remain in the skin, these can be removed with tweezers. If you cant remove the mouth (of a tick) easily with clean tweezers, leave it alone and let the skin heal.  · After the tick is removed, wash the bite area with rubbing alcohol, iodine, or soap and water.  · Put the tick in a sealed container and completely cover it with alcohol. Never try to kill or crush a tick with your hand or fingers.  Stings  Wasps, yellow jackets, and hornets dont leave a stinger behind. But if a honeybee stings you, a stinger may stay in your skin. The stinger of a honeybee releases a substance that will attract other bees to you. So try to move away from the nest immediately. Once you are away from the nest, then remove the stinger as quickly as possible by:  · Scraping the stinger out with the edge of a dull knife or plastic card (credit card).  · Don't use a tweezer or your fingers to remove the stinger since that may squeeze more toxin from the stinger.  · Wash the affected area with soap and warm water 2 to 3 times a day. Don't break a blister, if present.  · Next apply an ice pack for 5 to 10 minutes. To make an ice pack, put ice cubes in a plastic bag that seals at the top. Wrap the bag in a clean, thin towel or cloth. Dont put ice directly on the skin.  · Contact your healthcare provider and ask what can be used to help decrease the swelling and itching to the affected area.   · To prevent an infection, don't scratch the affected areas. Always check the sting area for signs of an infection: increased redness, swelling, or pain to the affected  area.  Preventing future reactions  Future reactions could be worse than this one. So try to avoid situations where you might be stung:  · Don't walk in grass without shoes. Avoid wearing sandals.  · Don't leave food uncovered when eating outside. Sweet treats, watermelon, and ice cream attract insects.  · Don't drink from uncovered sweetened drinks in cans when outside. Insects are attracted to soda drink cans and sometimes crawl inside of them.  · Don't wear bright colored clothes with flowery prints and patterns when outside.  · Dont wear perfume when outside. Smell attracts insects.  · Wear long pants, long-sleeved shirts, socks, and work gloves when working outside.  · Be aware that honeybees nest in trees. Wasps and yellow jackets nest in the ground, trees or roof eaves. Avoid garbage cans when outside.  Auto-injectable epinephrine  · If you are at high risk for another sting due to where you work or play, or if your reaction included dizziness, fainting or trouble breathing or swallowing, an auto-injectable epinephrine may be prescribed. If not, ask your healthcare provider for one and always carry it with you. Learn how to use the device. If you begin to feel the symptoms of another reaction in the future, use the auto-injectable epinephrine to inject yourself, and then call 911. Don't wait until symptoms become severe.   · Remember that the auto-injectable epinephrine is a rescue medicine only. You still need someone to take you to the hospital or call 911 after you have received the medicine.  Follow-up care  Follow up with your healthcare provider, or as advised if your symptoms do not continue to improve.  Call 911  Call 911 if any of these occur:  · Trouble breathing or swallowing, wheezing   · Cool, moist, pale skin  · Hoarse voice or trouble speaking  · Confused  · Very drowsy or trouble waking up  · Fainting or loss of consciousness  · Rapid heart rate  · Low blood pressure or feeling dizzy or  weak  · Feeling of doom  · Severe nausea, vomiting, or diarrhea  · Seizure  · Swelling in the face, eyelids, lips, mouth, throat or tongue  · Drooling  When to seek medical advice  Call your healthcare provider right away if any of the following occur:  · Spreading areas of itching, redness or swelling  · Headache, fever, chills, muscle or joint aching  · Increased pain or swelling  · Signs of infection of the affected area:  ¨ Spreading redness  ¨ Increase in pain or swelling  ¨ Fluid or colored drainage from the affected site  Date Last Reviewed: 3/1/2017  © 1662-6470 [employer]. [address] [place] [id number]. All rights reserved. This information is not intended as a substitute for professional medical care. Always follow your healthcare professional's instructions.

## 2020-11-03 NOTE — TELEPHONE ENCOUNTER
Patient seen in urgent care----- Message from Gema Parra sent at 11/3/2020  9:35 AM CST -----  Regarding: same day appt  Contact: Jolene @[phone]  Same Day Appointment Request    Was an appointment with another provider offered?    Yes     Reason for FST appt.: lump on right side on the thigh which is hot and painful    Communication Preference: Jolene @[phone]    Additional Information:   Pt is requesting a call back to see if she can be seen by the provider today. Pt was offer an ppt with a different provider but decline.

## 2020-11-03 NOTE — PROGRESS NOTES
"Subjective:       Patient ID: Jolene Andre is a 64 y.o. female.    Vitals:  height is 5' 7" (1.702 m) and weight is 72.6 kg (160 lb). Her temperature is 97.3 °F (36.3 °C). Her blood pressure is 116/81 and her pulse is 69. Her respiration is 16 and oxygen saturation is 97%.     Chief Complaint: Leg Pain    Leg Pain   The incident occurred 3 to 6 hours ago. There was no injury mechanism. The pain is present in the right thigh. The pain is at a severity of 7/10. The pain is moderate. The pain has been constant since onset. She reports no foreign bodies present. The symptoms are aggravated by movement and palpation. Treatments tried: CELEXA. The treatment provided no relief.       Constitution: Negative for chills, fatigue and fever.   HENT: Negative for congestion and sore throat.    Neck: Negative for painful lymph nodes.   Cardiovascular: Negative for chest pain and leg swelling.   Eyes: Negative for double vision and blurred vision.   Respiratory: Negative for cough and shortness of breath.    Gastrointestinal: Negative for nausea, vomiting and diarrhea.   Genitourinary: Negative for dysuria, frequency, urgency and history of kidney stones.   Musculoskeletal: Negative for joint pain, joint swelling, muscle cramps and muscle ache.   Skin: Negative for color change, pale, rash and bruising.   Allergic/Immunologic: Negative for seasonal allergies.   Neurological: Negative for dizziness, history of vertigo, light-headedness, passing out and headaches.   Hematologic/Lymphatic: Negative for swollen lymph nodes.   Psychiatric/Behavioral: Negative for nervous/anxious, sleep disturbance and depression. The patient is not nervous/anxious.        Objective:      Physical Exam   Constitutional: She is oriented to person, place, and time. She appears well-developed. She is cooperative.  Non-toxic appearance. She does not appear ill. No distress.   HENT:   Head: Normocephalic and atraumatic.   Ears:   Right Ear: Hearing, tympanic " membrane, external ear and ear canal normal.   Left Ear: Hearing, tympanic membrane, external ear and ear canal normal.   Nose: Nose normal. No mucosal edema, rhinorrhea or nasal deformity. No epistaxis. Right sinus exhibits no maxillary sinus tenderness and no frontal sinus tenderness. Left sinus exhibits no maxillary sinus tenderness and no frontal sinus tenderness.   Mouth/Throat: Uvula is midline, oropharynx is clear and moist and mucous membranes are normal. No trismus in the jaw. Normal dentition. No uvula swelling. No posterior oropharyngeal erythema.   Eyes: Conjunctivae and lids are normal. Right eye exhibits no discharge. Left eye exhibits no discharge. No scleral icterus.   Neck: Trachea normal, normal range of motion, full passive range of motion without pain and phonation normal. Neck supple.   Cardiovascular: Normal rate, regular rhythm, normal heart sounds and normal pulses.   Pulmonary/Chest: Effort normal and breath sounds normal. No respiratory distress.   Abdominal: Soft. Normal appearance and bowel sounds are normal. She exhibits no distension, no pulsatile midline mass and no mass. There is no abdominal tenderness.   Musculoskeletal: Normal range of motion.         General: No deformity.      Right upper leg: She exhibits tenderness and swelling.        Legs:    Neurological: She is alert and oriented to person, place, and time. She exhibits normal muscle tone. Coordination normal.   Skin: Skin is warm, dry, intact, not diaphoretic and not pale. Psychiatric: Her speech is normal and behavior is normal. Judgment and thought content normal.   Nursing note and vitals reviewed.            Assessment:       1. Insect bite of right thigh, initial encounter    2. Local reaction to insect sting, undetermined intent, initial encounter        Plan:       Localized reaction and swelling of right thigh. Differential diagnosis include insect bite, localized allergic reaction.     Will cover with antibiotic  and antihistamines.       Insect bite of right thigh, initial encounter  -     sulfamethoxazole-trimethoprim 800-160mg (BACTRIM DS) 800-160 mg Tab; Take 1 tablet by mouth 2 (two) times daily. for 7 days  Dispense: 14 tablet; Refill: 0    Local reaction to insect sting, undetermined intent, initial encounter      Patient Instructions   Alternate ibuprofen and tylenol for pain.     Use cool compresses.     Start antibiotic take as directed    Follow up immediately if symptoms persist or worsen.     You must understand that you've received an Urgent Care treatment only and that you may be released before all your medical problems are known or treated. You, the patient, will arrange for follow up care as instructed.  Follow up with your PCP or specialty clinic as directed in the next 1-2 weeks if not improved or as needed.  You can call [phone] to schedule an appointment with the appropriate provider.  If your condition worsens we recommend that you receive another evaluation at the emergency room immediately or contact your primary medical clinics after hours call service to discuss your concerns.  Please return here or go to the Emergency Department for any concerns or worsening of condition.      Insect Sting Allergy, Generalized  You are having an allergic reaction to an insect sting. This may occur after a sting by a wasp, honeybee, yellow jacket, or other insect. This may cause an itchy rash and swelling in the face or other parts of the body. A more severe reaction may cause you to feel dizzy, faint, or have trouble breathing or swallowing. Other warning signs are listed below.  Symptoms can include:  · Rash, hives, redness, welts, or blisters in areas other than the sting site  · Itching, burning, stinging, pain in areas other than the sting site  · Dry, flaky, cracking, scaly skin  · Swelling in areas other than the sting site   · Stomach pain or cramps  More severe symptoms include:  · Swelling of the  face or lips or drooling  · Trouble swallowing, feeling like your throat is closing  · Trouble breathing, wheezing  · Dizziness or a sudden decrease in blood pressure  · Hoarse voice or trouble speaking  · Severe nausea, vomiting, or diarrhea  · Feeling faint or lightheaded  · Rapid heart rate  Home care  Medicine  The healthcare provider may prescribe medicines to relieve swelling, itching, and pain. Follow the providers instructions when taking these medicines.  · If you had a severe reaction, the provider may prescribe an injectable epinephrine kit. Epinephrine will stop the progression of an allergic reaction. Before you leave the hospital, be sure that you understand when and how to use this medicine.  · Oral diphenhydramine is an over-the-counter antihistamine available at pharmacies and grocery stores. Unless a prescription antihistamine was given, diphenhydramine may be used to reduce itching if large areas of the skin are involved. It may make you sleepy, so be careful using it in the daytime or when going to school, working, or driving. Note: Dont use diphenhydramine if you have glaucoma or if you are a man with trouble urinating due to an enlarged prostate. There are other antihistamines that cause less drowsiness and are good choices for daytime use. Ask your pharmacist for suggestions.  · Dont use diphenhydramine cream on your skin. It can cause a further reaction in some people.  · Calamine lotion or oatmeal baths sometimes help with itching.  · You may use acetaminophen or ibuprofen to control pain, unless another pain medicine was prescribed. Note: If you have chronic liver or kidney disease or ever had a stomach ulcer or gastrointestinal bleeding, talk with your provider before using these medicines.    General care  Avoid tight clothing and things that heat up your skin (such as hot showers or baths, or direct sunlight). Heat makes the itching worse.  An ice pack will relieve local areas of  intense itching and redness. Apply 5 to 10 minutes. To make an ice pack, put ice cubes in a plastic bag that seals at the top. Wrap the bag in a clean, thin towel or cloth. Dont put ice directly on the skin.  Ticks  If you try to remove a tick, do the following:  · Use a set of fine tweezers and  the tick as close to the skin as is possible.  · Pull upwards, using even, steady pressure. Dont jerk or twist the tick. The ticks bodily fluids may contain infection-causing organisms. So dont squeeze, crush, or puncture the body of the tick. Dont use a smoldering match or cigarette, nail polish, petroleum jelly, liquid soap, or kerosene. They may irritate the tick.  · If any mouthparts of the tick remain in the skin, these can be removed with tweezers. If you cant remove the mouth (of a tick) easily with clean tweezers, leave it alone and let the skin heal.  · After the tick is removed, wash the bite area with rubbing alcohol, iodine, or soap and water.  · Put the tick in a sealed container and completely cover it with alcohol. Never try to kill or crush a tick with your hand or fingers.  Stings  Wasps, yellow jackets, and hornets dont leave a stinger behind. But if a honeybee stings you, a stinger may stay in your skin. The stinger of a honeybee releases a substance that will attract other bees to you. So try to move away from the nest immediately. Once you are away from the nest, then remove the stinger as quickly as possible by:  · Scraping the stinger out with the edge of a dull knife or plastic card (credit card).  · Don't use a tweezer or your fingers to remove the stinger since that may squeeze more toxin from the stinger.  · Wash the affected area with soap and warm water 2 to 3 times a day. Don't break a blister, if present.  · Next apply an ice pack for 5 to 10 minutes. To make an ice pack, put ice cubes in a plastic bag that seals at the top. Wrap the bag in a clean, thin towel or cloth. Dont put  ice directly on the skin.  · Contact your healthcare provider and ask what can be used to help decrease the swelling and itching to the affected area.   · To prevent an infection, don't scratch the affected areas. Always check the sting area for signs of an infection: increased redness, swelling, or pain to the affected area.  Preventing future reactions  Future reactions could be worse than this one. So try to avoid situations where you might be stung:  · Don't walk in grass without shoes. Avoid wearing sandals.  · Don't leave food uncovered when eating outside. Sweet treats, watermelon, and ice cream attract insects.  · Don't drink from uncovered sweetened drinks in cans when outside. Insects are attracted to soda drink cans and sometimes crawl inside of them.  · Don't wear bright colored clothes with flowery prints and patterns when outside.  · Dont wear perfume when outside. Smell attracts insects.  · Wear long pants, long-sleeved shirts, socks, and work gloves when working outside.  · Be aware that honeybees nest in trees. Wasps and yellow jackets nest in the ground, trees or roof eaves. Avoid garbage cans when outside.  Auto-injectable epinephrine  · If you are at high risk for another sting due to where you work or play, or if your reaction included dizziness, fainting or trouble breathing or swallowing, an auto-injectable epinephrine may be prescribed. If not, ask your healthcare provider for one and always carry it with you. Learn how to use the device. If you begin to feel the symptoms of another reaction in the future, use the auto-injectable epinephrine to inject yourself, and then call 911. Don't wait until symptoms become severe.   · Remember that the auto-injectable epinephrine is a rescue medicine only. You still need someone to take you to the hospital or call 911 after you have received the medicine.  Follow-up care  Follow up with your healthcare provider, or as advised if your symptoms do not  continue to improve.  Call 911  Call 911 if any of these occur:  · Trouble breathing or swallowing, wheezing   · Cool, moist, pale skin  · Hoarse voice or trouble speaking  · Confused  · Very drowsy or trouble waking up  · Fainting or loss of consciousness  · Rapid heart rate  · Low blood pressure or feeling dizzy or weak  · Feeling of doom  · Severe nausea, vomiting, or diarrhea  · Seizure  · Swelling in the face, eyelids, lips, mouth, throat or tongue  · Drooling  When to seek medical advice  Call your healthcare provider right away if any of the following occur:  · Spreading areas of itching, redness or swelling  · Headache, fever, chills, muscle or joint aching  · Increased pain or swelling  · Signs of infection of the affected area:  ¨ Spreading redness  ¨ Increase in pain or swelling  ¨ Fluid or colored drainage from the affected site  Date Last Reviewed: 3/1/2017  © 1148-9083 [employer]. [address] [place] [id number]. All rights reserved. This information is not intended as a substitute for professional medical care. Always follow your healthcare professional's instructions.

## 2020-11-04 ENCOUNTER — OFFICE VISIT (OUTPATIENT)
Dept: INTERNAL MEDICINE | Facility: CLINIC | Age: 65
End: 2020-11-04
Attending: FAMILY MEDICINE
Payer: COMMERCIAL

## 2020-11-04 VITALS
WEIGHT: 163.56 LBS | SYSTOLIC BLOOD PRESSURE: 128 MMHG | BODY MASS INDEX: 25.67 KG/M2 | DIASTOLIC BLOOD PRESSURE: 74 MMHG | HEIGHT: 67 IN | OXYGEN SATURATION: 99 % | HEART RATE: 89 BPM

## 2020-11-04 DIAGNOSIS — E03.9 HYPOTHYROIDISM, UNSPECIFIED TYPE: ICD-10-CM

## 2020-11-04 DIAGNOSIS — R06.00 DYSPNEA, UNSPECIFIED TYPE: Primary | ICD-10-CM

## 2020-11-04 PROCEDURE — 99214 OFFICE O/P EST MOD 30 MIN: CPT | Mod: S$GLB,,, | Performed by: FAMILY MEDICINE

## 2020-11-04 PROCEDURE — 99214 PR OFFICE/OUTPT VISIT, EST, LEVL IV, 30-39 MIN: ICD-10-PCS | Mod: S$GLB,,, | Performed by: FAMILY MEDICINE

## 2020-11-04 PROCEDURE — 99999 PR PBB SHADOW E&M-EST. PATIENT-LVL III: CPT | Mod: PBBFAC,,, | Performed by: FAMILY MEDICINE

## 2020-11-04 PROCEDURE — 99999 PR PBB SHADOW E&M-EST. PATIENT-LVL III: ICD-10-PCS | Mod: PBBFAC,,, | Performed by: FAMILY MEDICINE

## 2020-11-04 NOTE — PROGRESS NOTES
"CHIEF COMPLAINT:  Data review    HISTORY OF PRESENT ILLNESS: The patient is a generally healthy 64 year-old white female.  The patient has no specific complaints today other than the new onset of occasional dyspnea.  About 2 weeks ago she went to her primary care physician who embarked upon a thorough workup including blood work stress echocardiogram EKG and chest x-ray.  The patient wishes to discuss the results.  She has not been able to get any information from her primary care physician.      REVIEW OF SYSTEMS:  GENERAL: No fever, chills, fatigability or weight loss.  SKIN: No rashes, itching or changes in color or texture of skin.  HEAD: No headaches or recent head trauma.  EYES: Visual acuity fine. No photophobia, ocular pain or diplopia.  EARS: Denies ear pain, discharge or vertigo.  NOSE: No loss of smell, no epistaxis or postnasal drip.  MOUTH & THROAT: No hoarseness or change in voice. No excessive gum bleeding.  NODES: Denies swollen glands.  CHEST: Denies cyanosis, wheezing, cough and sputum production.  CARDIOVASCULAR: Denies chest pain, PND, orthopnea or reduced exercise tolerance.  ABDOMEN: Appetite fine. No weight loss. Denies diarrhea, abdominal pain, hematemesis or blood in stool.  URINARY: No flank pain, dysuria or hematuria.  PERIPHERAL VASCULAR: No claudication or cyanosis.  MUSCULOSKELETAL: No joint stiffness or swelling. Denies back pain.  NEUROLOGIC: No history of seizures, paralysis, alteration of gait or coordination.    SOCIAL HISTORY: The patient does not smoke.  The patient consumes alcohol socially.  The patient is .    PHYSICAL EXAMINATION:   Blood pressure 128/74, pulse 89, height 5' 7" (1.702 m), weight 74.2 kg (163 lb 9.3 oz), SpO2 99 %.    APPEARANCE: Well nourished, well developed, in no acute distress.    HEAD: Normocephalic, atraumatic.  EYES: PERRL. EOMI.  Conjunctivae without injection and  anicteric  NOSE: Mucosa pink. Airway clear.  MOUTH & THROAT: No tonsillar " enlargement. No pharyngeal erythema or exudate. No stridor.  NECK: Supple.   NODES: No cervical, axillary or inguinal lymph node enlargement.  ABDOMEN: Bowel sounds normal. Not distended. Soft. No tenderness or masses.  No ascites is noted.  MUSCULOSKELETAL:  There is no clubbing, cyanosis, or edema of the extremities x4.  There is full range of motion of the lumbar spine.  There is full range of motion of the extremities x4.  There is no deformity noted.    NEUROLOGIC:       Normal speech development.      Hearing normal.      Normal gait.      Motor and sensory exams grossly normal  PSYCHIATRIC: Patient is alert and oriented x3.  Thought processes are all normal.  There is no homicidality.  There is no suicidality.  There is no evidence of psychosis.    LABORATORY/RADIOLOGY:   Chart reviewed.  We reviewed her complete workup today.    ASSESSMENT:   Dyspnea  Hip cellulitis  Hypothyroidism    PLAN:  We reviewed all the studies that have been ordered regarding her new onset dyspnea.  They are all unremarkable.  Follow up PCP

## 2020-11-07 ENCOUNTER — TELEPHONE (OUTPATIENT)
Dept: URGENT CARE | Facility: CLINIC | Age: 65
End: 2020-11-07

## 2020-11-07 NOTE — TELEPHONE ENCOUNTER
called patient to see how she was feeling. reports that erythema has got a little better but still swollen she will finish the antibiotic. Follow up with PCP.

## 2020-11-12 ENCOUNTER — TELEPHONE (OUTPATIENT)
Dept: INTERNAL MEDICINE | Facility: CLINIC | Age: 65
End: 2020-11-12

## 2020-11-16 ENCOUNTER — OFFICE VISIT (OUTPATIENT)
Dept: URGENT CARE | Facility: CLINIC | Age: 65
End: 2020-11-16
Payer: COMMERCIAL

## 2020-11-16 VITALS
TEMPERATURE: 98 F | RESPIRATION RATE: 18 BRPM | HEART RATE: 74 BPM | WEIGHT: 160 LBS | BODY MASS INDEX: 25.11 KG/M2 | SYSTOLIC BLOOD PRESSURE: 96 MMHG | HEIGHT: 67 IN | DIASTOLIC BLOOD PRESSURE: 69 MMHG | OXYGEN SATURATION: 97 %

## 2020-11-16 DIAGNOSIS — M79.651 RIGHT THIGH PAIN: ICD-10-CM

## 2020-11-16 DIAGNOSIS — Z20.822 SUSPECTED COVID-19 VIRUS INFECTION: ICD-10-CM

## 2020-11-16 DIAGNOSIS — R11.0 NAUSEA: ICD-10-CM

## 2020-11-16 DIAGNOSIS — K52.9 GASTROENTERITIS: Primary | ICD-10-CM

## 2020-11-16 LAB
CTP QC/QA: YES
CTP QC/QA: YES
FLUAV AG NPH QL: NEGATIVE
FLUBV AG NPH QL: NEGATIVE
SARS-COV-2 RDRP RESP QL NAA+PROBE: NEGATIVE

## 2020-11-16 PROCEDURE — 87804 POCT INFLUENZA A/B: ICD-10-PCS | Mod: QW,S$GLB,, | Performed by: PHYSICIAN ASSISTANT

## 2020-11-16 PROCEDURE — U0002: ICD-10-PCS | Mod: QW,S$GLB,, | Performed by: PHYSICIAN ASSISTANT

## 2020-11-16 PROCEDURE — 87804 INFLUENZA ASSAY W/OPTIC: CPT | Mod: QW,S$GLB,, | Performed by: PHYSICIAN ASSISTANT

## 2020-11-16 PROCEDURE — 99214 OFFICE O/P EST MOD 30 MIN: CPT | Mod: 25,S$GLB,CS, | Performed by: PHYSICIAN ASSISTANT

## 2020-11-16 PROCEDURE — U0002 COVID-19 LAB TEST NON-CDC: HCPCS | Mod: QW,S$GLB,, | Performed by: PHYSICIAN ASSISTANT

## 2020-11-16 PROCEDURE — 99214 PR OFFICE/OUTPT VISIT, EST, LEVL IV, 30-39 MIN: ICD-10-PCS | Mod: 25,S$GLB,CS, | Performed by: PHYSICIAN ASSISTANT

## 2020-11-16 RX ORDER — ONDANSETRON 4 MG/1
4 TABLET, ORALLY DISINTEGRATING ORAL EVERY 6 HOURS PRN
Qty: 20 TABLET | Refills: 0 | Status: SHIPPED | OUTPATIENT
Start: 2020-11-16 | End: 2021-01-25 | Stop reason: SDUPTHER

## 2020-11-16 NOTE — PROGRESS NOTES
"Subjective:       Patient ID: Jolene Andre is a 64 y.o. female.    Vitals:  height is 5' 7" (1.702 m) and weight is 72.6 kg (160 lb). Her temperature is 97.5 °F (36.4 °C). Her blood pressure is 96/69 and her pulse is 74. Her respiration is 18 and oxygen saturation is 97%.     Chief Complaint: COVID-19 Concerns    63 y/o female presents with N/v/d and fever to 101.2 that started yesterday. Pt denies any known sick contacts or recent travel. Pt reports vomiting and diarrhea are improved today. Pt denies anosmia. Pt also reports right proximal-lateral thigh pain and swelling x 2 weeks. Pt reports having cellulitis in the same area 2 weeks ago. She reports completing a course of cephalexin with resolution of skin erythema, but pain and swelling persists. Pt is an active . Pt denied trauma to the right thigh.     Fever   This is a new problem. The current episode started yesterday. The problem occurs constantly. The problem has been unchanged. The temperature was taken using an oral thermometer. Associated symptoms include diarrhea, headaches, nausea and vomiting. Pertinent negatives include no chest pain, congestion, coughing, rash, sore throat or urinary pain. She has tried acetaminophen for the symptoms. The treatment provided mild relief.       Constitution: Positive for fever. Negative for chills and fatigue.        101.2 yesterday   HENT: Negative for congestion and sore throat.    Neck: Negative for painful lymph nodes.   Cardiovascular: Negative for chest pain and leg swelling.   Eyes: Negative for double vision and blurred vision.   Respiratory: Negative for cough and shortness of breath.    Gastrointestinal: Positive for nausea, vomiting and diarrhea.   Genitourinary: Negative for dysuria, frequency, urgency and history of kidney stones.   Musculoskeletal: Negative for trauma, joint pain, joint swelling, muscle cramps and muscle ache.        Swelling upper RT thigh   Skin: Negative for color change, " pale, rash, erythema and bruising.   Allergic/Immunologic: Negative for seasonal allergies.   Neurological: Positive for headaches. Negative for dizziness, history of vertigo, light-headedness and passing out.   Hematologic/Lymphatic: Negative for swollen lymph nodes.   Psychiatric/Behavioral: Negative for nervous/anxious, sleep disturbance and depression. The patient is not nervous/anxious.        Objective:      Physical Exam   Constitutional: She is oriented to person, place, and time. She appears well-developed.   HENT:   Head: Normocephalic and atraumatic.   Ears:   Right Ear: External ear normal.   Left Ear: External ear normal.   Nose: Nose normal.   Mouth/Throat: Mucous membranes are normal.   Eyes: Conjunctivae and lids are normal.   Neck: Trachea normal and full passive range of motion without pain. Neck supple.   Cardiovascular: Normal rate, regular rhythm and normal heart sounds.   Pulmonary/Chest: Effort normal and breath sounds normal. No respiratory distress.   Abdominal: Soft. Normal appearance and bowel sounds are normal. She exhibits no distension, no abdominal bruit, no pulsatile midline mass and no mass. There is no abdominal tenderness.   Musculoskeletal: Normal range of motion.      Right hip: She exhibits normal range of motion and no tenderness.      Right upper leg: She exhibits tenderness.        Legs:    Neurological: She is alert and oriented to person, place, and time. She has normal strength.   Skin: Skin is warm, dry, intact, not diaphoretic and not pale. abrasion, bruising, erythema and ecchymosisPsychiatric: Her speech is normal and behavior is normal. Judgment and thought content normal.   Nursing note and vitals reviewed.          Results for orders placed or performed in visit on 11/16/20   POCT COVID-19 Rapid Screening   Result Value Ref Range    POC Rapid COVID Negative Negative     Acceptable Yes    POCT Influenza A/B   Result Value Ref Range    Rapid Influenza A  Ag Negative Negative    Rapid Influenza B Ag Negative Negative     Acceptable Yes        Assessment:       1. Gastroenteritis    2. Suspected COVID-19 virus infection    3. Nausea    4. Right thigh pain        Plan:         Gastroenteritis    Suspected COVID-19 virus infection  -     POCT COVID-19 Rapid Screening  -     POCT Influenza A/B    Nausea  -     ondansetron (ZOFRAN-ODT) 4 MG TbDL; Take 1 tablet (4 mg total) by mouth every 6 (six) hours as needed.  Dispense: 20 tablet; Refill: 0    Right thigh pain    No SSI appreciated. Ice, celecoxib bid. F/U PCP if no improvement 1-2 weeks.       Patient Instructions   You must understand that you've received an Urgent Care treatment only and that you may be released before all your medical problems are known or treated. You, the patient, will arrange for follow up care as instructed.  Follow up with your PCP or specialty clinic as directed in the next 1-2 weeks if not improved or as needed.  You can call [phone] to schedule an appointment with the appropriate provider.  If your condition worsens we recommend that you receive another evaluation at the emergency room immediately or contact your primary medical clinics after hours call service to discuss your concerns.  Please return here or go to the Emergency Department for any concerns or worsening of condition.    If you were prescribed a narcotic or controlled medication, do not drive or operate heavy equipment or machinery while taking these medications.

## 2020-11-16 NOTE — PATIENT INSTRUCTIONS
You must understand that you've received an Urgent Care treatment only and that you may be released before all your medical problems are known or treated. You, the patient, will arrange for follow up care as instructed.  Follow up with your PCP or specialty clinic as directed in the next 1-2 weeks if not improved or as needed.  You can call [phone] to schedule an appointment with the appropriate provider.  If your condition worsens we recommend that you receive another evaluation at the emergency room immediately or contact your primary medical clinics after hours call service to discuss your concerns.  Please return here or go to the Emergency Department for any concerns or worsening of condition.    If you were prescribed a narcotic or controlled medication, do not drive or operate heavy equipment or machinery while taking these medications.

## 2020-11-17 NOTE — TELEPHONE ENCOUNTER
----- Message from Emelyn Bridget sent at 11/17/2020  9:29 AM CST -----  Regarding: Appt  Pt is calling to speak with Staff regarding being scheduled for an appt.  Pt says she has been calling and sending message for a week now and no one has returned her call.  Pt says she is having an issue with cellulitis that has made her vomit and caused fever.   was unable to find availability until February, 2021, but she wants to be seen in the next day or so and requests a returned call to [phone].    Thank you.

## 2020-11-18 RX ORDER — LEVOTHYROXINE SODIUM 112 UG/1
TABLET ORAL
Qty: 30 TABLET | Refills: 3 | Status: SHIPPED | OUTPATIENT
Start: 2020-11-18 | End: 2020-11-20

## 2020-11-18 RX ORDER — SULFAMETHOXAZOLE AND TRIMETHOPRIM 800; 160 MG/1; MG/1
1 TABLET ORAL
COMMUNITY
End: 2021-01-22

## 2020-11-20 ENCOUNTER — OFFICE VISIT (OUTPATIENT)
Dept: INTERNAL MEDICINE | Facility: CLINIC | Age: 65
End: 2020-11-20
Payer: COMMERCIAL

## 2020-11-20 VITALS
SYSTOLIC BLOOD PRESSURE: 100 MMHG | OXYGEN SATURATION: 98 % | DIASTOLIC BLOOD PRESSURE: 60 MMHG | BODY MASS INDEX: 25.78 KG/M2 | WEIGHT: 164.25 LBS | HEIGHT: 67 IN | HEART RATE: 71 BPM

## 2020-11-20 DIAGNOSIS — M25.50 ARTHRALGIA, UNSPECIFIED JOINT: ICD-10-CM

## 2020-11-20 DIAGNOSIS — E03.9 HYPOTHYROIDISM, UNSPECIFIED TYPE: Primary | ICD-10-CM

## 2020-11-20 DIAGNOSIS — M25.551 RIGHT HIP PAIN: ICD-10-CM

## 2020-11-20 PROCEDURE — 99999 PR PBB SHADOW E&M-EST. PATIENT-LVL III: ICD-10-PCS | Mod: PBBFAC,,, | Performed by: INTERNAL MEDICINE

## 2020-11-20 PROCEDURE — 99999 PR PBB SHADOW E&M-EST. PATIENT-LVL III: CPT | Mod: PBBFAC,,, | Performed by: INTERNAL MEDICINE

## 2020-11-20 PROCEDURE — 99213 PR OFFICE/OUTPT VISIT, EST, LEVL III, 20-29 MIN: ICD-10-PCS | Mod: S$GLB,,, | Performed by: INTERNAL MEDICINE

## 2020-11-20 PROCEDURE — 99213 OFFICE O/P EST LOW 20 MIN: CPT | Mod: S$GLB,,, | Performed by: INTERNAL MEDICINE

## 2020-11-20 RX ORDER — GABAPENTIN 300 MG/1
300 CAPSULE ORAL NIGHTLY
Qty: 30 CAPSULE | Refills: 1 | Status: SHIPPED | OUTPATIENT
Start: 2020-11-20 | End: 2021-03-10 | Stop reason: SDUPTHER

## 2020-11-20 RX ORDER — LEVOTHYROXINE SODIUM 100 UG/1
100 TABLET ORAL
Qty: 30 TABLET | Refills: 11 | Status: SHIPPED | OUTPATIENT
Start: 2020-11-20 | End: 2021-03-19

## 2020-11-20 NOTE — PROGRESS NOTES
Subjective:       Patient ID: Jolene Andre is a 64 y.o. female.    Chief Complaint: Recurrent Skin Infections (3 weeks)    HPIPt with a cellulitis (unknown cause) of R hip - took bactrim - it improved but she still feels area is swollen and painful.  No fever or chills. Feels achy at times. Dyspnea is resolved.   Review of Systems   Respiratory: Negative for shortness of breath (PND or orthopnea).    Cardiovascular: Negative for chest pain (arm pain or jaw pain).   Gastrointestinal: Negative for abdominal pain, diarrhea, nausea and vomiting.   Genitourinary: Negative for dysuria.   Musculoskeletal: Positive for arthralgias.   Neurological: Negative for seizures, syncope and headaches.       Objective:      Physical Exam  Constitutional:       General: She is not in acute distress.     Appearance: She is well-developed.   HENT:      Head: Normocephalic.   Neck:      Musculoskeletal: Neck supple.      Thyroid: No thyromegaly.      Vascular: No JVD.   Cardiovascular:      Rate and Rhythm: Normal rate and regular rhythm.      Heart sounds: Normal heart sounds. No murmur. No friction rub. No gallop.    Pulmonary:      Effort: Pulmonary effort is normal.      Breath sounds: Normal breath sounds. No wheezing or rales.   Abdominal:      General: Bowel sounds are normal. There is no distension.      Palpations: Abdomen is soft. There is no mass.      Tenderness: There is no abdominal tenderness. There is no guarding or rebound.   Musculoskeletal:      Comments: R hip is  Mildly tender ?trochanteric bursitis, no erythema or warmth is seen.  Area minimally prominent compared to the opposite side.    Lymphadenopathy:      Cervical: No cervical adenopathy.   Skin:     General: Skin is warm and dry.   Neurological:      Mental Status: She is alert and oriented to person, place, and time.      Deep Tendon Reflexes: Reflexes are normal and symmetric.   Psychiatric:         Behavior: Behavior normal.         Thought Content: Thought  content normal.         Judgment: Judgment normal.         Assessment:       1. Hypothyroidism, unspecified type    2. Right hip pain    3. Arthralgia, unspecified joint        Plan:   Hypothyroidism, unspecified type  -     TSH; Future; Expected date: 11/20/2020  Last TSH appears hyperthyroid - decrease synthroid to 100mcg daily and recheck TSH in 6 weeks  Right hip pain  -     Unclear etiology - MRI Hip W WO Contrast Right; Future; Expected date: 11/20/2020  May be bursitis - will rule out any underlying lesion in that area - may be some scarring from previous cellulitis     No need for more antibiotics at this point - pt reassured  Arthralgia, unspecified joint  -     Sedimentation rate; Future; Expected date: 11/20/2020  -     C-Reactive Protein; Future; Expected date: 11/20/2020    Other orders  -     levothyroxine (SYNTHROID) 100 MCG tablet; Take 1 tablet (100 mcg total) by mouth before breakfast.  Dispense: 30 tablet; Refill: 11  -     gabapentin (NEURONTIN) 300 MG capsule; Take 1 capsule (300 mg total) by mouth every evening.  Dispense: 30 capsule; Refill: 1 - for discomfort at night

## 2020-11-22 RX ORDER — SULFAMETHOXAZOLE AND TRIMETHOPRIM 800; 160 MG/1; MG/1
1 TABLET ORAL EVERY 12 HOURS
OUTPATIENT
Start: 2020-11-22

## 2020-11-24 ENCOUNTER — TELEPHONE (OUTPATIENT)
Dept: INTERNAL MEDICINE | Facility: CLINIC | Age: 65
End: 2020-11-24

## 2020-11-24 NOTE — TELEPHONE ENCOUNTER
----- Message from Renay Luna sent at 11/24/2020  8:32 AM CST -----  Contact: [phone]  Pt wants to make sure that MRI is for right hip and whole right leg please call to advise

## 2020-11-24 NOTE — TELEPHONE ENCOUNTER
Called patient and spoke to her, inform her that her MRI is for her right hip. Patient verbalize understanding.

## 2020-11-27 ENCOUNTER — HOSPITAL ENCOUNTER (OUTPATIENT)
Dept: RADIOLOGY | Facility: HOSPITAL | Age: 65
Discharge: HOME | End: 2020-11-27
Attending: INTERNAL MEDICINE
Payer: COMMERCIAL

## 2020-11-27 DIAGNOSIS — M25.551 RIGHT HIP PAIN: ICD-10-CM

## 2020-11-29 RX ORDER — SULFAMETHOXAZOLE AND TRIMETHOPRIM 800; 160 MG/1; MG/1
1 TABLET ORAL EVERY 12 HOURS
OUTPATIENT
Start: 2020-11-29

## 2020-11-30 ENCOUNTER — HOSPITAL ENCOUNTER (OUTPATIENT)
Dept: RADIOLOGY | Facility: HOSPITAL | Age: 65
Discharge: HOME | End: 2020-11-30
Attending: INTERNAL MEDICINE
Payer: COMMERCIAL

## 2020-11-30 PROCEDURE — 73723 MRI JOINT LWR EXTR W/O&W/DYE: CPT | Mod: TC,RT

## 2020-11-30 PROCEDURE — A9585 GADOBUTROL INJECTION: HCPCS | Performed by: INTERNAL MEDICINE

## 2020-11-30 PROCEDURE — 25500020 PHARM REV CODE 255: Performed by: INTERNAL MEDICINE

## 2020-11-30 PROCEDURE — 73723 MRI JOINT LWR EXTR W/O&W/DYE: CPT | Mod: 26,RT,, | Performed by: RADIOLOGY

## 2020-11-30 PROCEDURE — 73723 MRI HIP W WO CONTRAST RIGHT: ICD-10-PCS | Mod: 26,RT,, | Performed by: RADIOLOGY

## 2020-11-30 RX ORDER — GADOBUTROL 604.72 MG/ML
8 INJECTION INTRAVENOUS
Status: COMPLETED | OUTPATIENT
Start: 2020-11-30 | End: 2020-11-30

## 2020-11-30 RX ADMIN — GADOBUTROL 8 ML: 604.72 INJECTION INTRAVENOUS at 04:11

## 2020-12-01 ENCOUNTER — TELEPHONE (OUTPATIENT)
Dept: INTERNAL MEDICINE | Facility: CLINIC | Age: 65
End: 2020-12-01

## 2020-12-01 DIAGNOSIS — K62.89 MASS OF PERIRECTAL SOFT TISSUE: Primary | ICD-10-CM

## 2020-12-03 ENCOUNTER — HOSPITAL ENCOUNTER (OUTPATIENT)
Dept: RADIOLOGY | Facility: HOSPITAL | Age: 65
Discharge: HOME | End: 2020-12-03
Attending: INTERNAL MEDICINE
Payer: COMMERCIAL

## 2020-12-03 DIAGNOSIS — K62.89 MASS OF PERIRECTAL SOFT TISSUE: ICD-10-CM

## 2020-12-03 LAB
CREAT SERPL-MCNC: 0.7 MG/DL (ref 0.5–1.4)
SAMPLE: NORMAL

## 2020-12-03 PROCEDURE — 25500020 PHARM REV CODE 255: Performed by: INTERNAL MEDICINE

## 2020-12-03 PROCEDURE — 74177 CT ABD & PELVIS W/CONTRAST: CPT | Mod: TC

## 2020-12-03 PROCEDURE — 74177 CT ABD & PELVIS W/CONTRAST: CPT | Mod: 26,,, | Performed by: RADIOLOGY

## 2020-12-03 PROCEDURE — 74177 CT ABDOMEN PELVIS WITH CONTRAST: ICD-10-PCS | Mod: 26,,, | Performed by: RADIOLOGY

## 2020-12-03 RX ADMIN — IOHEXOL 15 ML: 350 INJECTION, SOLUTION INTRAVENOUS at 02:12

## 2020-12-03 RX ADMIN — IOHEXOL 75 ML: 350 INJECTION, SOLUTION INTRAVENOUS at 04:12

## 2020-12-03 RX ADMIN — IOHEXOL 30 ML: 350 INJECTION, SOLUTION INTRAVENOUS at 04:12

## 2020-12-04 ENCOUNTER — TELEPHONE (OUTPATIENT)
Dept: INTERNAL MEDICINE | Facility: CLINIC | Age: 65
End: 2020-12-04

## 2020-12-04 DIAGNOSIS — R19.09 PRESACRAL MASS: Primary | ICD-10-CM

## 2020-12-04 NOTE — TELEPHONE ENCOUNTER
----- Message from Carlitos Martinez sent at 12/4/2020  2:52 PM CST -----  Contact: Patient  The pt called and would like to results of her CT scan    She can be reached at [phone]

## 2020-12-09 ENCOUNTER — HOSPITAL ENCOUNTER (OUTPATIENT)
Dept: RADIOLOGY | Facility: HOSPITAL | Age: 65
Discharge: HOME | End: 2020-12-09
Attending: INTERNAL MEDICINE
Payer: COMMERCIAL

## 2020-12-09 DIAGNOSIS — R19.09 PRESACRAL MASS: ICD-10-CM

## 2020-12-09 PROCEDURE — 72197 MRI PELVIS W/O & W/DYE: CPT | Mod: TC

## 2020-12-09 PROCEDURE — 72197 MRI PELVIS W WO CONTRAST: ICD-10-PCS | Mod: 26,,, | Performed by: RADIOLOGY

## 2020-12-09 PROCEDURE — 25500020 PHARM REV CODE 255: Performed by: INTERNAL MEDICINE

## 2020-12-09 PROCEDURE — A9585 GADOBUTROL INJECTION: HCPCS | Performed by: INTERNAL MEDICINE

## 2020-12-09 PROCEDURE — 72197 MRI PELVIS W/O & W/DYE: CPT | Mod: 26,,, | Performed by: RADIOLOGY

## 2020-12-09 RX ORDER — GADOBUTROL 604.72 MG/ML
8 INJECTION INTRAVENOUS
Status: COMPLETED | OUTPATIENT
Start: 2020-12-09 | End: 2020-12-09

## 2020-12-09 RX ADMIN — GADOBUTROL 8 ML: 604.72 INJECTION INTRAVENOUS at 05:12

## 2020-12-11 ENCOUNTER — TELEPHONE (OUTPATIENT)
Dept: SURGERY | Facility: CLINIC | Age: 65
End: 2020-12-11

## 2020-12-11 NOTE — TELEPHONE ENCOUNTER
----- Message from Monika Anders sent at 12/11/2020 12:29 PM CST -----  Regarding: Appointment  Contact: [phone]  Calling in regards to confirming appointment being reschedule to next Thursday per doctor. Please call and confirm

## 2020-12-15 ENCOUNTER — PATIENT OUTREACH (OUTPATIENT)
Dept: ADMINISTRATIVE | Facility: OTHER | Age: 65
End: 2020-12-15

## 2020-12-15 NOTE — PROGRESS NOTES
LINKS immunization registry not responding  Care Everywhere updated  Health Maintenance updated  Chart reviewed for overdue Proactive [facility] Encounters ([facility]) health maintenance testing (CRS, Breast Ca, Diabetic Eye Exam)   Orders entered:N/A

## 2020-12-16 ENCOUNTER — DOCUMENTATION ONLY (OUTPATIENT)
Dept: SURGERY | Facility: HOSPITAL | Age: 65
End: 2020-12-16

## 2020-12-16 NOTE — PROGRESS NOTES
Multidisciplinary Conference - Evaluation and Recommendation Summary  12/16/2020  Jolene Andre  [id number]  65 y.o. female    1. Evaluation    65F presented to PCP with right hip and thigh pain x 2 weeks.    MRI Hip - 5.6 x 4.3 x 4.1cm presacral mass     CT A/P - well circumscribed 4 x 5.5 x 5 cm presacral mass          MRI pelvis - A posterolateral aspect of rectum, possible subserosal component. Appears to be a solid, lobulated mass.      2. Treatment Recommendation    Recommendation is a percutaneous biopsy given this is a solid mass. Rule out GIST to assess need for neoadjuvant therapy;

## 2020-12-17 ENCOUNTER — PATIENT MESSAGE (OUTPATIENT)
Dept: RADIOLOGY | Facility: HOSPITAL | Age: 65
End: 2020-12-17

## 2020-12-17 ENCOUNTER — OFFICE VISIT (OUTPATIENT)
Dept: SURGERY | Facility: CLINIC | Age: 65
End: 2020-12-17
Payer: COMMERCIAL

## 2020-12-17 VITALS
WEIGHT: 163.69 LBS | SYSTOLIC BLOOD PRESSURE: 122 MMHG | DIASTOLIC BLOOD PRESSURE: 81 MMHG | HEIGHT: 67 IN | BODY MASS INDEX: 25.69 KG/M2

## 2020-12-17 DIAGNOSIS — Z12.11 SPECIAL SCREENING FOR MALIGNANT NEOPLASMS, COLON: Primary | ICD-10-CM

## 2020-12-17 DIAGNOSIS — R19.09 PRESACRAL MASS: Primary | ICD-10-CM

## 2020-12-17 DIAGNOSIS — Z01.812 PRE-PROCEDURE LAB EXAM: ICD-10-CM

## 2020-12-17 PROCEDURE — 99999 PR PBB SHADOW E&M-EST. PATIENT-LVL IV: CPT | Mod: PBBFAC,,, | Performed by: COLON & RECTAL SURGERY

## 2020-12-17 PROCEDURE — 99999 PR PBB SHADOW E&M-EST. PATIENT-LVL IV: ICD-10-PCS | Mod: PBBFAC,,, | Performed by: COLON & RECTAL SURGERY

## 2020-12-17 PROCEDURE — 99204 PR OFFICE/OUTPT VISIT, NEW, LEVL IV, 45-59 MIN: ICD-10-PCS | Mod: S$GLB,,, | Performed by: COLON & RECTAL SURGERY

## 2020-12-17 PROCEDURE — 99204 OFFICE O/P NEW MOD 45 MIN: CPT | Mod: S$GLB,,, | Performed by: COLON & RECTAL SURGERY

## 2020-12-17 RX ORDER — ERGOCALCIFEROL 1.25 MG/1
50000 CAPSULE ORAL
COMMUNITY
End: 2021-03-18 | Stop reason: SDUPTHER

## 2020-12-17 RX ORDER — ESZOPICLONE 3 MG/1
2 TABLET, FILM COATED ORAL NIGHTLY
COMMUNITY
End: 2022-05-31

## 2020-12-17 RX ORDER — SODIUM, POTASSIUM,MAG SULFATES 17.5-3.13G
1 SOLUTION, RECONSTITUTED, ORAL ORAL
Qty: 1 BOTTLE | Refills: 0 | Status: SHIPPED | OUTPATIENT
Start: 2020-12-17 | End: 2021-02-08

## 2020-12-17 NOTE — PROGRESS NOTES
Subjective:      Jolene Andre is a 65 y.o. female who presents for evaluation of a presacral mass.    She has had right hip and right thigh pain for the last 6 months or so, which prompted her to get an MRI of the right hip, which incidentally found an indeterminate mass adjacent to the distal rectum. She subsequently underwent a CT A/P, as well as a formal pelvic MRI revealing a presacral mass with detailed radiologic findings below. She therefore presents for evaluation.    Initially, her pain was thought to be related to bursitis, for which she was treated with gabapentin and Celebrex with only minimal relief. She reports that she has had some BRBPR 1-2x per week upon wiping after BMs. This blood does not drip in the toiled. She has also noticed some slight spotting in her underwear over the last couple of weeks. She reports that she usually has 3 BMs per day that all happen in the morning and are usually brown, very soft, almost liquid in nature. She does not need to strain. She reports that she has had 2 episodes of fecal incontinence over the last 2 months, which she attributes to urgency.    She denies any recent changes in her weight or appetite. No f/c/s. No CP/SOB/palps    She denies any family history of colon or rectal cancer. No family history of IBD.    Last colonoscopy 5/5/2016 - The entire examined colon was normal    Patient Active Problem List    Diagnosis Date Noted    GERD (gastroesophageal reflux disease) 02/18/2020    Osteoarthritis of spine with radiculopathy, cervical region 12/07/2018    Chest pain 11/02/2017    Nocturia 05/09/2017    Cystitis cystica 02/06/2013    Unspecified hypothyroidism 08/26/2012    Mood disorder in full remission 08/26/2012    Migraine headache 08/26/2012    Chronic neck pain 08/26/2012    Lumbago 08/26/2012     Past Medical History:   Diagnosis Date    Allergy     Arthritis     Bipolar disorder     Headache(784.0)     Herniated disc     x4 in cervical  area    History of migraine headaches     Hypothyroidism     Infections of kidney     has had multiple kidney infections in the past.    Low back pain     Migraine headache     Moderate mood disorder     Neck pain     Rotator cuff tear     Thyroid disease       Past Surgical History:   Procedure Laterality Date    BLADDER SUSPENSION      COLONOSCOPY N/A 5/5/2016    Procedure: COLONOSCOPY;  Surgeon: Daron Scruggs MD;  Location: [facility] (4TH FLR);  Service: Endoscopy;  Laterality: N/A;    COSMETIC SURGERY      face lift     CYSTOSCOPY      ESOPHAGOGASTRODUODENOSCOPY N/A 2/18/2020    Procedure: ESOPHAGOGASTRODUODENOSCOPY (EGD);  Surgeon: Rip Davis MD;  Location: [facility] ([facility]);  Service: Endoscopy;  Laterality: N/A;  [id number] ride-tb    EYE SURGERY      HYSTERECTOMY  1989    age 33 CEYC/BSO (pain, bleeding)    OOPHORECTOMY      TONSILLECTOMY        (Not in a hospital admission)      Current Outpatient Medications:     ALPRAZolam (XANAX) 0.25 MG tablet, One-two during a flight for anxiety, Disp: 5 tablet, Rfl: 0    celecoxib (CELEBREX ORAL), , Disp: , Rfl:     ergocalciferol (VITAMIN D2) 50,000 unit Cap, Take 50,000 Units by mouth every 7 days., Disp: , Rfl:     estradioL (VIVELLE-DOT) 0.0375 mg/24 hr, APPLY 1 PATCH EXTERNALLY TO THE SKIN 2 TIMES A WEEK, Disp: 8 patch, Rfl: 6    eszopiclone (LUNESTA) 3 mg Tab, , Disp: , Rfl:     gabapentin (NEURONTIN) 300 MG capsule, Take 1 capsule (300 mg total) by mouth every evening., Disp: 30 capsule, Rfl: 1    levothyroxine (SYNTHROID) 100 MCG tablet, Take 1 tablet (100 mcg total) by mouth before breakfast., Disp: 30 tablet, Rfl: 11    pantoprazole (PROTONIX) 40 MG tablet, Take 1 tablet (40 mg total) by mouth once daily., Disp: 30 tablet, Rfl: 12    ondansetron (ZOFRAN-ODT) 4 MG TbDL, Take 1 tablet (4 mg total) by mouth every 6 (six) hours as needed., Disp: 20 tablet, Rfl: 0    sulfamethoxazole-trimethoprim 800-160mg (BACTRIM DS)  800-160 mg Tab, Take 1 tablet by mouth every 12 (twelve) hours., Disp: , Rfl:     Review of patient's allergies indicates:   Allergen Reactions    Rocephin [ceftriaxone]      Severe migraine    Abilify [aripiprazole] Other (See Comments)     Tardive dyskinesia  - do not give any other meds that affect dopamine    Clindamycin Other (See Comments)     nausea    Codeine      Other reaction(s): Unknown    Demerol [meperidine]      Interacts with meds    Doxycycline      headaches    Erythromycin      Other reaction(s): Unknown    Fioricet  [butalbital-acetaminophen-caff] Other (See Comments)    Nsaids (non-steroidal anti-inflammatory drug)      Alters lithium metabolism      Social History     Tobacco Use    Smoking status: Never Smoker    Smokeless tobacco: Never Used   Substance Use Topics    Alcohol use: No     Frequency: Never      Family History   Problem Relation Age of Onset    Hypertension Mother     Migraines Mother     Tremor Mother     Hyperlipidemia Mother     Allergies Mother     Hypertension Sister     Uterine cancer Maternal Grandmother     Breast cancer Maternal Aunt     Asthma Brother     Allergies Son     Asthma Son     Colon cancer Neg Hx     Ovarian cancer Neg Hx         Review of Systems  Review of Systems   Constitutional: Negative for chills and fever.   HENT: Negative for congestion and sore throat.    Eyes: Negative for blurred vision.   Respiratory: Negative for cough and shortness of breath.    Cardiovascular: Negative for chest pain and palpitations.   Gastrointestinal: Negative for heartburn and nausea.   Genitourinary: Negative for dysuria.   Skin: Negative for itching.   Neurological: Negative for dizziness and headaches.   Endo/Heme/Allergies: Negative for polydipsia. Does not bruise/bleed easily.   Psychiatric/Behavioral: Negative for depression.         Objective:       Vitals:    12/17/20 0806   BP: 122/81       Physical Exam  Vitals signs reviewed.    Constitutional:       Appearance: Normal appearance.   HENT:      Head: Normocephalic and atraumatic.   Eyes:      General: No scleral icterus.  Cardiovascular:      Rate and Rhythm: Normal rate and regular rhythm.   Pulmonary:      Effort: Pulmonary effort is normal.   Abdominal:      General: Abdomen is flat. There is no distension.      Palpations: Abdomen is soft.      Tenderness: There is no abdominal tenderness.   Skin:     General: Skin is warm.      Capillary Refill: Capillary refill takes less than 2 seconds.   Neurological:      General: No focal deficit present.      Mental Status: She is alert and oriented to person, place, and time.       Digital Rectal Exam:  Resting Tone normal  Squeeze normal  Relaxation with bear down present  Mass Part of mass palpated posteriorly at tip of finger to the left of midline. Felt well circumscribed and smooth, proximal aspect of mass could not be palpated  Rectocele  absent  Tenderness  absent      Imaging  MRI R-Hip 11/30  1. In this patient with a reported history of cellulitis, there is no abnormality of the visualized right hip cutaneous or subcutaneous soft tissues, noting no marker was applied at the region of interest.  2. Indeterminate pelvic mass adjacent to the distal rectum as above.  Further evaluation is recommended. Consider CT with IV and rectal contrast for additional characterization.  3. Mild asymmetric fluid/edema along the right greater trochanter which may reflect mild trochanteric bursitis noting similar findings may be seen in asymptomatic patients.    CT A/P 12/03  Well rounded presacral soft tissue mass measuring approximately 4.0 x 5.5 x 4.9 cm with minimal mass effect on the surrounding rectum, with a focus of enhancement at the anterior inferior aspect of this mass measuring 1.7 cm in greatest dimension.  No definite osseous involvement of the surrounding sacrum.  Given irregular enhancement, malignant etiology cannot be completely  excluded, with benign lesion such as mesenchymal mass (fibroma) also a consideration.  Further evaluation with MRI with and without contrast or tissue sampling with biopsy is recommended.    MRI Pelvis 12/09  Circumscribed, lobulated presacral mass which appears solid and perirectal in nature.  Findings remain technically indeterminate with differential considerations to include but not limited to neurogenic tumor, either benign or malignant or mesenchymal tumor.  Given appearance, additional considerations such as germ cell tumor or developmental cyst felt less likely.  This would likely be amenable to soft tissue sampling.    Lab Review  Lab Results   Component Value Date    WBC 6.35 10/21/2020    HCT 41.8 10/21/2020    HGB 13.0 10/21/2020     (H) 10/21/2020      Lab Results   Component Value Date    BILIDIR 0.5 (H) 09/29/2005    AST 21 10/21/2020    ALT 11 10/21/2020    ALKPHOS 53 (L) 10/21/2020    PROT 6.7 10/21/2020      No results found for: CEA           Assessment:   Mrs. Andre is a 65 year old woman who presents for evaluation of a presacral mass, found on imaging as part of right hip pain.     We discussed the wide differential for presacral masses, specifically that given the fact that this mass does not appear completely separate from the rectal wall, it may represent a GIST, which could be treated medically up front with Gleevec to reduce its size prior to resection. Therefore a core biopsy performed percutaneously would be of benefit in elucidating preoperative diagnosis prior to resection. We will schedule her for percutaneous biopsy. She will also need a colonoscopy given her rectal bleeding; anoscopy was deferred given plans for colonoscopy     Plan:     Outpatient percutaneous biopsy for tissue diagnosis  Will schedule colonoscopy  Return to clinic following biopsy and colonoscopy results

## 2020-12-22 ENCOUNTER — TELEPHONE (OUTPATIENT)
Dept: INTERVENTIONAL RADIOLOGY/VASCULAR | Facility: HOSPITAL | Age: 65
End: 2020-12-22

## 2020-12-23 ENCOUNTER — HOSPITAL ENCOUNTER (OUTPATIENT)
Facility: HOSPITAL | Age: 65
Discharge: HOME | End: 2020-12-23
Attending: RADIOLOGY | Admitting: RADIOLOGY
Payer: COMMERCIAL

## 2020-12-23 ENCOUNTER — HOSPITAL ENCOUNTER (OUTPATIENT)
Dept: RADIOLOGY | Facility: HOSPITAL | Age: 65
Discharge: HOME | End: 2020-12-23
Attending: FAMILY MEDICINE
Payer: COMMERCIAL

## 2020-12-23 VITALS
TEMPERATURE: 98 F | DIASTOLIC BLOOD PRESSURE: 70 MMHG | HEIGHT: 67 IN | HEART RATE: 64 BPM | RESPIRATION RATE: 18 BRPM | OXYGEN SATURATION: 98 % | SYSTOLIC BLOOD PRESSURE: 98 MMHG | WEIGHT: 160 LBS | BODY MASS INDEX: 25.11 KG/M2

## 2020-12-23 DIAGNOSIS — R19.09 PRESACRAL MASS: ICD-10-CM

## 2020-12-23 LAB
ALBUMIN SERPL BCP-MCNC: 4.2 G/DL (ref 3.5–5.2)
ALP SERPL-CCNC: 61 U/L (ref 55–135)
ALT SERPL W/O P-5'-P-CCNC: 12 U/L (ref 10–44)
ANION GAP SERPL CALC-SCNC: 10 MMOL/L (ref 8–16)
AST SERPL-CCNC: 21 U/L (ref 10–40)
BASOPHILS # BLD AUTO: 0.04 K/UL (ref 0–0.2)
BASOPHILS NFR BLD: 0.7 % (ref 0–1.9)
BILIRUB SERPL-MCNC: 1.5 MG/DL (ref 0.1–1)
BUN SERPL-MCNC: 10 MG/DL (ref 8–23)
CALCIUM SERPL-MCNC: 9.6 MG/DL (ref 8.7–10.5)
CHLORIDE SERPL-SCNC: 104 MMOL/L (ref 95–110)
CO2 SERPL-SCNC: 25 MMOL/L (ref 23–29)
CREAT SERPL-MCNC: 0.8 MG/DL (ref 0.5–1.4)
DIFFERENTIAL METHOD: NORMAL
EOSINOPHIL # BLD AUTO: 0.2 K/UL (ref 0–0.5)
EOSINOPHIL NFR BLD: 2.9 % (ref 0–8)
ERYTHROCYTE [DISTWIDTH] IN BLOOD BY AUTOMATED COUNT: 12 % (ref 11.5–14.5)
EST. GFR  (AFRICAN AMERICAN): >60 ML/MIN/1.73 M^2
EST. GFR  (NON AFRICAN AMERICAN): >60 ML/MIN/1.73 M^2
GLUCOSE SERPL-MCNC: 80 MG/DL (ref 70–110)
HCT VFR BLD AUTO: 46.3 % (ref 37–48.5)
HGB BLD-MCNC: 15 G/DL (ref 12–16)
IMM GRANULOCYTES # BLD AUTO: 0.03 K/UL (ref 0–0.04)
IMM GRANULOCYTES NFR BLD AUTO: 0.5 % (ref 0–0.5)
INR PPP: 1 (ref 0.8–1.2)
LYMPHOCYTES # BLD AUTO: 1.5 K/UL (ref 1–4.8)
LYMPHOCYTES NFR BLD: 26.8 % (ref 18–48)
MCH RBC QN AUTO: 30.5 PG (ref 27–31)
MCHC RBC AUTO-ENTMCNC: 32.4 G/DL (ref 32–36)
MCV RBC AUTO: 94 FL (ref 82–98)
MONOCYTES # BLD AUTO: 0.5 K/UL (ref 0.3–1)
MONOCYTES NFR BLD: 8.2 % (ref 4–15)
NEUTROPHILS # BLD AUTO: 3.3 K/UL (ref 1.8–7.7)
NEUTROPHILS NFR BLD: 60.9 % (ref 38–73)
NRBC BLD-RTO: 0 /100 WBC
PLATELET # BLD AUTO: 335 K/UL (ref 150–350)
PMV BLD AUTO: 9.5 FL (ref 9.2–12.9)
POTASSIUM SERPL-SCNC: 4 MMOL/L (ref 3.5–5.1)
PROT SERPL-MCNC: 7.3 G/DL (ref 6–8.4)
PROTHROMBIN TIME: 10.3 SEC (ref 9–12.5)
RBC # BLD AUTO: 4.91 M/UL (ref 4–5.4)
SODIUM SERPL-SCNC: 139 MMOL/L (ref 136–145)
WBC # BLD AUTO: 5.48 K/UL (ref 3.9–12.7)

## 2020-12-23 PROCEDURE — 88333 PATH CONSLTJ SURG CYTO XM 1: CPT | Performed by: PATHOLOGY

## 2020-12-23 PROCEDURE — 49180 PR PERCUT BIOPSY, ABDOMINAL MASS: ICD-10-PCS | Mod: ,,, | Performed by: RADIOLOGY

## 2020-12-23 PROCEDURE — 77012 CT SCAN FOR NEEDLE BIOPSY: CPT | Mod: 26,,, | Performed by: RADIOLOGY

## 2020-12-23 PROCEDURE — 88305 TISSUE EXAM BY PATHOLOGIST: ICD-10-PCS | Mod: 26,,, | Performed by: PATHOLOGY

## 2020-12-23 PROCEDURE — 88305 TISSUE EXAM BY PATHOLOGIST: CPT | Performed by: PATHOLOGY

## 2020-12-23 PROCEDURE — 63600175 PHARM REV CODE 636 W HCPCS: Performed by: RADIOLOGY

## 2020-12-23 PROCEDURE — 49180 BIOPSY ABDOMINAL MASS: CPT | Mod: ,,, | Performed by: RADIOLOGY

## 2020-12-23 PROCEDURE — 99152 MOD SED SAME PHYS/QHP 5/>YRS: CPT

## 2020-12-23 PROCEDURE — 85610 PROTHROMBIN TIME: CPT

## 2020-12-23 PROCEDURE — 77012 CT SCAN FOR NEEDLE BIOPSY: CPT | Mod: TC

## 2020-12-23 PROCEDURE — 27200937 CT GUIDED NEEDLE PLACEMENT

## 2020-12-23 PROCEDURE — 88333 PATH CONSLTJ SURG CYTO XM 1: CPT | Mod: 26,,, | Performed by: PATHOLOGY

## 2020-12-23 PROCEDURE — 99153 MOD SED SAME PHYS/QHP EA: CPT

## 2020-12-23 PROCEDURE — 80053 COMPREHEN METABOLIC PANEL: CPT

## 2020-12-23 PROCEDURE — 85025 COMPLETE CBC W/AUTO DIFF WBC: CPT

## 2020-12-23 PROCEDURE — 77012 CT GUIDED NEEDLE PLACEMENT: ICD-10-PCS | Mod: 26,,, | Performed by: RADIOLOGY

## 2020-12-23 PROCEDURE — 88305 TISSUE EXAM BY PATHOLOGIST: CPT | Mod: 26,,, | Performed by: PATHOLOGY

## 2020-12-23 PROCEDURE — 49180 BIOPSY ABDOMINAL MASS: CPT

## 2020-12-23 PROCEDURE — 88333 PR  INTRAOPERATIVE CYTO PATH CONSULT, INITIAL SITE: ICD-10-PCS | Mod: 26,,, | Performed by: PATHOLOGY

## 2020-12-23 PROCEDURE — 36415 COLL VENOUS BLD VENIPUNCTURE: CPT

## 2020-12-23 RX ORDER — FENTANYL CITRATE 50 UG/ML
INJECTION, SOLUTION INTRAMUSCULAR; INTRAVENOUS CODE/TRAUMA/SEDATION MEDICATION
Status: COMPLETED | OUTPATIENT
Start: 2020-12-23 | End: 2020-12-23

## 2020-12-23 RX ORDER — MIDAZOLAM HYDROCHLORIDE 1 MG/ML
INJECTION INTRAMUSCULAR; INTRAVENOUS CODE/TRAUMA/SEDATION MEDICATION
Status: COMPLETED | OUTPATIENT
Start: 2020-12-23 | End: 2020-12-23

## 2020-12-23 RX ADMIN — MIDAZOLAM HYDROCHLORIDE 1 MG: 1 INJECTION, SOLUTION INTRAMUSCULAR; INTRAVENOUS at 02:12

## 2020-12-23 RX ADMIN — MIDAZOLAM HYDROCHLORIDE 0.5 MG: 1 INJECTION, SOLUTION INTRAMUSCULAR; INTRAVENOUS at 02:12

## 2020-12-23 RX ADMIN — FENTANYL CITRATE 25 MCG: 50 INJECTION, SOLUTION INTRAMUSCULAR; INTRAVENOUS at 02:12

## 2020-12-23 RX ADMIN — FENTANYL CITRATE 50 MCG: 50 INJECTION, SOLUTION INTRAMUSCULAR; INTRAVENOUS at 02:12

## 2020-12-23 NOTE — NURSING
Patient arrived to pre post for 1 hour recovery per MD orders. VSS. Family updated via telephone. Advised daughter patient will be ready for  at 1550. Will continue to monitor. Dressing to right buttocks; clean, dry, and intact.

## 2020-12-23 NOTE — NURSING
AVS packet printed and reviewed with patient. All questions answered, no further questions at this time. IV discontinued. VSS. Pt escorted out to lobby.

## 2020-12-23 NOTE — PROCEDURES
Interventional Radiology Post-Procedure Note    Pre Op Diagnosis: Pre-sacral mass  Post Op Diagnosis: Same    Procedure: CT-guided coaxial core needle bx    Procedure performed by: Roro    Written Informed Consent Obtained: Yes  Specimen Sent: Yes  Estimated Blood Loss: Minimal    Findings:   20-ga x 20 mm cores x10 taken from target mass anterior to the sacrum. The material obtained appears poorly cohesive. Adequacy was confirmed.     No immediate complications. Patient tolerated procedure well. Please see full dictated procedure report for additional details and recommendations.      Rohith Read MD  [facility]  Pager [phone]

## 2020-12-23 NOTE — NURSING
Presacral mass biopsy completed. Pt tolerated well. VSS. Pathology at bedside for specimen collection. Band-aid applied to site; clean, dry, and intact. No bleeding or hematoma noted. Pt ready for transport back to pre/post for recovery.

## 2020-12-23 NOTE — DISCHARGE SUMMARY
Interventional Radiology Discharge Summary      Hospital Course: No complications    Admit Date: 12/23/2020  Discharge Date: 12/23/2020     Instructions Given to Patient: Yes  Diet: Resume prior diet  Activity: Activity as tolerated and no driving for today    Description of Condition on Discharge: Stable  Vital Signs (Most Recent): Temp: 97.5 °F (36.4 °C) (12/23/20 1123)  Pulse: 64 (12/23/20 1545)  Resp: 18 (12/23/20 1545)  BP: 98/70 (12/23/20 1545)  SpO2: 98 % (12/23/20 1545)    Discharge Disposition: Home    Discharge Diagnosis: Pre-sacral mass s/p bx today     Follow-up: With referring provider      Rohith Read MD  [facility]  Pager [phone]

## 2020-12-25 ENCOUNTER — LAB VISIT (OUTPATIENT)
Dept: INTERNAL MEDICINE | Facility: CLINIC | Age: 65
End: 2020-12-25
Payer: COMMERCIAL

## 2020-12-25 DIAGNOSIS — Z01.812 PRE-PROCEDURE LAB EXAM: ICD-10-CM

## 2020-12-25 PROCEDURE — U0003 INFECTIOUS AGENT DETECTION BY NUCLEIC ACID (DNA OR RNA); SEVERE ACUTE RESPIRATORY SYNDROME CORONAVIRUS 2 (SARS-COV-2) (CORONAVIRUS DISEASE [COVID-19]), AMPLIFIED PROBE TECHNIQUE, MAKING USE OF HIGH THROUGHPUT TECHNOLOGIES AS DESCRIBED BY CMS-2020-01-R: HCPCS

## 2020-12-26 LAB — SARS-COV-2 RNA RESP QL NAA+PROBE: NOT DETECTED

## 2020-12-28 ENCOUNTER — ANESTHESIA (OUTPATIENT)
Dept: ENDOSCOPY | Facility: HOSPITAL | Age: 65
End: 2020-12-28
Payer: COMMERCIAL

## 2020-12-28 ENCOUNTER — ANESTHESIA EVENT (OUTPATIENT)
Dept: ENDOSCOPY | Facility: HOSPITAL | Age: 65
End: 2020-12-28
Payer: COMMERCIAL

## 2020-12-28 ENCOUNTER — HOSPITAL ENCOUNTER (OUTPATIENT)
Facility: HOSPITAL | Age: 65
Discharge: HOME | End: 2020-12-28
Attending: COLON & RECTAL SURGERY | Admitting: COLON & RECTAL SURGERY
Payer: COMMERCIAL

## 2020-12-28 VITALS
RESPIRATION RATE: 15 BRPM | DIASTOLIC BLOOD PRESSURE: 74 MMHG | SYSTOLIC BLOOD PRESSURE: 102 MMHG | HEIGHT: 67 IN | HEART RATE: 76 BPM | TEMPERATURE: 98 F | OXYGEN SATURATION: 96 % | BODY MASS INDEX: 25.11 KG/M2 | WEIGHT: 160 LBS

## 2020-12-28 DIAGNOSIS — R19.09 PRESACRAL MASS: Primary | ICD-10-CM

## 2020-12-28 PROCEDURE — E9220 PRA ENDO ANESTHESIA: HCPCS | Mod: ,,, | Performed by: NURSE ANESTHETIST, CERTIFIED REGISTERED

## 2020-12-28 PROCEDURE — 45378 DIAGNOSTIC COLONOSCOPY: CPT | Performed by: COLON & RECTAL SURGERY

## 2020-12-28 PROCEDURE — 45378 PR COLONOSCOPY,DIAGNOSTIC: ICD-10-PCS | Mod: ,,, | Performed by: COLON & RECTAL SURGERY

## 2020-12-28 PROCEDURE — 37000009 HC ANESTHESIA EA ADD 15 MINS: Performed by: COLON & RECTAL SURGERY

## 2020-12-28 PROCEDURE — 37000008 HC ANESTHESIA 1ST 15 MINUTES: Performed by: COLON & RECTAL SURGERY

## 2020-12-28 PROCEDURE — E9220 PRA ENDO ANESTHESIA: ICD-10-PCS | Mod: ,,, | Performed by: NURSE ANESTHETIST, CERTIFIED REGISTERED

## 2020-12-28 PROCEDURE — 45378 DIAGNOSTIC COLONOSCOPY: CPT | Mod: ,,, | Performed by: COLON & RECTAL SURGERY

## 2020-12-28 PROCEDURE — 63600175 PHARM REV CODE 636 W HCPCS: Performed by: NURSE ANESTHETIST, CERTIFIED REGISTERED

## 2020-12-28 PROCEDURE — 25000003 PHARM REV CODE 250: Performed by: NURSE ANESTHETIST, CERTIFIED REGISTERED

## 2020-12-28 RX ORDER — LIDOCAINE HCL/PF 100 MG/5ML
SYRINGE (ML) INTRAVENOUS
Status: DISCONTINUED | OUTPATIENT
Start: 2020-12-28 | End: 2020-12-28

## 2020-12-28 RX ORDER — SODIUM CHLORIDE 9 MG/ML
INJECTION, SOLUTION INTRAVENOUS CONTINUOUS PRN
Status: DISCONTINUED | OUTPATIENT
Start: 2020-12-28 | End: 2020-12-28

## 2020-12-28 RX ORDER — PROPOFOL 10 MG/ML
INJECTION, EMULSION INTRAVENOUS
Status: DISCONTINUED | OUTPATIENT
Start: 2020-12-28 | End: 2020-12-28

## 2020-12-28 RX ORDER — SODIUM CHLORIDE 9 MG/ML
INJECTION, SOLUTION INTRAVENOUS CONTINUOUS
Status: DISCONTINUED | OUTPATIENT
Start: 2020-12-28 | End: 2020-12-28 | Stop reason: HOSPADM

## 2020-12-28 RX ORDER — PROPOFOL 10 MG/ML
VIAL (ML) INTRAVENOUS CONTINUOUS PRN
Status: DISCONTINUED | OUTPATIENT
Start: 2020-12-28 | End: 2020-12-28

## 2020-12-28 RX ADMIN — SODIUM CHLORIDE: 0.9 INJECTION, SOLUTION INTRAVENOUS at 12:12

## 2020-12-28 RX ADMIN — PROPOFOL 125 MCG/KG/MIN: 10 INJECTION, EMULSION INTRAVENOUS at 01:12

## 2020-12-28 RX ADMIN — PROPOFOL 50 MG: 10 INJECTION, EMULSION INTRAVENOUS at 01:12

## 2020-12-28 RX ADMIN — Medication 50 MG: at 01:12

## 2020-12-28 NOTE — [employer] PATIENT INSTRUCTIONS
Discharge Summary/Instructions after an Endoscopic Procedure  Patient Name: Jolene Andre  Patient MRN: [id number]  Patient YOB: 1955 Monday, December 28, 2020  Daron Scruggs MD  RESTRICTIONS:  During your procedure today, you received medications for sedation.  These   medications may affect your judgment, balance and coordination.  Therefore,   for 24 hours, you have the following restrictions:   - DO NOT drive a car, operate machinery, make legal/financial decisions,   sign important papers or drink alcohol.    ACTIVITY:  Today: no heavy lifting, straining or running due to procedural   sedation/anesthesia.  The following day: return to full activity including work.  DIET:  Eat and drink normally unless instructed otherwise.     TREATMENT FOR COMMON SIDE EFFECTS:  - Mild abdominal pain, nausea, belching, bloating or excessive gas:  rest,   eat lightly and use a heating pad.  - Sore Throat: treat with throat lozenges and/or gargle with warm salt   water.  - Because air was used during the procedure, expelling large amounts of air   from your rectum or belching is normal.  - If a bowel prep was taken, you may not have a bowel movement for 1-3 days.    This is normal.  SYMPTOMS TO WATCH FOR AND REPORT TO YOUR PHYSICIAN:  1. Abdominal pain or bloating, other than gas cramps.  2. Chest pain.  3. Back pain.  4. Signs of infection such as: chills or fever occurring within 24 hours   after the procedure.  5. Rectal bleeding, which would show as bright red, maroon, or black stools.   (A tablespoon of blood from the rectum is not serious, especially if   hemorrhoids are present.)  6. Vomiting.  7. Weakness or dizziness.  GO DIRECTLY TO THE NEAREST EMERGENCY ROOM IF YOU HAVE ANY OF THE FOLLOWING:      Difficulty breathing              Chills and/or fever over 101 F   Persistent vomiting and/or vomiting blood   Severe abdominal pain   Severe chest pain   Black, tarry stools   Bleeding- more than one  tablespoon   Any other symptom or condition that you feel may need urgent attention  Your doctor recommends these additional instructions:  If any biopsies were taken, your doctors clinic will contact you in 1 to 2   weeks with any results.  - Patient has a contact number available for emergencies.  The signs and   symptoms of potential delayed complications were discussed with the   patient.  Return to normal activities tomorrow.  Written discharge   instructions were provided to the patient.   - Discharge patient to home (ambulatory).   - Resume regular diet indefinitely.   - Repeat colonoscopy in 10 years for screening purposes.   - Continue present medications.  For questions, problems or results please call your physician - Daron Scruggs MD at Work:  [phone].  [facility], EMERGENCY ROOM PHONE NUMBER: [phone]  IF A COMPLICATION OR EMERGENCY SITUATION ARISES AND YOU ARE UNABLE TO REACH   YOUR PHYSICIAN - GO DIRECTLY TO THE EMERGENCY ROOM.  Daron Scruggs MD  12/28/2020 1:22:25 PM  This report has been verified and signed electronically.  [employer]

## 2020-12-28 NOTE — ANESTHESIA POSTPROCEDURE EVALUATION
Anesthesia Post Evaluation    Patient: Jolene Andre    Procedure(s) Performed: Procedure(s) (LRB):  COLONOSCOPY (N/A)    Final Anesthesia Type: general      Patient location during evaluation: PACU  Patient participation: Yes- Able to Participate  Level of consciousness: awake and alert and oriented  Pain management: adequate  Airway patency: patent    PONV status at discharge: No PONV  Anesthetic complications: no      Cardiovascular status: blood pressure returned to baseline and hemodynamically stable  Respiratory status: unassisted  Hydration status: euvolemic  Follow-up not needed.          Vitals Value Taken Time   /69 12/28/20 1335   Temp 36.7 °C (98.1 °F) 12/28/20 1320   Pulse 76 12/28/20 1335   Resp 14 12/28/20 1335   SpO2 99 % 12/28/20 1335         No case tracking events are documented in the log.      Pain/Obi Score: Obi Score: 10 (12/28/2020  1:35 PM)

## 2020-12-28 NOTE — PLAN OF CARE
POC reiviewed with pt. Pt verbalized understanding of discharge instructions including diet, s/s to notify md,  and follow up.

## 2020-12-28 NOTE — H&P
COLONOSCOPY HISTORY AND PHYSICAL EXAM    Procedure : Colonoscopy      INDICATIONS: Presacral mass    Family Hx of CRC: none    Last Colonoscopy:  2016       Past Medical History:   Diagnosis Date    Allergy     Arthritis     Bipolar disorder     Headache(784.0)     Herniated disc     x4 in cervical area    History of migraine headaches     Hypothyroidism     Infections of kidney     has had multiple kidney infections in the past.    Low back pain     Migraine headache     Moderate mood disorder     Neck pain     Rotator cuff tear     Thyroid disease      Sedation Problems: NO  Family History   Problem Relation Age of Onset    Hypertension Mother     Migraines Mother     Tremor Mother     Hyperlipidemia Mother     Allergies Mother     Hypertension Sister     Uterine cancer Maternal Grandmother     Breast cancer Maternal Aunt     Asthma Brother     Allergies Son     Asthma Son     Colon cancer Neg Hx     Ovarian cancer Neg Hx      Fam Hx of Sedation Problems: NO  Social History     Socioeconomic History    Marital status:      Spouse name: Not on file    Number of children: 2    Years of education: 18    Highest education level: Not on file   Occupational History    Occupation: Homemaker/Retired   Social Needs    Financial resource strain: Not on file    Food insecurity     Worry: Not on file     Inability: Not on file    Transportation needs     Medical: Not on file     Non-medical: Not on file   Tobacco Use    Smoking status: Never Smoker    Smokeless tobacco: Never Used   Substance and Sexual Activity    Alcohol use: No     Frequency: Never    Drug use: No    Sexual activity: Yes     Partners: Male   Lifestyle    Physical activity     Days per week: Not on file     Minutes per session: Not on file    Stress: Not on file   Relationships    Social connections     Talks on phone: Not on file     Gets together: Not on file     Attends Mosque service: Not on  "file     Active member of club or organization: Not on file     Attends meetings of clubs or organizations: Not on file     Relationship status: Not on file   Other Topics Concern    Not on file   Social History Narrative    Not on file       Review of Systems - Negative except   Respiratory ROS: no dyspnea  Cardiovascular ROS: no exertional chest pain  Gastrointestinal ROS: NO abdominal discomfort,  NO rectal bleeding  Musculoskeletal ROS: no muscular pain  Neurological ROS: no recent stroke    Physical Exam:  /69 (BP Location: Left arm, Patient Position: Lying)   Pulse 84   Temp 98.6 °F (37 °C) (Temporal)   Resp 16   Ht 5' 7" (1.702 m)   Wt 72.6 kg (160 lb)   SpO2 97%   Breastfeeding No   BMI 25.06 kg/m²   General: no distress  Head: normocephalic  Mallampati Score   Neck: supple, symmetrical, trachea midline  Lungs:  normal respiratory effort  Heart: regular rate and rhythm  Abdomen: soft, non-tender non-distented; bowel sounds normal; no masses,  no organomegaly  Extremities: no cyanosis or edema, or clubbing    ASA:  II    PLAN  COLONOSCOPY.    SedationPlan :MAC    The details of the procedure, the possible need for biopsy or polypectomy and the potential risks including bleeding, perforation, missed polyps were discussed in detail.      "

## 2020-12-28 NOTE — ANESTHESIA PREPROCEDURE EVALUATION
12/28/2020  Jolene Andre is a 65 y.o., female.    Patient Active Problem List     Diagnosis Date Noted    GERD (gastroesophageal reflux disease) 02/18/2020    Osteoarthritis of spine with radiculopathy, cervical region 12/07/2018    Chest pain 11/02/2017    Nocturia 05/09/2017    Cystitis cystica 02/06/2013    Unspecified hypothyroidism 08/26/2012    Mood disorder in full remission 08/26/2012    Migraine headache 08/26/2012    Chronic neck pain 08/26/2012    Lumbago 08/26/2012           Past Medical History:   Diagnosis Date    Allergy      Arthritis      Bipolar disorder      Headache(784.0)      Herniated disc       x4 in cervical area    History of migraine headaches      Hypothyroidism      Infections of kidney       has had multiple kidney infections in the past.    Low back pain      Migraine headache      Moderate mood disorder      Neck pain      Rotator cuff tear      Thyroid disease              Past Surgical History:   Procedure Laterality Date    BLADDER SUSPENSION        COLONOSCOPY N/A 5/5/2016     Procedure: COLONOSCOPY;  Surgeon: Daron Scruggs MD;  Location: [facility] ([address] [place] [address]);  Service: Endoscopy;  Laterality: N/A;    COSMETIC SURGERY         face lift     CYSTOSCOPY        ESOPHAGOGASTRODUODENOSCOPY N/A 2/18/2020     Procedure: ESOPHAGOGASTRODUODENOSCOPY (EGD);  Surgeon: Rip Davis MD;  Location: [facility] ([address] [place] [address]);  Service: Endoscopy;  Laterality: N/A;  [id number] ride-tb    EYE SURGERY        HYSTERECTOMY   1989     age 33 CECY/BSO (pain, bleeding)    OOPHORECTOMY        TONSILLECTOMY            Prescriptions Prior to Admission   (Not in a hospital admission)        Current Outpatient Medications:     ALPRAZolam (XANAX) 0.25 MG tablet, One-two during a flight for anxiety, Disp: 5 tablet, Rfl: 0    celecoxib (CELEBREX ORAL), , Disp: ,  Rfl:     ergocalciferol (VITAMIN D2) 50,000 unit Cap, Take 50,000 Units by mouth every 7 days., Disp: , Rfl:     estradioL (VIVELLE-DOT) 0.0375 mg/24 hr, APPLY 1 PATCH EXTERNALLY TO THE SKIN 2 TIMES A WEEK, Disp: 8 patch, Rfl: 6    eszopiclone (LUNESTA) 3 mg Tab, , Disp: , Rfl:     gabapentin (NEURONTIN) 300 MG capsule, Take 1 capsule (300 mg total) by mouth every evening., Disp: 30 capsule, Rfl: 1    levothyroxine (SYNTHROID) 100 MCG tablet, Take 1 tablet (100 mcg total) by mouth before breakfast., Disp: 30 tablet, Rfl: 11    pantoprazole (PROTONIX) 40 MG tablet, Take 1 tablet (40 mg total) by mouth once daily., Disp: 30 tablet, Rfl: 12    ondansetron (ZOFRAN-ODT) 4 MG TbDL, Take 1 tablet (4 mg total) by mouth every 6 (six) hours as needed., Disp: 20 tablet, Rfl: 0    sulfamethoxazole-trimethoprim 800-160mg (BACTRIM DS) 800-160 mg Tab, Take 1 tablet by mouth every 12 (twelve) hours., Disp: , Rfl:            Review of patient's allergies indicates:   Allergen Reactions    Rocephin [ceftriaxone]         Severe migraine    Abilify [aripiprazole] Other (See Comments)       Tardive dyskinesia  - do not give any other meds that affect dopamine    Clindamycin Other (See Comments)       nausea    Codeine         Other reaction(s): Unknown    Demerol [meperidine]         Interacts with meds    Doxycycline         headaches    Erythromycin         Other reaction(s): Unknown    Fioricet  [butalbital-acetaminophen-caff] Other (See Comments)    Nsaids (non-steroidal anti-inflammatory drug)         Alters lithium metabolism      Social History            Tobacco Use    Smoking status: Never Smoker    Smokeless tobacco: Never Used   Substance Use Topics    Alcohol use: No       Frequency: Never         Anesthesia Evaluation    I have reviewed the Patient Summary Reports.    I have reviewed the Nursing Notes.    I have reviewed the Medications.     Review of Systems         Anesthesia Plan  Type of Anesthesia,  risks & benefits discussed:  Anesthesia Type:  general, MAC  Patient's Preference:   Intra-op Monitoring Plan:   Intra-op Monitoring Plan Comments:   Post Op Pain Control Plan:   Post Op Pain Control Plan Comments:   Induction:   IV  Beta Blocker:  Patient is not currently on a Beta-Blocker (No further documentation required).       Informed Consent: Patient understands risks and agrees with Anesthesia plan.  Questions answered. Anesthesia consent signed with patient.  ASA Score: 2     Day of Surgery Review of History & Physical:    H&P update referred to the surgeon.         Ready For Surgery From Anesthesia Perspective.

## 2020-12-28 NOTE — DISCHARGE INSTRUCTIONS
Colonoscopy     A camera attached to a flexible tube with a viewing lens is used to take video pictures.     Colonoscopy is a test to view the inside of your lower digestive tract (colon and rectum). Sometimes it can show the last part of the small intestine (ileum). During the test, small pieces of tissue may be removed for testing. This is called a biopsy. Small growths, such as polyps, may also be removed.   Why is colonoscopy done?  The test is done to help look for colon cancer. And it can help find the source of abdominal pain, bleeding, and changes in bowel habits. It may be needed once a year, depending on factors such as your:  · Age  · Health history  · Family health history  · Symptoms  · Results from any prior colonoscopy  Risks and possible complications  These include:  · Bleeding               · A puncture or tear in the colon   · Risks of anesthesia  · A cancer lesion not being seen  Getting ready   To prepare for the test:  · Talk with your healthcare provider about the risks of the test (see below). Also ask your healthcare provider about alternatives to the test.  · Tell your healthcare provider about any medicines you take. Also tell him or her about any health conditions you may have.  · Make sure your rectum and colon are empty for the test. Follow the diet and bowel prep instructions exactly. If you dont, the test may need to be rescheduled.  · Plan for a friend or family member to drive you home after the test.     Colonoscopy provides an inside view of the entire colon.     You may discuss the results with your doctor right away or at a future visit.  During the test   The test is usually done in the hospital on an outpatient basis. This means you go home the same day. The procedure takes about 30 minutes. During that time:  · You are given relaxing (sedating) medicine through an IV line. You may be drowsy, or fully asleep.  · The healthcare provider will first give you a physical exam to  check for anal and rectal problems.  · Then the anus is lubricated and the scope inserted.  · If you are awake, you may have a feeling similar to needing to have a bowel movement. You may also feel pressure as air is pumped into the colon. Its OK to pass gas during the procedure.  · Biopsy, polyp removal, or other treatments may be done during the test.  After the test   You may have gas right after the test. It can help to try to pass it to help prevent later bloating. Your healthcare provider may discuss the results with you right away. Or you may need to schedule a follow-up visit to talk about the results. After the test, you can go back to your normal eating and other activities. You may be tired from the sedation and need to rest for a few hours.  Date Last Reviewed: 11/1/2016 © 2000-2017 [employer], "[employer]". [address] [place] [address], [place] [address]. All rights reserved. This information is not intended as a substitute for professional medical care. Always follow your healthcare professional's instructions.

## 2020-12-28 NOTE — TRANSFER OF CARE
"Anesthesia Transfer of Care Note    Patient: Jolene Andre    Procedure(s) Performed: Procedure(s) (LRB):  COLONOSCOPY (N/A)    Patient location: GI    Anesthesia Type: MAC    Transport from OR: Transported from OR on room air with adequate spontaneous ventilation    Post pain: adequate analgesia    Post assessment: no apparent anesthetic complications    Post vital signs: stable    Level of consciousness: awake    Nausea/Vomiting: no nausea/vomiting    Complications: none    Transfer of care protocol was followedComments: 107/68  91  16  96%  98.2      Last vitals:   Visit Vitals  /69 (BP Location: Left arm, Patient Position: Lying)   Pulse 84   Temp 37 °C (98.6 °F) (Temporal)   Resp 16   Ht 5' 7" (1.702 m)   Wt 72.6 kg (160 lb)   SpO2 97%   Breastfeeding No   BMI 25.06 kg/m²     "

## 2021-01-03 ENCOUNTER — PATIENT MESSAGE (OUTPATIENT)
Dept: INTERNAL MEDICINE | Facility: CLINIC | Age: 66
End: 2021-01-03

## 2021-01-04 ENCOUNTER — TELEPHONE (OUTPATIENT)
Dept: SURGERY | Facility: CLINIC | Age: 66
End: 2021-01-04

## 2021-01-04 LAB
COMMENT: NORMAL
FINAL PATHOLOGIC DIAGNOSIS: NORMAL
GROSS: NORMAL

## 2021-01-05 DIAGNOSIS — R19.09 PRESACRAL MASS: Primary | ICD-10-CM

## 2021-01-08 ENCOUNTER — HOSPITAL ENCOUNTER (OUTPATIENT)
Facility: HOSPITAL | Age: 66
Discharge: HOME | End: 2021-01-08
Attending: RADIOLOGY | Admitting: RADIOLOGY
Payer: COMMERCIAL

## 2021-01-08 ENCOUNTER — HOSPITAL ENCOUNTER (OUTPATIENT)
Dept: RADIOLOGY | Facility: HOSPITAL | Age: 66
Discharge: HOME | End: 2021-01-08
Attending: FAMILY MEDICINE
Payer: COMMERCIAL

## 2021-01-08 VITALS
SYSTOLIC BLOOD PRESSURE: 94 MMHG | TEMPERATURE: 98 F | HEIGHT: 67 IN | DIASTOLIC BLOOD PRESSURE: 65 MMHG | RESPIRATION RATE: 15 BRPM | HEART RATE: 64 BPM | OXYGEN SATURATION: 99 % | WEIGHT: 165 LBS | BODY MASS INDEX: 25.9 KG/M2

## 2021-01-08 DIAGNOSIS — R19.09 PRESACRAL MASS: ICD-10-CM

## 2021-01-08 PROCEDURE — 49180 BIOPSY ABDOMINAL MASS: CPT | Mod: ,,, | Performed by: RADIOLOGY

## 2021-01-08 PROCEDURE — 99152 PR MOD CONSCIOUS SEDATION, SAME PHYS, 5+ YRS, FIRST 15 MIN: ICD-10-PCS | Mod: ,,, | Performed by: RADIOLOGY

## 2021-01-08 PROCEDURE — 99153 MOD SED SAME PHYS/QHP EA: CPT | Performed by: RADIOLOGY

## 2021-01-08 PROCEDURE — 63600175 PHARM REV CODE 636 W HCPCS: Performed by: RADIOLOGY

## 2021-01-08 PROCEDURE — 88333 PATH CONSLTJ SURG CYTO XM 1: CPT | Performed by: PATHOLOGY

## 2021-01-08 PROCEDURE — 77012 CT SCAN FOR NEEDLE BIOPSY: CPT | Mod: TC

## 2021-01-08 PROCEDURE — 88333 PATH CONSLTJ SURG CYTO XM 1: CPT | Mod: 26,,, | Performed by: PATHOLOGY

## 2021-01-08 PROCEDURE — 99152 MOD SED SAME PHYS/QHP 5/>YRS: CPT | Mod: ,,, | Performed by: RADIOLOGY

## 2021-01-08 PROCEDURE — 49180 CT GUIDED NEEDLE PLACEMENT: ICD-10-PCS | Mod: ,,, | Performed by: RADIOLOGY

## 2021-01-08 PROCEDURE — 88333 PR  INTRAOPERATIVE CYTO PATH CONSULT, INITIAL SITE: ICD-10-PCS | Mod: 26,,, | Performed by: PATHOLOGY

## 2021-01-08 PROCEDURE — 88305 TISSUE EXAM BY PATHOLOGIST: CPT | Performed by: PATHOLOGY

## 2021-01-08 PROCEDURE — 88305 TISSUE EXAM BY PATHOLOGIST: CPT | Mod: 26,,, | Performed by: PATHOLOGY

## 2021-01-08 PROCEDURE — 25000003 PHARM REV CODE 250: Performed by: FAMILY MEDICINE

## 2021-01-08 PROCEDURE — 88305 TISSUE EXAM BY PATHOLOGIST: ICD-10-PCS | Mod: 26,,, | Performed by: PATHOLOGY

## 2021-01-08 PROCEDURE — 49180 BIOPSY ABDOMINAL MASS: CPT | Performed by: RADIOLOGY

## 2021-01-08 PROCEDURE — 77012 CT SCAN FOR NEEDLE BIOPSY: CPT | Mod: TC | Performed by: RADIOLOGY

## 2021-01-08 PROCEDURE — 99152 MOD SED SAME PHYS/QHP 5/>YRS: CPT | Performed by: RADIOLOGY

## 2021-01-08 PROCEDURE — 77012 PR  CT GUIDANCE NEEDLE PLACEMENT: ICD-10-PCS | Mod: 26,,, | Performed by: RADIOLOGY

## 2021-01-08 PROCEDURE — 77012 CT SCAN FOR NEEDLE BIOPSY: CPT | Mod: 26,,, | Performed by: RADIOLOGY

## 2021-01-08 RX ORDER — SODIUM CHLORIDE 9 MG/ML
INJECTION, SOLUTION INTRAVENOUS CONTINUOUS
Status: DISCONTINUED | OUTPATIENT
Start: 2021-01-08 | End: 2021-01-09 | Stop reason: HOSPADM

## 2021-01-08 RX ORDER — SODIUM CHLORIDE 9 MG/ML
INJECTION, SOLUTION INTRAVENOUS CONTINUOUS
Status: CANCELLED | OUTPATIENT
Start: 2021-01-08

## 2021-01-08 RX ORDER — FENTANYL CITRATE 50 UG/ML
INJECTION, SOLUTION INTRAMUSCULAR; INTRAVENOUS CODE/TRAUMA/SEDATION MEDICATION
Status: COMPLETED | OUTPATIENT
Start: 2021-01-08 | End: 2021-01-08

## 2021-01-08 RX ORDER — MIDAZOLAM HYDROCHLORIDE 1 MG/ML
INJECTION INTRAMUSCULAR; INTRAVENOUS CODE/TRAUMA/SEDATION MEDICATION
Status: COMPLETED | OUTPATIENT
Start: 2021-01-08 | End: 2021-01-08

## 2021-01-08 RX ADMIN — SODIUM CHLORIDE 50 ML/HR: 0.9 INJECTION, SOLUTION INTRAVENOUS at 08:01

## 2021-01-08 RX ADMIN — MIDAZOLAM HYDROCHLORIDE 0.5 MG: 1 INJECTION, SOLUTION INTRAMUSCULAR; INTRAVENOUS at 09:01

## 2021-01-08 RX ADMIN — MIDAZOLAM HYDROCHLORIDE 1 MG: 1 INJECTION, SOLUTION INTRAMUSCULAR; INTRAVENOUS at 09:01

## 2021-01-08 RX ADMIN — FENTANYL CITRATE 50 MCG: 50 INJECTION, SOLUTION INTRAMUSCULAR; INTRAVENOUS at 09:01

## 2021-01-08 RX ADMIN — FENTANYL CITRATE 25 MCG: 50 INJECTION, SOLUTION INTRAMUSCULAR; INTRAVENOUS at 09:01

## 2021-01-15 ENCOUNTER — TELEPHONE (OUTPATIENT)
Dept: SURGERY | Facility: CLINIC | Age: 66
End: 2021-01-15

## 2021-01-15 ENCOUNTER — HOSPITAL ENCOUNTER (OUTPATIENT)
Dept: CARDIOLOGY | Facility: CLINIC | Age: 66
Discharge: HOME | End: 2021-01-15
Payer: COMMERCIAL

## 2021-01-15 DIAGNOSIS — Z01.818 PREOP TESTING: ICD-10-CM

## 2021-01-15 DIAGNOSIS — R19.09 PRESACRAL MASS: Primary | ICD-10-CM

## 2021-01-15 DIAGNOSIS — Z01.818 PRE-OP TESTING: ICD-10-CM

## 2021-01-15 DIAGNOSIS — Z01.818 PREOP TESTING: Primary | ICD-10-CM

## 2021-01-15 LAB
ADEQUACY: NORMAL
FINAL PATHOLOGIC DIAGNOSIS: NORMAL
FINAL PATHOLOGIC DIAGNOSIS: NORMAL
GROSS: NORMAL

## 2021-01-15 PROCEDURE — 93005 EKG 12-LEAD: ICD-10-PCS | Mod: S$GLB,,, | Performed by: COLON & RECTAL SURGERY

## 2021-01-15 PROCEDURE — 93010 EKG 12-LEAD: ICD-10-PCS | Mod: S$GLB,,, | Performed by: INTERNAL MEDICINE

## 2021-01-15 PROCEDURE — 93010 ELECTROCARDIOGRAM REPORT: CPT | Mod: S$GLB,,, | Performed by: INTERNAL MEDICINE

## 2021-01-15 PROCEDURE — 93005 ELECTROCARDIOGRAM TRACING: CPT | Mod: S$GLB,,, | Performed by: COLON & RECTAL SURGERY

## 2021-01-15 RX ORDER — POLYETHYLENE GLYCOL 3350 17 G/17G
POWDER, FOR SOLUTION ORAL
Qty: 238 G | Refills: 0 | Status: SHIPPED | OUTPATIENT
Start: 2021-01-15 | End: 2021-02-08

## 2021-01-15 RX ORDER — NEOMYCIN SULFATE 500 MG/1
TABLET ORAL
Qty: 6 TABLET | Refills: 0 | Status: SHIPPED | OUTPATIENT
Start: 2021-01-15 | End: 2021-02-08

## 2021-01-15 RX ORDER — METRONIDAZOLE 500 MG/1
TABLET ORAL
Qty: 3 TABLET | Refills: 0 | Status: SHIPPED | OUTPATIENT
Start: 2021-01-15 | End: 2021-02-08

## 2021-01-15 RX ORDER — HYDROCODONE BITARTRATE AND ACETAMINOPHEN 5; 325 MG/1; MG/1
1 TABLET ORAL EVERY 6 HOURS PRN
Qty: 30 TABLET | Refills: 0 | Status: SHIPPED | OUTPATIENT
Start: 2021-01-15 | End: 2021-02-08

## 2021-01-16 ENCOUNTER — LAB VISIT (OUTPATIENT)
Dept: INTERNAL MEDICINE | Facility: CLINIC | Age: 66
End: 2021-01-16
Payer: COMMERCIAL

## 2021-01-16 DIAGNOSIS — Z01.818 PRE-OP TESTING: ICD-10-CM

## 2021-01-16 LAB — SARS-COV-2 RNA RESP QL NAA+PROBE: NOT DETECTED

## 2021-01-16 PROCEDURE — U0003 INFECTIOUS AGENT DETECTION BY NUCLEIC ACID (DNA OR RNA); SEVERE ACUTE RESPIRATORY SYNDROME CORONAVIRUS 2 (SARS-COV-2) (CORONAVIRUS DISEASE [COVID-19]), AMPLIFIED PROBE TECHNIQUE, MAKING USE OF HIGH THROUGHPUT TECHNOLOGIES AS DESCRIBED BY CMS-2020-01-R: HCPCS

## 2021-01-18 ENCOUNTER — ANESTHESIA EVENT (OUTPATIENT)
Dept: SURGERY | Facility: HOSPITAL | Age: 66
DRG: 830 | End: 2021-01-18
Payer: COMMERCIAL

## 2021-01-18 ENCOUNTER — NURSE TRIAGE (OUTPATIENT)
Dept: ADMINISTRATIVE | Facility: CLINIC | Age: 66
End: 2021-01-18

## 2021-01-19 ENCOUNTER — ANESTHESIA (OUTPATIENT)
Dept: SURGERY | Facility: HOSPITAL | Age: 66
DRG: 830 | End: 2021-01-19
Payer: COMMERCIAL

## 2021-01-19 ENCOUNTER — HOSPITAL ENCOUNTER (INPATIENT)
Facility: HOSPITAL | Age: 66
LOS: 1 days | Discharge: HOME HEALTH | DRG: 830 | End: 2021-01-21
Attending: COLON & RECTAL SURGERY | Admitting: COLON & RECTAL SURGERY
Payer: COMMERCIAL

## 2021-01-19 DIAGNOSIS — R19.09 PRESACRAL MASS: Primary | ICD-10-CM

## 2021-01-19 PROCEDURE — 63600175 PHARM REV CODE 636 W HCPCS: Performed by: STUDENT IN AN ORGANIZED HEALTH CARE EDUCATION/TRAINING PROGRAM

## 2021-01-19 PROCEDURE — 99900035 HC TECH TIME PER 15 MIN (STAT)

## 2021-01-19 PROCEDURE — 25000003 PHARM REV CODE 250: Performed by: NURSE PRACTITIONER

## 2021-01-19 PROCEDURE — 71000016 HC POSTOP RECOV ADDL HR: Performed by: COLON & RECTAL SURGERY

## 2021-01-19 PROCEDURE — 25000003 PHARM REV CODE 250: Performed by: STUDENT IN AN ORGANIZED HEALTH CARE EDUCATION/TRAINING PROGRAM

## 2021-01-19 PROCEDURE — 27201423 OPTIME MED/SURG SUP & DEVICES STERILE SUPPLY: Performed by: COLON & RECTAL SURGERY

## 2021-01-19 PROCEDURE — 37000009 HC ANESTHESIA EA ADD 15 MINS: Performed by: COLON & RECTAL SURGERY

## 2021-01-19 PROCEDURE — 25000003 PHARM REV CODE 250: Performed by: NURSE ANESTHETIST, CERTIFIED REGISTERED

## 2021-01-19 PROCEDURE — 88307 TISSUE EXAM BY PATHOLOGIST: CPT | Mod: 26,,, | Performed by: STUDENT IN AN ORGANIZED HEALTH CARE EDUCATION/TRAINING PROGRAM

## 2021-01-19 PROCEDURE — 25000003 PHARM REV CODE 250: Performed by: SURGERY

## 2021-01-19 PROCEDURE — 71000033 HC RECOVERY, INTIAL HOUR: Performed by: COLON & RECTAL SURGERY

## 2021-01-19 PROCEDURE — 63600175 PHARM REV CODE 636 W HCPCS: Performed by: NURSE ANESTHETIST, CERTIFIED REGISTERED

## 2021-01-19 PROCEDURE — 49215 PR EXCISE PRESACRAL/SACRAL TUMOR: ICD-10-PCS | Mod: ,,, | Performed by: COLON & RECTAL SURGERY

## 2021-01-19 PROCEDURE — 49215 EXCISE SACRAL SPINE TUMOR: CPT | Mod: ,,, | Performed by: COLON & RECTAL SURGERY

## 2021-01-19 PROCEDURE — 71000015 HC POSTOP RECOV 1ST HR: Performed by: COLON & RECTAL SURGERY

## 2021-01-19 PROCEDURE — 99499 NO LOS: ICD-10-PCS | Mod: ,,, | Performed by: COLON & RECTAL SURGERY

## 2021-01-19 PROCEDURE — 88305 TISSUE EXAM BY PATHOLOGIST: CPT | Performed by: STUDENT IN AN ORGANIZED HEALTH CARE EDUCATION/TRAINING PROGRAM

## 2021-01-19 PROCEDURE — 94761 N-INVAS EAR/PLS OXIMETRY MLT: CPT

## 2021-01-19 PROCEDURE — 36000707: Performed by: COLON & RECTAL SURGERY

## 2021-01-19 PROCEDURE — 25000003 PHARM REV CODE 250: Performed by: COLON & RECTAL SURGERY

## 2021-01-19 PROCEDURE — 88305 TISSUE EXAM BY PATHOLOGIST: ICD-10-PCS | Mod: 26,,, | Performed by: STUDENT IN AN ORGANIZED HEALTH CARE EDUCATION/TRAINING PROGRAM

## 2021-01-19 PROCEDURE — D9220A PRA ANESTHESIA: ICD-10-PCS | Mod: ,,, | Performed by: ANESTHESIOLOGY

## 2021-01-19 PROCEDURE — 37000008 HC ANESTHESIA 1ST 15 MINUTES: Performed by: COLON & RECTAL SURGERY

## 2021-01-19 PROCEDURE — 63600175 PHARM REV CODE 636 W HCPCS: Performed by: SURGERY

## 2021-01-19 PROCEDURE — 88307 TISSUE EXAM BY PATHOLOGIST: CPT | Performed by: STUDENT IN AN ORGANIZED HEALTH CARE EDUCATION/TRAINING PROGRAM

## 2021-01-19 PROCEDURE — S0030 INJECTION, METRONIDAZOLE: HCPCS | Performed by: SURGERY

## 2021-01-19 PROCEDURE — C1729 CATH, DRAINAGE: HCPCS | Performed by: COLON & RECTAL SURGERY

## 2021-01-19 PROCEDURE — 88307 PR  SURG PATH,LEVEL V: ICD-10-PCS | Mod: 26,,, | Performed by: STUDENT IN AN ORGANIZED HEALTH CARE EDUCATION/TRAINING PROGRAM

## 2021-01-19 PROCEDURE — 63600175 PHARM REV CODE 636 W HCPCS: Performed by: ANESTHESIOLOGY

## 2021-01-19 PROCEDURE — 63600175 PHARM REV CODE 636 W HCPCS

## 2021-01-19 PROCEDURE — D9220A PRA ANESTHESIA: Mod: ,,, | Performed by: ANESTHESIOLOGY

## 2021-01-19 PROCEDURE — 36000706: Performed by: COLON & RECTAL SURGERY

## 2021-01-19 PROCEDURE — 88305 TISSUE EXAM BY PATHOLOGIST: CPT | Mod: 26,,, | Performed by: STUDENT IN AN ORGANIZED HEALTH CARE EDUCATION/TRAINING PROGRAM

## 2021-01-19 PROCEDURE — 99499 UNLISTED E&M SERVICE: CPT | Mod: ,,, | Performed by: COLON & RECTAL SURGERY

## 2021-01-19 RX ORDER — DIPHENHYDRAMINE HYDROCHLORIDE 50 MG/ML
25 INJECTION INTRAMUSCULAR; INTRAVENOUS EVERY 6 HOURS PRN
Status: DISCONTINUED | OUTPATIENT
Start: 2021-01-19 | End: 2021-01-21 | Stop reason: HOSPADM

## 2021-01-19 RX ORDER — MUPIROCIN 20 MG/G
OINTMENT TOPICAL
Status: DISCONTINUED | OUTPATIENT
Start: 2021-01-19 | End: 2021-01-19

## 2021-01-19 RX ORDER — TALC
6 POWDER (GRAM) TOPICAL NIGHTLY PRN
Status: DISCONTINUED | OUTPATIENT
Start: 2021-01-19 | End: 2021-01-21 | Stop reason: HOSPADM

## 2021-01-19 RX ORDER — METRONIDAZOLE 500 MG/100ML
500 INJECTION, SOLUTION INTRAVENOUS
Status: COMPLETED | OUTPATIENT
Start: 2021-01-19 | End: 2021-01-19

## 2021-01-19 RX ORDER — DEXAMETHASONE SODIUM PHOSPHATE 4 MG/ML
INJECTION, SOLUTION INTRA-ARTICULAR; INTRALESIONAL; INTRAMUSCULAR; INTRAVENOUS; SOFT TISSUE
Status: DISCONTINUED | OUTPATIENT
Start: 2021-01-19 | End: 2021-01-19

## 2021-01-19 RX ORDER — PANTOPRAZOLE SODIUM 40 MG/1
40 TABLET, DELAYED RELEASE ORAL DAILY
Status: DISCONTINUED | OUTPATIENT
Start: 2021-01-20 | End: 2021-01-21 | Stop reason: HOSPADM

## 2021-01-19 RX ORDER — BUPIVACAINE HYDROCHLORIDE 2.5 MG/ML
INJECTION, SOLUTION EPIDURAL; INFILTRATION; INTRACAUDAL
Status: DISCONTINUED | OUTPATIENT
Start: 2021-01-19 | End: 2021-01-19 | Stop reason: HOSPADM

## 2021-01-19 RX ORDER — ACETAMINOPHEN 500 MG
1000 TABLET ORAL EVERY 8 HOURS
Status: DISCONTINUED | OUTPATIENT
Start: 2021-01-20 | End: 2021-01-21 | Stop reason: HOSPADM

## 2021-01-19 RX ORDER — MIDAZOLAM HYDROCHLORIDE 1 MG/ML
INJECTION INTRAMUSCULAR; INTRAVENOUS
Status: DISCONTINUED | OUTPATIENT
Start: 2021-01-19 | End: 2021-01-19

## 2021-01-19 RX ORDER — FENTANYL CITRATE 50 UG/ML
25 INJECTION, SOLUTION INTRAMUSCULAR; INTRAVENOUS EVERY 5 MIN PRN
Status: COMPLETED | OUTPATIENT
Start: 2021-01-19 | End: 2021-01-19

## 2021-01-19 RX ORDER — SODIUM CHLORIDE 9 MG/ML
INJECTION, SOLUTION INTRAVENOUS CONTINUOUS PRN
Status: DISCONTINUED | OUTPATIENT
Start: 2021-01-19 | End: 2021-01-19

## 2021-01-19 RX ORDER — AMOXICILLIN AND CLAVULANATE POTASSIUM 875; 125 MG/1; MG/1
1 TABLET, FILM COATED ORAL EVERY 12 HOURS
Status: DISCONTINUED | OUTPATIENT
Start: 2021-01-19 | End: 2021-01-21 | Stop reason: HOSPADM

## 2021-01-19 RX ORDER — GABAPENTIN 300 MG/1
300 CAPSULE ORAL NIGHTLY
Status: DISCONTINUED | OUTPATIENT
Start: 2021-01-19 | End: 2021-01-21 | Stop reason: HOSPADM

## 2021-01-19 RX ORDER — MUPIROCIN 20 MG/G
OINTMENT TOPICAL 2 TIMES DAILY
Status: DISCONTINUED | OUTPATIENT
Start: 2021-01-19 | End: 2021-01-21 | Stop reason: HOSPADM

## 2021-01-19 RX ORDER — ONDANSETRON 2 MG/ML
INJECTION INTRAMUSCULAR; INTRAVENOUS
Status: DISCONTINUED | OUTPATIENT
Start: 2021-01-19 | End: 2021-01-19

## 2021-01-19 RX ORDER — ONDANSETRON 2 MG/ML
4 INJECTION INTRAMUSCULAR; INTRAVENOUS EVERY 6 HOURS PRN
Status: DISCONTINUED | OUTPATIENT
Start: 2021-01-19 | End: 2021-01-21 | Stop reason: HOSPADM

## 2021-01-19 RX ORDER — LIDOCAINE HYDROCHLORIDE 20 MG/ML
INJECTION, SOLUTION EPIDURAL; INFILTRATION; INTRACAUDAL; PERINEURAL
Status: DISCONTINUED | OUTPATIENT
Start: 2021-01-19 | End: 2021-01-19

## 2021-01-19 RX ORDER — PHENYLEPHRINE HCL IN 0.9% NACL 1 MG/10 ML
SYRINGE (ML) INTRAVENOUS
Status: DISCONTINUED | OUTPATIENT
Start: 2021-01-19 | End: 2021-01-19

## 2021-01-19 RX ORDER — ONDANSETRON 2 MG/ML
INJECTION INTRAMUSCULAR; INTRAVENOUS
Status: DISPENSED
Start: 2021-01-19 | End: 2021-01-20

## 2021-01-19 RX ORDER — FENTANYL CITRATE 50 UG/ML
INJECTION, SOLUTION INTRAMUSCULAR; INTRAVENOUS
Status: COMPLETED
Start: 2021-01-19 | End: 2021-01-19

## 2021-01-19 RX ORDER — ESMOLOL HYDROCHLORIDE 10 MG/ML
INJECTION INTRAVENOUS
Status: DISCONTINUED | OUTPATIENT
Start: 2021-01-19 | End: 2021-01-19

## 2021-01-19 RX ORDER — ACETAMINOPHEN 10 MG/ML
1000 INJECTION, SOLUTION INTRAVENOUS EVERY 8 HOURS
Status: COMPLETED | OUTPATIENT
Start: 2021-01-19 | End: 2021-01-20

## 2021-01-19 RX ORDER — ROCURONIUM BROMIDE 10 MG/ML
INJECTION, SOLUTION INTRAVENOUS
Status: DISCONTINUED | OUTPATIENT
Start: 2021-01-19 | End: 2021-01-19

## 2021-01-19 RX ORDER — SODIUM CHLORIDE 0.9 % (FLUSH) 0.9 %
10 SYRINGE (ML) INJECTION
Status: DISCONTINUED | OUTPATIENT
Start: 2021-01-19 | End: 2021-01-19

## 2021-01-19 RX ORDER — LEVOTHYROXINE SODIUM 100 UG/1
100 TABLET ORAL
Status: DISCONTINUED | OUTPATIENT
Start: 2021-01-20 | End: 2021-01-21 | Stop reason: HOSPADM

## 2021-01-19 RX ORDER — PROPOFOL 10 MG/ML
VIAL (ML) INTRAVENOUS
Status: DISCONTINUED | OUTPATIENT
Start: 2021-01-19 | End: 2021-01-19

## 2021-01-19 RX ORDER — GENTAMICIN SULFATE 40 MG/ML
INJECTION, SOLUTION INTRAMUSCULAR; INTRAVENOUS
Status: DISCONTINUED | OUTPATIENT
Start: 2021-01-19 | End: 2021-01-19

## 2021-01-19 RX ORDER — METOCLOPRAMIDE HYDROCHLORIDE 5 MG/ML
10 INJECTION INTRAMUSCULAR; INTRAVENOUS EVERY 6 HOURS PRN
Status: DISCONTINUED | OUTPATIENT
Start: 2021-01-19 | End: 2021-01-21 | Stop reason: HOSPADM

## 2021-01-19 RX ORDER — KETAMINE HCL IN 0.9 % NACL 50 MG/5 ML
SYRINGE (ML) INTRAVENOUS
Status: DISCONTINUED | OUTPATIENT
Start: 2021-01-19 | End: 2021-01-19

## 2021-01-19 RX ORDER — DIPHENHYDRAMINE HYDROCHLORIDE 50 MG/ML
25 INJECTION INTRAMUSCULAR; INTRAVENOUS EVERY 6 HOURS PRN
Status: DISCONTINUED | OUTPATIENT
Start: 2021-01-19 | End: 2021-01-19

## 2021-01-19 RX ORDER — FENTANYL CITRATE 50 UG/ML
INJECTION, SOLUTION INTRAMUSCULAR; INTRAVENOUS
Status: DISCONTINUED | OUTPATIENT
Start: 2021-01-19 | End: 2021-01-19

## 2021-01-19 RX ORDER — IBUPROFEN 400 MG/1
800 TABLET ORAL EVERY 8 HOURS
Status: DISCONTINUED | OUTPATIENT
Start: 2021-01-20 | End: 2021-01-20

## 2021-01-19 RX ORDER — SODIUM CHLORIDE 9 MG/ML
INJECTION, SOLUTION INTRAVENOUS CONTINUOUS
Status: DISCONTINUED | OUTPATIENT
Start: 2021-01-19 | End: 2021-01-19

## 2021-01-19 RX ORDER — ACETAMINOPHEN 10 MG/ML
1000 INJECTION, SOLUTION INTRAVENOUS ONCE
Status: COMPLETED | OUTPATIENT
Start: 2021-01-19 | End: 2021-01-19

## 2021-01-19 RX ORDER — SODIUM CHLORIDE 0.9 % (FLUSH) 0.9 %
10 SYRINGE (ML) INJECTION
Status: DISCONTINUED | OUTPATIENT
Start: 2021-01-19 | End: 2021-01-21 | Stop reason: HOSPADM

## 2021-01-19 RX ORDER — OXYCODONE HYDROCHLORIDE 5 MG/1
5 TABLET ORAL EVERY 4 HOURS PRN
Status: DISCONTINUED | OUTPATIENT
Start: 2021-01-19 | End: 2021-01-21 | Stop reason: HOSPADM

## 2021-01-19 RX ORDER — PHENYLEPHRINE HYDROCHLORIDE 10 MG/ML
INJECTION INTRAVENOUS
Status: DISCONTINUED | OUTPATIENT
Start: 2021-01-19 | End: 2021-01-19

## 2021-01-19 RX ORDER — TRAMADOL HYDROCHLORIDE 50 MG/1
50 TABLET ORAL EVERY 6 HOURS PRN
Status: DISCONTINUED | OUTPATIENT
Start: 2021-01-19 | End: 2021-01-21 | Stop reason: HOSPADM

## 2021-01-19 RX ORDER — MIDAZOLAM HYDROCHLORIDE 1 MG/ML
1 INJECTION INTRAMUSCULAR; INTRAVENOUS ONCE
Status: COMPLETED | OUTPATIENT
Start: 2021-01-19 | End: 2021-01-19

## 2021-01-19 RX ORDER — OXYCODONE HYDROCHLORIDE 10 MG/1
10 TABLET ORAL EVERY 4 HOURS PRN
Status: DISCONTINUED | OUTPATIENT
Start: 2021-01-19 | End: 2021-01-21 | Stop reason: HOSPADM

## 2021-01-19 RX ADMIN — SUGAMMADEX 200 MG: 100 INJECTION, SOLUTION INTRAVENOUS at 03:01

## 2021-01-19 RX ADMIN — FENTANYL CITRATE 50 MCG: 50 INJECTION, SOLUTION INTRAMUSCULAR; INTRAVENOUS at 01:01

## 2021-01-19 RX ADMIN — PHENYLEPHRINE HYDROCHLORIDE 200 MCG: 10 INJECTION INTRAVENOUS at 02:01

## 2021-01-19 RX ADMIN — ONDANSETRON 4 MG: 2 INJECTION, SOLUTION INTRAMUSCULAR; INTRAVENOUS at 03:01

## 2021-01-19 RX ADMIN — ROCURONIUM BROMIDE 40 MG: 10 INJECTION, SOLUTION INTRAVENOUS at 01:01

## 2021-01-19 RX ADMIN — TRAMADOL HYDROCHLORIDE 50 MG: 50 TABLET, COATED ORAL at 06:01

## 2021-01-19 RX ADMIN — SODIUM CHLORIDE, SODIUM GLUCONATE, SODIUM ACETATE, POTASSIUM CHLORIDE, MAGNESIUM CHLORIDE, SODIUM PHOSPHATE, DIBASIC, AND POTASSIUM PHOSPHATE: .53; .5; .37; .037; .03; .012; .00082 INJECTION, SOLUTION INTRAVENOUS at 01:01

## 2021-01-19 RX ADMIN — PHENYLEPHRINE HYDROCHLORIDE 100 MCG: 10 INJECTION INTRAVENOUS at 01:01

## 2021-01-19 RX ADMIN — ACETAMINOPHEN 1000 MG: 10 INJECTION, SOLUTION INTRAVENOUS at 09:01

## 2021-01-19 RX ADMIN — SODIUM CHLORIDE: 0.9 INJECTION, SOLUTION INTRAVENOUS at 01:01

## 2021-01-19 RX ADMIN — SODIUM CHLORIDE 70 ML/HR: 0.9 INJECTION, SOLUTION INTRAVENOUS at 09:01

## 2021-01-19 RX ADMIN — PHENYLEPHRINE HYDROCHLORIDE 100 MCG: 10 INJECTION INTRAVENOUS at 02:01

## 2021-01-19 RX ADMIN — METRONIDAZOLE 500 MG: 500 INJECTION, SOLUTION INTRAVENOUS at 01:01

## 2021-01-19 RX ADMIN — MELATONIN TAB 3 MG 6 MG: 3 TAB at 09:01

## 2021-01-19 RX ADMIN — GABAPENTIN 300 MG: 300 CAPSULE ORAL at 09:01

## 2021-01-19 RX ADMIN — GENTAMICIN SULFATE 320 MG: 40 INJECTION, SOLUTION INTRAMUSCULAR; INTRAVENOUS at 01:01

## 2021-01-19 RX ADMIN — PROPOFOL 60 MG: 10 INJECTION, EMULSION INTRAVENOUS at 02:01

## 2021-01-19 RX ADMIN — OXYCODONE HYDROCHLORIDE 10 MG: 10 TABLET ORAL at 09:01

## 2021-01-19 RX ADMIN — MUPIROCIN: 20 OINTMENT TOPICAL at 09:01

## 2021-01-19 RX ADMIN — MIDAZOLAM 2 MG: 1 INJECTION INTRAMUSCULAR; INTRAVENOUS at 01:01

## 2021-01-19 RX ADMIN — ACETAMINOPHEN 1000 MG: 10 INJECTION, SOLUTION INTRAVENOUS at 01:01

## 2021-01-19 RX ADMIN — DIPHENHYDRAMINE HYDROCHLORIDE 25 MG: 50 INJECTION, SOLUTION INTRAMUSCULAR; INTRAVENOUS at 09:01

## 2021-01-19 RX ADMIN — Medication 20 MG: at 01:01

## 2021-01-19 RX ADMIN — DEXAMETHASONE SODIUM PHOSPHATE 4 MG: 4 INJECTION INTRA-ARTICULAR; INTRALESIONAL; INTRAMUSCULAR; INTRAVENOUS; SOFT TISSUE at 01:01

## 2021-01-19 RX ADMIN — FENTANYL CITRATE 25 MCG: 50 INJECTION, SOLUTION INTRAMUSCULAR; INTRAVENOUS at 04:01

## 2021-01-19 RX ADMIN — LIDOCAINE HYDROCHLORIDE 80 MG: 20 INJECTION, SOLUTION EPIDURAL; INFILTRATION; INTRACAUDAL at 01:01

## 2021-01-19 RX ADMIN — Medication 10 MG: at 02:01

## 2021-01-19 RX ADMIN — IBUPROFEN 800 MG: 800 INJECTION INTRAVENOUS at 09:01

## 2021-01-19 RX ADMIN — ONDANSETRON 4 MG: 2 INJECTION INTRAMUSCULAR; INTRAVENOUS at 04:01

## 2021-01-19 RX ADMIN — ESMOLOL HYDROCHLORIDE 10 MG: 100 INJECTION, SOLUTION INTRAVENOUS at 02:01

## 2021-01-19 RX ADMIN — OXYCODONE HYDROCHLORIDE 10 MG: 10 TABLET ORAL at 04:01

## 2021-01-19 RX ADMIN — AMOXICILLIN AND CLAVULANATE POTASSIUM 1 TABLET: 875; 125 TABLET, FILM COATED ORAL at 09:01

## 2021-01-19 RX ADMIN — PROPOFOL 140 MG: 10 INJECTION, EMULSION INTRAVENOUS at 01:01

## 2021-01-19 RX ADMIN — ROCURONIUM BROMIDE 10 MG: 10 INJECTION, SOLUTION INTRAVENOUS at 02:01

## 2021-01-19 RX ADMIN — ROCURONIUM BROMIDE 10 MG: 10 INJECTION, SOLUTION INTRAVENOUS at 03:01

## 2021-01-19 RX ADMIN — MIDAZOLAM 1 MG: 1 INJECTION INTRAMUSCULAR; INTRAVENOUS at 10:01

## 2021-01-20 LAB
ANION GAP SERPL CALC-SCNC: 6 MMOL/L (ref 8–16)
BASOPHILS # BLD AUTO: 0.01 K/UL (ref 0–0.2)
BASOPHILS NFR BLD: 0.1 % (ref 0–1.9)
BUN SERPL-MCNC: 5 MG/DL (ref 8–23)
CALCIUM SERPL-MCNC: 7.9 MG/DL (ref 8.7–10.5)
CHLORIDE SERPL-SCNC: 107 MMOL/L (ref 95–110)
CO2 SERPL-SCNC: 21 MMOL/L (ref 23–29)
CREAT SERPL-MCNC: 0.6 MG/DL (ref 0.5–1.4)
DIFFERENTIAL METHOD: ABNORMAL
EOSINOPHIL # BLD AUTO: 0 K/UL (ref 0–0.5)
EOSINOPHIL NFR BLD: 0 % (ref 0–8)
ERYTHROCYTE [DISTWIDTH] IN BLOOD BY AUTOMATED COUNT: 11.9 % (ref 11.5–14.5)
EST. GFR  (AFRICAN AMERICAN): >60 ML/MIN/1.73 M^2
EST. GFR  (NON AFRICAN AMERICAN): >60 ML/MIN/1.73 M^2
GLUCOSE SERPL-MCNC: 94 MG/DL (ref 70–110)
HCT VFR BLD AUTO: 36.6 % (ref 37–48.5)
HGB BLD-MCNC: 11.3 G/DL (ref 12–16)
IMM GRANULOCYTES # BLD AUTO: 0.03 K/UL (ref 0–0.04)
IMM GRANULOCYTES NFR BLD AUTO: 0.4 % (ref 0–0.5)
LYMPHOCYTES # BLD AUTO: 0.6 K/UL (ref 1–4.8)
LYMPHOCYTES NFR BLD: 8.8 % (ref 18–48)
MAGNESIUM SERPL-MCNC: 1.8 MG/DL (ref 1.6–2.6)
MCH RBC QN AUTO: 29.5 PG (ref 27–31)
MCHC RBC AUTO-ENTMCNC: 30.9 G/DL (ref 32–36)
MCV RBC AUTO: 96 FL (ref 82–98)
MONOCYTES # BLD AUTO: 0.5 K/UL (ref 0.3–1)
MONOCYTES NFR BLD: 7.1 % (ref 4–15)
NEUTROPHILS # BLD AUTO: 5.8 K/UL (ref 1.8–7.7)
NEUTROPHILS NFR BLD: 83.6 % (ref 38–73)
NRBC BLD-RTO: 0 /100 WBC
PHOSPHATE SERPL-MCNC: 3.5 MG/DL (ref 2.7–4.5)
PLATELET # BLD AUTO: 259 K/UL (ref 150–350)
PMV BLD AUTO: 10.2 FL (ref 9.2–12.9)
POTASSIUM SERPL-SCNC: 3.9 MMOL/L (ref 3.5–5.1)
RBC # BLD AUTO: 3.83 M/UL (ref 4–5.4)
SODIUM SERPL-SCNC: 134 MMOL/L (ref 136–145)
WBC # BLD AUTO: 6.94 K/UL (ref 3.9–12.7)

## 2021-01-20 PROCEDURE — 97535 SELF CARE MNGMENT TRAINING: CPT

## 2021-01-20 PROCEDURE — 80048 BASIC METABOLIC PNL TOTAL CA: CPT

## 2021-01-20 PROCEDURE — 85025 COMPLETE CBC W/AUTO DIFF WBC: CPT

## 2021-01-20 PROCEDURE — 97161 PT EVAL LOW COMPLEX 20 MIN: CPT

## 2021-01-20 PROCEDURE — 83735 ASSAY OF MAGNESIUM: CPT

## 2021-01-20 PROCEDURE — 25000003 PHARM REV CODE 250: Performed by: STUDENT IN AN ORGANIZED HEALTH CARE EDUCATION/TRAINING PROGRAM

## 2021-01-20 PROCEDURE — 36415 COLL VENOUS BLD VENIPUNCTURE: CPT

## 2021-01-20 PROCEDURE — 84100 ASSAY OF PHOSPHORUS: CPT

## 2021-01-20 PROCEDURE — 63600175 PHARM REV CODE 636 W HCPCS: Performed by: STUDENT IN AN ORGANIZED HEALTH CARE EDUCATION/TRAINING PROGRAM

## 2021-01-20 PROCEDURE — 97165 OT EVAL LOW COMPLEX 30 MIN: CPT

## 2021-01-20 PROCEDURE — 25000003 PHARM REV CODE 250: Performed by: SURGERY

## 2021-01-20 PROCEDURE — 97530 THERAPEUTIC ACTIVITIES: CPT

## 2021-01-20 PROCEDURE — 63600175 PHARM REV CODE 636 W HCPCS: Performed by: SURGERY

## 2021-01-20 RX ORDER — KETOROLAC TROMETHAMINE 15 MG/ML
15 INJECTION, SOLUTION INTRAMUSCULAR; INTRAVENOUS EVERY 6 HOURS
Status: DISCONTINUED | OUTPATIENT
Start: 2021-01-20 | End: 2021-01-21 | Stop reason: HOSPADM

## 2021-01-20 RX ORDER — ENOXAPARIN SODIUM 100 MG/ML
40 INJECTION SUBCUTANEOUS EVERY 24 HOURS
Status: DISCONTINUED | OUTPATIENT
Start: 2021-01-21 | End: 2021-01-21 | Stop reason: HOSPADM

## 2021-01-20 RX ORDER — HYDROMORPHONE HYDROCHLORIDE 1 MG/ML
0.5 INJECTION, SOLUTION INTRAMUSCULAR; INTRAVENOUS; SUBCUTANEOUS ONCE
Status: COMPLETED | OUTPATIENT
Start: 2021-01-20 | End: 2021-01-20

## 2021-01-20 RX ADMIN — ACETAMINOPHEN 1000 MG: 10 INJECTION, SOLUTION INTRAVENOUS at 05:01

## 2021-01-20 RX ADMIN — KETOROLAC TROMETHAMINE 15 MG: 15 INJECTION, SOLUTION INTRAMUSCULAR; INTRAVENOUS at 11:01

## 2021-01-20 RX ADMIN — AMOXICILLIN AND CLAVULANATE POTASSIUM 1 TABLET: 875; 125 TABLET, FILM COATED ORAL at 09:01

## 2021-01-20 RX ADMIN — ONDANSETRON 4 MG: 2 INJECTION INTRAMUSCULAR; INTRAVENOUS at 11:01

## 2021-01-20 RX ADMIN — MUPIROCIN: 20 OINTMENT TOPICAL at 09:01

## 2021-01-20 RX ADMIN — OXYCODONE HYDROCHLORIDE 10 MG: 10 TABLET ORAL at 01:01

## 2021-01-20 RX ADMIN — OXYCODONE HYDROCHLORIDE 10 MG: 10 TABLET ORAL at 05:01

## 2021-01-20 RX ADMIN — IBUPROFEN 800 MG: 800 INJECTION INTRAVENOUS at 05:01

## 2021-01-20 RX ADMIN — ACETAMINOPHEN 1000 MG: 500 TABLET ORAL at 09:01

## 2021-01-20 RX ADMIN — OXYCODONE HYDROCHLORIDE 10 MG: 10 TABLET ORAL at 09:01

## 2021-01-20 RX ADMIN — GABAPENTIN 300 MG: 300 CAPSULE ORAL at 09:01

## 2021-01-20 RX ADMIN — MELATONIN TAB 3 MG 6 MG: 3 TAB at 09:01

## 2021-01-20 RX ADMIN — HYDROMORPHONE HYDROCHLORIDE 0.5 MG: 1 INJECTION, SOLUTION INTRAMUSCULAR; INTRAVENOUS; SUBCUTANEOUS at 09:01

## 2021-01-20 RX ADMIN — PANTOPRAZOLE SODIUM 40 MG: 40 TABLET, DELAYED RELEASE ORAL at 09:01

## 2021-01-20 RX ADMIN — LEVOTHYROXINE SODIUM 100 MCG: 100 TABLET ORAL at 05:01

## 2021-01-20 RX ADMIN — ACETAMINOPHEN 1000 MG: 10 INJECTION, SOLUTION INTRAVENOUS at 01:01

## 2021-01-20 RX ADMIN — DIPHENHYDRAMINE HYDROCHLORIDE 25 MG: 50 INJECTION, SOLUTION INTRAMUSCULAR; INTRAVENOUS at 09:01

## 2021-01-20 RX ADMIN — DOCUSATE SODIUM 50 MG: 50 CAPSULE, LIQUID FILLED ORAL at 05:01

## 2021-01-20 RX ADMIN — SODIUM CHLORIDE, SODIUM LACTATE, POTASSIUM CHLORIDE, AND CALCIUM CHLORIDE 1000 ML: .6; .31; .03; .02 INJECTION, SOLUTION INTRAVENOUS at 05:01

## 2021-01-20 RX ADMIN — ONDANSETRON 4 MG: 2 INJECTION INTRAMUSCULAR; INTRAVENOUS at 05:01

## 2021-01-20 RX ADMIN — KETOROLAC TROMETHAMINE 15 MG: 15 INJECTION, SOLUTION INTRAMUSCULAR; INTRAVENOUS at 05:01

## 2021-01-21 ENCOUNTER — TELEPHONE (OUTPATIENT)
Dept: SURGERY | Facility: CLINIC | Age: 66
End: 2021-01-21

## 2021-01-21 VITALS
OXYGEN SATURATION: 92 % | HEIGHT: 67 IN | SYSTOLIC BLOOD PRESSURE: 103 MMHG | WEIGHT: 155 LBS | HEART RATE: 64 BPM | TEMPERATURE: 97 F | RESPIRATION RATE: 20 BRPM | BODY MASS INDEX: 24.33 KG/M2 | DIASTOLIC BLOOD PRESSURE: 62 MMHG

## 2021-01-21 PROCEDURE — 25000003 PHARM REV CODE 250: Performed by: STUDENT IN AN ORGANIZED HEALTH CARE EDUCATION/TRAINING PROGRAM

## 2021-01-21 PROCEDURE — 63600175 PHARM REV CODE 636 W HCPCS: Performed by: SURGERY

## 2021-01-21 PROCEDURE — 25000003 PHARM REV CODE 250: Performed by: SURGERY

## 2021-01-21 PROCEDURE — 20600001 HC STEP DOWN PRIVATE ROOM

## 2021-01-21 PROCEDURE — 63600175 PHARM REV CODE 636 W HCPCS: Performed by: STUDENT IN AN ORGANIZED HEALTH CARE EDUCATION/TRAINING PROGRAM

## 2021-01-21 RX ORDER — ONDANSETRON 4 MG/1
4 TABLET, FILM COATED ORAL EVERY 8 HOURS PRN
Qty: 30 TABLET | Refills: 0 | Status: SHIPPED | OUTPATIENT
Start: 2021-01-21 | End: 2021-02-08

## 2021-01-21 RX ORDER — HYDROCODONE BITARTRATE AND ACETAMINOPHEN 5; 325 MG/1; MG/1
1 TABLET ORAL EVERY 4 HOURS PRN
Qty: 25 TABLET | Refills: 0 | Status: SHIPPED | OUTPATIENT
Start: 2021-01-21 | End: 2021-02-08

## 2021-01-21 RX ORDER — KETOROLAC TROMETHAMINE 10 MG/1
10 TABLET, FILM COATED ORAL EVERY 6 HOURS PRN
Qty: 20 TABLET | Refills: 0 | Status: SHIPPED | OUTPATIENT
Start: 2021-01-21 | End: 2021-01-26

## 2021-01-21 RX ORDER — AMOXICILLIN AND CLAVULANATE POTASSIUM 875; 125 MG/1; MG/1
1 TABLET, FILM COATED ORAL 2 TIMES DAILY
Qty: 4 TABLET | Refills: 0 | Status: SHIPPED | OUTPATIENT
Start: 2021-01-21 | End: 2021-01-23

## 2021-01-21 RX ADMIN — PANTOPRAZOLE SODIUM 40 MG: 40 TABLET, DELAYED RELEASE ORAL at 08:01

## 2021-01-21 RX ADMIN — AMOXICILLIN AND CLAVULANATE POTASSIUM 1 TABLET: 875; 125 TABLET, FILM COATED ORAL at 08:01

## 2021-01-21 RX ADMIN — KETOROLAC TROMETHAMINE 15 MG: 15 INJECTION, SOLUTION INTRAMUSCULAR; INTRAVENOUS at 05:01

## 2021-01-21 RX ADMIN — KETOROLAC TROMETHAMINE 15 MG: 15 INJECTION, SOLUTION INTRAMUSCULAR; INTRAVENOUS at 12:01

## 2021-01-21 RX ADMIN — DIPHENHYDRAMINE HYDROCHLORIDE 25 MG: 50 INJECTION, SOLUTION INTRAMUSCULAR; INTRAVENOUS at 05:01

## 2021-01-21 RX ADMIN — OXYCODONE HYDROCHLORIDE 10 MG: 10 TABLET ORAL at 05:01

## 2021-01-21 RX ADMIN — TRAMADOL HYDROCHLORIDE 50 MG: 50 TABLET, COATED ORAL at 08:01

## 2021-01-21 RX ADMIN — ACETAMINOPHEN 1000 MG: 500 TABLET ORAL at 05:01

## 2021-01-21 RX ADMIN — ONDANSETRON 4 MG: 2 INJECTION INTRAMUSCULAR; INTRAVENOUS at 08:01

## 2021-01-21 RX ADMIN — LEVOTHYROXINE SODIUM 100 MCG: 100 TABLET ORAL at 05:01

## 2021-01-21 RX ADMIN — DOCUSATE SODIUM 50 MG: 50 CAPSULE, LIQUID FILLED ORAL at 08:01

## 2021-01-22 ENCOUNTER — TELEPHONE (OUTPATIENT)
Dept: SURGERY | Facility: CLINIC | Age: 66
End: 2021-01-22

## 2021-01-22 ENCOUNTER — OFFICE VISIT (OUTPATIENT)
Dept: SURGERY | Facility: CLINIC | Age: 66
End: 2021-01-22
Payer: COMMERCIAL

## 2021-01-22 VITALS
HEIGHT: 67 IN | BODY MASS INDEX: 24.28 KG/M2 | SYSTOLIC BLOOD PRESSURE: 95 MMHG | DIASTOLIC BLOOD PRESSURE: 71 MMHG | HEART RATE: 80 BPM

## 2021-01-22 DIAGNOSIS — R19.09 PRESACRAL MASS: Primary | ICD-10-CM

## 2021-01-22 PROCEDURE — 99999 PR PBB SHADOW E&M-EST. PATIENT-LVL IV: CPT | Mod: PBBFAC,,, | Performed by: COLON & RECTAL SURGERY

## 2021-01-22 PROCEDURE — 99024 PR POST-OP FOLLOW-UP VISIT: ICD-10-PCS | Mod: S$GLB,,, | Performed by: COLON & RECTAL SURGERY

## 2021-01-22 PROCEDURE — G0180 PR HOME HEALTH MD CERTIFICATION: ICD-10-PCS | Mod: ,,, | Performed by: INTERNAL MEDICINE

## 2021-01-22 PROCEDURE — 99999 PR PBB SHADOW E&M-EST. PATIENT-LVL IV: ICD-10-PCS | Mod: PBBFAC,,, | Performed by: COLON & RECTAL SURGERY

## 2021-01-22 PROCEDURE — G0180 MD CERTIFICATION HHA PATIENT: HCPCS | Mod: ,,, | Performed by: INTERNAL MEDICINE

## 2021-01-22 PROCEDURE — 99024 POSTOP FOLLOW-UP VISIT: CPT | Mod: S$GLB,,, | Performed by: COLON & RECTAL SURGERY

## 2021-01-22 RX ORDER — CELECOXIB 200 MG/1
CAPSULE ORAL
COMMUNITY
Start: 2021-01-15 | End: 2021-06-11 | Stop reason: SDUPTHER

## 2021-01-22 RX ORDER — GABAPENTIN 600 MG/1
TABLET ORAL
COMMUNITY
Start: 2020-12-07 | End: 2021-03-28 | Stop reason: SDUPTHER

## 2021-01-22 RX ORDER — SULFAMETHOXAZOLE AND TRIMETHOPRIM 800; 160 MG/1; MG/1
1 TABLET ORAL 2 TIMES DAILY
Status: DISCONTINUED | OUTPATIENT
Start: 2021-01-22 | End: 2021-01-22

## 2021-01-22 RX ORDER — SULFAMETHOXAZOLE AND TRIMETHOPRIM 800; 160 MG/1; MG/1
1 TABLET ORAL 2 TIMES DAILY
Qty: 4 TABLET | Refills: 0 | Status: SHIPPED | OUTPATIENT
Start: 2021-01-22 | End: 2021-02-08

## 2021-01-22 RX ORDER — CELECOXIB 50 MG/1
CAPSULE ORAL
COMMUNITY
Start: 2020-12-07 | End: 2021-02-08

## 2021-01-25 ENCOUNTER — TELEPHONE (OUTPATIENT)
Dept: SURGERY | Facility: CLINIC | Age: 66
End: 2021-01-25

## 2021-01-25 ENCOUNTER — OFFICE VISIT (OUTPATIENT)
Dept: SURGERY | Facility: CLINIC | Age: 66
End: 2021-01-25
Payer: COMMERCIAL

## 2021-01-25 VITALS
WEIGHT: 155 LBS | DIASTOLIC BLOOD PRESSURE: 73 MMHG | HEIGHT: 67 IN | BODY MASS INDEX: 24.33 KG/M2 | SYSTOLIC BLOOD PRESSURE: 99 MMHG | HEART RATE: 76 BPM

## 2021-01-25 DIAGNOSIS — R11.0 NAUSEA: ICD-10-CM

## 2021-01-25 DIAGNOSIS — Z48.89 ENCOUNTER FOR SURGICAL FOLLOW-UP CARE: Primary | ICD-10-CM

## 2021-01-25 PROCEDURE — 99024 PR POST-OP FOLLOW-UP VISIT: ICD-10-PCS | Mod: S$GLB,,, | Performed by: NURSE PRACTITIONER

## 2021-01-25 PROCEDURE — 99999 PR PBB SHADOW E&M-EST. PATIENT-LVL III: CPT | Mod: PBBFAC,,, | Performed by: NURSE PRACTITIONER

## 2021-01-25 PROCEDURE — 99999 PR PBB SHADOW E&M-EST. PATIENT-LVL III: ICD-10-PCS | Mod: PBBFAC,,, | Performed by: NURSE PRACTITIONER

## 2021-01-25 PROCEDURE — 99024 POSTOP FOLLOW-UP VISIT: CPT | Mod: S$GLB,,, | Performed by: NURSE PRACTITIONER

## 2021-01-25 RX ORDER — ONDANSETRON 4 MG/1
4 TABLET, ORALLY DISINTEGRATING ORAL EVERY 6 HOURS PRN
Qty: 30 TABLET | Refills: 0 | Status: SHIPPED | OUTPATIENT
Start: 2021-01-25 | End: 2021-02-08

## 2021-01-26 LAB
COMMENT: NORMAL
FINAL PATHOLOGIC DIAGNOSIS: NORMAL
GROSS: NORMAL
Lab: NORMAL
MICROSCOPIC EXAM: NORMAL

## 2021-01-29 ENCOUNTER — EXTERNAL HOME HEALTH (OUTPATIENT)
Dept: HOME HEALTH SERVICES | Facility: HOSPITAL | Age: 66
End: 2021-01-29
Payer: COMMERCIAL

## 2021-02-02 ENCOUNTER — DOCUMENT SCAN (OUTPATIENT)
Dept: HOME HEALTH SERVICES | Facility: HOSPITAL | Age: 66
End: 2021-02-02
Payer: COMMERCIAL

## 2021-02-05 ENCOUNTER — OFFICE VISIT (OUTPATIENT)
Dept: SURGERY | Facility: CLINIC | Age: 66
End: 2021-02-05
Payer: COMMERCIAL

## 2021-02-05 ENCOUNTER — TELEPHONE (OUTPATIENT)
Dept: SURGERY | Facility: CLINIC | Age: 66
End: 2021-02-05

## 2021-02-05 VITALS
DIASTOLIC BLOOD PRESSURE: 75 MMHG | BODY MASS INDEX: 24.47 KG/M2 | HEART RATE: 80 BPM | HEIGHT: 67 IN | SYSTOLIC BLOOD PRESSURE: 93 MMHG | WEIGHT: 155.88 LBS

## 2021-02-05 DIAGNOSIS — R19.09 PRESACRAL MASS: ICD-10-CM

## 2021-02-05 DIAGNOSIS — Z09 POSTOP CHECK: Primary | ICD-10-CM

## 2021-02-05 PROCEDURE — 99024 POSTOP FOLLOW-UP VISIT: CPT | Mod: S$GLB,,, | Performed by: COLON & RECTAL SURGERY

## 2021-02-05 PROCEDURE — 99024 PR POST-OP FOLLOW-UP VISIT: ICD-10-PCS | Mod: S$GLB,,, | Performed by: COLON & RECTAL SURGERY

## 2021-02-05 PROCEDURE — 99999 PR PBB SHADOW E&M-EST. PATIENT-LVL III: CPT | Mod: PBBFAC,,, | Performed by: COLON & RECTAL SURGERY

## 2021-02-05 PROCEDURE — 99999 PR PBB SHADOW E&M-EST. PATIENT-LVL III: ICD-10-PCS | Mod: PBBFAC,,, | Performed by: COLON & RECTAL SURGERY

## 2021-02-08 ENCOUNTER — OFFICE VISIT (OUTPATIENT)
Dept: SURGERY | Facility: CLINIC | Age: 66
End: 2021-02-08
Payer: COMMERCIAL

## 2021-02-08 ENCOUNTER — TELEPHONE (OUTPATIENT)
Dept: SURGERY | Facility: CLINIC | Age: 66
End: 2021-02-08

## 2021-02-08 VITALS
HEART RATE: 103 BPM | DIASTOLIC BLOOD PRESSURE: 58 MMHG | TEMPERATURE: 97 F | SYSTOLIC BLOOD PRESSURE: 81 MMHG | BODY MASS INDEX: 24.33 KG/M2 | WEIGHT: 155 LBS | HEIGHT: 67 IN

## 2021-02-08 DIAGNOSIS — Z48.89 ENCOUNTER FOR SURGICAL FOLLOW-UP CARE: Primary | ICD-10-CM

## 2021-02-08 PROCEDURE — 99999 PR PBB SHADOW E&M-EST. PATIENT-LVL IV: ICD-10-PCS | Mod: PBBFAC,,, | Performed by: NURSE PRACTITIONER

## 2021-02-08 PROCEDURE — 99024 PR POST-OP FOLLOW-UP VISIT: ICD-10-PCS | Mod: S$GLB,,, | Performed by: NURSE PRACTITIONER

## 2021-02-08 PROCEDURE — 99999 PR PBB SHADOW E&M-EST. PATIENT-LVL IV: CPT | Mod: PBBFAC,,, | Performed by: NURSE PRACTITIONER

## 2021-02-08 PROCEDURE — 99024 POSTOP FOLLOW-UP VISIT: CPT | Mod: S$GLB,,, | Performed by: NURSE PRACTITIONER

## 2021-02-08 RX ORDER — CEPHALEXIN 500 MG/1
500 CAPSULE ORAL EVERY 12 HOURS
Qty: 14 CAPSULE | Refills: 0 | Status: SHIPPED | OUTPATIENT
Start: 2021-02-08 | End: 2021-02-15

## 2021-02-11 ENCOUNTER — TELEPHONE (OUTPATIENT)
Dept: SURGERY | Facility: CLINIC | Age: 66
End: 2021-02-11

## 2021-02-17 ENCOUNTER — DOCUMENT SCAN (OUTPATIENT)
Dept: HOME HEALTH SERVICES | Facility: HOSPITAL | Age: 66
End: 2021-02-17
Payer: COMMERCIAL

## 2021-02-17 RX ORDER — PANTOPRAZOLE SODIUM 40 MG/1
40 TABLET, DELAYED RELEASE ORAL DAILY
Qty: 30 TABLET | Refills: 12 | Status: CANCELLED | OUTPATIENT
Start: 2021-02-17

## 2021-02-18 ENCOUNTER — OFFICE VISIT (OUTPATIENT)
Dept: SURGERY | Facility: CLINIC | Age: 66
End: 2021-02-18
Payer: COMMERCIAL

## 2021-02-18 VITALS
HEART RATE: 101 BPM | HEIGHT: 67 IN | WEIGHT: 153.44 LBS | SYSTOLIC BLOOD PRESSURE: 106 MMHG | BODY MASS INDEX: 24.08 KG/M2 | DIASTOLIC BLOOD PRESSURE: 73 MMHG

## 2021-02-18 DIAGNOSIS — R11.0 NAUSEA: Primary | ICD-10-CM

## 2021-02-18 DIAGNOSIS — R12 PYROSIS: ICD-10-CM

## 2021-02-18 PROCEDURE — 99024 PR POST-OP FOLLOW-UP VISIT: ICD-10-PCS | Mod: S$GLB,,, | Performed by: NURSE PRACTITIONER

## 2021-02-18 PROCEDURE — 99024 POSTOP FOLLOW-UP VISIT: CPT | Mod: S$GLB,,, | Performed by: NURSE PRACTITIONER

## 2021-02-18 PROCEDURE — 99999 PR PBB SHADOW E&M-EST. PATIENT-LVL III: ICD-10-PCS | Mod: PBBFAC,,, | Performed by: NURSE PRACTITIONER

## 2021-02-18 PROCEDURE — 99999 PR PBB SHADOW E&M-EST. PATIENT-LVL III: CPT | Mod: PBBFAC,,, | Performed by: NURSE PRACTITIONER

## 2021-02-18 RX ORDER — PANTOPRAZOLE SODIUM 40 MG/1
40 TABLET, DELAYED RELEASE ORAL 2 TIMES DAILY
Qty: 60 TABLET | Refills: 0 | Status: SHIPPED | OUTPATIENT
Start: 2021-02-18 | End: 2021-08-10

## 2021-02-22 ENCOUNTER — DOCUMENT SCAN (OUTPATIENT)
Dept: HOME HEALTH SERVICES | Facility: HOSPITAL | Age: 66
End: 2021-02-22
Payer: COMMERCIAL

## 2021-03-12 RX ORDER — GABAPENTIN 300 MG/1
300 CAPSULE ORAL NIGHTLY
Qty: 30 CAPSULE | Refills: 1 | Status: SHIPPED | OUTPATIENT
Start: 2021-03-12 | End: 2021-06-11 | Stop reason: SDUPTHER

## 2021-03-16 ENCOUNTER — TELEPHONE (OUTPATIENT)
Dept: INTERNAL MEDICINE | Facility: CLINIC | Age: 66
End: 2021-03-16

## 2021-03-18 DIAGNOSIS — R73.9 HYPERGLYCEMIA: ICD-10-CM

## 2021-03-18 DIAGNOSIS — K21.9 GASTROESOPHAGEAL REFLUX DISEASE, UNSPECIFIED WHETHER ESOPHAGITIS PRESENT: Primary | ICD-10-CM

## 2021-03-18 DIAGNOSIS — E55.9 MILD VITAMIN D DEFICIENCY: ICD-10-CM

## 2021-03-18 DIAGNOSIS — G43.009 MIGRAINE WITHOUT AURA AND WITHOUT STATUS MIGRAINOSUS, NOT INTRACTABLE: ICD-10-CM

## 2021-03-18 DIAGNOSIS — M79.10 MYALGIA: ICD-10-CM

## 2021-03-18 DIAGNOSIS — E03.9 HYPOTHYROIDISM, UNSPECIFIED TYPE: ICD-10-CM

## 2021-03-18 DIAGNOSIS — E78.5 HYPERLIPIDEMIA, UNSPECIFIED HYPERLIPIDEMIA TYPE: ICD-10-CM

## 2021-03-19 RX ORDER — LEVOTHYROXINE SODIUM 100 UG/1
100 TABLET ORAL
Qty: 30 TABLET | Refills: 11 | OUTPATIENT
Start: 2021-03-19 | End: 2022-03-19

## 2021-03-19 RX ORDER — ERGOCALCIFEROL 1.25 MG/1
50000 CAPSULE ORAL
Qty: 8 CAPSULE | Refills: 0 | Status: SHIPPED | OUTPATIENT
Start: 2021-03-19 | End: 2021-12-09

## 2021-03-19 RX ORDER — LEVOTHYROXINE SODIUM 88 UG/1
88 TABLET ORAL
Qty: 90 TABLET | Refills: 3 | Status: SHIPPED | OUTPATIENT
Start: 2021-03-19 | End: 2021-09-27 | Stop reason: SDUPTHER

## 2021-03-23 ENCOUNTER — PATIENT MESSAGE (OUTPATIENT)
Dept: INTERNAL MEDICINE | Facility: CLINIC | Age: 66
End: 2021-03-23

## 2021-03-23 ENCOUNTER — TELEPHONE (OUTPATIENT)
Dept: SURGERY | Facility: CLINIC | Age: 66
End: 2021-03-23

## 2021-03-23 ENCOUNTER — PATIENT OUTREACH (OUTPATIENT)
Dept: ADMINISTRATIVE | Facility: HOSPITAL | Age: 66
End: 2021-03-23

## 2021-03-24 ENCOUNTER — OFFICE VISIT (OUTPATIENT)
Dept: INTERNAL MEDICINE | Facility: CLINIC | Age: 66
End: 2021-03-24
Payer: COMMERCIAL

## 2021-03-24 ENCOUNTER — LAB VISIT (OUTPATIENT)
Dept: LAB | Facility: HOSPITAL | Age: 66
End: 2021-03-24
Attending: INTERNAL MEDICINE
Payer: COMMERCIAL

## 2021-03-24 VITALS
WEIGHT: 157.88 LBS | DIASTOLIC BLOOD PRESSURE: 72 MMHG | HEIGHT: 67 IN | HEART RATE: 80 BPM | TEMPERATURE: 98 F | SYSTOLIC BLOOD PRESSURE: 104 MMHG | OXYGEN SATURATION: 99 % | BODY MASS INDEX: 24.78 KG/M2

## 2021-03-24 DIAGNOSIS — Z87.2 HISTORY OF CELLULITIS: Primary | ICD-10-CM

## 2021-03-24 DIAGNOSIS — E55.9 MILD VITAMIN D DEFICIENCY: ICD-10-CM

## 2021-03-24 DIAGNOSIS — M79.10 MYALGIA: ICD-10-CM

## 2021-03-24 DIAGNOSIS — E03.9 HYPOTHYROIDISM, UNSPECIFIED TYPE: ICD-10-CM

## 2021-03-24 DIAGNOSIS — K21.9 GASTROESOPHAGEAL REFLUX DISEASE, UNSPECIFIED WHETHER ESOPHAGITIS PRESENT: ICD-10-CM

## 2021-03-24 DIAGNOSIS — M79.2 NEUROPATHIC PAIN OF RIGHT THIGH: ICD-10-CM

## 2021-03-24 DIAGNOSIS — E78.5 HYPERLIPIDEMIA, UNSPECIFIED HYPERLIPIDEMIA TYPE: ICD-10-CM

## 2021-03-24 DIAGNOSIS — R73.9 HYPERGLYCEMIA: ICD-10-CM

## 2021-03-24 LAB
25(OH)D3+25(OH)D2 SERPL-MCNC: 44 NG/ML (ref 30–96)
ALBUMIN SERPL BCP-MCNC: 3.8 G/DL (ref 3.5–5.2)
ALP SERPL-CCNC: 53 U/L (ref 55–135)
ALT SERPL W/O P-5'-P-CCNC: 11 U/L (ref 10–44)
ANION GAP SERPL CALC-SCNC: 6 MMOL/L (ref 8–16)
AST SERPL-CCNC: 18 U/L (ref 10–40)
BASOPHILS # BLD AUTO: 0.03 K/UL (ref 0–0.2)
BASOPHILS NFR BLD: 0.4 % (ref 0–1.9)
BILIRUB SERPL-MCNC: 1 MG/DL (ref 0.1–1)
BUN SERPL-MCNC: 10 MG/DL (ref 8–23)
CALCIUM SERPL-MCNC: 9.4 MG/DL (ref 8.7–10.5)
CHLORIDE SERPL-SCNC: 103 MMOL/L (ref 95–110)
CHOLEST SERPL-MCNC: 204 MG/DL (ref 120–199)
CHOLEST/HDLC SERPL: 3.3 {RATIO} (ref 2–5)
CO2 SERPL-SCNC: 28 MMOL/L (ref 23–29)
CREAT SERPL-MCNC: 0.8 MG/DL (ref 0.5–1.4)
CRP SERPL-MCNC: 1.1 MG/L (ref 0–8.2)
DIFFERENTIAL METHOD: ABNORMAL
EOSINOPHIL # BLD AUTO: 0.1 K/UL (ref 0–0.5)
EOSINOPHIL NFR BLD: 1.2 % (ref 0–8)
ERYTHROCYTE [DISTWIDTH] IN BLOOD BY AUTOMATED COUNT: 12.4 % (ref 11.5–14.5)
ERYTHROCYTE [SEDIMENTATION RATE] IN BLOOD BY WESTERGREN METHOD: 14 MM/HR (ref 0–36)
EST. GFR  (AFRICAN AMERICAN): >60 ML/MIN/1.73 M^2
EST. GFR  (NON AFRICAN AMERICAN): >60 ML/MIN/1.73 M^2
GLUCOSE SERPL-MCNC: 87 MG/DL (ref 70–110)
HCT VFR BLD AUTO: 42.5 % (ref 37–48.5)
HDLC SERPL-MCNC: 61 MG/DL (ref 40–75)
HDLC SERPL: 29.9 % (ref 20–50)
HGB BLD-MCNC: 13.7 G/DL (ref 12–16)
IMM GRANULOCYTES # BLD AUTO: 0.04 K/UL (ref 0–0.04)
IMM GRANULOCYTES NFR BLD AUTO: 0.6 % (ref 0–0.5)
LDLC SERPL CALC-MCNC: 124.8 MG/DL (ref 63–159)
LYMPHOCYTES # BLD AUTO: 1.4 K/UL (ref 1–4.8)
LYMPHOCYTES NFR BLD: 19.9 % (ref 18–48)
MCH RBC QN AUTO: 30.8 PG (ref 27–31)
MCHC RBC AUTO-ENTMCNC: 32.2 G/DL (ref 32–36)
MCV RBC AUTO: 96 FL (ref 82–98)
MONOCYTES # BLD AUTO: 0.4 K/UL (ref 0.3–1)
MONOCYTES NFR BLD: 5.5 % (ref 4–15)
NEUTROPHILS # BLD AUTO: 5 K/UL (ref 1.8–7.7)
NEUTROPHILS NFR BLD: 72.4 % (ref 38–73)
NONHDLC SERPL-MCNC: 143 MG/DL
NRBC BLD-RTO: 0 /100 WBC
PLATELET # BLD AUTO: 355 K/UL (ref 150–350)
PMV BLD AUTO: 10.9 FL (ref 9.2–12.9)
POTASSIUM SERPL-SCNC: 4.4 MMOL/L (ref 3.5–5.1)
PROT SERPL-MCNC: 6.9 G/DL (ref 6–8.4)
RBC # BLD AUTO: 4.45 M/UL (ref 4–5.4)
SODIUM SERPL-SCNC: 137 MMOL/L (ref 136–145)
TRIGL SERPL-MCNC: 91 MG/DL (ref 30–150)
TSH SERPL DL<=0.005 MIU/L-ACNC: 1.28 UIU/ML (ref 0.4–4)
WBC # BLD AUTO: 6.94 K/UL (ref 3.9–12.7)

## 2021-03-24 PROCEDURE — 99999 PR PBB SHADOW E&M-EST. PATIENT-LVL IV: ICD-10-PCS | Mod: PBBFAC,,, | Performed by: NURSE PRACTITIONER

## 2021-03-24 PROCEDURE — 84443 ASSAY THYROID STIM HORMONE: CPT | Performed by: INTERNAL MEDICINE

## 2021-03-24 PROCEDURE — 99999 PR PBB SHADOW E&M-EST. PATIENT-LVL IV: CPT | Mod: PBBFAC,,, | Performed by: NURSE PRACTITIONER

## 2021-03-24 PROCEDURE — 99214 OFFICE O/P EST MOD 30 MIN: CPT | Mod: S$GLB,,, | Performed by: NURSE PRACTITIONER

## 2021-03-24 PROCEDURE — 80053 COMPREHEN METABOLIC PANEL: CPT | Performed by: INTERNAL MEDICINE

## 2021-03-24 PROCEDURE — 36415 COLL VENOUS BLD VENIPUNCTURE: CPT | Performed by: INTERNAL MEDICINE

## 2021-03-24 PROCEDURE — 83036 HEMOGLOBIN GLYCOSYLATED A1C: CPT | Performed by: INTERNAL MEDICINE

## 2021-03-24 PROCEDURE — 80061 LIPID PANEL: CPT | Performed by: INTERNAL MEDICINE

## 2021-03-24 PROCEDURE — 85025 COMPLETE CBC W/AUTO DIFF WBC: CPT | Performed by: INTERNAL MEDICINE

## 2021-03-24 PROCEDURE — 86140 C-REACTIVE PROTEIN: CPT | Performed by: INTERNAL MEDICINE

## 2021-03-24 PROCEDURE — 85652 RBC SED RATE AUTOMATED: CPT | Performed by: INTERNAL MEDICINE

## 2021-03-24 PROCEDURE — 99214 PR OFFICE/OUTPT VISIT, EST, LEVL IV, 30-39 MIN: ICD-10-PCS | Mod: S$GLB,,, | Performed by: NURSE PRACTITIONER

## 2021-03-24 PROCEDURE — 82306 VITAMIN D 25 HYDROXY: CPT | Performed by: INTERNAL MEDICINE

## 2021-03-25 LAB
ESTIMATED AVG GLUCOSE: 120 MG/DL (ref 68–131)
HBA1C MFR BLD: 5.8 % (ref 4–5.6)

## 2021-03-26 ENCOUNTER — TELEPHONE (OUTPATIENT)
Dept: INTERNAL MEDICINE | Facility: CLINIC | Age: 66
End: 2021-03-26

## 2021-03-26 ENCOUNTER — OFFICE VISIT (OUTPATIENT)
Dept: SURGERY | Facility: CLINIC | Age: 66
End: 2021-03-26
Payer: COMMERCIAL

## 2021-03-26 VITALS
SYSTOLIC BLOOD PRESSURE: 93 MMHG | BODY MASS INDEX: 24.44 KG/M2 | DIASTOLIC BLOOD PRESSURE: 73 MMHG | HEART RATE: 91 BPM | WEIGHT: 155.69 LBS | HEIGHT: 67 IN

## 2021-03-26 DIAGNOSIS — Z09 POSTOP CHECK: Primary | ICD-10-CM

## 2021-03-26 DIAGNOSIS — R19.09 PRESACRAL MASS: ICD-10-CM

## 2021-03-26 PROCEDURE — 99999 PR PBB SHADOW E&M-EST. PATIENT-LVL III: ICD-10-PCS | Mod: PBBFAC,,, | Performed by: COLON & RECTAL SURGERY

## 2021-03-26 PROCEDURE — 99999 PR PBB SHADOW E&M-EST. PATIENT-LVL III: CPT | Mod: PBBFAC,,, | Performed by: COLON & RECTAL SURGERY

## 2021-03-26 PROCEDURE — 99024 PR POST-OP FOLLOW-UP VISIT: ICD-10-PCS | Mod: S$GLB,,, | Performed by: COLON & RECTAL SURGERY

## 2021-03-26 PROCEDURE — 99024 POSTOP FOLLOW-UP VISIT: CPT | Mod: S$GLB,,, | Performed by: COLON & RECTAL SURGERY

## 2021-03-29 ENCOUNTER — TELEPHONE (OUTPATIENT)
Dept: INTERNAL MEDICINE | Facility: CLINIC | Age: 66
End: 2021-03-29

## 2021-03-31 ENCOUNTER — DOCUMENT SCAN (OUTPATIENT)
Dept: HOME HEALTH SERVICES | Facility: HOSPITAL | Age: 66
End: 2021-03-31
Payer: COMMERCIAL

## 2021-04-14 ENCOUNTER — TELEPHONE (OUTPATIENT)
Dept: INTERNAL MEDICINE | Facility: CLINIC | Age: 66
End: 2021-04-14

## 2021-06-06 RX ORDER — ESTRADIOL 0.04 MG/D
FILM, EXTENDED RELEASE TRANSDERMAL
Qty: 8 PATCH | Refills: 2 | Status: SHIPPED | OUTPATIENT
Start: 2021-06-06 | End: 2021-09-26

## 2021-06-09 RX ORDER — ESTRADIOL 0.04 MG/D
FILM, EXTENDED RELEASE TRANSDERMAL
Qty: 8 PATCH | Refills: 2 | OUTPATIENT
Start: 2021-06-09

## 2021-06-16 RX ORDER — GABAPENTIN 300 MG/1
300 CAPSULE ORAL NIGHTLY
Qty: 30 CAPSULE | Refills: 1 | Status: SHIPPED | OUTPATIENT
Start: 2021-06-16 | End: 2021-12-09

## 2021-06-16 RX ORDER — CELECOXIB 200 MG/1
200 CAPSULE ORAL DAILY
Qty: 30 CAPSULE | Refills: 1 | Status: SHIPPED | OUTPATIENT
Start: 2021-06-16 | End: 2022-05-31

## 2021-07-19 ENCOUNTER — TELEPHONE (OUTPATIENT)
Dept: SURGERY | Facility: CLINIC | Age: 66
End: 2021-07-19

## 2021-07-29 ENCOUNTER — LAB VISIT (OUTPATIENT)
Dept: LAB | Facility: HOSPITAL | Age: 66
End: 2021-07-29
Attending: COLON & RECTAL SURGERY
Payer: COMMERCIAL

## 2021-07-29 ENCOUNTER — OFFICE VISIT (OUTPATIENT)
Dept: SURGERY | Facility: CLINIC | Age: 66
End: 2021-07-29
Payer: COMMERCIAL

## 2021-07-29 VITALS
DIASTOLIC BLOOD PRESSURE: 78 MMHG | HEIGHT: 67 IN | SYSTOLIC BLOOD PRESSURE: 115 MMHG | HEART RATE: 78 BPM | WEIGHT: 151.88 LBS | BODY MASS INDEX: 23.84 KG/M2

## 2021-07-29 DIAGNOSIS — M25.551 RIGHT HIP PAIN: ICD-10-CM

## 2021-07-29 DIAGNOSIS — R19.09 PRESACRAL MASS: Primary | ICD-10-CM

## 2021-07-29 DIAGNOSIS — R19.09 PRESACRAL MASS: ICD-10-CM

## 2021-07-29 LAB
CREAT SERPL-MCNC: 0.7 MG/DL (ref 0.5–1.4)
EST. GFR  (AFRICAN AMERICAN): >60 ML/MIN/1.73 M^2
EST. GFR  (NON AFRICAN AMERICAN): >60 ML/MIN/1.73 M^2

## 2021-07-29 PROCEDURE — 99213 PR OFFICE/OUTPT VISIT, EST, LEVL III, 20-29 MIN: ICD-10-PCS | Mod: S$GLB,,, | Performed by: COLON & RECTAL SURGERY

## 2021-07-29 PROCEDURE — 99999 PR PBB SHADOW E&M-EST. PATIENT-LVL III: ICD-10-PCS | Mod: PBBFAC,,, | Performed by: COLON & RECTAL SURGERY

## 2021-07-29 PROCEDURE — 36415 COLL VENOUS BLD VENIPUNCTURE: CPT | Performed by: COLON & RECTAL SURGERY

## 2021-07-29 PROCEDURE — 99999 PR PBB SHADOW E&M-EST. PATIENT-LVL III: CPT | Mod: PBBFAC,,, | Performed by: COLON & RECTAL SURGERY

## 2021-07-29 PROCEDURE — 82565 ASSAY OF CREATININE: CPT | Performed by: COLON & RECTAL SURGERY

## 2021-07-29 PROCEDURE — 99213 OFFICE O/P EST LOW 20 MIN: CPT | Mod: S$GLB,,, | Performed by: COLON & RECTAL SURGERY

## 2021-08-03 ENCOUNTER — PATIENT MESSAGE (OUTPATIENT)
Dept: SURGERY | Facility: CLINIC | Age: 66
End: 2021-08-03

## 2021-08-16 ENCOUNTER — HOSPITAL ENCOUNTER (OUTPATIENT)
Dept: RADIOLOGY | Facility: OTHER | Age: 66
Discharge: HOME | End: 2021-08-16
Attending: COLON & RECTAL SURGERY
Payer: COMMERCIAL

## 2021-08-16 DIAGNOSIS — R19.09 PRESACRAL MASS: ICD-10-CM

## 2021-08-16 PROCEDURE — 72197 MRI PELVIS W WO CONTRAST: ICD-10-PCS | Mod: 26,,, | Performed by: RADIOLOGY

## 2021-08-16 PROCEDURE — A9585 GADOBUTROL INJECTION: HCPCS | Performed by: COLON & RECTAL SURGERY

## 2021-08-16 PROCEDURE — 25500020 PHARM REV CODE 255: Performed by: COLON & RECTAL SURGERY

## 2021-08-16 PROCEDURE — 72197 MRI PELVIS W/O & W/DYE: CPT | Mod: 26,,, | Performed by: RADIOLOGY

## 2021-08-16 PROCEDURE — 72197 MRI PELVIS W/O & W/DYE: CPT | Mod: TC

## 2021-08-16 RX ORDER — GADOBUTROL 604.72 MG/ML
7 INJECTION INTRAVENOUS
Status: COMPLETED | OUTPATIENT
Start: 2021-08-16 | End: 2021-08-16

## 2021-08-16 RX ADMIN — GADOBUTROL 7 ML: 604.72 INJECTION INTRAVENOUS at 09:08

## 2021-08-17 ENCOUNTER — PATIENT OUTREACH (OUTPATIENT)
Dept: ADMINISTRATIVE | Facility: OTHER | Age: 66
End: 2021-08-17

## 2021-08-20 ENCOUNTER — OFFICE VISIT (OUTPATIENT)
Dept: SURGERY | Facility: CLINIC | Age: 66
End: 2021-08-20
Payer: COMMERCIAL

## 2021-08-20 VITALS
HEIGHT: 67 IN | HEART RATE: 84 BPM | DIASTOLIC BLOOD PRESSURE: 74 MMHG | SYSTOLIC BLOOD PRESSURE: 104 MMHG | BODY MASS INDEX: 23.46 KG/M2 | WEIGHT: 149.5 LBS

## 2021-08-20 DIAGNOSIS — R19.09 PRESACRAL MASS: Primary | ICD-10-CM

## 2021-08-20 PROCEDURE — 99999 PR PBB SHADOW E&M-EST. PATIENT-LVL III: ICD-10-PCS | Mod: PBBFAC,,, | Performed by: COLON & RECTAL SURGERY

## 2021-08-20 PROCEDURE — 99999 PR PBB SHADOW E&M-EST. PATIENT-LVL III: CPT | Mod: PBBFAC,,, | Performed by: COLON & RECTAL SURGERY

## 2021-08-20 PROCEDURE — 99212 OFFICE O/P EST SF 10 MIN: CPT | Mod: S$GLB,,, | Performed by: COLON & RECTAL SURGERY

## 2021-08-20 PROCEDURE — 99212 PR OFFICE/OUTPT VISIT, EST, LEVL II, 10-19 MIN: ICD-10-PCS | Mod: S$GLB,,, | Performed by: COLON & RECTAL SURGERY

## 2021-09-28 ENCOUNTER — HOSPITAL ENCOUNTER (OUTPATIENT)
Dept: RADIOLOGY | Facility: HOSPITAL | Age: 66
Discharge: HOME | End: 2021-09-28
Attending: INTERNAL MEDICINE
Payer: COMMERCIAL

## 2021-09-28 VITALS — WEIGHT: 145 LBS | BODY MASS INDEX: 22.76 KG/M2 | HEIGHT: 67 IN

## 2021-09-28 DIAGNOSIS — Z12.31 SCREENING MAMMOGRAM, ENCOUNTER FOR: ICD-10-CM

## 2021-09-28 PROCEDURE — 77063 BREAST TOMOSYNTHESIS BI: CPT | Mod: 26,,, | Performed by: RADIOLOGY

## 2021-09-28 PROCEDURE — 77063 MAMMO DIGITAL SCREENING BILAT WITH TOMO: ICD-10-PCS | Mod: 26,,, | Performed by: RADIOLOGY

## 2021-09-28 PROCEDURE — 77067 SCR MAMMO BI INCL CAD: CPT | Mod: 26,,, | Performed by: RADIOLOGY

## 2021-09-28 PROCEDURE — 77067 SCR MAMMO BI INCL CAD: CPT | Mod: TC

## 2021-09-28 PROCEDURE — 77067 MAMMO DIGITAL SCREENING BILAT WITH TOMO: ICD-10-PCS | Mod: 26,,, | Performed by: RADIOLOGY

## 2021-10-24 ENCOUNTER — OFFICE VISIT (OUTPATIENT)
Dept: URGENT CARE | Facility: CLINIC | Age: 66
End: 2021-10-24
Payer: COMMERCIAL

## 2021-10-24 VITALS
SYSTOLIC BLOOD PRESSURE: 122 MMHG | WEIGHT: 145 LBS | OXYGEN SATURATION: 100 % | RESPIRATION RATE: 16 BRPM | BODY MASS INDEX: 22.76 KG/M2 | HEART RATE: 64 BPM | DIASTOLIC BLOOD PRESSURE: 81 MMHG | TEMPERATURE: 98 F | HEIGHT: 67 IN

## 2021-10-24 DIAGNOSIS — N30.00 ACUTE CYSTITIS WITHOUT HEMATURIA: Primary | ICD-10-CM

## 2021-10-24 DIAGNOSIS — R30.0 DYSURIA: ICD-10-CM

## 2021-10-24 LAB
BILIRUB UR QL STRIP: NEGATIVE
GLUCOSE UR QL STRIP: NEGATIVE
KETONES UR QL STRIP: NEGATIVE
LEUKOCYTE ESTERASE UR QL STRIP: NEGATIVE
PH, POC UA: 5.5 (ref 5–8)
POC BLOOD, URINE: NEGATIVE
POC NITRATES, URINE: NEGATIVE
PROT UR QL STRIP: NEGATIVE
SP GR UR STRIP: 1.01 (ref 1–1.03)
UROBILINOGEN UR STRIP-ACNC: NORMAL (ref 0.1–1.1)

## 2021-10-24 PROCEDURE — 99213 OFFICE O/P EST LOW 20 MIN: CPT | Mod: 25,S$GLB,, | Performed by: NURSE PRACTITIONER

## 2021-10-24 PROCEDURE — 99213 PR OFFICE/OUTPT VISIT, EST, LEVL III, 20-29 MIN: ICD-10-PCS | Mod: 25,S$GLB,, | Performed by: NURSE PRACTITIONER

## 2021-10-24 PROCEDURE — 81003 URINALYSIS AUTO W/O SCOPE: CPT | Mod: QW,S$GLB,, | Performed by: NURSE PRACTITIONER

## 2021-10-24 PROCEDURE — 81003 POCT URINALYSIS, DIPSTICK, AUTOMATED, W/O SCOPE: ICD-10-PCS | Mod: QW,S$GLB,, | Performed by: NURSE PRACTITIONER

## 2021-10-24 PROCEDURE — 87086 URINE CULTURE/COLONY COUNT: CPT | Performed by: NURSE PRACTITIONER

## 2021-10-24 RX ORDER — NITROFURANTOIN 25; 75 MG/1; MG/1
100 CAPSULE ORAL 2 TIMES DAILY
Qty: 14 CAPSULE | Refills: 0 | Status: SHIPPED | OUTPATIENT
Start: 2021-10-24 | End: 2021-10-31

## 2021-10-26 ENCOUNTER — TELEPHONE (OUTPATIENT)
Dept: URGENT CARE | Facility: CLINIC | Age: 66
End: 2021-10-26
Payer: COMMERCIAL

## 2021-10-26 LAB — BACTERIA UR CULT: NO GROWTH

## 2021-10-27 ENCOUNTER — TELEPHONE (OUTPATIENT)
Dept: INTERNAL MEDICINE | Facility: CLINIC | Age: 66
End: 2021-10-27
Payer: COMMERCIAL

## 2021-10-27 ENCOUNTER — PATIENT MESSAGE (OUTPATIENT)
Dept: INTERNAL MEDICINE | Facility: CLINIC | Age: 66
End: 2021-10-27
Payer: COMMERCIAL

## 2021-10-28 ENCOUNTER — TELEPHONE (OUTPATIENT)
Dept: URGENT CARE | Facility: CLINIC | Age: 66
End: 2021-10-28
Payer: COMMERCIAL

## 2021-10-28 RX ORDER — ESTRADIOL 0.04 MG/D
FILM, EXTENDED RELEASE TRANSDERMAL
Qty: 24 PATCH | Refills: 3 | Status: SHIPPED | OUTPATIENT
Start: 2021-10-28 | End: 2022-10-01

## 2021-10-28 RX ORDER — PHENAZOPYRIDINE HYDROCHLORIDE 200 MG/1
200 TABLET, FILM COATED ORAL 3 TIMES DAILY PRN
Qty: 15 TABLET | Refills: 0 | Status: SHIPPED | OUTPATIENT
Start: 2021-10-28 | End: 2021-11-02

## 2021-11-24 RX ORDER — LEVOTHYROXINE SODIUM 88 UG/1
88 TABLET ORAL
Qty: 90 TABLET | Refills: 0 | Status: SHIPPED | OUTPATIENT
Start: 2021-11-24 | End: 2021-11-26

## 2021-11-24 NOTE — TELEPHONE ENCOUNTER
----- Message from Laura Sanchez sent at 11/24/2021  8:31 AM CST -----  Contact: [phone]  Requesting an RX refill or new RX.  Is this a refill or new RX: refill  RX name and strength : levothyroxine (SYNTHROID) 88 MCG tablet 90 tablet   Is this a 30 day or 90 day RX: 90  Patient advised that in the future they can use their [facility] account to request a refill?:  yes  Pharmacy name and phone # :  [id number] - [place], [place] - [address]  [address]  & [facility]   Phone:  [phone]  Fax:  [phone]        Comments: Patient is completely out.

## 2021-11-26 ENCOUNTER — TELEPHONE (OUTPATIENT)
Dept: INTERNAL MEDICINE | Facility: CLINIC | Age: 66
End: 2021-11-26
Payer: COMMERCIAL

## 2021-11-26 DIAGNOSIS — R73.9 HYPERGLYCEMIA: ICD-10-CM

## 2021-11-26 DIAGNOSIS — E78.5 HYPERLIPIDEMIA, UNSPECIFIED HYPERLIPIDEMIA TYPE: ICD-10-CM

## 2021-11-26 DIAGNOSIS — E55.9 MILD VITAMIN D DEFICIENCY: ICD-10-CM

## 2021-11-26 DIAGNOSIS — G44.89 CHRONIC MIXED HEADACHE SYNDROME: ICD-10-CM

## 2021-11-26 DIAGNOSIS — K62.89 MASS OF PERIRECTAL SOFT TISSUE: ICD-10-CM

## 2021-11-26 DIAGNOSIS — E03.9 HYPOTHYROIDISM, UNSPECIFIED TYPE: Primary | ICD-10-CM

## 2021-11-26 RX ORDER — LEVOTHYROXINE SODIUM 100 UG/1
100 TABLET ORAL
Qty: 90 TABLET | Refills: 3 | Status: SHIPPED | OUTPATIENT
Start: 2021-11-26 | End: 2022-08-31 | Stop reason: SDUPTHER

## 2021-12-06 ENCOUNTER — LAB VISIT (OUTPATIENT)
Dept: LAB | Facility: HOSPITAL | Age: 66
End: 2021-12-06
Attending: INTERNAL MEDICINE
Payer: COMMERCIAL

## 2021-12-06 ENCOUNTER — PATIENT MESSAGE (OUTPATIENT)
Dept: INTERNAL MEDICINE | Facility: CLINIC | Age: 66
End: 2021-12-06
Payer: COMMERCIAL

## 2021-12-06 DIAGNOSIS — K62.89 MASS OF PERIRECTAL SOFT TISSUE: ICD-10-CM

## 2021-12-06 DIAGNOSIS — E03.9 HYPOTHYROIDISM, UNSPECIFIED TYPE: ICD-10-CM

## 2021-12-06 DIAGNOSIS — E55.9 MILD VITAMIN D DEFICIENCY: ICD-10-CM

## 2021-12-06 DIAGNOSIS — R73.9 HYPERGLYCEMIA: ICD-10-CM

## 2021-12-06 DIAGNOSIS — E78.5 HYPERLIPIDEMIA, UNSPECIFIED HYPERLIPIDEMIA TYPE: ICD-10-CM

## 2021-12-06 LAB
25(OH)D3+25(OH)D2 SERPL-MCNC: 35 NG/ML (ref 30–96)
ALBUMIN SERPL BCP-MCNC: 3.8 G/DL (ref 3.5–5.2)
ALP SERPL-CCNC: 50 U/L (ref 55–135)
ALT SERPL W/O P-5'-P-CCNC: 10 U/L (ref 10–44)
ANION GAP SERPL CALC-SCNC: 8 MMOL/L (ref 8–16)
AST SERPL-CCNC: 17 U/L (ref 10–40)
BASOPHILS # BLD AUTO: 0.03 K/UL (ref 0–0.2)
BASOPHILS NFR BLD: 0.9 % (ref 0–1.9)
BILIRUB SERPL-MCNC: 1.9 MG/DL (ref 0.1–1)
BUN SERPL-MCNC: 8 MG/DL (ref 8–23)
CALCIUM SERPL-MCNC: 10 MG/DL (ref 8.7–10.5)
CHLORIDE SERPL-SCNC: 105 MMOL/L (ref 95–110)
CHOLEST SERPL-MCNC: 214 MG/DL (ref 120–199)
CHOLEST/HDLC SERPL: 3.1 {RATIO} (ref 2–5)
CO2 SERPL-SCNC: 24 MMOL/L (ref 23–29)
CREAT SERPL-MCNC: 0.7 MG/DL (ref 0.5–1.4)
CRP SERPL-MCNC: 2.5 MG/L (ref 0–8.2)
DIFFERENTIAL METHOD: ABNORMAL
EOSINOPHIL # BLD AUTO: 0.1 K/UL (ref 0–0.5)
EOSINOPHIL NFR BLD: 3.6 % (ref 0–8)
ERYTHROCYTE [DISTWIDTH] IN BLOOD BY AUTOMATED COUNT: 12.2 % (ref 11.5–14.5)
ERYTHROCYTE [SEDIMENTATION RATE] IN BLOOD BY WESTERGREN METHOD: 24 MM/HR (ref 0–36)
EST. GFR  (AFRICAN AMERICAN): >60 ML/MIN/1.73 M^2
EST. GFR  (NON AFRICAN AMERICAN): >60 ML/MIN/1.73 M^2
ESTIMATED AVG GLUCOSE: 105 MG/DL (ref 68–131)
GLUCOSE SERPL-MCNC: 82 MG/DL (ref 70–110)
HBA1C MFR BLD: 5.3 % (ref 4–5.6)
HCT VFR BLD AUTO: 43.5 % (ref 37–48.5)
HDLC SERPL-MCNC: 68 MG/DL (ref 40–75)
HDLC SERPL: 31.8 % (ref 20–50)
HGB BLD-MCNC: 13.8 G/DL (ref 12–16)
IMM GRANULOCYTES # BLD AUTO: 0 K/UL (ref 0–0.04)
IMM GRANULOCYTES NFR BLD AUTO: 0 % (ref 0–0.5)
LDLC SERPL CALC-MCNC: 131.2 MG/DL (ref 63–159)
LYMPHOCYTES # BLD AUTO: 1 K/UL (ref 1–4.8)
LYMPHOCYTES NFR BLD: 31.3 % (ref 18–48)
MCH RBC QN AUTO: 29.7 PG (ref 27–31)
MCHC RBC AUTO-ENTMCNC: 31.7 G/DL (ref 32–36)
MCV RBC AUTO: 94 FL (ref 82–98)
MONOCYTES # BLD AUTO: 0.2 K/UL (ref 0.3–1)
MONOCYTES NFR BLD: 7.3 % (ref 4–15)
NEUTROPHILS # BLD AUTO: 1.9 K/UL (ref 1.8–7.7)
NEUTROPHILS NFR BLD: 56.9 % (ref 38–73)
NONHDLC SERPL-MCNC: 146 MG/DL
NRBC BLD-RTO: 0 /100 WBC
PLATELET # BLD AUTO: 306 K/UL (ref 150–450)
PMV BLD AUTO: 10 FL (ref 9.2–12.9)
POTASSIUM SERPL-SCNC: 4.3 MMOL/L (ref 3.5–5.1)
PROT SERPL-MCNC: 6.7 G/DL (ref 6–8.4)
RBC # BLD AUTO: 4.64 M/UL (ref 4–5.4)
SODIUM SERPL-SCNC: 137 MMOL/L (ref 136–145)
TRIGL SERPL-MCNC: 74 MG/DL (ref 30–150)
TSH SERPL DL<=0.005 MIU/L-ACNC: 1.07 UIU/ML (ref 0.4–4)
TSH SERPL DL<=0.005 MIU/L-ACNC: 1.07 UIU/ML (ref 0.4–4)
WBC # BLD AUTO: 3.29 K/UL (ref 3.9–12.7)

## 2021-12-06 PROCEDURE — 82306 VITAMIN D 25 HYDROXY: CPT | Performed by: INTERNAL MEDICINE

## 2021-12-06 PROCEDURE — 80053 COMPREHEN METABOLIC PANEL: CPT | Performed by: INTERNAL MEDICINE

## 2021-12-06 PROCEDURE — 36415 COLL VENOUS BLD VENIPUNCTURE: CPT | Performed by: INTERNAL MEDICINE

## 2021-12-06 PROCEDURE — 86140 C-REACTIVE PROTEIN: CPT | Performed by: INTERNAL MEDICINE

## 2021-12-06 PROCEDURE — 85652 RBC SED RATE AUTOMATED: CPT | Performed by: INTERNAL MEDICINE

## 2021-12-06 PROCEDURE — 83036 HEMOGLOBIN GLYCOSYLATED A1C: CPT | Performed by: INTERNAL MEDICINE

## 2021-12-06 PROCEDURE — 84443 ASSAY THYROID STIM HORMONE: CPT | Performed by: INTERNAL MEDICINE

## 2021-12-06 PROCEDURE — 85025 COMPLETE CBC W/AUTO DIFF WBC: CPT | Performed by: INTERNAL MEDICINE

## 2021-12-06 PROCEDURE — 80061 LIPID PANEL: CPT | Performed by: INTERNAL MEDICINE

## 2021-12-09 ENCOUNTER — OFFICE VISIT (OUTPATIENT)
Dept: INTERNAL MEDICINE | Facility: CLINIC | Age: 66
End: 2021-12-09
Payer: COMMERCIAL

## 2021-12-09 VITALS
HEART RATE: 56 BPM | OXYGEN SATURATION: 99 % | SYSTOLIC BLOOD PRESSURE: 120 MMHG | BODY MASS INDEX: 23.07 KG/M2 | HEIGHT: 67 IN | DIASTOLIC BLOOD PRESSURE: 78 MMHG | WEIGHT: 147 LBS

## 2021-12-09 DIAGNOSIS — M85.80 OSTEOPENIA, UNSPECIFIED LOCATION: Primary | ICD-10-CM

## 2021-12-09 DIAGNOSIS — R41.3 MEMORY CHANGE: ICD-10-CM

## 2021-12-09 DIAGNOSIS — N39.0 URINARY TRACT INFECTION WITHOUT HEMATURIA, SITE UNSPECIFIED: ICD-10-CM

## 2021-12-09 PROCEDURE — 87086 URINE CULTURE/COLONY COUNT: CPT | Performed by: INTERNAL MEDICINE

## 2021-12-09 PROCEDURE — 99999 PR PBB SHADOW E&M-EST. PATIENT-LVL IV: CPT | Mod: PBBFAC,,, | Performed by: INTERNAL MEDICINE

## 2021-12-09 PROCEDURE — 99214 PR OFFICE/OUTPT VISIT, EST, LEVL IV, 30-39 MIN: ICD-10-PCS | Mod: S$GLB,,, | Performed by: INTERNAL MEDICINE

## 2021-12-09 PROCEDURE — 81003 URINALYSIS AUTO W/O SCOPE: CPT | Performed by: INTERNAL MEDICINE

## 2021-12-09 PROCEDURE — 99214 OFFICE O/P EST MOD 30 MIN: CPT | Mod: S$GLB,,, | Performed by: INTERNAL MEDICINE

## 2021-12-09 PROCEDURE — 99999 PR PBB SHADOW E&M-EST. PATIENT-LVL IV: ICD-10-PCS | Mod: PBBFAC,,, | Performed by: INTERNAL MEDICINE

## 2021-12-09 RX ORDER — GABAPENTIN 300 MG/1
300 CAPSULE ORAL 2 TIMES DAILY
Qty: 30 CAPSULE | Refills: 1
Start: 2021-12-09 | End: 2023-02-15

## 2021-12-09 RX ORDER — DICLOFENAC POTASSIUM 50 MG/1
POWDER, FOR SOLUTION ORAL
Qty: 9 EACH | Refills: 0 | Status: SHIPPED | OUTPATIENT
Start: 2021-12-09 | End: 2022-01-23

## 2021-12-10 LAB
BILIRUB UR QL STRIP: NEGATIVE
CLARITY UR REFRACT.AUTO: CLEAR
COLOR UR AUTO: NORMAL
GLUCOSE UR QL STRIP: NEGATIVE
HGB UR QL STRIP: NEGATIVE
KETONES UR QL STRIP: NEGATIVE
LEUKOCYTE ESTERASE UR QL STRIP: NEGATIVE
NITRITE UR QL STRIP: NEGATIVE
PH UR STRIP: 5 [PH] (ref 5–8)
PROT UR QL STRIP: NEGATIVE
SP GR UR STRIP: 1 (ref 1–1.03)
URN SPEC COLLECT METH UR: NORMAL

## 2021-12-11 LAB — BACTERIA UR CULT: NO GROWTH

## 2021-12-13 ENCOUNTER — TELEPHONE (OUTPATIENT)
Dept: NEUROLOGY | Facility: CLINIC | Age: 66
End: 2021-12-13
Payer: COMMERCIAL

## 2021-12-15 ENCOUNTER — TELEPHONE (OUTPATIENT)
Dept: NEUROLOGY | Facility: CLINIC | Age: 66
End: 2021-12-15
Payer: COMMERCIAL

## 2021-12-15 ENCOUNTER — IMMUNIZATION (OUTPATIENT)
Dept: INTERNAL MEDICINE | Facility: CLINIC | Age: 66
End: 2021-12-15
Payer: COMMERCIAL

## 2021-12-15 PROCEDURE — 90694 VACC AIIV4 NO PRSRV 0.5ML IM: CPT | Mod: S$GLB,,, | Performed by: INTERNAL MEDICINE

## 2021-12-15 PROCEDURE — 90471 FLU VACCINE - QUADRIVALENT - ADJUVANTED: ICD-10-PCS | Mod: S$GLB,,, | Performed by: INTERNAL MEDICINE

## 2021-12-15 PROCEDURE — 90471 IMMUNIZATION ADMIN: CPT | Mod: S$GLB,,, | Performed by: INTERNAL MEDICINE

## 2021-12-15 PROCEDURE — 90694 FLU VACCINE - QUADRIVALENT - ADJUVANTED: ICD-10-PCS | Mod: S$GLB,,, | Performed by: INTERNAL MEDICINE

## 2021-12-16 ENCOUNTER — PATIENT MESSAGE (OUTPATIENT)
Dept: NEUROLOGY | Facility: CLINIC | Age: 66
End: 2021-12-16
Payer: COMMERCIAL

## 2022-01-13 ENCOUNTER — HOSPITAL ENCOUNTER (OUTPATIENT)
Dept: RADIOLOGY | Facility: CLINIC | Age: 67
Discharge: HOME | End: 2022-01-13
Attending: INTERNAL MEDICINE
Payer: COMMERCIAL

## 2022-01-13 ENCOUNTER — IMMUNIZATION (OUTPATIENT)
Dept: INTERNAL MEDICINE | Facility: CLINIC | Age: 67
End: 2022-01-13
Payer: COMMERCIAL

## 2022-01-13 DIAGNOSIS — Z23 NEED FOR VACCINATION: Primary | ICD-10-CM

## 2022-01-13 DIAGNOSIS — M85.80 OSTEOPENIA, UNSPECIFIED LOCATION: ICD-10-CM

## 2022-01-13 PROCEDURE — 0003A COVID-19, MRNA, LNP-S, PF, 30 MCG/0.3 ML DOSE VACCINE: CPT | Mod: PBBFAC | Performed by: INTERNAL MEDICINE

## 2022-01-13 PROCEDURE — 77080 DXA BONE DENSITY AXIAL: CPT | Mod: TC

## 2022-01-13 PROCEDURE — 77080 DEXA BONE DENSITY SPINE HIP: ICD-10-PCS | Mod: 26,,, | Performed by: INTERNAL MEDICINE

## 2022-01-13 PROCEDURE — 77080 DXA BONE DENSITY AXIAL: CPT | Mod: 26,,, | Performed by: INTERNAL MEDICINE

## 2022-01-21 ENCOUNTER — OFFICE VISIT (OUTPATIENT)
Dept: NEUROLOGY | Facility: CLINIC | Age: 67
End: 2022-01-21
Payer: COMMERCIAL

## 2022-01-21 VITALS
SYSTOLIC BLOOD PRESSURE: 103 MMHG | DIASTOLIC BLOOD PRESSURE: 71 MMHG | HEART RATE: 74 BPM | BODY MASS INDEX: 23.02 KG/M2 | HEIGHT: 67 IN

## 2022-01-21 DIAGNOSIS — Z81.8 FAMILY HISTORY OF DEMENTIA: ICD-10-CM

## 2022-01-21 DIAGNOSIS — F90.9 HYPERACTIVE: ICD-10-CM

## 2022-01-21 DIAGNOSIS — R41.3 OTHER AMNESIA: ICD-10-CM

## 2022-01-21 DIAGNOSIS — G31.84 MCI (MILD COGNITIVE IMPAIRMENT): Primary | ICD-10-CM

## 2022-01-21 DIAGNOSIS — R41.3 MEMORY CHANGE: ICD-10-CM

## 2022-01-21 DIAGNOSIS — F41.9 ANXIETY: ICD-10-CM

## 2022-01-21 DIAGNOSIS — G47.00 INSOMNIA, UNSPECIFIED TYPE: ICD-10-CM

## 2022-01-21 DIAGNOSIS — F13.20 BENZODIAZEPINE DEPENDENCE: ICD-10-CM

## 2022-01-21 PROCEDURE — 99999 PR PBB SHADOW E&M-EST. PATIENT-LVL IV: ICD-10-PCS | Mod: PBBFAC,,, | Performed by: PSYCHIATRY & NEUROLOGY

## 2022-01-21 PROCEDURE — 99417 PROLNG OP E/M EACH 15 MIN: CPT | Mod: S$GLB,,, | Performed by: PSYCHIATRY & NEUROLOGY

## 2022-01-21 PROCEDURE — 96116 PR NEUROBEHAVIORAL STATUS EXAM BY PSYCH/PHYS: ICD-10-PCS | Mod: 59,S$GLB,, | Performed by: PSYCHIATRY & NEUROLOGY

## 2022-01-21 PROCEDURE — 99417 PR PROLONGED SVC, OUTPT, W/WO DIRECT PT CONTACT,  EA ADDTL 15 MIN: ICD-10-PCS | Mod: S$GLB,,, | Performed by: PSYCHIATRY & NEUROLOGY

## 2022-01-21 PROCEDURE — 99205 PR OFFICE/OUTPT VISIT, NEW, LEVL V, 60-74 MIN: ICD-10-PCS | Mod: S$GLB,,, | Performed by: PSYCHIATRY & NEUROLOGY

## 2022-01-21 PROCEDURE — 99205 OFFICE O/P NEW HI 60 MIN: CPT | Mod: S$GLB,,, | Performed by: PSYCHIATRY & NEUROLOGY

## 2022-01-21 PROCEDURE — 96132 PR NEUROPSYCHOLOGIC TEST EVAL SVCS, 1ST HR: ICD-10-PCS | Mod: 59,S$GLB,, | Performed by: PSYCHIATRY & NEUROLOGY

## 2022-01-21 PROCEDURE — 99999 PR PBB SHADOW E&M-EST. PATIENT-LVL IV: CPT | Mod: PBBFAC,,, | Performed by: PSYCHIATRY & NEUROLOGY

## 2022-01-21 PROCEDURE — 96116 NUBHVL XM PHYS/QHP 1ST HR: CPT | Mod: 59,S$GLB,, | Performed by: PSYCHIATRY & NEUROLOGY

## 2022-01-21 PROCEDURE — 96132 NRPSYC TST EVAL PHYS/QHP 1ST: CPT | Mod: 59,S$GLB,, | Performed by: PSYCHIATRY & NEUROLOGY

## 2022-01-21 NOTE — PROGRESS NOTES
[facility]  [facility]     PATIENT: Norm Andre  VISIT DATE: 2022  MRN: [id number]  PRIMARY PROVIDER: Kell Ramirez MD  : 1955       Chief complaint: Cognitive Impairment.     History of present illness:      Ms. Andre is a 66-year-old right-handed female who presents today to the [facility] due to concerns related to progressive neurocognitive impairment.    Ms. Andre is accompanied by alone who participates in providing history.   Additional information is obtained by reviewing available medical records.     Relevant Background/Context   Known Relevant Genetics:  o There is no known relevant genetic testing available.   Known Relevant Family history:  o Patient is unaware of her per paternal family history. Patient's father had depression. Patient's mother had late onset cognitive impairment at age 84. mother had mild depression and congestive heart failure. Patient has 2 uncles with coronary artery disease. Patient has 1 paternal aunt with uterine cancer. Patient's maternal grandmother had rheumatoid arthritis and COPD  at the age of 60. Patient's maternal grandfather  at the age of 60 due to prostate cancer. Patient has 5 half siblings. To her half siblings have dyslexia. One of her half sisters has personality disorder. Patient's son has ADHD.  o Patient's mother had late onset cognitive impairment. Patient had a aunt with late onset cognitive impairment.  o The family denies a history of movement disorder (PD, PDD, tremor, etc).  o The family denies a history of motor neuron disease (ALS).  o Patient's son has ADHD.  o Patient's parents have depression.   Developmental/Milestones:  o The patient/family report no known birth complications or early life problems. The patient met all developmental milestones.   Learning Disorders:  o The patient/family report no signs or symptoms suggestive of  developmental learning disorder.   Education:  o 16 years of formal education.  o HS.  o BA.   Career/Skills:  o Retired . Spends much of her time playing tournament tennis. recently stopped playing tennis due to surgery. 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   Relevant Medical History:  o Bipolar disorder  o Migraine headaches  o Herniated disc  o History of migraine headaches  o Hypothyroidism  o Moderate mood disorder  o Rotator cuff tear  o Thyroid disease   Relevant Exposure/Trauma to CNS:  o No History of Traumatic Brain Injury or Concussions  o No History of Malnutrition  o No History of Chronic Substance Abuse  o No History of Toxic Exposure     Neurocognitive Disorder Features   Onset/Duration:  o Dec 2004 (~17-year)   First Symptom:  o Memory impairment   Progression:  o Static   Clinical Course:  o Electronic Medical Record (Approximately Remote)  - Type: Nonspecific. She has suffered from HA since childhood, worsening at 18 y/o when she started menstruating, improved with both pregnancies in her mid 20s, worsening again a few years after delivery but improving again with hysterectomy at age 33.  o Neurologist (10/05/2012)  - Type: Nonspecific. But HA have worsened again since and are now present daily, mild on awakening and gradually worsening to severe level as the day goes on, sometimes prohibiting/interrupting sleep. At times, attacks are present on awakening even if the patient went to bed HA free. There are no prodromes or auras before the onset of dull/pressing sensations on the L orbital area that radiate to the midocciput/back or the neck; at times, pain can be localized on the occipital area only. Associated sx include nausea, vomiting, photo/phono/osmophobia, cognitive slowing, L sidedb lurry vision, but without focal neurological/unilateral autonomic deficits. Propranolol caused severe fatigue while Topamax  100-150 mg caused cognitive slowing and severe leg cramps. Maxalt is effective within 1 hour w/o recurrence of pain, although at times, she may have to take a 2nd dose for persistent sx. Midrin, ES Tylenol and OTC nsaids were not helpful. HYdrocodone helps with neck pain. She has had 2 epidural inj this year, and while the 1st one helped, she has noticed that her headaches worsened after the 2nd. BC powder is effective for milder pains. Start Magnesium 250 mg bid, CoQ10 200 mg bid, Vit B2 (riboflavin) 200 mg bid. Advised not to discontinue treatment prematurely as it may take 2-3 weeks to reach a therapeutic level and dosage can be adjusted further to achieve desired benefit, unless side effects are prominent.  o Neurologist (11/06/2017)  - Type: Nonspecific. She notes that wine triggers her headaches. She is presently on birth control patch with a history of a hysterectomy total at 33 years old. She has occasional aura prior to the headache in the left eye described as blurriness. She does suffer from low blood pressure and notes that she has orthostatic hypotension.  o Neurologist (01/26/2018)  - Type: Nonspecific. Recommendations/Plan increase to Imitrex 100 mg tablet at onset of headache. Stop amitriptyline 10 mg every other night for 3 nights and then stop. Discussed possibility of using Trokendi however suggested getting off the amitriptyline for a week or so first and keep a headache diary as to the frequency of the headaches to determine whether or not she should be on preventive medication at this point. Keep headache diary; follow-u 2 month:.  o Hospitalization (04/17/2018)  - Type: Nonspecific. Hx of bipolar disorder and multiple kidney infections. She was sent here by her psychiatrist for possible lithium toxicity. Pt has been on lithium for 10 years and the normal is in the range of 0. 5 and 0. 6. Pt had oral surgery two weeks ago and was discharged with 800 mg ibuprofen TID. Pt has been taking  ibuprofen for these two weeks. She has been off balance and dizzy for few days and also has been confused (like she is getting forgetful). She also gets tired more than usual with exertion. Pt stated that since ibuprofen is not compatible with lithium and since she is sensitive to the lithium , her psychiatrist sent her here to be evaluated for possible toxicity. Pt also notes of headache and suprapubic tenderness today.  o Neurologist (05/18/2018)  - Type: Nonspecific. She was on Lexapro and this is being weaned off. She is scheduled to start Viibryd for depression however wants to try the Topamax for the headache prevention. We discussed checking with her psychiatrist in reference to the Topamax. Would probably suggest starting the Topamax for headache prevention and mood stabilization to see if that will help before starting the Viibryd.  o Neurologist (12/07/2018)  - Type: Nonspecific. The patient presents for follow-up for headaches. She has had 6 significant headaches in the last 6 weeks. Imitrex 100 mg helps. She is stop the amitriptyline. She continues with neck pain. MRI shows significant cervical spondylosis with severe C3-C4 and C4-C5 arthropathy approaching fusion. Recommendations/Plan continue Imitrex 100 mg  tablet at onset of headache.    we discussed starting Trokendi XR 25 mg at night. She will follow-up with orthopedics for her MRI of the cervical spine. She will also inquire as to the need for CT of the cervical spine for a questionable vertebral body lesion (hemangioma).   .   Keep headache diary; follow-up 3 month.     Current Presentation   Recent/Interim History:  o Patient presents alone reporting a 16 year history of nonprogressive cognitive impairment. Patient reports a lifetime history of cyclothymic behavior that was otherwise non bothersome in fact improve general functional capacity. Patient denies any prior history of manic episodes. Patient reports that in the setting of multiple  "psychosocial social stressors in 2004 patient had a emotional/ mental/ mood breakdown resulting in hospitalization. Patient was diagnosed with bipolar disorder at this time and started on lithium. Patient reports a general slowing of cognition while on lithium however still remained functional. Patient reports that her mood and behavior was somewhat blunted, and she "went along with things "that she would have done in the past. Patient denies any overt cognitive impairment at this time aside from a general slowing. She still remained functionally and cognitively capable of managing multiple and community responsibilities in sharing multiple boards. In 2019 in the process of self evaluation she was weaned off of her psychiatric medications and underwent a formal neurocognitive evaluation. Records from this event are not available for secondary review however she self reports that she scored "less than ideal ", and she presents today reporting a desire to be re-evaluated having now been tapered off her prior psychiatric medication. Patient denies any significant bothersome neurocognitive impairment at this time. She reports that her daughter has mentioned that she does repeat herself more often however not outside the norm and not to a particularly bothersome level. Patient reports otherwise non bothersome word-finding name finding difficulty. Patient reports she does feel mildly slower than she has in previous years but again her own report on outside which she feels is normal aging. Patient does report 2 episodes of "going on autopilot "on a long drive home from [place] at night and inadvertently finding herself going in the wrong direction. patient's primary Stephenie self-reported deficits include difficulty with selective, sustained, and divided attention which does find mildly bothersome and a slight worsening of her baseline tendencies. On presentation, patient's primary concerns include her family history of " dementia and her individual risk for progressive cognitive impairment.   Unresolved Concern(s) reported by patient/family:  o History of bipolar disorder on lithium 14 years  o Family history of late onset dementia in her mother.        Review of cognitive, visuospatial, motor, sensory, and behavioral systems:     Memory:    Ms. Mejía memory has worsened in the past few years.   She does repeat statements or asks the same question repeatedly.   She does not have difficulty remembering recent important conversations.   She does not have difficulty remembering recent events.   She does not forget information within minutes.   Her remote memory is intact.   Her recent retrograde memory is intact.  Attention:    Her attention and concentration are impaired.   She does not have attentional fluctuations.   She does have difficulty with selective attention.   She does become easily distracted.   She does have difficulty with divided attention.  Executive:    Ms. Mejía cognitive processing speed is slower.   She does have difficulty with working memory.   She does not misplace personal items (e.g., keys, cell phone, wallet) more frequently.   She does not have difficulty keeping track of her medications.   She does not have difficulty with planning/organizing/completing multistep tasks.   She does not have difficulty with executive attention.   She does have difficulty with flexible thinking.   She does not have difficulty with response inhibition.   She denies new impulsivity or rash/careless actions.   Her judgment is intact.  Language:    Her speech output is unaffected.   She does forget people's names more frequently.   She does have word-finding difficulties.   Ms. Mejía speech is fluent and non-effortful.   Ms. Mejía speech is grammatically intact.   She does not make word substitutions.   She does not have difficulty reading.   She does not appear to have impaired  comprehension.  Visuospatial:    She has new visuospatial problems.   She does not become confused or disoriented in *new*, unfamiliar places.   She does not have trouble with navigation.   She does not get lost in familiar places.   Ms. Andre does not have visuospatial disorientation.   She does not have difficulty recognizing objects or faces.   She denies problems with driving or parking.  Motor/Coordination:    Ms. Andre does not have difficulty with walking.   She does not feel imbalanced.   She denies having fallen.   She does not appear to have new muscle weakness.   She does not have difficulty buttoning shirts, operating zippers, or manipulating tools/utensils.   Her handwriting has not become micrographic.   She does not have a resting tremor.   She denies having any new involuntary movements and/or muscle jerking.   She does not have swallowing difficulty.   She denies new muscle cramps and twitching.  Sensory:    Ms. Andre denies new numbness, tingling, paresthesias, or pain.   Ms. Andre denies a loss of vision, blurry vision, or double vision.   Ms. Andre denies new loss of hearing or worsening tinnitus.   Ms. Andre denies anosmia.  Sleep:    Ms. Andre reports difficulty sleeping.   Ms. Andre does have difficulty going to sleep.   Ms. Andre reports difficulty staying asleep and/or frequently awakening at night.   Ms. Andre does not snore or have witnessed apneas while sleeping.   When she wakes up in the morning, she does feel well-rested.   She denies dream-enactment behavior.   She denies symptoms suggestive of restless leg syndrome.  Behavior:    Ms. Andre's personality has not changed.   She does not have symptoms of disinhibition and social inappropriateness.   She does not have symptoms to suggest a loss of manners or decorum.   She does not appear apathetic or has decreased motivation.   She does not appear to have a change in inertia.   There is no report that Ms. Andre has  had a change in their emotional expression.   She does not have emotional blunting or lability.   She does not have symptoms of irritability and mood lability.   She does not have symptoms of agitation, aggression, or violent outbursts.   Her insight into his health and situation is intact.   Her personal hygiene is intact.   She is not exhibiting a diminished response to other people's needs and feelings?   She is not exhibiting a diminished social interest, interrelatedness, or personal warmth.   She denies restlessness.   She denies new and/or worsening simple repetitive behaviors.   Her speech has not become simplified or become repetitive/stereotyped.   She denies new/worsening complex repetitive/ritualistic compulsions and behaviors.   She does not have symptoms of hyper-religiosity or dogmatism.   Her interests/pleasures have not become restrictive, simplified, interrupting, or repetitive.   She denies a change of self-stimulating behavior.   She denies any changes in eating behavior.   She denies increased consumption of food or substances.   She denies oral exploration or consumption of inedible objects.  Psychiatric:    She does not feel depressed.   She is not exhibiting symptoms of social withdrawal/indifference.   She does have anxiety.   She does not exhibit cycling behavior.   She does exhibit hyperactive behavior.   She is not exhibited symptoms of paranoia.   She does not have delusions.   She does not have hallucinations.   She does not have a history of sensitivity to neuroleptic/psychotropic medications.  Medical Review of Systems:    Ms. Andre does not have constipation.   Ms. Andre does not have urinary incontinence.   Ms. Andre denies orthostatic lightheadedness.   Ms. Andre's weight is stable.  Functional status:   Difficulty performing the following Instrumental ADLs:  o Housekeeping: No  o Food Preparation: No  o Shopping: No  o Ability to Handle Finances:  No  o Transportation/Driving: No  o Household Appliances/Stove: No  o Laundry: No   Difficulty performing the following Basic ADLs:  o Dressing: No  o Bathing: No  o Toileting: No  o Personal hygiene and grooming: No  o Feeding: No  Care Management:   Patient/Family Safety Concerns:  o Medication Adherence: No  o Home Safety: No  o Wandered: No  o Firearms: No  o Fall Risk: No  o Home Alone: No       Past Medical History:   Diagnosis Date    Allergy     Arthritis     Headache(784.0)     Herniated disc     x4 in cervical area    History of migraine headaches     Hypothyroidism     Infections of kidney     has had multiple kidney infections in the past.    Low back pain     Migraine headache     Moderate mood disorder     Neck pain     Rotator cuff tear     Thyroid disease        Past Surgical History:   Procedure Laterality Date    BLADDER SUSPENSION      COCCYGECTOMY  1/19/2021    Procedure: COCCYGECTOMY;  Surgeon: Daron Scruggs MD;  Location: [facility] OR 2ND FLR;  Service: Colon and Rectal;;    COLONOSCOPY N/A 5/5/2016    Procedure: COLONOSCOPY;  Surgeon: Daron Scruggs MD;  Location: [facility] (4TH FLR);  Service: Endoscopy;  Laterality: N/A;    COLONOSCOPY N/A 12/28/2020    Procedure: COLONOSCOPY;  Surgeon: Daron Scruggs MD;  Location: [facility] (4TH FLR);  Service: Endoscopy;  Laterality: N/A;  Covid test at Drive thru on 12/25. No viditor policy discussed.  12/23/20-pt confirmed updated arrival time of 12:15 pm-BB    COSMETIC SURGERY      face lift     CYSTOSCOPY      ESOPHAGOGASTRODUODENOSCOPY N/A 2/18/2020    Procedure: ESOPHAGOGASTRODUODENOSCOPY (EGD);  Surgeon: Rip Davis MD;  Location: [facility] (4TH FLR);  Service: Endoscopy;  Laterality: N/A;  [id number] ride-tb    EXCISION, CYST, RETRORECTAL OR PELVIC, POSTERIOR APPROACH N/A 1/19/2021    Procedure: EXCISION,CYST,RETRORECTAL OR PELVIC,POSTERIOR APPROACH;  Surgeon: Daron Scruggs MD;  Location: [facility] OR  2ND FLR;  Service: Colon and Rectal;  Laterality: N/A;    EYE SURGERY      HYSTERECTOMY  1988    age 33 CECY/BSO (pain, bleeding)    OOPHORECTOMY  1988    TONSILLECTOMY         Family History   Problem Relation Age of Onset    Hypertension Mother     Migraines Mother     Tremor Mother     Hyperlipidemia Mother     Allergies Mother     Hypertension Sister     Uterine cancer Maternal Grandmother     Breast cancer Maternal Aunt     Asthma Brother     Allergies Son     Asthma Son     Colon cancer Neg Hx     Ovarian cancer Neg Hx        Social History     Socioeconomic History    Marital status:     Number of children: 2    Years of education: 18   Occupational History    Occupation: Homemaker/Retired   Tobacco Use    Smoking status: Never Smoker    Smokeless tobacco: Never Used   Substance and Sexual Activity    Alcohol use: No    Drug use: No    Sexual activity: Yes     Partners: Male       Medication:     Current Outpatient Medications on File Prior to Visit   Medication Sig Dispense Refill    ALPRAZolam (XANAX) 0.25 MG tablet One-two during a flight for anxiety 5 tablet 0    celecoxib (CELEBREX) 200 MG capsule Take 1 capsule (200 mg total) by mouth once daily. 30 capsule 1    estradioL (VIVELLE-DOT) 0.0375 mg/24 hr APPLY 1 PATCH TOPICALLY TO THE SKIN 2 TIMES A WEEK 24 patch 3    eszopiclone (LUNESTA) 3 mg Tab Take 2 mg by mouth every evening.      gabapentin (NEURONTIN) 300 MG capsule Take 1 capsule (300 mg total) by mouth 2 (two) times daily. 30 capsule 1    levothyroxine (SYNTHROID) 100 MCG tablet Take 1 tablet (100 mcg total) by mouth before breakfast. 90 tablet 3    pantoprazole (PROTONIX) 40 MG tablet TAKE 1 TABLET(40 MG) BY MOUTH TWICE DAILY 60 tablet 3    diclofenac potassium (CAMBIA) 50 mg PwPk One to two packs for onset of migraine (Patient not taking: Reported on 1/21/2022) 9 each 0     No current facility-administered medications on file prior to visit.        Review  of patient's allergies indicates:   Allergen Reactions    Klonopin [clonazepam] Other (See Comments)     Severe mouth ulcers    Rocephin [ceftriaxone]      Severe migraine    Amoxicillin Other (See Comments)     Headache    Abilify [aripiprazole] Other (See Comments)     Tardive dyskinesia  - do not give any other meds that affect dopamine    Clindamycin Other (See Comments)     Nausea, headache    Codeine      Other reaction(s): Unknown    Demerol [meperidine]      Interacts with meds    Erythromycin      Other reaction(s): Unknown    Fioricet  [butalbital-acetaminophen-caff] Other (See Comments)       Medications Reconciliation:   I have reconciled the patient's home medications and discharge medications with the patient/family. I have updated all changes.  Refer to After-Visit Medication List.    Objective:  Vital Signs:  Vitals:    01/21/22 1040   BP: 103/71   Pulse: 74     Wt Readings from Last 3 Encounters:   12/09/21 1308 66.7 kg (147 lb)   10/24/21 1541 65.8 kg (145 lb)   09/28/21 1051 65.8 kg (145 lb)     Body mass index is 23.02 kg/m².     Neurological examination:      Mental Status:       Ms. Andre's energy level is abnormal.    Comment: Arousal/Energy Level is elevated   Her appearance is normal (hygiene is appropriate; attire is proper and clean).   She has appropriate attention/concentration (VERONA/EDGAR forwards and backward).   She can complete three-step commands.   Her thought process is logical and goal-oriented.   She has no evidence of hallucinations (auditory, visual, olfactory).   She has no evidence of delusions (paranoid, grandiose, bizarre).   She demonstrated good judgment based on actions and plans for the future.   She demonstrated appropriate insight based on actions, awareness of her illness, plans for the future.   Throughout the interview, she is cooperative, her eye contact is appropriate.  Cranial Nerves:    Her pupils were normal.   Her elemental visual acuity  "assessment demonstrated normal OS vision and normal OD vision.   Her visual fields were full to confrontation in all quadrants.   Her ocular pursuit in the horizontal and vertical plane was complete.   Her saccadic initiation, velocity, and amplitude are normal.   She demonstrated no square-wave jerks.   Her eyelid assessment showed no apraxia. There was no eyelid dysfunction, retraction, or ramirez sign.   Her facial strength was normal.   Her facial expression was symmetric and appropriate to the context.   Her tongue showed no evidence of scalloping.   Her tongue showed no evidence of fasciculation or scalloping.   She can protrude their tongue beyond Her lips for >10 sec.   She can move their extended tongue back and forth rapidly.   She had no significant evidence of anterocollis or retrocollis.  Speech/Language:    Ms. Mejía speech was fluent, non-effortful, and her rate was appropriate to the context.   Her speech volume is within normal range and appropriate to the context.   Her speech rate is normal.   She has no articulation (segmental features) errors.   She has no prosody (suprasegmental features) errors.   Ms. Mejía speech is not dysarthric.   Ms. Mejía speech was without evidence of anomia.   She showed no evidence of anomia during spontaneous speech.   She showed no evidence of anomia during confrontational naming; 12/12 (correct chocolate bar, kangaroo, theater, Bahai, doctor, potato, battery, ice cube tray, thermometer, flower, bomb, and calendar).   She makes no phonological loop errors.   She makes no errors during the repetition of gibberish words (e.g., "Supercalifragilisticexpialidocious," "Pigglywiggly," "Woospiedoo," "Zowzy," "Bazinga").   She makes no errors during the repetition of complex meaningless phrases (e.g., "The horse raced past the barn fell.", "The complex houses  and single soldiers and their families," "Wishes are hopping, and trees are " "west," and "Brushing liked to марина xiao's direction").   She can comprehend commands that cross the midline (e.g., with your left thumb, touch your right ear).   She can comprehend commands that depend on syntax (e.g., point to the ceiling after you point to the floor).   Her speech is grammatically intact; (no function/semantic word substitutions, phonemic/semantic paraphasias, or binary confusion).   She makes no superordinate errors when asked to draw an animal (e.g., elephant/giraffe).  Motor:    Ms. Andre's bilateral upper extremity muscle bulk is appropriate.   Ms. Gomezs bilateral upper extremity muscle tone is not increased.   Assessment of motor strength was symmetric and at minimal anti-gravity.   There is no pronator or downward drift.   There is no upward drift.   There is no outward/diagonal drift.   There is no myoclonus observed in Ms. Andre's bilateral upper and lower extremities.   There are no fasciculations observed in Ms. Andre's bilateral upper and lower extremities.  Coordination:    She has no bilateral upper extremity limb dysmetria or past pointing on finger-nose-finger bilaterally.   She has no limb dysdiadochokinesia of the upper extremity on the pronation/supination test and screwing in a light bulb or lower extremity during tapping ball of each foot bilaterally.   She has no cerebellar rebound bilaterally.   She has a visible tremor.    Comment: VERY MILD DISTAL FINE MOTOR TREMOR LIKELY DUE TO LONGSTANDING LITHIUM USE   She has no kinetic tremor bilaterally.   She has a postural tremor.   She has no resting tremor bilaterally.   She has evidence of interhemispheric motor control deficits.   She demonstrates evidence of motor overflow.   Ms. Gomezs bilateral upper extremity coordination with finger tapping, pronation/supination, and the open-close fist showed no slowing, no hypometria, and no dysrhythmia inconsistent with bradykinesia.   Ms. Gomezs bilateral upper " extremity coordination with finger tapping, pronation/supination, and the open-close fist showed slowing.   Ms. Andre's bilateral upper extremity coordination with finger tapping, pronation/supination, and the open-close fist showed hypometria.   Ms. Andre's bilateral upper extremity coordination with finger tapping, pronation/supination, and the open-close fist showed dysrhythmia.  Higher Cortical Function:    Ms. Andre showed no evidence of simultanagnosia (Navon hierarchical letters).   She demonstrates no evidence of dorsal simultanagnosia (overlapping objects).   She demonstrates no evidence of ventral simultanagnosia (complex picture synthesis).   Ms. Andre showed no evidence of visuospatial constructional dysfunction.   Ms. Andre showed no evidence of agnosia.   Ms. Andre showed no evidence of angular gyrus disconnection (insular-operculum).   She has no left-right confusion.   Ms. Andre showed no evidence of apraxia.   She showed no dysexecutive behavior.   She has no perseverative or stereotyped behavior.  Sensory:    Her cortical sensory assessment demonstrated no neglect bilaterally.   She he had no astereognosis (paper clip, ring, dime) bilaterally in the palms.   She he had no agraphesthesia (drawing numbers) in the palms.  Reflexes:    Reflexes were symmetric and 2+ at biceps, 2+ triceps, and 2+ brachioradialis, 2+ at the knees bilaterally, there was no cross-abductor sign, 2+ in the bilateral ankles.   There was no spreading/clonus.  Gait:    She has normal posture sitting unaided.   She can arise from a chair and sit back down without using their arms.   Her gait was normal.   Her posture while walking is normal.   Her gait initiation/inhibition was normal.   Her stance while walking is normal.   Her gait speed (6 meters) was normal (70-80 F 1.13 m/s M 1.26 m/s, >80 F 0.94 m/sec, M 0.97 m/sec).   Her stride including step-time, step-width, and step-length was normal.   Her arms  swing is symmetric and of normal amplitude.   She takes turns in <4 steps.   When attempting to walk abnormally (heels, tiptoes, tandem), she makes no errors.   She has no evidence of posture/balance impairment.   She has no evidence of a specific gait disorder.  Neuropsychological Evaluation Summary:     Prior Neurocognitive/Neuropsychological Evaluations   Summary from EMR:    Neurocognitive Evaluation completed on 01/21/2022:  Memory   Registration-3 3/3 Within Normal Limits.   Recall-3 3/3 Within Normal Limits.   Recall-5 5/5 Within Normal Limits.   Registration-5 5/0     T1 5/9 Within Normal Limits.   T2 7/9 Within Normal Limits.   T3 8/9 Within Normal Limits.   T4 9/9 Within Normal Limits.   T5 9/9 Within Normal Limits.   DR-30 Sec 9/9 Within Normal Limits.   DR-10 min 9/9 Within Normal Limits.   DR-Cued 9/9 Within Normal Limits.   Executive   Three-step command 3/3 Within Normal Limits.   Trials-1 1/1 Within Normal Limits.   WORLD Backward 5/5 Within Normal Limits.   Digit Span - 2 2/2 Within Normal Limits.   Serial Sevens 3/3 Within Normal Limits.   Fluency 1/1 Within Normal Limits.   Digit Span Backwards 4 Within Normal Limits.   Lexical Fluency - D 20 Within Normal Limits.   Lexical Fluency - F 18 Within Normal Limits.   Semantic Fluency - Animals 24 Within Normal Limits.   Visuospatial   Intersecting Pentagons 1/1 Within Normal Limits.   3D Cube Copy 1/1 Within Normal Limits.   Clock Draw 3/3 Within Normal Limits.   Overlapping Images 5/5 Within Normal Limits.   Picture Synthesis 3/3 Within Normal Limits.   Noise Pareidolia Test 5/5 Within Normal Limits.   Object Decision 4/4 Within Normal Limits.   Guerrero 4/4 Within Normal Limits.   Shape Detection 2/2 Within Normal Limits.   Animal Silhouette 3/3 Within Normal Limits.   Attention   Orientation-10 10/10 Within Normal Limits.   Orientation-6 6/6 Within Normal Limits.   Alternating Sequence 1/1 Within Normal Limits.   Digit Span Forwards 5 Impairment:  -2.1 STDs below the average score based on age and education.   Language   Repetition-1 1/1 Within Normal Limits.   Naming-2 2/2 Within Normal Limits.   Following written command 1/1 Within Normal Limits.   Writing a complete sentence 1/1 Within Normal Limits.   Naming-3 3/3 Within Normal Limits.   Repetition-2 2/2 Within Normal Limits.   Abstraction 2/2 Within Normal Limits.   Confrontational Naming 12/12 Within Normal Limits.   Repetition of Phrases 5/5 Within Normal Limits.   Verbal Agility 6/6 Within Normal Limits.   Aggregate Scores   MMSE 30/30 MMSE Score suggestive of normal to questionable cognitive impairment.   MOCA 30/30 MOCA Score suggestive of normal to questionable cognitive impairment.   Neuropsychiatric/Behavioral Focused Evaluation Assessment   BEHAV5+ 1/6 See ROS section for a full description   Laboratories:     Lab Date Value [Reference]   Autoimmune/Paraneoplastic Screening           CRP 2021, Dec-06  2021, Mar-24  2020, Nov-27    2.5 [0.0 - 8.2 mg/L]  1.1 [0.0 - 8.2 mg/L]  1.2 [0.0 - 8.2 mg/L]      Sed Rate 2021, Dec-06  2021, Mar-24  2020, Nov-27    24 [0 - 36 mm/Hr]  14 [0 - 36 mm/Hr]  4 [0 - 36 mm/Hr]      Coagulopathy Screening   D-Dimer 2020, Oct-21    0.96 (H)      INR 2020, Dec-23    1.0 [0.8 - 1.2]      Protime 01/15/2021  10.3 [9.0 - 12.5 sec]      Metabolic Screening   Free T4 10/21/08649625, Oct-21    1.44 [0.71 - 1.51 ng/dL]  1.45 [0.71 - 1.51 ng/dL]      Hemoglobin A1C External 12/06/2021  5.3 [4.0 - 5.6 %]  5.8 (H) [4.0 - 5.6 %]      TSH 10/21/2020  1.075 [0.400 - 4.000 uIU/mL]  1.283 [0.400 - 4.000 uIU/mL]  0.135 (L) [0.400 - 4.000 uIU/mL]      Cholesterol 2021, Dec-06    214 (H) [120 - 199 mg/dL]      HDL 2021, Dec-06    68 [40 - 75 mg/dL]      Non-HDL Cholesterol 2021, Dec-06    146 [mg/dL]      Triglycerides 2021, Dec-06    74 [30 - 150 mg/dL]      Vit D, 25-Hydroxy 01/20/2021  35 [30 - 96 ng/mL]  44 [30 - 96 ng/mL]      Neuroendocrine/Electrolyte Screening   Magnesium 2021,  Jan-20    1.8 [1.6 - 2.6 mg/dL]      Phosphorus 2021, Jan-20    3.5 [2.7 - 4.5 mg/dL]      Creatinine 2021, Jul-29    0.7 [0.5 - 1.4 mg/dL]      Infectious Disease/Immunocompromised Screening   SARS-CoV-2 RNA, Amplification, Qual 2020, Nov-16    Negative      SARS-CoV2 (COVID-19) Qualitative PCR 2021, Cosme-16  2020, Dec-25    Not Detected [Not Detected]  Not Detected [Not Detected]      Delirium Screening   Glucose, UA 2021, Dec-09    Negative      Ketones, UA 2021, Dec-09    Negative      Leukocytes, UA 2021, Dec-09    Negative      NITRITE UA 2021, Dec-09    Negative      Protein, UA 2021, Dec-09    Negative           Neuroimaging:    MRI brain/head without contrast on 6/9/2020   Technique: Multiplanar multisequence MR imaging of the brain was performed without intravenous contrast.   Formal interpretation by Radiology:   Stable exam. No evidence of acute intracranial pathology.   Independently reviewed radiological imaging by Rip Maynard MD. MPH. Behavioral Neurologist   T1: No significant cortical atrophy with age-appropriate changes. Subcortical nuclei and hippocampi are without significant atrophy.   T2/FLAIR: No Significant hyperintensities appreciated on MRI T2/FLAIR   DWI/ADC: No Significant DWI hyperintensities/hypointensities. No ADC correlation.   SWI/GRE: No Significant hypointensities to suggest cortical/subcortical hemosiderin deposition.   Impression: : Normal Brain Imaging.     Procedures:  No behavioral neurology relevant procedures are currently available for review.      Clinical Summary:     Ms. Andre is a 66-year-old right-handed female with a relevant past medical history of Mood disorder with a prior diagnosis of bipolar disease, hypothyroidism, migraine headaches, chronic neck/ back pain, GERD, who presents reporting a 17-year history of static neurocognitive impairment.       The clinical history is suggestive of:   Memory Impairment: STM encoding impairment   Attention  Impairment: Attention, Selective attention, Sustained attention, Shifting attention   Executive Impairment: Energization, Working Memory   Language Impairment: Language Dysfunction   Psychiatric Impairment: Signal-Noise Dysregulation, Stimulation Dysregulation  The neurological examination is significant for:   Cortical Transcallosal Disconnection: interhemispheric motor control (interhemispheric motor control ), motor efference (motor overflow)   Movement Disorder (Hyperkinetic): tremor (postural)  Informal neuropsychology battery is positive (based on age and education) for:   Mild Attention predominant MCI   Moderate Attention Impairment: She scored >2 standard deviations below the norm on at least one measure. She had difficulty with verbal short term memory.   MMSE 30/30: MMSE Score suggestive of normal to questionable cognitive impairment.   MOCA 30/30: MOCA Score suggestive of normal to questionable cognitive impairment.   BEHAV5+ 1/6: See ROS section for a full description  Neurological imaging   MRI brain/head without contrast (6/9/2020): Normal Brain Imaging.        Assessment:        Ms. Andre's clinical presentation is isolated attention impairment (CDR-SOB: 0.5 - Questionable cognitive impairment). Given Ms. Andre's high functional baseline, this does appear to be a mild decline relative to her baseline as such she meets basic criteria for Mild Cognitive Impairment (MCI-ADRC) (Cash MS, et al. 2011 Alzheimer's & Dementia).   Concern regarding an intraindividual change in cognition   Impairment in one or more cognitive domains (>1-1.5 SD below the norm)   Preservation of independence in functional abilities.   Not demented     The pathology underlying Ms. Gomezs cognitive impairment is unclear at this time. The patient has a lifetime history of hyperactive tendencies and carries the former diagnosis of bipolar disorder however the patient notes that she has been told by her former  psychiatrist this diagnosis is  inaccurate. Regardless, patient  is hyperactive during the evaluation which does interfere with her ability to concentrate on certain tasks as evidence by her limited digit span relative to all other cognitive function. Patients with bipolar disorder or  hyperactive tendencies tend to score below their peers on measures of executive function and attention function as they age. This in of itself has not been proven to be a risk factor for dementia. At this time there is a low suspicion for neurodegeneration however their remains the possibility of an indolent neurodegenerative disease process.      The observations made above were discussed with the patient. We discussed patient's life time baseline temperament and hyperactive tendencies and her former diagnosis of bipolar disorder and how this is currently shaping in her subjective cognitive impairment with mild objective attention impairment. We discussed how patient's baseline tendencies may be affecting her current neurocognitive performance. We discuss means of mitigating this impairment with diet, exercise, and medication management. We discussed the benefits of insomnia management and the need to decrease her dependence on benzodiazepines and sedative agents.     Given borderline impairment, we have offered a more thorough evaluation by neuropsychology however patient has declined at this time. We have offered opportunities for biomarker testing for which the patient is not interested at this time. We recommend longitudinal follow-up. Recommend screening for reversible cause of cognitive impairment with serum laboratories.        Care Management Plan:     #Diagnostic Workup:    Laboratories: Hcy, Free T4, TSH, B1, B9, B12, MMA, HIV Ab/Ag, RPR, D  #Behavioral Disorder Treatment:   No indication for memantine at this time  #Neurocognitive Disorder Treatment:   We have discussed opportunities for further testing with  "neuropsychology   We have discussed opportunities for further testing with CSF biomarkers or Amyloid-PET   We have discussed dietary in and lifestyle adjustments.   We discussed opportunities for yoga and aqua exercise  #Migraines   Headache significantly improved following removal of lithium.  Patient is currently having 1 headache per month.  Headaches responsive to p.r.n. Imitrex and Toradol.  #Insomnia Treatment:   If patient's has return of insomnia following the removal of these agents will consider ramelteon or Belsomra.  #Behavioral/Environmental Treatment   We recommend engaging in activities that stimulate cognitively and socially while avoiding excessive stimulation and fatigue in overwhelmingly complex situations.   We recommend integrating routine and schedule into your daily life. https://www.alzheimersproject.org/news/the-importance-of-routine-and-familiarity-to-persons-with-dementia/  #Health Maintenance/Lifestyle Advice   We have discussed the value in aggressively controlling vascular risk factors like hypertension, hyperlipidemia, and Diabetes SBP<130, LDL<100, A1C<7.0.   We discussed the need to optimize lifestyle choices including a heart-healthy diet (e.g., Mediterranean or DASH), increased cardiovascular exercise (goal 150 minutes of moderate-intensity per week), and stay cognitively and socially active.  #Support   We all need support sometimes. Get easy access to local resources, community programs, and services. https://www.communityresourcefinder.org/   Learn more about Cognitive Impairment in [place]: https://www.alz.org/professionals/public-health/state-overview/[place]  #Safety   The Alzheimer's Association administers the nationwide "Safe Return" program with identification bracelets, necklaces, or clothing tags and 24-hour assistance. More information is available online at " https://www.alz.org/help-support/caregiving/safety/medicalert-with-24-7-wandering-support  #Follow up:   Follow-up as needed.    Thank you for allowing us to participate in the care of your patient. Please do not hesitate to contact us with any questions or concerns.     It was a pleasure seeing Ms. Andre and we look forward to seeing them at their follow-up visit.     This note is dictated on M*Modal Fluency Direct word recognition program. There are word recognition mistakes that are occasionally missed on review.      Scheduled Follow-up :  No future appointments.    After Visit Medication List :     Medication List          Accurate as of January 21, 2022 12:53 PM. If you have any questions, ask your nurse or doctor.            CONTINUE taking these medications    ALPRAZolam 0.25 MG tablet  Commonly known as: XANAX  One-two during a flight for anxiety     celecoxib 200 MG capsule  Commonly known as: CeleBREX  Take 1 capsule (200 mg total) by mouth once daily.     diclofenac potassium 50 mg Pwpk  Commonly known as: CAMBIA  One to two packs for onset of migraine     estradioL 0.0375 mg/24 hr  Commonly known as: VIVELLE-DOT  APPLY 1 PATCH TOPICALLY TO THE SKIN 2 TIMES A WEEK     eszopiclone 3 mg Tab  Commonly known as: LUNESTA     gabapentin 300 MG capsule  Commonly known as: NEURONTIN  Take 1 capsule (300 mg total) by mouth 2 (two) times daily.     levothyroxine 100 MCG tablet  Commonly known as: SYNTHROID  Take 1 tablet (100 mcg total) by mouth before breakfast.     pantoprazole 40 MG tablet  Commonly known as: PROTONIX  TAKE 1 TABLET(40 MG) BY MOUTH TWICE DAILY            Signing Physician:  Rip Russell MD    Billing:    -----------------------------------------------------------------------------    I spent a total of 100 minutes (time-in: 10:45 AM; time-out: 12:25 PM) on 01/21/2022, in-person face-to-face with the patient and caregiver(s), >50% of that time was spent counseling regarding the symptoms,  treatment plan, risks, therapeutic options, lifestyle modifications, and/or safety issues for the diagnoses above.    10/14 Review of Systems completed and is negative except as stated above in HPI (Systems reviewed: Const, Eyes, ENT, Resp, CV, GI, , MSK, Skin, Neuro)    I reviewed previous labs for a total of 5 minutes on 01/21/2022. This is directly related to the face-to-face encounter. Review of previous labs was performed all negative except as stated above in HPI    I reviewed previous diagnostic testing for a total of 5 minutes on 01/21/2022. This is directly related to the face-to-face encounter. A review of previous diagnostic testing was performed was noted to be within normal limits except as is stated above in HPI    I independently reviewed radiological imaging, specimen, and tracing for a total of 5 minutes on 01/21/2022. This is directly related to the face-to-face encounter. Independent review of radiological imaging, specimen, and tracing was noted to be within normal limits except as stated above in HPI    I reviewed the summation of records from outside physicians for a total of 5 minutes on 01/21/2022. Reviewed and summation of records from an outside physician was performed as summarized above in HPI    I performed a neurobehavioral status examination that included a clinical assessment of thinking, reasoning, and judgment. Please see above HPI and ROS for full details. This exam was performed on 01/21/2022 and included 13 minutes spent on direct face-to-face clinical observation and interview with the patient and 23 minutes spent interpreting test results and preparing the report. The total time of 36 minutes spent on the neurobehavioral status examination is not included in the time spent on evaluation and management coding.    I performed a neuropsychological evaluation that included the application of a series of standardized neurocognitive tests. Please see the informal  neuropsychological assessment above for full details. This evaluation was performed on 01/21/2022 and included 13 minutes spent on direct face-to-face clinical standardized test administration with the patient and 24 minutes spent on interpreting standardized test results, integrating patient data into a treatment plan, and providing feedback to the patient and caregiver. The total time of 37 minutes spent on the neuropsychological evaluation is not included in the time spent on evaluation and management coding.        Total Billing time spent on encounter/documentation for this patient's evaluation and management, not including the neurobehavioral status examination and neuropsychological evaluation: 94 minutes.

## 2022-01-27 ENCOUNTER — LAB VISIT (OUTPATIENT)
Dept: LAB | Facility: HOSPITAL | Age: 67
End: 2022-01-27
Attending: PSYCHIATRY & NEUROLOGY
Payer: COMMERCIAL

## 2022-01-27 DIAGNOSIS — R41.3 OTHER AMNESIA: ICD-10-CM

## 2022-01-27 DIAGNOSIS — G31.84 MCI (MILD COGNITIVE IMPAIRMENT): ICD-10-CM

## 2022-01-27 DIAGNOSIS — R41.3 MEMORY CHANGE: ICD-10-CM

## 2022-01-27 LAB
ALBUMIN SERPL BCP-MCNC: 3.9 G/DL (ref 3.5–5.2)
ALP SERPL-CCNC: 51 U/L (ref 55–135)
ALT SERPL W/O P-5'-P-CCNC: 10 U/L (ref 10–44)
ANION GAP SERPL CALC-SCNC: 6 MMOL/L (ref 8–16)
APTT BLDCRRT: 25.9 SEC (ref 21–32)
AST SERPL-CCNC: 19 U/L (ref 10–40)
BASOPHILS # BLD AUTO: 0.04 K/UL (ref 0–0.2)
BASOPHILS NFR BLD: 1.3 % (ref 0–1.9)
BILIRUB SERPL-MCNC: 2.5 MG/DL (ref 0.1–1)
BUN SERPL-MCNC: 10 MG/DL (ref 8–23)
CALCIUM SERPL-MCNC: 9.7 MG/DL (ref 8.7–10.5)
CHLORIDE SERPL-SCNC: 101 MMOL/L (ref 95–110)
CO2 SERPL-SCNC: 26 MMOL/L (ref 23–29)
CREAT SERPL-MCNC: 0.7 MG/DL (ref 0.5–1.4)
DIFFERENTIAL METHOD: ABNORMAL
EOSINOPHIL # BLD AUTO: 0.1 K/UL (ref 0–0.5)
EOSINOPHIL NFR BLD: 2.3 % (ref 0–8)
ERYTHROCYTE [DISTWIDTH] IN BLOOD BY AUTOMATED COUNT: 12 % (ref 11.5–14.5)
EST. GFR  (AFRICAN AMERICAN): >60 ML/MIN/1.73 M^2
EST. GFR  (NON AFRICAN AMERICAN): >60 ML/MIN/1.73 M^2
FOLATE SERPL-MCNC: 11.3 NG/ML (ref 4–24)
GLUCOSE SERPL-MCNC: 92 MG/DL (ref 70–110)
HCT VFR BLD AUTO: 45.3 % (ref 37–48.5)
HCYS SERPL-SCNC: 7.7 UMOL/L (ref 4–15.5)
HGB BLD-MCNC: 14 G/DL (ref 12–16)
IMM GRANULOCYTES # BLD AUTO: 0.01 K/UL (ref 0–0.04)
IMM GRANULOCYTES NFR BLD AUTO: 0.3 % (ref 0–0.5)
LYMPHOCYTES # BLD AUTO: 1.1 K/UL (ref 1–4.8)
LYMPHOCYTES NFR BLD: 36.1 % (ref 18–48)
MAGNESIUM SERPL-MCNC: 2.1 MG/DL (ref 1.6–2.6)
MCH RBC QN AUTO: 29.4 PG (ref 27–31)
MCHC RBC AUTO-ENTMCNC: 30.9 G/DL (ref 32–36)
MCV RBC AUTO: 95 FL (ref 82–98)
MONOCYTES # BLD AUTO: 0.3 K/UL (ref 0.3–1)
MONOCYTES NFR BLD: 8.7 % (ref 4–15)
NEUTROPHILS # BLD AUTO: 1.6 K/UL (ref 1.8–7.7)
NEUTROPHILS NFR BLD: 51.3 % (ref 38–73)
NRBC BLD-RTO: 0 /100 WBC
PLATELET # BLD AUTO: 331 K/UL (ref 150–450)
PMV BLD AUTO: 9.7 FL (ref 9.2–12.9)
POTASSIUM SERPL-SCNC: 4.8 MMOL/L (ref 3.5–5.1)
PROT SERPL-MCNC: 6.9 G/DL (ref 6–8.4)
RBC # BLD AUTO: 4.76 M/UL (ref 4–5.4)
SODIUM SERPL-SCNC: 133 MMOL/L (ref 136–145)
T4 SERPL-MCNC: 10.4 UG/DL (ref 4.5–11.5)
TSH SERPL DL<=0.005 MIU/L-ACNC: 0.89 UIU/ML (ref 0.4–4)
WBC # BLD AUTO: 3.1 K/UL (ref 3.9–12.7)

## 2022-01-27 PROCEDURE — 82607 VITAMIN B-12: CPT | Performed by: PSYCHIATRY & NEUROLOGY

## 2022-01-27 PROCEDURE — 86780 TREPONEMA PALLIDUM: CPT | Performed by: PSYCHIATRY & NEUROLOGY

## 2022-01-27 PROCEDURE — 80053 COMPREHEN METABOLIC PANEL: CPT | Performed by: PSYCHIATRY & NEUROLOGY

## 2022-01-27 PROCEDURE — 85730 THROMBOPLASTIN TIME PARTIAL: CPT | Performed by: PSYCHIATRY & NEUROLOGY

## 2022-01-27 PROCEDURE — 84436 ASSAY OF TOTAL THYROXINE: CPT | Performed by: PSYCHIATRY & NEUROLOGY

## 2022-01-27 PROCEDURE — 83921 ORGANIC ACID SINGLE QUANT: CPT | Performed by: PSYCHIATRY & NEUROLOGY

## 2022-01-27 PROCEDURE — 84425 ASSAY OF VITAMIN B-1: CPT | Performed by: PSYCHIATRY & NEUROLOGY

## 2022-01-27 PROCEDURE — 82746 ASSAY OF FOLIC ACID SERUM: CPT | Performed by: PSYCHIATRY & NEUROLOGY

## 2022-01-27 PROCEDURE — 83090 ASSAY OF HOMOCYSTEINE: CPT | Performed by: PSYCHIATRY & NEUROLOGY

## 2022-01-27 PROCEDURE — 84443 ASSAY THYROID STIM HORMONE: CPT | Performed by: PSYCHIATRY & NEUROLOGY

## 2022-01-27 PROCEDURE — 83735 ASSAY OF MAGNESIUM: CPT | Performed by: PSYCHIATRY & NEUROLOGY

## 2022-01-27 PROCEDURE — 87389 HIV-1 AG W/HIV-1&-2 AB AG IA: CPT | Performed by: PSYCHIATRY & NEUROLOGY

## 2022-01-27 PROCEDURE — 85025 COMPLETE CBC W/AUTO DIFF WBC: CPT | Performed by: PSYCHIATRY & NEUROLOGY

## 2022-01-28 ENCOUNTER — TELEPHONE (OUTPATIENT)
Dept: NEUROLOGY | Facility: CLINIC | Age: 67
End: 2022-01-28
Payer: COMMERCIAL

## 2022-01-28 LAB
HIV 1+2 AB+HIV1 P24 AG SERPL QL IA: NEGATIVE
VIT B12 SERPL-MCNC: 233 NG/L (ref 180–914)

## 2022-01-28 NOTE — TELEPHONE ENCOUNTER
----- Message from Madelaine Benjamin sent at 1/28/2022 12:28 PM CST -----  Norm Andre calling regarding a miss call want to know if someone from this office try to reach out to her.  She had labs done on 01/27/22.  She also would like her results.  call back [phone]

## 2022-01-29 LAB — TREPONEMA PALLIDUM IGG+IGM AB [PRESENCE] IN SERUM OR PLASMA BY IMMUNOASSAY: NONREACTIVE

## 2022-02-01 ENCOUNTER — PATIENT MESSAGE (OUTPATIENT)
Dept: NEUROLOGY | Facility: CLINIC | Age: 67
End: 2022-02-01
Payer: COMMERCIAL

## 2022-02-01 DIAGNOSIS — E53.8 B12 DEFICIENCY: Primary | ICD-10-CM

## 2022-02-01 LAB
METHYLMALONATE SERPL-SCNC: <0.1 UMOL/L
VIT B1 BLD-MCNC: 55 UG/L (ref 38–122)

## 2022-02-01 RX ORDER — ACETAMINOPHEN, DIPHENHYDRAMINE HCL, PHENYLEPHRINE HCL 325; 25; 5 MG/1; MG/1; MG/1
TABLET ORAL
Qty: 30 TABLET | Refills: 1 | Status: SHIPPED | OUTPATIENT
Start: 2022-02-01

## 2022-02-02 LAB — METHYLMALONATE SERPL-SCNC: 0.09 NMOL/ML

## 2022-02-02 RX ORDER — SUMATRIPTAN SUCCINATE 100 MG/1
100 TABLET ORAL EVERY 8 HOURS PRN
Qty: 8 TABLET | Refills: 2 | Status: SHIPPED | OUTPATIENT
Start: 2022-02-02

## 2022-02-24 ENCOUNTER — PATIENT OUTREACH (OUTPATIENT)
Dept: ADMINISTRATIVE | Facility: OTHER | Age: 67
End: 2022-02-24
Payer: COMMERCIAL

## 2022-02-24 ENCOUNTER — TELEPHONE (OUTPATIENT)
Dept: INTERNAL MEDICINE | Facility: CLINIC | Age: 67
End: 2022-02-24
Payer: COMMERCIAL

## 2022-02-24 ENCOUNTER — TELEPHONE (OUTPATIENT)
Dept: UROGYNECOLOGY | Facility: CLINIC | Age: 67
End: 2022-02-24
Payer: COMMERCIAL

## 2022-02-24 NOTE — TELEPHONE ENCOUNTER
----- Message from Mercedez Jerome sent at 2/24/2022  3:13 PM CST -----  Contact: pt  Pt calling to speak with you she stated she is having a problem but did not go into detail but she would like to get a call back.Please advise

## 2022-03-07 ENCOUNTER — OFFICE VISIT (OUTPATIENT)
Dept: OBSTETRICS AND GYNECOLOGY | Facility: CLINIC | Age: 67
End: 2022-03-07
Payer: COMMERCIAL

## 2022-03-07 VITALS
BODY MASS INDEX: 22.76 KG/M2 | SYSTOLIC BLOOD PRESSURE: 106 MMHG | DIASTOLIC BLOOD PRESSURE: 60 MMHG | WEIGHT: 145 LBS | HEIGHT: 67 IN

## 2022-03-07 DIAGNOSIS — N81.10 VAGINAL PROLAPSE: Primary | ICD-10-CM

## 2022-03-07 DIAGNOSIS — R82.90 ABNORMAL URINE ODOR: ICD-10-CM

## 2022-03-07 PROCEDURE — 87086 URINE CULTURE/COLONY COUNT: CPT | Performed by: OBSTETRICS & GYNECOLOGY

## 2022-03-07 PROCEDURE — 99999 PR PBB SHADOW E&M-EST. PATIENT-LVL III: ICD-10-PCS | Mod: PBBFAC,,, | Performed by: OBSTETRICS & GYNECOLOGY

## 2022-03-07 PROCEDURE — 99204 OFFICE O/P NEW MOD 45 MIN: CPT | Mod: S$GLB,,, | Performed by: OBSTETRICS & GYNECOLOGY

## 2022-03-07 PROCEDURE — 99204 PR OFFICE/OUTPT VISIT, NEW, LEVL IV, 45-59 MIN: ICD-10-PCS | Mod: S$GLB,,, | Performed by: OBSTETRICS & GYNECOLOGY

## 2022-03-07 PROCEDURE — 99999 PR PBB SHADOW E&M-EST. PATIENT-LVL III: CPT | Mod: PBBFAC,,, | Performed by: OBSTETRICS & GYNECOLOGY

## 2022-03-07 RX ORDER — TINIDAZOLE 500 MG/1
TABLET ORAL
Qty: 8 TABLET | Refills: 1 | Status: SHIPPED | OUTPATIENT
Start: 2022-03-07 | End: 2022-05-31

## 2022-03-07 NOTE — PROGRESS NOTES
"PT HERE OVER LAST 6-8 WEEKS WITH A RECURRING "HARD" OBJECT IN THE MIDDLE OF THE LABIA THAT REDUCES SPONTANEOUSLY.  DID HAVE B GROIN PAINS OVER LUANNE GRAS. OTHERWISE NO PAIN.  STARTED TAKING VITAMINS. ? URINE OR VAGINAL ODOR.    ROS:  GENERAL: No fever, chills, fatigability or weight loss.  VULVAR: No pain, no lesions and no itching.  VAGINAL: No relaxation, no itching, no discharge, no abnormal bleeding and no lesions.  ABDOMEN: No abdominal pain. Denies nausea. Denies vomiting. No diarrhea. No constipation  BREAST: Denies pain. No lumps. No discharge.  URINARY: No incontinence, no nocturia, no frequency and no dysuria.  CARDIOVASCULAR: No chest pain. No shortness of breath. No leg cramps.  NEUROLOGICAL: No headaches. No vision changes.  The remainder of the review of systems was negative.    PE:   General Appearance: normal weight Well developed. Well nourished. In no acute distress.  Urethral Meatus: Normal size. Normal location. No lesions. No prolapse.  Vulva: Atrophic. Lesions: No.  Urethra: No masses. No tenderness. No prolapse. No scarring.  Bladder: No masses. No tenderness.  Vagina: Mucosa NI: yes Discharge: no Atrophic: yes Rectocele: yes Cystocele: yes Vaginal cuff intact: yes  Cervix: Absent.  Uterus: Absent.  Adnexa: Masses: No Tenderness: No       CDS Nodularity: No   Abdomen: normal weight  No masses. No tenderness.  CHEST: CTA B  HEART: RRR  EXT: NO EDEMA      PROCEDURES:    DIAGNOSIS:  1. Vaginal prolapse    2. Abnormal urine odor        PLAN:     MEDICATIONS & ORDERS:  Orders Placed This Encounter    Urine culture    Ambulatory referral/consult to Urogynecology    tinidazole (TINDAMAX) 500 MG tablet       Patient was counseled today on the new ACS guidelines for cervical cytology screening as well as the current recommendations for breast cancer screening. She was counseled to follow up with her PCP for other routine health maintenance. Counseling session lasted approximately 10 minutes, and all " her questions were answered.         FOLLOW-UP: With me PRN

## 2022-03-08 ENCOUNTER — PATIENT MESSAGE (OUTPATIENT)
Dept: UROGYNECOLOGY | Facility: CLINIC | Age: 67
End: 2022-03-08
Payer: COMMERCIAL

## 2022-03-08 LAB — BACTERIA UR CULT: NO GROWTH

## 2022-03-10 ENCOUNTER — TELEPHONE (OUTPATIENT)
Dept: UROGYNECOLOGY | Facility: CLINIC | Age: 67
End: 2022-03-10
Payer: COMMERCIAL

## 2022-03-10 NOTE — TELEPHONE ENCOUNTER
----- Message from Beth Munson sent at 3/10/2022 12:30 PM CST -----  Regarding: Reschedule  Contact: DANIELA CONWAY [[id number]]  Type:  Patient Returning Call    Who Called:DANIELA CONWAY [[id number]]    Who Left Message for Patient:Mary    Does the patient know what this is regarding?:yes    Would the patient rather a call back or a response via [facility]? #Call back    Best Call Back Number:[phone]    Additional Information:         Statement Selected

## 2022-03-24 ENCOUNTER — OFFICE VISIT (OUTPATIENT)
Dept: UROGYNECOLOGY | Facility: CLINIC | Age: 67
End: 2022-03-24
Payer: COMMERCIAL

## 2022-03-24 VITALS
BODY MASS INDEX: 22.76 KG/M2 | HEART RATE: 87 BPM | DIASTOLIC BLOOD PRESSURE: 81 MMHG | HEIGHT: 67 IN | SYSTOLIC BLOOD PRESSURE: 110 MMHG | WEIGHT: 145 LBS

## 2022-03-24 DIAGNOSIS — N81.10 PROLAPSE OF ANTERIOR VAGINAL WALL: ICD-10-CM

## 2022-03-24 DIAGNOSIS — N39.46 MIXED INCONTINENCE: ICD-10-CM

## 2022-03-24 DIAGNOSIS — N95.2 ATROPHIC VAGINITIS: ICD-10-CM

## 2022-03-24 DIAGNOSIS — R39.15 URINARY URGENCY: Primary | ICD-10-CM

## 2022-03-24 PROCEDURE — 87086 URINE CULTURE/COLONY COUNT: CPT | Performed by: OBSTETRICS & GYNECOLOGY

## 2022-03-24 PROCEDURE — 99245 PR OFFICE CONSULTATION,LEVEL V: ICD-10-PCS | Mod: S$GLB,,, | Performed by: OBSTETRICS & GYNECOLOGY

## 2022-03-24 PROCEDURE — 99999 PR PBB SHADOW E&M-EST. PATIENT-LVL V: ICD-10-PCS | Mod: PBBFAC,,, | Performed by: OBSTETRICS & GYNECOLOGY

## 2022-03-24 PROCEDURE — 99245 OFF/OP CONSLTJ NEW/EST HI 55: CPT | Mod: S$GLB,,, | Performed by: OBSTETRICS & GYNECOLOGY

## 2022-03-24 PROCEDURE — 99999 PR PBB SHADOW E&M-EST. PATIENT-LVL V: CPT | Mod: PBBFAC,,, | Performed by: OBSTETRICS & GYNECOLOGY

## 2022-03-24 RX ORDER — CONJUGATED ESTROGENS 0.62 MG/G
CREAM VAGINAL
Qty: 45 G | Refills: 12 | Status: SHIPPED | OUTPATIENT
Start: 2022-03-24 | End: 2023-12-20

## 2022-03-24 NOTE — PATIENT INSTRUCTIONS
1. Prolapse: Stage 1 apical prolapse, Stage 2 anterior and posterior vaginal wall prolapse   --Pessary benefit discussed with patient, will schedule for pessary fitting  --Daily pelvic floor exercises as assigned, start Pelvic floor PT  --Non surgical options discussed with patient    --Surgical options discussed such as : with apical suspension: SSF, USLS and SCP with mesh augmentation. Risks/ benefits/ alternatives/ succuss and failure rates were reviewed. Information packets were given detailing surgical options.  She was also given web site information for: www.augs.org and www.[employer].org and http://www.fda.gov/MedicalDevices/UroGynSurgicalMesh/default.htm to review the details of the surgical options discussed and the use of mesh in surgery. She will review the information given and we will discuss further at the follow up visit.     2.  Mixed urinary incontinence, urge > stress:   --Bladder diary for 3-7 days, write the number of times you go to the rest room and associated symptoms of urgency or leakage.   --Empty bladder every 3 hours.  Empty well: wait a minute, lean forward on toilet.    --Avoid dietary irritants (see sheet).  Keep diary x 3-5 days to determine your irritants.  --KEGELS: do 10 in AM and 10 in PM, holding each x 10 seconds.  When you feel urge to go, STOP, KEGEL, and when urge has passed, then go to bathroom.  Start pelvic floor PT   --URGE: Myrbetriq 50 mg daily.  SE profile reviewed.    Takes 2-4 weeks to see if will have effect.    --STRESS:  Pessary vs. Sling vs impressa     3. Vaginal atrophy (dryness):  Use 1 gram of estrogen cream in vagina nightly x 2 weeks, then twice a week thereafter.

## 2022-03-24 NOTE — PROGRESS NOTES
Subjective:       Patient ID: Norm Andre is a 66 y.o. female.    Chief Complaint:  Vaginal Prolapse      History of Present Illness  HPI 66 Y O  F  has a past medical history of Allergy, Arthritis, Headache(784.0), Herniated disc, History of migraine headaches, Hypothyroidism, Infections of kidney, Low back pain, Migraine headache, Moderate mood disorder, Neck pain, Rotator cuff tear, and Thyroid disease.  Referred by Dr. Oden for evaluation of prolapse. She has had this issue for 3 months  and has noticed that it has worsened over time.  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   She had a hysterectomy and a MMK in her 30's, complicated by recurrent UTI's.     She does do not use pads she changes on average 2 three times a day, uses wipes. The has bowel leakage, did not tolerate metamucil or fibercon     [facility]    Question 3/24/2022  8:55 AM CDT - Filed by Mary Wheeler MA   General Urogynecology: Are you experiencing the following?    Dysuria (painful urination) Yes, sometimes feels like she has a constant pain and feels like it hurts to urinate   Nocturia:  waking up at night to empty your bladder  Yes   If you answered yes to the previous question, how many times does this happen per night? 1-2   Enuresis (urine loss during sleep) Yes   Dribbling urine after you urinate Yes   Hematuria (urine appears red) Yes   Type of stream Sprays   Urinary frequency: How often a day are you going to the bathroom per day?  10-15   Urinary Tract Infections: How many Urinary Tract Infections have you had in the past year? Two (2) in six (6) months   If you have had a UTI in the past year, what treatments have you had so far?  abx    Urinary Incontinence (General): Are you experiencing the following?    Past consultation for incontinence: Have you ever seen someone for the evaluation of incontinence? No   If  "you answered yes to the previous question, please select all the therapies you have tried.  N/a- I answered no to the previous question   Please note the effectiveness of the therapies.    Need to wear protection to keep clothes dry  No   If you answered yes to the previous question, please bill the protection you use.  N/a- I answered no to the previous question   If you wear protection, how much wetness is typically on each pad? N/a- I do not wear protection   If you wear protection, how often do you have to change per day, if applicable?     Stress Symptoms: Are you experiencing the following? Hx of MMK, and has been more bothersome lately    Leakage of urine with cough, laugh and/or sneeze Yes   If you answered yes to the previous question, what is the frequency in days, weeks and/or months? Several times a day   Leakage of urine with sex No   Leakage of urine with bending/ lifting Yes   Leakage of urine with briskly walking or jogging Yes   If you lose urine for any other reason not previously mentioned, please note it below, if applicable.     Urge Symptoms: Are you experiencing the following?    Urgency ("got to go" feeling) Yes   Urge: How frequently do you feel an urge to urinate (feeling like you "gotta go" to the bathroom and can't wait) Several times a day   Do you experience a leakage of urine when you have a feeling of urgency?  Yes   Leakage of urine when unaware No   Past use of anticholinergics (medications used to treat overactive bladder) No   If you answered yes to the previous question, please bill the anticholinergics you have used:     Have you ever used Mirbetriq (aka Mirabegron)?  No   Prolapse Symptoms: Are you experiencing any of the following?     Falling out/ Bulging/ Heaviness in the vagina Yes   Vaginal/ Abdominal Pain/ Pressure Yes   Need to strain/ Push to void Yes   Need to wait on the toilet before you void Yes   Unusual position to urinate (using your hands to push back the " vaginal bulge) Yes   Sensation of incomplete emptying Yes   Past use of pessary device No   If you answered yes to the previous question, please list the devices you have used below.     Bowel Symptoms: Are you experiencing any of the following?    Constipation Yes   Diarrhea  Yes   Hematochezia (bloody stool) No   Incomplete evacuation of stool No   Involuntary loss of formed stool No   Fecal smearing/urgency No   Involuntary loss of gas No   Vaginal Symptoms: Are you experiencing any of the following?     Abnormal vaginal bleeding  No   Vaginal dryness No   Sexually active  No   Dyspareunia (painful intercourse) No   Estrogen use  Yes       GYN & OB History  No LMP recorded. Patient has had a hysterectomy.   Date of Last Pap: No result found    OB History    Para Term  AB Living   3 2 2   1 2   SAB IAB Ectopic Multiple Live Births   1              # Outcome Date GA Lbr Marlo/2nd Weight Sex Delivery Anes PTL Lv   3 SAB            2 Term            1 Term              OB     x 2  C/s x 0     Largest: 9 # 2 oz   Forceps: yes  Episiotomy:  yes  Degree: 3rd or 4th no    GYN  Menarche:  17  Menstrual cycle:n/a  Menopause: No   Hysterectomy: Yes,  Open:   Ovaries:  Present  Tubal ligation: No   Other abdominal surgeries:   Presacral mass     Review of Systems  Review of Systems   Constitutional: Negative.  Negative for activity change, appetite change, chills, diaphoresis, fatigue, fever and unexpected weight change.   HENT: Negative.    Eyes: Negative.    Respiratory: Negative.  Negative for apnea, cough and wheezing.    Cardiovascular: Negative.  Negative for chest pain and palpitations.   Gastrointestinal: Negative for abdominal distention, abdominal pain, anal bleeding, blood in stool, constipation, diarrhea, nausea, rectal pain and vomiting.   Endocrine: Negative.    Genitourinary: Positive for frequency and urgency. Negative for decreased urine volume, difficulty urinating, dyspareunia, dysuria,  enuresis, flank pain, genital sores, hematuria, menstrual problem, pelvic pain, vaginal bleeding, vaginal discharge and vaginal pain.        Prolapse    Musculoskeletal: Negative for back pain and gait problem.   Skin: Negative for color change, pallor, rash and wound.   Allergic/Immunologic: Negative for immunocompromised state.   Neurological: Negative.  Negative for dizziness and speech difficulty.   Hematological: Negative for adenopathy.   Psychiatric/Behavioral: Negative for agitation, behavioral problems, confusion and sleep disturbance.           Objective:     Physical Exam   Constitutional: She is oriented to person, place, and time. She appears well-developed.   HENT:   Head: Normocephalic and atraumatic.   Eyes: Conjunctivae and EOM are normal.   Cardiovascular: Normal rate, regular rhythm, S1 normal, S2 normal, normal heart sounds and intact distal pulses.   Pulmonary/Chest: Effort normal and breath sounds normal. She exhibits no tenderness.   Abdominal: Soft. Bowel sounds are normal. She exhibits no distension and no mass. There is no splenomegaly or hepatomegaly. There is no abdominal tenderness. There is no rigidity, no rebound and no guarding. Hernia confirmed negative in the right inguinal area and confirmed negative in the left inguinal area.   Genitourinary: Pelvic exam was performed with patient supine. Rectum normal, vagina normal, skenes normal and bartholins normal. Right labia normal and left labia normal. Urethra exhibits hypermobility. Urethra exhibits no urethral caruncle, no urethral diverticulum and no urethral mass. Right bartholin is not enlarged and not tender. Left bartholin is not enlarged and not tender. Rectal exam shows resting tone normal and active tone normal. Rectal exam shows no external hemorrhoid, no fissure, no tenderness, anal tone normal and no dovetailing. Guaiac negative stool. There is atrophy in the vagina. No foreign body, tenderness, bleeding, unspecified  prolapse of vaginal walls, fistula, mesh exposure or lavator tenderness in the vagina. Right adnexum displays no tenderness. Left adnexum displays no tenderness. Uterus is absent.   PVR: 20 ML  Empty cough stress test: Negative.  Kegel: 2/5    POP-Q  Aa: 0 Ba: 0 C: -5   GH: 4 PB: 2 TVL: 8   Ap: 0 Bp: 0 D:                      Musculoskeletal:         General: Normal range of motion.      Cervical back: Normal range of motion and neck supple.   Neurological: She is alert and oriented to person, place, and time. She has normal strength and normal reflexes. Cranial nerves II through XII intact. No cranial nerve deficit.   Skin: Skin is warm and dry.   Psychiatric: She has a normal mood and affect. Her speech is normal and behavior is normal. Judgment normal.             HCM  Pap's: Normal/ normal last Pap:history of HPV  Mammo: BIRADS: 1 ; Last imaging  2021   Colonoscopy:  12/ 2020   Dexa: Normal    Assessment:        1. Urinary urgency    2. Mixed incontinence    3. Atrophic vaginitis    4. Prolapse of anterior vaginal wall               Plan:      1. Prolapse: Stage 1 apical prolapse, Stage 2 anterior and posterior vaginal wall prolapse   --Pessary benefit discussed with patient, will schedule for pessary fitting  --Daily pelvic floor exercises as assigned, start Pelvic floor PT  --Non surgical options discussed with patient    --Surgical options discussed such as: with apical suspension: SSF, USLS and SCP with mesh augmentation. Risks/ benefits/ alternatives/ succuss and failure rates were reviewed. Information packets were given detailing surgical options.  She was also given web site information for: www.augs.org and www.[employer].org and http://www.fda.gov/MedicalDevices/UroGynSurgicalMesh/default.htm to review the details of the surgical options discussed and the use of mesh in surgery. She will review the information given and we will discuss further at the follow up visit. She is looking for a non-surgical  options for surgery. I think the pessary is a good start for her.     2.  Mixed urinary incontinence, urge > stress: history of MMK   --Bladder diary for 3-7 days, write the number of times you go to the rest room and associated symptoms of urgency or leakage.   --Empty bladder every 3 hours.  Empty well: wait a minute, lean forward on toilet.    --Avoid dietary irritants (see sheet).  Keep diary x 3-5 days to determine your irritants.  --KEGELS: do 10 in AM and 10 in PM, holding each x 10 seconds.  When you feel urge to go, STOP, KEGEL, and when urge has passed, then go to bathroom.  Start pelvic floor PT   --URGE: Myrbetriq 50 mg daily.  SE profile reviewed.    Takes 2-4 weeks to see if will have effect.    --STRESS:  Pessary vs. Sling vs impressa     3. Vaginal atrophy (dryness):  Use 1 gram of estrogen cream in vagina nightly x 2 weeks, then twice a week thereafter.      Approximately 60 minutes of total time spent in consult, 75 % in discussion. This includes face to face time and non-face to face time preparing to see the patient, obtaining and/or reviewing separately obtained history, documenting clinical information in the electronic or other health record, independently interpreting results (not separately reported) and communicating results to the patient/family/caregiver, or care coordination (not separately reported).        Thank you for requesting consultation of your patient.  I look forward to participating in her care.    Mayra Funez DO  Female Pelvic Medicine and Reconstructive Surgery  [facility] [place], [place]

## 2022-03-25 LAB — BACTERIA UR CULT: NO GROWTH

## 2022-03-26 ENCOUNTER — PATIENT MESSAGE (OUTPATIENT)
Dept: UROGYNECOLOGY | Facility: CLINIC | Age: 67
End: 2022-03-26
Payer: COMMERCIAL

## 2022-03-28 ENCOUNTER — TELEPHONE (OUTPATIENT)
Dept: NEUROLOGY | Facility: CLINIC | Age: 67
End: 2022-03-28
Payer: COMMERCIAL

## 2022-03-28 NOTE — TELEPHONE ENCOUNTER
----- Message from Ceci Arroyo sent at 3/28/2022  1:28 PM CDT -----  Contact: pt  Pt requesting call back      Confirmed patient's contact info below:  Contact Name: Norm Thai  Phone Number: [phone]

## 2022-03-29 ENCOUNTER — TELEPHONE (OUTPATIENT)
Dept: NEUROLOGY | Facility: CLINIC | Age: 67
End: 2022-03-29
Payer: COMMERCIAL

## 2022-03-29 NOTE — TELEPHONE ENCOUNTER
Called and spoke with patient who was trying to schedule an appointment for neuropsychology testing. Provided patient the number to neuropsychology to get scheduled.

## 2022-03-29 NOTE — TELEPHONE ENCOUNTER
----- Message from Maureen Quach sent at 3/29/2022  9:49 AM CDT -----  Contact: Patient  Patient requesting call back in regards to having test done.       [phone] ([place])

## 2022-04-04 ENCOUNTER — PATIENT MESSAGE (OUTPATIENT)
Dept: NEUROLOGY | Facility: CLINIC | Age: 67
End: 2022-04-04
Payer: COMMERCIAL

## 2022-04-04 DIAGNOSIS — G31.84 MCI (MILD COGNITIVE IMPAIRMENT): Primary | ICD-10-CM

## 2022-04-13 PROBLEM — M62.81 MUSCLE WEAKNESS: Status: ACTIVE | Noted: 2022-04-13

## 2022-04-13 PROBLEM — R27.9 LACK OF COORDINATION: Status: ACTIVE | Noted: 2022-04-13

## 2022-04-18 ENCOUNTER — CLINICAL SUPPORT (OUTPATIENT)
Dept: REHABILITATION | Facility: OTHER | Age: 67
End: 2022-04-18
Attending: OBSTETRICS & GYNECOLOGY
Payer: COMMERCIAL

## 2022-04-18 ENCOUNTER — PATIENT MESSAGE (OUTPATIENT)
Dept: NEUROLOGY | Facility: CLINIC | Age: 67
End: 2022-04-18

## 2022-04-18 DIAGNOSIS — M62.81 MUSCLE WEAKNESS: ICD-10-CM

## 2022-04-18 DIAGNOSIS — N81.10 PROLAPSE OF ANTERIOR VAGINAL WALL: ICD-10-CM

## 2022-04-18 DIAGNOSIS — R27.9 LACK OF COORDINATION: ICD-10-CM

## 2022-04-18 DIAGNOSIS — N39.46 MIXED INCONTINENCE: ICD-10-CM

## 2022-04-18 PROCEDURE — 97161 PT EVAL LOW COMPLEX 20 MIN: CPT

## 2022-04-18 PROCEDURE — 97530 THERAPEUTIC ACTIVITIES: CPT

## 2022-04-18 NOTE — PATIENT INSTRUCTIONS
1) PELVIC FLOOR PRESSURE MANAGEMENT         Your pelvic floor muscles and your diaphragm work closely together to regulate the pressure in your body. Each time you inhale, your diaphragm contracts, flattens, and moves downward. In response, your pelvic floor muscles relax and drop down as well. When you exhale, both muscles lift upwards. Throughout a diaphragmatic breath cycle, your pelvic floor goes through a full range of motion that contributes to its optimal function.    Think of your body like a canister, with one opening at the top where your mouth is and another opening at the bottom through your pelvic floor. If the top of the canister is closed off (as with straining during bowel movements or Valsalva maneuver during lifting) all of the pressure moves downward. If your pelvic floor muscles are not strong enough to counteract this increased pressure, you may experience leaking or increased sensations of pelvic organ prolapse.     This is why it is extremely important to implement the following pressure management techniques:  Avoiding Constipation - make high-fiber foods part of your regular diet (fruits, vegetables, bran, and beans), reduce the amount of processed foods in your diet, drink plenty of water and fluids every day, exercise daily, and manage your stress with meditation or other relaxation techniques.  No Straining! Straining (bearing down and holding your breath) puts additional downward pressure on our organs, causing increased prolapse and pelvic pressure. Instead, blow out the candles as you gently push down. Do NOT hold your breath!  Use a Stool - Utilize a squat position when voiding. Get your knees up higher than your hips. Squatting helps relax the pelvic floor muscles and reduces straining.  Avoid heavy lifting and high-impact activities until you can strengthen your core and pelvic floor muscles appropriately. When you do have to lift, hold the load close to your body to reduce strain  and do not hold your breath when lifting.  Do not Valsalva (hold your breath and push) at any time.  Use diaphragmatic breathing (belly breathing) to manage stress, help empty bladder, help empty bowels, and help lightly stretch pelvic floor muscles.     Positioning for Pelvic Pressure:     *Can sit with legs up the wall or with 2-3 pillows under hips in order to reduce pelvic pressure and bulging.     Diaphragmatic Breathing    The diaphragm is a dome shaped muscle that forms the floor of the rib cage. It is the most efficient muscle for breathing and relaxation, although most people are not used to using the diaphragm. Diaphragmatic or belly breathing is an important technique to learn because it helps settle down or relax the autonomic nervous system. The correct use of diaphragmatic breathing can help to quiet brain activity resulting in the relaxation of all the muscles and organs of the body. This is accomplished by slow rhythmic breathing concentrated in the diaphragm muscle rather than the chest.    How to do proper relaxation breathing:    Start by lying on your back or reclining in a chair in a relaxed position. Place one hand on your chest and the other on your abdomen.  Relax your jaw by placing your tongue on the floor of your mouth and keeping your teeth slightly apart.   Take a deep breath in, letting the abdomen expand and rise while you keep your upper chest, neck and shoulders relaxed.   As you breathe out, allow your abdomen and chest to fall. Exhale completely.  It doesn't matter if you breathe in/out through your nose and/or mouth. Do whichever feels comfortable.  Remember to breathe slowly.  Do not force your breathing. Do not hold your breath.  Repeat for 5-10 minutes every day.          2) BOWEL MANAGEMENT PROGRAM  The goal is to establish a routine and predictable time for elimination. Doing the bowel management program after breakfast allows you to take advantage of the gastrocolic reflex  (wave-like movements of the intestines which propel fecal material through the colon) which occurs 20-30 minutes after eating a meal, and that colonic activity is greatest in the morning.     You also want to normalize stool consistency. The optimal goal for stool consistency is formed and soft. Hard stool is more difficult to evacuate and leakage is less likely if stools are not loose or mushy.      Adequate fluid intake is necessary for proper bowel function. A minimum of 4-6 cups (32-48 oz) should be consumed daily, up to 8-10 cups (64-80 oz).    Fiber adds bulk to stool and promotes more frequent and regular bowel movements. Strategies to increase fiber intake:  One cup of high fiber cereal every day.  1/2 to 1 cup of prune juice or several stewed prunes every day, followed by a cup of hot water or tea.   2 cups of fruit or 2 1/2 cups vegetables, 6-8 oz high fiber grains daily  Fiber supplements, psyllium husk.     3) VAGINAL MOISTURIZERS  Just Soles Restore® Moisturizing Vaginal Gel    https://[employer]/products/bio-match-restore-moisturizing-vaginal-gel  Cost: $15.99    Replens Long Lasting Vaginal Moisturizer    Commonly found in most drugstores  Cost: $11.99
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
## 2022-05-03 ENCOUNTER — PATIENT OUTREACH (OUTPATIENT)
Dept: ADMINISTRATIVE | Facility: OTHER | Age: 67
End: 2022-05-03
Payer: COMMERCIAL

## 2022-05-10 ENCOUNTER — TELEPHONE (OUTPATIENT)
Dept: UROGYNECOLOGY | Facility: CLINIC | Age: 67
End: 2022-05-10
Payer: COMMERCIAL

## 2022-05-10 NOTE — TELEPHONE ENCOUNTER
----- Message from Maureen Cabello sent at 5/10/2022  2:04 PM CDT -----  Regarding: Call To Give Authrization  Contact: Ms Ayala/ [employer] [phone]  Type:  Call to Give Authorizations    Name of Caller:Ms Ayala calling from [employer]     [phone].   Ms Ayala calling  to give authorization number [id number] has validate 4/18/2022 up 5/25/2022 for physical therapy one evaculation and 12 visits for $ 635.00  When is the first available appointment?  Symptoms:  Best Call Back Number:[phone]  Additional Information:

## 2022-05-10 NOTE — TELEPHONE ENCOUNTER
Attempted to call back. Was put on hold and then was hung up on.    Believe this is for PFPT and I am unaware of their schedule. Will forward on th Anna who is working with this patient.

## 2022-05-11 ENCOUNTER — DOCUMENTATION ONLY (OUTPATIENT)
Dept: REHABILITATION | Facility: OTHER | Age: 67
End: 2022-05-11
Payer: COMMERCIAL

## 2022-05-11 NOTE — PROGRESS NOTES
Pt arrived for PT on 5/11/2022 reporting symptoms of bladder pain, fever, and chills that began last night which she states might be a UTI attributed to poor water intake. Recommended pt go to urgent care for urinalysis to determine whether or not infection is present before proceeding with PT.

## 2022-05-13 ENCOUNTER — PATIENT MESSAGE (OUTPATIENT)
Dept: REHABILITATION | Facility: OTHER | Age: 67
End: 2022-05-13
Payer: COMMERCIAL

## 2022-05-13 NOTE — PROGRESS NOTES
Pelvic Health Physical Therapy   Treatment Note     Name: Norm Andre  Clinic Number: [id number]    Therapy Diagnosis:   Encounter Diagnoses   Name Primary?    Muscle weakness Yes    Lack of coordination      Physician: Marya Funez,*    Visit Date: 5/18/2022    Physician Orders: PT Eval and Treat  Medical Diagnosis from Referral: N39.46 (ICD-10-CM) - Mixed incontinence N81.10 (ICD-10-CM) - Prolapse of anterior vaginal wall  Evaluation Date: 4/18/2022  Authorization Period Expiration: 5/25/2022  Plan of Care Expiration: 7/17/2022  Visit # / Visits authorized: 1/ 3  FOTO: 1/3    Time In: 1106  Time Out: 1200  Total Billable Time: 54 minutes    Precautions: PMHx low back pain, herniated disc    Subjective     Pt reports: she was diagnosed with UTI at urgent care. She states that she has finished 5-day course of ABX (Cipro) and vaginal odor and symptoms have since resolved. Pt reports that she attributes increased vaginal bulge to starting yoga practice with her daughter. She states that she will be getting fit for pessary today at 4pm.     She was not compliant with home exercise program.  Response to previous treatment: no adverse effects reported  Functional change: none    Pain: 0/10  Location: NA    Constitutional Symptoms Review: The patient denies having any constitutional symptoms.     Objective   Pt verbally consents to intravaginal treatment today.  Signed consent form already on file.     ABDOMINAL WALL ASSESSMENT  Palpation: tender throughout lower quadrant   Abdominal strength: fair  Scarring: hysterectomy scar, TTP   Pelvic Floor Muscle and Transverse Abdominus Synergy: absent  Diastasis: absent      BREATHING MECHANICS ASSESSMENT   Thorax Assessment During Quiet Respiration: WNL excursion of ribcage and WNL excursion of abdominal wall  Thorax Assessment During Deep Respiration: WNL excursion of ribcage and Decreased excursion of abdominal wall     VAGINAL PELVIC FLOOR EXAM    EXTERNAL  ASSESSMENT  Introitus: WNL  Skin condition: dryness noted  Scarring: none   Sensation: WNL   Pain: none  Voluntary contraction: visible lift  Voluntary relaxation: visible drop  Involuntary contraction: visible drop  Bearing down: nil  Perineal descent: absent      INTERNAL ASSESSMENT  Pain: tender areas noted as follows: throughout PFM and periurethral tissues   Sensation: able to localized pressure appropriately   Vaginal vault: WNL   Muscle Bulk: atrophy   Muscle Power: 2/5  Muscle Endurance: 6 sec  # Reps To Fatigue: 5    Fast Contractions in 10 seconds: NT     Quality of contraction: decreased hold and slow relaxation   Specificity: patient contracts: abdominals   Coordination: tends to hold breath during PFM contration and cannot isolate PFM from abdominals   Prolapse check: unable to test d/t poor ability to bear down    Jolene received the following manual therapy techniques: to develop extensibility and desensitization for 54 minutes including:  Myofascial release pelvic floor mm, OI and endopelvic fascia using PIT and ischemic compression while giving VC/tactile cues for voluntary relaxation of mm  Scar mobilization to  scar using cross friction techniques  Abdominal wall mobilization and MFR    Jolene participated in dynamic functional therapeutic activities to improve functional performance for 5  minutes, including:  Edu on use of vaginal moisturizer to decrease dryness  Edu on use of donut cushion while sitting for extended periods of time due to coccygectomy     Home Exercises Provided and Patient Education Provided     Education provided:   - anatomy/physiology of pelvic floor, posture/body mechanices, kegels, proper bearing down techniques and behavior modifications  Discussed progression of plan of care with patient; educated pt in activity modification; reviewed HEP with pt. Pt demonstrated and verbalized understanding of all instruction and was provided with a handout of HEP (see Patient  Instructions).    Written Home Exercises Provided: yes.  Exercises were reviewed and Jolene was able to demonstrate them prior to the end of the session.  Jolene demonstrated good  understanding of the education provided.     See EMR under Patient Instructions for exercises provided 5/18/2022.    Assessment     Pt reports UTI symptoms have resolved since finishing course of antibiotics. Intravaginal assessment reveals decreased strength, endurance, and coordination, as evidenced by poor ability to bear down and lengthen PFM. She reports TTP throughout bilateral levator ani and obturator internus. Palpation of OI on L side produces tremoring in L quad and glutes as well as recreates gluteal pain that pt feels with activity. Pt reports mild vaginal soreness after treatment. Performed scar and abdominal wall mobilization and myofascial release due to pt reporting sensitivity over lower abdominal quadrant and hysterectomy scar. Unable to test for prolapse due to poor ability to bear down. Noted significant vaginal dryness. Provided edu on utilizing vaginal moisturizer in conjunction with estrogen to improve tissue quality.      Jolene Is progressing well towards her goals.   Pt prognosis is Excellent.     Pt will continue to benefit from skilled outpatient physical therapy to address the deficits listed in the problem list box on initial evaluation, provide pt/family education and to maximize pt's level of independence in the home and community environment.     Pt's spiritual, cultural and educational needs considered and pt agreeable to plan of care and goals.     Anticipated barriers to physical therapy: none    Goals:   Short Term Goals: 6 weeks   Pt to increase pelvic floor strength to at least 3/5 needed for continence with ADLs and social activities.  Pt to increase hip and pelvic floor strength by 1/2 a grade to improve stability needed for ADL performance.    Pt to demonstrate proper body mechanics with lifting,  bending and squatting to decrease strain on lumbopelvic structures.     Long Term Goals: 12 weeks   Pt to be discharged with home plan for carry over after discharge.   Pt to report a decrease in pelvic pressure and fullness to no more than 10% of the time to demonstrate improving pelvic support of pelvic structures.  Pt to report a decrease in episodes of FI to no more than 1x every 2 weeks to improve confidence needed to participate in social outings.    Plan     Plan of care Certification: 4/18/2022 to 7/17/2022.     Outpatient Physical Therapy 1 times weekly for 12 weeks to include the following interventions: therapeutic exercises, therapeutic activity, neuromuscular re-education, manual therapy, patient/family education and self care/home management     sEMG for coordination. Continue intravaginal STM and abdominal wall mobilization. Initiate proximal hip and pelvic floor strengthening.     Anna Kimura, PT

## 2022-05-18 ENCOUNTER — CLINICAL SUPPORT (OUTPATIENT)
Dept: REHABILITATION | Facility: OTHER | Age: 67
End: 2022-05-18
Attending: OBSTETRICS & GYNECOLOGY
Payer: COMMERCIAL

## 2022-05-18 DIAGNOSIS — R27.9 LACK OF COORDINATION: ICD-10-CM

## 2022-05-18 DIAGNOSIS — M62.81 MUSCLE WEAKNESS: Primary | ICD-10-CM

## 2022-05-18 PROCEDURE — 97140 MANUAL THERAPY 1/> REGIONS: CPT

## 2022-05-19 ENCOUNTER — OFFICE VISIT (OUTPATIENT)
Dept: UROGYNECOLOGY | Facility: CLINIC | Age: 67
End: 2022-05-19
Payer: COMMERCIAL

## 2022-05-19 VITALS — SYSTOLIC BLOOD PRESSURE: 122 MMHG | BODY MASS INDEX: 22.71 KG/M2 | DIASTOLIC BLOOD PRESSURE: 76 MMHG | HEIGHT: 67 IN

## 2022-05-19 DIAGNOSIS — N81.10 PROLAPSE OF ANTERIOR VAGINAL WALL: ICD-10-CM

## 2022-05-19 DIAGNOSIS — N39.46 MIXED INCONTINENCE: Primary | ICD-10-CM

## 2022-05-19 PROCEDURE — 99999 PR PBB SHADOW E&M-EST. PATIENT-LVL III: CPT | Mod: PBBFAC,,, | Performed by: PHYSICIAN ASSISTANT

## 2022-05-19 PROCEDURE — 99212 PR OFFICE/OUTPT VISIT, EST, LEVL II, 10-19 MIN: ICD-10-PCS | Mod: S$GLB,,, | Performed by: PHYSICIAN ASSISTANT

## 2022-05-19 PROCEDURE — 99999 PR PBB SHADOW E&M-EST. PATIENT-LVL III: ICD-10-PCS | Mod: PBBFAC,,, | Performed by: PHYSICIAN ASSISTANT

## 2022-05-19 PROCEDURE — 99212 OFFICE O/P EST SF 10 MIN: CPT | Mod: S$GLB,,, | Performed by: PHYSICIAN ASSISTANT

## 2022-05-19 NOTE — PATIENT INSTRUCTIONS
1. Prolapse: Stage 1 apical prolapse, Stage 2 anterior and posterior vaginal wall prolapse   --Pessary benefit discussed with patient, pessary fitting today #4 ring with support  --Daily pelvic floor exercises as assigned, start Pelvic floor PT  --Non surgical options discussed with patient    --Surgical options discussed such as: with apical suspension: SSF, USLS and SCP with mesh augmentation. Risks/ benefits/ alternatives/ succuss and failure rates were reviewed. Information packets were given detailing surgical options.  She was also given web site information for: www.augs.org and www.[employer].org and http://www.fda.gov/MedicalDevices/UroGynSurgicalMesh/default.htm to review the details of the surgical options discussed and the use of mesh in surgery. She will review the information given and we will discuss further at the follow up visit. She is looking for a non-surgical options for surgery. I think the pessary is a good start for her.      2.  Mixed urinary incontinence, urge > stress: history of MMK   --Bladder diary for 3-7 days, write the number of times you go to the rest room and associated symptoms of urgency or leakage.   --Empty bladder every 3 hours.  Empty well: wait a minute, lean forward on toilet.    --Avoid dietary irritants (see sheet).  Keep diary x 3-5 days to determine your irritants.  --KEGELS: do 10 in AM and 10 in PM, holding each x 10 seconds.  When you feel urge to go, STOP, KEGEL, and when urge has passed, then go to bathroom.  Start pelvic floor PT   --URGE: Myrbetriq 50 mg daily.  SE profile reviewed.    Takes 2-4 weeks to see if will have effect.    --STRESS:  Pessary vs. Sling vs impressa      3. Vaginal atrophy (dryness):  Use 1 gram of estrogen cream in vagina  2 nights a week

## 2022-05-19 NOTE — PROGRESS NOTES
[facility] - UROGYNECOLOGY  [address] [address]  May 19, 2022     Norm Andre  [id number]  1955    UROGYN FOLLOW-UP  2022     66 y.o. female,   presents for urogyn follow up. She c/o   Chief Complaint   Patient presents with    Pessary Check     fitting    .     Last HPI from 3/24/22:  66 Y O  F  has a past medical history of Allergy, Arthritis, Headache(784.0), Herniated disc, History of migraine headaches, Hypothyroidism, Infections of kidney, Low back pain, Migraine headache, Moderate mood disorder, Neck pain, Rotator cuff tear, and Thyroid disease.  Referred by Dr. Oden for evaluation of prolapse. She has had this issue for 3 months  and has noticed that it has worsened over time.  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   She had a hysterectomy and a MMK in her 30's, complicated by recurrent UTI's.     She does do not use pads she changes on average 2 three times a day, uses wipes. The has bowel leakage, did not tolerate metamucil or fibercon     Changes from last visit:  Patient here for pessary fitting    Past Medical History:   Diagnosis Date    Allergy     Arthritis     Headache(784.0)     Herniated disc     x4 in cervical area    History of migraine headaches     Hypothyroidism     Infections of kidney     has had multiple kidney infections in the past.    Low back pain     Migraine headache     Moderate mood disorder     Neck pain     Rotator cuff tear     Thyroid disease        Past Surgical History:   Procedure Laterality Date    COCCYGECTOMY  2021    Surgeon: Daron Scruggs MD    COLONOSCOPY N/A 2016    Surgeon: Daron Scruggs MD    COLONOSCOPY N/A 2020    Surgeon: Daron Scruggs MD    CYSTOSCOPY      ESOPHAGOGASTRODUODENOSCOPY N/A 2020    Surgeon: Rip Davis MD    EXCISION, CYST,  RETRORECTAL OR PELVIC, POSTERIOR APPROACH N/A 01/19/2021    Surgeon: Daron Scruggs MD    EYE SURGERY      FACIAL COSMETIC SURGERY      TONSILLECTOMY      TOTAL ABDOMINAL HYSTERECTOMY W/ BILATERAL SALPINGOOPHORECTOMY  1988    URETHROPEXY  1988    MMK       Family History   Problem Relation Age of Onset    Hypertension Mother     Migraines Mother     Tremor Mother     Hyperlipidemia Mother     Allergies Mother     Hypertension Sister     Uterine cancer Maternal Grandmother     Breast cancer Maternal Aunt     Asthma Brother     Allergies Son     Asthma Son     Colon cancer Neg Hx     Ovarian cancer Neg Hx        Social History     Socioeconomic History    Marital status:     Number of children: 2    Years of education: 18   Occupational History    Occupation: Homemaker/Retired   Tobacco Use    Smoking status: Never Smoker    Smokeless tobacco: Never Used   Substance and Sexual Activity    Alcohol use: No    Drug use: No    Sexual activity: Yes     Partners: Male       Current Outpatient Medications   Medication Sig Dispense Refill    ALPRAZolam (XANAX) 0.25 MG tablet One-two during a flight for anxiety 5 tablet 0    celecoxib (CELEBREX) 200 MG capsule Take 1 capsule (200 mg total) by mouth once daily. 30 capsule 1    conjugated estrogens (PREMARIN) vaginal cream 1 g with applicator or dime-sized amt with finger in vagina nightly x 2 weeks, then twice a week thereafter 45 g 12    cyanocobalamin, vitamin B-12, 5,000 mcg Subl Take 1 tablet under tongue once a day for 4 weeks, then continue once per week 30 tablet 1    estradioL (VIVELLE-DOT) 0.0375 mg/24 hr APPLY 1 PATCH TOPICALLY TO THE SKIN 2 TIMES A WEEK 24 patch 3    eszopiclone (LUNESTA) 3 mg Tab Take 2 mg by mouth every evening.      gabapentin (NEURONTIN) 300 MG capsule Take 1 capsule (300 mg total) by mouth 2 (two) times daily. 30 capsule 1    levothyroxine (SYNTHROID) 100 MCG tablet Take 1 tablet (100 mcg total) by  "mouth before breakfast. 90 tablet 3    mirabegron (MYRBETRIQ) 50 mg Tb24 Take 1 tablet (50 mg total) by mouth once daily. 30 tablet 11    pantoprazole (PROTONIX) 40 MG tablet TAKE 1 TABLET(40 MG) BY MOUTH TWICE DAILY 60 tablet 3    sumatriptan (IMITREX) 100 MG tablet Take 1 tablet (100 mg total) by mouth every 8 (eight) hours as needed for Migraine. No more than 2 tablets per day, no more than 4 days per week. 8 tablet 2    tinidazole (TINDAMAX) 500 MG tablet 4 PILLS A DAY FOR 2 DAYS 8 tablet 1     No current facility-administered medications for this visit.       Review of patient's allergies indicates:   Allergen Reactions    Klonopin [clonazepam] Other (See Comments)     Severe mouth ulcers    Rocephin [ceftriaxone]      Severe migraine    Amoxicillin Other (See Comments)     Headache    Abilify [aripiprazole] Other (See Comments)     Tardive dyskinesia  - do not give any other meds that affect dopamine    Clindamycin Other (See Comments)     Nausea, headache    Codeine      Other reaction(s): Unknown    Demerol [meperidine]      Interacts with meds    Erythromycin      Other reaction(s): Unknown    Fioricet  [butalbital-acetaminophen-caff] Other (See Comments)       OB History        3    Para   2    Term   2            AB   1    Living   2       SAB   1    IAB        Ectopic        Multiple        Live Births                      ROS  As per HPI.      Physical Exam  /76   Ht 5' 7" (1.702 m)   BMI 22.71 kg/m²   General: alert and oriented, no acute distress  Respiratory: normal respiratory effort  Abd: soft, non-tender, non-distended  Pelvic:  Ext. Genitalia: normal external genitalia. Normal bartholin's and skeens glands  Vagina: -- atrophy. Normal vaginal mucosa without lesions. No discharge noted.   Non-tender bladder base without palpable mass.  Cervix: absent  Uterus:  surgically absent, vaginal cuff well healed   Urethra: no masses or tenderness  Urethral meatus: no " lesions, caruncle or prolapse.    Pessary fitting: pessary fitting today #4 ring with support, will re-assess PVR at pessary insertion    Impression  1. Mixed incontinence     2. Prolapse of anterior vaginal wall       We reviewed the above issues and discussed options for short-term versus long-term management of her problems.     Plan:   1. Prolapse: Stage 1 apical prolapse, Stage 2 anterior and posterior vaginal wall prolapse   --Pessary benefit discussed with patient, pessary fitting today #4 ring with support  --Daily pelvic floor exercises as assigned, start Pelvic floor PT  --Non surgical options discussed with patient    --Surgical options discussed such as: with apical suspension: SSF, USLS and SCP with mesh augmentation. Risks/ benefits/ alternatives/ succuss and failure rates were reviewed. Information packets were given detailing surgical options.  She was also given web site information for: www.augs.org and www.[employer].org and http://www.fda.gov/MedicalDevices/UroGynSurgicalMesh/default.htm to review the details of the surgical options discussed and the use of mesh in surgery. She will review the information given and we will discuss further at the follow up visit. She is looking for a non-surgical options for surgery. I think the pessary is a good start for her.      2.  Mixed urinary incontinence, urge > stress: history of MMK   --Bladder diary for 3-7 days, write the number of times you go to the rest room and associated symptoms of urgency or leakage.   --Empty bladder every 3 hours.  Empty well: wait a minute, lean forward on toilet.    --Avoid dietary irritants (see sheet).  Keep diary x 3-5 days to determine your irritants.  --KEGELS: do 10 in AM and 10 in PM, holding each x 10 seconds.  When you feel urge to go, STOP, KEGEL, and when urge has passed, then go to bathroom.  Start pelvic floor PT   --URGE: Myrbetriq 50 mg daily.  SE profile reviewed.    Takes 2-4 weeks to see if will have  effect.    --STRESS:  Pessary vs. Sling vs impressa      3. Vaginal atrophy (dryness):  Use 1 gram of estrogen cream in vagina  2 nights a week    RTC for pessary insertion and repeat PVR    Gustavo Mcgill PA-C  Division of Female Pelvic Medicine and Reconstructive Surgery  Department of Obstetrics & Gynecology  [facility] [place], [place]

## 2022-05-25 NOTE — PROGRESS NOTES
Pelvic Health Physical Therapy   Treatment Note     Name: Norm Andre  Clinic Number: [id number]    Therapy Diagnosis:   Encounter Diagnoses   Name Primary?    Muscle weakness Yes    Lack of coordination      Physician: Mayra Funez,*    Visit Date: 2022    Physician Orders: PT Eval and Treat  Medical Diagnosis from Referral: N39.46 (ICD-10-CM) - Mixed incontinence N81.10 (ICD-10-CM) - Prolapse of anterior vaginal wall  Evaluation Date: 2022  Authorization Period Expiration: 2022  Plan of Care Expiration: 2022  Visit # / Visits authorized:   FOTO: 1/3    Time In: 1454  Time Out: 1550  Total Billable Time: 56 minutes    Precautions: PMHx low back pain, herniated disc    Subjective     Pt reports: she was fitted for pessary but still feels reluctant to use it. Pt reports that she has not experienced any incidents of increased pelvic pressure. She states that she is having difficulty complying with HEP due to helping her daughter move.     She was not compliant with home exercise program.  Response to previous treatment: no adverse effects reported  Functional change: none    Pain: 0/10  Location: NA    Constitutional Symptoms Review: The patient denies having any constitutional symptoms.     Objective   Pt verbally consents to intravaginal treatment today.  Signed consent form already on file.     Therapeutic Exercise to develop  ROM and flexibility for 11 minutes including:   Child's pose 3x30 sec   Happy Baby stretch 3x30 sec   Piriformis stretch 3x30 sec Richie    Jolene received the following manual therapy techniques: to develop extensibility and desensitization for 45 minutes including:  Myofascial release pelvic floor mm, OI and endopelvic fascia using PIT and ischemic compression while giving VC/tactile cues for voluntary relaxation of mm  Scar mobilization to  scar using cross friction techniques  Abdominal wall mobilization and MFR    Jolene participated in  dynamic functional therapeutic activities to improve functional performance for 00 minutes, including:  Edu on use of vaginal moisturizer to decrease dryness  Edu on use of donut cushion while sitting for extended periods of time due to coccygectomy     Home Exercises Provided and Patient Education Provided     Education provided:   - diaphragmatic breathing and behavior modifications  Discussed progression of plan of care with patient; educated pt in activity modification; reviewed HEP with pt. Pt demonstrated and verbalized understanding of all instruction and was provided with a handout of HEP (see Patient Instructions).    Written Home Exercises Provided: yes.  Exercises were reviewed and Jolene was able to demonstrate them prior to the end of the session.  Jolene demonstrated good  understanding of the education provided.     See EMR under Patient Instructions for exercises provided 5/26/22.    Assessment     Pt reports noncompliance with HEP due to schedule with helping her daughter move. Focused session on manual techniques to decrease PFM and lower abdominal tension. She demonstrates increased tension with reports of TTP throughout L PFM. Initiated pelvic stretching this visit with pt reporting tension in bilateral hips. Encouraged pt to make HEP part of her nightly routine in order to improve compliance.     Jolene Is progressing well towards her goals.   Pt prognosis is Excellent.     Pt will continue to benefit from skilled outpatient physical therapy to address the deficits listed in the problem list box on initial evaluation, provide pt/family education and to maximize pt's level of independence in the home and community environment.     Pt's spiritual, cultural and educational needs considered and pt agreeable to plan of care and goals.     Anticipated barriers to physical therapy: none    Goals:   Short Term Goals: 6 weeks   Pt to increase pelvic floor strength to at least 3/5 needed for continence  with ADLs and social activities.  Pt to increase hip and pelvic floor strength by 1/2 a grade to improve stability needed for ADL performance.    Pt to demonstrate proper body mechanics with lifting, bending and squatting to decrease strain on lumbopelvic structures.     Long Term Goals: 12 weeks   Pt to be discharged with home plan for carry over after discharge.   Pt to report a decrease in pelvic pressure and fullness to no more than 10% of the time to demonstrate improving pelvic support of pelvic structures.  Pt to report a decrease in episodes of FI to no more than 1x every 2 weeks to improve confidence needed to participate in social outings.    Plan     Plan of care Certification: 4/18/2022 to 7/17/2022.     Outpatient Physical Therapy 1 times weekly for 12 weeks to include the following interventions: therapeutic exercises, therapeutic activity, neuromuscular re-education, manual therapy, patient/family education and self care/home management     sEMG for coordination. Continue intravaginal STM and abdominal wall mobilization. Initiate proximal hip and pelvic floor strengthening.     Anna Kimura, PT

## 2022-05-26 ENCOUNTER — CLINICAL SUPPORT (OUTPATIENT)
Dept: REHABILITATION | Facility: OTHER | Age: 67
End: 2022-05-26
Attending: OBSTETRICS & GYNECOLOGY
Payer: COMMERCIAL

## 2022-05-26 DIAGNOSIS — R27.9 LACK OF COORDINATION: ICD-10-CM

## 2022-05-26 DIAGNOSIS — M62.81 MUSCLE WEAKNESS: Primary | ICD-10-CM

## 2022-05-26 PROCEDURE — 97140 MANUAL THERAPY 1/> REGIONS: CPT

## 2022-05-26 PROCEDURE — 97110 THERAPEUTIC EXERCISES: CPT

## 2022-05-26 NOTE — PATIENT INSTRUCTIONS
Happy Baby     Lying on your back, start off with legs up against wall. Bring one leg towards you with knee bent at 90 degrees and hold the inner foot. Repeat with other leg. Hold for 10 deep breaths.    Piriformis stretch    While lying on your back with both knee bent, cross your one leg on the other knee. Next, hold your thigh and pull it up towards your chest until a stretch is felt in the buttock. Hold for 60 seconds. Repeat on both legs.     Child's Pose    While in a crawl position, slowly lower your buttocks towards your feet until a stretch is felt along your back and or buttocks.

## 2022-06-02 NOTE — PROGRESS NOTES
Pelvic Health Physical Therapy   Treatment Note     Name: Norm Andre  Clinic Number: [id number]    Therapy Diagnosis:   Encounter Diagnoses   Name Primary?    Muscle weakness Yes    Lack of coordination      Physician: Mayra Funez,*    Visit Date: 6/6/2022    Physician Orders: PT Eval and Treat  Medical Diagnosis from Referral: N39.46 (ICD-10-CM) - Mixed incontinence N81.10 (ICD-10-CM) - Prolapse of anterior vaginal wall  Evaluation Date: 4/18/2022  Authorization Period Expiration: 12/31/2022  Plan of Care Expiration: 7/17/2022  Visit # / Visits authorized: 3/ 23  FOTO: 1/3    Time In: 907  Time Out: 1000  Total Billable Time: 53 minutes    Precautions: PMHx low back pain, herniated disc    Subjective     Pt reports: practicing pelvic stretching at home. She states that she has been able to manage pelvic pressure which has increased during helping her daughter move. Pt reports that she has occasional leaking (50% of the time) after urinating when she stands up and puts clothes back on.     She was compliant with home exercise program.  Response to previous treatment: no adverse effects reported  Functional change: none    Pain: 0/10  Location: NA    Constitutional Symptoms Review: The patient denies having any constitutional symptoms.     Objective   Pt verbally consents to intravaginal treatment today.  Signed consent form already on file.     Therapeutic Exercise to develop  ROM and flexibility for 00 minutes including:   Child's pose 3x30 sec   Happy Baby stretch 3x30 sec   Piriformis stretch 3x30 sec Richie    Neuromuscular Re-education to develop Coordination and Control for 42 minutes including:   SEMG EVALUATION:   PF Electrode placement: external perineal  Pt. Position: supine hooklying    SEMG Finding: irregular baseline, poor holding ability, low net rise and inconsistent contraction  No skin irritation, erythema, or other adverse effects observed from electrode placement.    Treatment: Worked  on pelvic floor muscle strength training and coordination using sEMG assist.      BASELINE  Position: supine hooklying  Resting tone: 1.2-2.0 inconsistent  Max contraction: 7.0 uV  Max hold time: 2 sec     10 TRIALS (5 sec on, 10 sec)  Position: supine hooklying  Average contraction: 4.7 uV  Average rest: 2.0 uV     Jolene received the following manual therapy techniques: to develop extensibility and desensitization for 00 minutes including:  Myofascial release pelvic floor mm, OI and endopelvic fascia using PIT and ischemic compression while giving VC/tactile cues for voluntary relaxation of mm  Scar mobilization to  scar using cross friction techniques  Abdominal wall mobilization and MFR    Jolene participated in dynamic functional therapeutic activities to improve functional performance for 11 minutes, including:  Edu on double voiding techniques. Practiced technique with patient and utilized teach back strategy to confirm understanding.     Home Exercises Provided and Patient Education Provided     Education provided:   - double voiding techniques and kegels  Discussed progression of plan of care with patient; educated pt in activity modification; reviewed HEP with pt. Pt demonstrated and verbalized understanding of all instruction and was provided with a handout of HEP (see Patient Instructions).    Written Home Exercises Provided: yes.  Exercises were reviewed and Jolene was able to demonstrate them prior to the end of the session.  Jolene demonstrated good  understanding of the education provided.     See EMR under Patient Instructions for exercises provided 22.    Assessment     Discussed double voiding strategies due to pt reporting urinary incontinence occurring after urination when she leaves the bathroom. Biofeedback training performed this visit to assist with PFM strength and coordination. Pt demonstrates inconsistent baseline, poor holding ability, inconsistent contraction, and low net  rise. She demonstrates fatigue after 4 repetitions. Demonstrated effect of hip IR/ER motion on PFM activation with pt demonstrating improved activation and holding ability with ball squeeze. Progressed HEP to include kegel exercise with emphasis on avoiding holding breath or compensating through glutes. Plan to progress to strengthening exercises next visit contingent on pt tolerance.     Jolene Is progressing well towards her goals.   Pt prognosis is Excellent.     Pt will continue to benefit from skilled outpatient physical therapy to address the deficits listed in the problem list box on initial evaluation, provide pt/family education and to maximize pt's level of independence in the home and community environment.     Pt's spiritual, cultural and educational needs considered and pt agreeable to plan of care and goals.     Anticipated barriers to physical therapy: none    Goals:   Short Term Goals: 6 weeks   Pt to increase pelvic floor strength to at least 3/5 needed for continence with ADLs and social activities.  Pt to increase hip and pelvic floor strength by 1/2 a grade to improve stability needed for ADL performance.    Pt to demonstrate proper body mechanics with lifting, bending and squatting to decrease strain on lumbopelvic structures.     Long Term Goals: 12 weeks   Pt to be discharged with home plan for carry over after discharge.   Pt to report a decrease in pelvic pressure and fullness to no more than 10% of the time to demonstrate improving pelvic support of pelvic structures.  Pt to report a decrease in episodes of FI to no more than 1x every 2 weeks to improve confidence needed to participate in social outings.    Plan     Plan of care Certification: 4/18/2022 to 7/17/2022.     Outpatient Physical Therapy 1 times weekly for 12 weeks to include the following interventions: therapeutic exercises, therapeutic activity, neuromuscular re-education, manual therapy, patient/family education and self  care/home management     sEMG for coordination. Continue intravaginal STM and abdominal wall mobilization. Initiate proximal hip and pelvic floor strengthening.     Anna Kimura, PT

## 2022-06-06 ENCOUNTER — CLINICAL SUPPORT (OUTPATIENT)
Dept: REHABILITATION | Facility: OTHER | Age: 67
End: 2022-06-06
Attending: OBSTETRICS & GYNECOLOGY
Payer: COMMERCIAL

## 2022-06-06 DIAGNOSIS — R27.9 LACK OF COORDINATION: ICD-10-CM

## 2022-06-06 DIAGNOSIS — M62.81 MUSCLE WEAKNESS: Primary | ICD-10-CM

## 2022-06-06 PROCEDURE — 97112 NEUROMUSCULAR REEDUCATION: CPT

## 2022-06-06 PROCEDURE — 97530 THERAPEUTIC ACTIVITIES: CPT

## 2022-06-06 NOTE — PATIENT INSTRUCTIONS
Home Exercise Program: 06/06/2022    DOUBLE VOIDING - Double voiding is a technique that may assist the bladder to empty more effectively when urine is left in the bladder. It involves passing urine more than once each time that you go to the toilet. This makes sure that the bladder is completely empty.    Here are 3 strategies you can try to fully empty your bladder.  You do not have to do all of these things every single time. Find which ones work best for you.     Check to make sure your pelvic floor is relaxed   Do a body scan - make sure your legs, buttocks, and abdominals are relaxed.  Take a couple deep, slow breaths to encourage your pelvic floor muscles to DROP (ie. Try Diaphragmatic Breathing).  Gently apply pressure over your bladder.  Change your pelvic position: lean forward, rock your pelvis forward and backward, stand up then sit back down.     Wait at least 30 seconds to 1 minute to see if a second urine stream begins.     Do not stop your urine stream or push/strain!    KEGELS:     1. Lie comfortably with legs and buttocks relaxed.  2. Squeeze and LIFT the muscles that stop the flow of urine and passage of gas.  Keep legs and buttock muscles quiet.  3. Hold lift for 5 seconds without holding breath.  4. Release and DROP the pelvic floor muscles for 10 seconds.  5. Repeat 10 times, 1-2 sets per day. Spread throughout the day.    No Kegels while urinating!

## 2022-07-11 NOTE — PROGRESS NOTES
Pelvic Health Physical Therapy   Treatment Note  Recertification Note     Name: Norm Andre  Clinic Number: [id number]    Therapy Diagnosis:   Encounter Diagnoses   Name Primary?    Muscle weakness Yes    Lack of coordination      Physician: Mayra Funez,*    Visit Date: 7/15/2022    Physician Orders: PT Eval and Treat  Medical Diagnosis from Referral: N39.46 (ICD-10-CM) - Mixed incontinence N81.10 (ICD-10-CM) - Prolapse of anterior vaginal wall  Evaluation Date: 4/18/2022  Authorization Period Expiration: 12/31/2022  Plan of Care Expiration: 7/17/2022, updated to 10/13/2022  Visit # / Visits authorized: 4/ 23  FOTO: 1/3    Time In: 901  Time Out: 941   Total Billable Time: 40 minutes    Precautions: PMHx low back pain, herniated disc    Subjective     Pt reports: seeing Dr. Funez on Wednesday due to complaints of dysuria and gave a urine sample that came back negative, however she started Bactrim yesterday. She states that she has an appointment for pessary appointment after PT. Pt attributes increased pain with urination to starting to use [place] wet wipes after using the bathroom, has since discontinued.     She was compliant with home exercise program.  Response to previous treatment: no adverse effects reported  Functional change: none    Pain: 0/10  Location: NA    Constitutional Symptoms Review: The patient denies having any constitutional symptoms.     Objective   Pt verbally consents to intravaginal treatment today.  Signed consent form already on file.     ABDOMINAL WALL ASSESSMENT  Palpation: WNL  Abdominal strength: fair  Scarring: hysterectomy scar   Pelvic Floor Muscle and Transverse Abdominus Synergy: absent  Diastasis: absent       VAGINAL PELVIC FLOOR EXAM     EXTERNAL ASSESSMENT  Introitus: WNL  Skin condition: dryness noted  Scarring: none   Sensation: WNL   Pain: none  Voluntary contraction: visible lift  Voluntary relaxation: visible drop  Involuntary contraction: visible  drop  Bearing down: nil  Perineal descent: absent                            INTERNAL ASSESSMENT  Pain: tender areas noted as follows: L PFM and periurethral tissues   Sensation: able to localized pressure appropriately   Vaginal vault: WNL   Muscle Bulk: atrophy   Muscle Power: 2/5  Muscle Endurance: 6 sec  # Reps To Fatigue: 5    Fast Contractions in 10 seconds: NT                        Quality of contraction: decreased hold and slow relaxation   Specificity: patient contracts: abdominals   Coordination: tends to hold breath during PFM contration and cannot isolate PFM from abdominals   Prolapse check: unable to test d/t poor ability to bear down    Therapeutic Exercise to develop  ROM and flexibility for 00 minutes including:   Child's pose 3x30 sec   Happy Baby stretch 3x30 sec   Piriformis stretch 3x30 sec Richie    Neuromuscular Re-education to develop Coordination and Control for 00 minutes including:   SEMG EVALUATION:   PF Electrode placement: external perineal  Pt. Position: supine hooklying    SEMG Finding: irregular baseline, poor holding ability, low net rise and inconsistent contraction  No skin irritation, erythema, or other adverse effects observed from electrode placement.    Treatment: Worked on pelvic floor muscle strength training and coordination using sEMG assist.      BASELINE  Position: supine hooklying  Resting tone: 1.2-2.0 inconsistent  Max contraction: 7.0 uV  Max hold time: 2 sec     10 TRIALS (5 sec on, 10 sec)  Position: supine hooklying  Average contraction: 4.7 uV  Average rest: 2.0 uV     Jolene received the following manual therapy techniques: to develop extensibility and desensitization for 40 minutes including:  Myofascial release pelvic floor mm, OI and endopelvic fascia using PIT and ischemic compression while giving VC/tactile cues for voluntary relaxation of mm    Scar mobilization to  scar using cross friction techniques  Abdominal wall mobilization and MFR    Jolene  participated in dynamic functional therapeutic activities to improve functional performance for 00 minutes, including:  Edu on double voiding techniques. Practiced technique with patient and utilized teach back strategy to confirm understanding.     Home Exercises Provided and Patient Education Provided     Education provided:   - anatomy/physiology of pelvic floor and behavior modifications  Discussed progression of plan of care with patient; educated pt in activity modification; reviewed HEP with pt. Pt demonstrated and verbalized understanding of all instruction and was provided with a handout of HEP (see Patient Instructions).    Written Home Exercises Provided: Patient instructed to cont prior HEP.  Exercises were reviewed and Jolene was able to demonstrate them prior to the end of the session.  Jolene demonstrated good  understanding of the education provided.     See EMR under Patient Instructions for exercises provided prior visit.    Assessment     Pt reports acute flare-up of dysuria that began earlier this week. Dr. Funez present at beginning of session to discuss medical concerns with pt. She was counseled on discontinuing Bactrim and starting Uribel, and will be scheduled for further workup via cystoscopy. Abbreviated session due to high symptom irritability this visit and not wanting to create further pain flare before pessary fitting. Pt demonstrates increased tension throughout L PFM and periurethral tissues with reports of burning sensation with palpation. Pt reports less tenderness at lower abdominal wall and hysterectomy scar. Pt was counseling on adherence to HEP in order to maximize therapeutic outcomes.     Jolene Is progressing well towards her goals.   Pt prognosis is Excellent.     Pt will continue to benefit from skilled outpatient physical therapy to address the deficits listed in the problem list box on initial evaluation, provide pt/family education and to maximize pt's level of  independence in the home and community environment.     Pt's spiritual, cultural and educational needs considered and pt agreeable to plan of care and goals.     Anticipated barriers to physical therapy: none    Goals:   Short Term Goals: 6 weeks   Pt to increase pelvic floor strength to at least 3/5 needed for continence with ADLs and social activities. PROGRESSING  Pt to increase hip and pelvic floor strength by 1/2 a grade to improve stability needed for ADL performance. PROGRESSING  Pt to demonstrate proper body mechanics with lifting, bending and squatting to decrease strain on lumbopelvic structures. PROGRESSING     Long Term Goals: 12 weeks   Pt to be discharged with home plan for carry over after discharge. PROGRESSING  Pt to report a decrease in pelvic pressure and fullness to no more than 10% of the time to demonstrate improving pelvic support of pelvic structures. PROGRESSING  Pt to report a decrease in episodes of FI to no more than 1x every 2 weeks to improve confidence needed to participate in social outings. PROGRESSING    Plan     Plan of care Certification: 4/18/2022 to 7/17/2022, updated to 10/13/2022     Outpatient Physical Therapy 1 times weekly for 12 weeks to include the following interventions: therapeutic exercises, therapeutic activity, neuromuscular re-education, manual therapy, patient/family education and self care/home management     sEMG for coordination. Continue intravaginal STM and abdominal wall mobilization. Initiate proximal hip and pelvic floor strengthening.     Anna Kimura, PT

## 2022-07-13 ENCOUNTER — OFFICE VISIT (OUTPATIENT)
Dept: UROGYNECOLOGY | Facility: CLINIC | Age: 67
End: 2022-07-13
Payer: COMMERCIAL

## 2022-07-13 VITALS
HEART RATE: 63 BPM | DIASTOLIC BLOOD PRESSURE: 72 MMHG | SYSTOLIC BLOOD PRESSURE: 101 MMHG | BODY MASS INDEX: 24.01 KG/M2 | WEIGHT: 153 LBS | HEIGHT: 67 IN

## 2022-07-13 DIAGNOSIS — R30.0 DYSURIA: Primary | ICD-10-CM

## 2022-07-13 DIAGNOSIS — N30.90 CYSTITIS: ICD-10-CM

## 2022-07-13 DIAGNOSIS — N39.46 MIXED INCONTINENCE: ICD-10-CM

## 2022-07-13 DIAGNOSIS — N81.10 PROLAPSE OF ANTERIOR VAGINAL WALL: ICD-10-CM

## 2022-07-13 DIAGNOSIS — N81.6 POSTERIOR VAGINAL WALL PROLAPSE: ICD-10-CM

## 2022-07-13 PROCEDURE — 87086 URINE CULTURE/COLONY COUNT: CPT | Performed by: OBSTETRICS & GYNECOLOGY

## 2022-07-13 PROCEDURE — 99214 OFFICE O/P EST MOD 30 MIN: CPT | Mod: S$GLB,,, | Performed by: OBSTETRICS & GYNECOLOGY

## 2022-07-13 PROCEDURE — 99999 PR PBB SHADOW E&M-EST. PATIENT-LVL IV: CPT | Mod: PBBFAC,,, | Performed by: OBSTETRICS & GYNECOLOGY

## 2022-07-13 PROCEDURE — 99214 PR OFFICE/OUTPT VISIT, EST, LEVL IV, 30-39 MIN: ICD-10-PCS | Mod: S$GLB,,, | Performed by: OBSTETRICS & GYNECOLOGY

## 2022-07-13 PROCEDURE — 99999 PR PBB SHADOW E&M-EST. PATIENT-LVL IV: ICD-10-PCS | Mod: PBBFAC,,, | Performed by: OBSTETRICS & GYNECOLOGY

## 2022-07-13 RX ORDER — SULFAMETHOXAZOLE AND TRIMETHOPRIM 800; 160 MG/1; MG/1
1 TABLET ORAL 2 TIMES DAILY
Qty: 10 TABLET | Refills: 0 | Status: SHIPPED | OUTPATIENT
Start: 2022-07-13 | End: 2023-01-31

## 2022-07-13 NOTE — PROGRESS NOTES
Subjective:       Patient ID: Norm Andre is a 66 y.o. female.    Chief Complaint:  Follow-up and Urinary Tract Infection (Painful urination, patient taking pyridium )      History of Present Illness  Follow-up  Pertinent negatives include no abdominal pain, chest pain, chills, coughing, diaphoresis, fatigue, fever, nausea, rash or vomiting.   Urinary Tract Infection   Associated symptoms include frequency and urgency. Pertinent negatives include no chills, flank pain, hematuria, nausea, vomiting, constipation or rash.    66 Y O  F  has a past medical history of Allergy, Arthritis, Headache(784.0), Herniated disc, History of migraine headaches, Hypothyroidism, Infections of kidney, Low back pain, Migraine headache, Moderate mood disorder, Neck pain, Rotator cuff tear, and Thyroid disease.  Referred by Dr. Oden for evaluation of prolapse. She has had this issue for 3 months  and has noticed that it has worsened over time.  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   She had a hysterectomy and a MMK in her 30's, complicated by recurrent UTI's.     She does do not use pads she changes on average 2 three times a day, uses wipes. The has bowel leakage, did not tolerate metamucil or fibercon     7/13/2022:  Patient presents with complaints of dysuria and urinary urgency for several days. She denies fever chills, significant abdominal or back pain.  Incontinence has worsened.     [facility]    Question 3/24/2022  8:55 AM CDT - Filed by Mary Wheeler MA   General Urogynecology: Are you experiencing the following?    Dysuria (painful urination) Yes, sometimes feels like she has a constant pain and feels like it hurts to urinate   Nocturia:  waking up at night to empty your bladder  Yes   If you answered yes to the previous question, how many times does this happen per night? 1-2   Enuresis (urine loss during  "sleep) Yes   Dribbling urine after you urinate Yes   Hematuria (urine appears red) Yes   Type of stream Sprays   Urinary frequency: How often a day are you going to the bathroom per day?  10-15   Urinary Tract Infections: How many Urinary Tract Infections have you had in the past year? Two (2) in six (6) months   If you have had a UTI in the past year, what treatments have you had so far?  abx    Urinary Incontinence (General): Are you experiencing the following?    Past consultation for incontinence: Have you ever seen someone for the evaluation of incontinence? No   If you answered yes to the previous question, please select all the therapies you have tried.  N/a- I answered no to the previous question   Please note the effectiveness of the therapies.    Need to wear protection to keep clothes dry  No   If you answered yes to the previous question, please bill the protection you use.  N/a- I answered no to the previous question   If you wear protection, how much wetness is typically on each pad? N/a- I do not wear protection   If you wear protection, how often do you have to change per day, if applicable?     Stress Symptoms: Are you experiencing the following? Hx of MMK, and has been more bothersome lately    Leakage of urine with cough, laugh and/or sneeze Yes   If you answered yes to the previous question, what is the frequency in days, weeks and/or months? Several times a day   Leakage of urine with sex No   Leakage of urine with bending/ lifting Yes   Leakage of urine with briskly walking or jogging Yes   If you lose urine for any other reason not previously mentioned, please note it below, if applicable.     Urge Symptoms: Are you experiencing the following?    Urgency ("got to go" feeling) Yes   Urge: How frequently do you feel an urge to urinate (feeling like you "gotta go" to the bathroom and can't wait) Several times a day   Do you experience a leakage of urine when you have a feeling of urgency?  Yes "   Leakage of urine when unaware No   Past use of anticholinergics (medications used to treat overactive bladder) No   If you answered yes to the previous question, please bill the anticholinergics you have used:     Have you ever used Mirbetriq (aka Mirabegron)?  No   Prolapse Symptoms: Are you experiencing any of the following?     Falling out/ Bulging/ Heaviness in the vagina Yes   Vaginal/ Abdominal Pain/ Pressure Yes   Need to strain/ Push to void Yes   Need to wait on the toilet before you void Yes   Unusual position to urinate (using your hands to push back the vaginal bulge) Yes   Sensation of incomplete emptying Yes   Past use of pessary device No   If you answered yes to the previous question, please list the devices you have used below.     Bowel Symptoms: Are you experiencing any of the following?    Constipation Yes   Diarrhea  Yes   Hematochezia (bloody stool) No   Incomplete evacuation of stool No   Involuntary loss of formed stool No   Fecal smearing/urgency No   Involuntary loss of gas No   Vaginal Symptoms: Are you experiencing any of the following?     Abnormal vaginal bleeding  No   Vaginal dryness No   Sexually active  No   Dyspareunia (painful intercourse) No   Estrogen use  Yes       GYN & OB History  No LMP recorded. Patient has had a hysterectomy.   Date of Last Pap: No result found    OB History    Para Term  AB Living   3 2 2   1 2   SAB IAB Ectopic Multiple Live Births   1              # Outcome Date GA Lbr Marlo/2nd Weight Sex Delivery Anes PTL Lv   3 SAB            2 Term            1 Term              OB     x 2  C/s x 0     Largest: 9 # 2 oz   Forceps: yes  Episiotomy:  yes  Degree: 3rd or 4th no    GYN  Menarche:  17  Menstrual cycle:n/a  Menopause: No   Hysterectomy: Yes,  Open:   Ovaries:  Present  Tubal ligation: No   Other abdominal surgeries:   Presacral mass     Review of Systems  Review of Systems   Constitutional: Negative.  Negative for activity change,  appetite change, chills, diaphoresis, fatigue, fever and unexpected weight change.   HENT: Negative.    Eyes: Negative.    Respiratory: Negative.  Negative for apnea, cough and wheezing.    Cardiovascular: Negative.  Negative for chest pain and palpitations.   Gastrointestinal: Negative for abdominal distention, abdominal pain, anal bleeding, blood in stool, constipation, diarrhea, nausea, rectal pain and vomiting.   Endocrine: Negative.    Genitourinary: Positive for frequency and urgency. Negative for decreased urine volume, difficulty urinating, dyspareunia, dysuria, enuresis, flank pain, genital sores, hematuria, menstrual problem, pelvic pain, vaginal bleeding, vaginal discharge and vaginal pain.        Prolapse    Musculoskeletal: Negative for back pain and gait problem.   Skin: Negative for color change, pallor, rash and wound.   Allergic/Immunologic: Negative for immunocompromised state.   Neurological: Negative.  Negative for dizziness and speech difficulty.   Hematological: Negative for adenopathy.   Psychiatric/Behavioral: Negative for agitation, behavioral problems, confusion and sleep disturbance.           Objective:     Physical Exam   Constitutional: She is oriented to person, place, and time. She appears well-developed.   HENT:   Head: Normocephalic and atraumatic.   Eyes: Conjunctivae and EOM are normal.   Cardiovascular: Normal rate, regular rhythm, S1 normal, S2 normal, normal heart sounds and intact distal pulses.   Pulmonary/Chest: Effort normal and breath sounds normal. She exhibits no tenderness.   Abdominal: Soft. Bowel sounds are normal. She exhibits no distension and no mass. There is no splenomegaly or hepatomegaly. There is no abdominal tenderness. There is no rigidity, no rebound and no guarding. Hernia confirmed negative in the right inguinal area and confirmed negative in the left inguinal area.   Genitourinary: Pelvic exam was performed with patient supine. Rectum normal, vagina  normal, skenes normal and bartholins normal. Right labia normal and left labia normal. Urethra exhibits hypermobility. Urethra exhibits no urethral caruncle, no urethral diverticulum and no urethral mass. Right bartholin is not enlarged and not tender. Left bartholin is not enlarged and not tender. Rectal exam shows resting tone normal and active tone normal. Rectal exam shows no external hemorrhoid, no fissure, no tenderness, anal tone normal and no dovetailing. Guaiac negative stool. There is atrophy in the vagina. No foreign body, tenderness, bleeding, unspecified prolapse of vaginal walls, fistula, mesh exposure or lavator tenderness in the vagina. Right adnexum displays no tenderness. Left adnexum displays no tenderness. Uterus is absent.   PVR: 40 ML  Empty cough stress test: Negative.  Kegel: 2/5    POP-Q  Aa: 0 Ba: 0 C: -5   GH: 4 PB: 2 TVL: 8   Ap: 0 Bp: 0 D:                      Musculoskeletal:         General: Normal range of motion.      Cervical back: Normal range of motion and neck supple.   Neurological: She is alert and oriented to person, place, and time. She has normal strength and normal reflexes. Cranial nerves II through XII intact. No cranial nerve deficit.   Skin: Skin is warm and dry.   Psychiatric: She has a normal mood and affect. Her speech is normal and behavior is normal. Judgment normal.        The urethral was cleaned with Betadine swab, a 10 [place] catheter was easily passed using sterile technique and the bladder drained. Approximately   40 L's of  cloudy urine was obtained. The patient tolerated the procedure well.        HCM  Pap's: Normal/ normal last Pap:history of HPV  Mammo: BIRADS: 1 ; Last imaging  2021   Colonoscopy:  12/ 2020   Dexa: Normal    Assessment:        1. Dysuria    2. Cystitis    3. Prolapse of anterior vaginal wall    4. Posterior vaginal wall prolapse    5. Mixed incontinence               Plan:      1. Cystitis  --urine culture  --bactrim      2.  Prolapse:  Stage 1 apical prolapse, Stage 2 anterior and posterior vaginal wall prolapse: no change in her symptoms    --Pessary benefit discussed with patient, s/p pessary fitting will decide if she wants to use it.   --Daily pelvic floor exercises as assigned, Pelvic floor PT  --Non surgical options discussed with patient    --Surgical options discussed such as: with apical suspension: SSF, USLS and SCP with mesh augmentation. Risks/ benefits/ alternatives/ succuss and failure rates were reviewed. Information packets were given detailing surgical options.  She was also given web site information for: www.augs.org and www.[employer] [place].org and http://www.fda.gov/MedicalDevices/UroGynSurgicalMesh/default.htm to review the details of the surgical options discussed and the use of mesh in surgery. She will review the information given and we will discuss further at the follow up visit. She is looking for a non-surgical options for surgery. I think the pessary is a good start for her.     3.  Mixed urinary incontinence, urge > stress: history of MMK   --Bladder diary for 3-7 days, write the number of times you go to the rest room and associated symptoms of urgency or leakage.   --Empty bladder every 3 hours.  Empty well: wait a minute, lean forward on toilet.    --Avoid dietary irritants (see sheet).  Keep diary x 3-5 days to determine your irritants.  --KEGELS: do 10 in AM and 10 in PM, holding each x 10 seconds.  When you feel urge to go, STOP, KEGEL, and when urge has passed, then go to bathroom.   Pelvic floor PT   --URGE: Myrbetriq 50 mg daily.  SE profile reviewed.    Takes 2-4 weeks to see if will have effect.    --STRESS:  Pessary vs. Sling vs impressa     4. Vaginal atrophy (dryness):  Restart 1 gram of estrogen cream in vagina nightly x 2 weeks, then twice a week thereafter.       Mayra Funez DO  Female Pelvic Medicine and Reconstructive Surgery  [facility] [place], [place]

## 2022-07-13 NOTE — PATIENT INSTRUCTIONS
1. Prolapse: Stage 1 apical prolapse, Stage 2 anterior and posterior vaginal wall prolapse   --Pessary benefit discussed with patient, will schedule for pessary fitting  --Daily pelvic floor exercises as assigned, Pelvic floor PT  --Non surgical options discussed with patient    --Surgical options discussed such as: with apical suspension: SSF, USLS and SCP with mesh augmentation. Risks/ benefits/ alternatives/ succuss and failure rates were reviewed. Information packets were given detailing surgical options.  She was also given web site information for: www.augs.org and www.[employer].org and http://www.fda.gov/MedicalDevices/UroGynSurgicalMesh/default.htm to review the details of the surgical options discussed and the use of mesh in surgery. She will review the information given and we will discuss further at the follow up visit. She is looking for a non-surgical options for surgery. I think the pessary is a good start for her.     2.  Mixed urinary incontinence, urge > stress: history of MMK   --Bladder diary for 3-7 days, write the number of times you go to the rest room and associated symptoms of urgency or leakage.   --Empty bladder every 3 hours.  Empty well: wait a minute, lean forward on toilet.    --Avoid dietary irritants (see sheet).  Keep diary x 3-5 days to determine your irritants.  --KEGELS: do 10 in AM and 10 in PM, holding each x 10 seconds.  When you feel urge to go, STOP, KEGEL, and when urge has passed, then go to bathroom.  Start pelvic floor PT   --URGE: Myrbetriq 50 mg daily.  SE profile reviewed.    Takes 2-4 weeks to see if will have effect.    --STRESS:  Pessary vs. Sling vs impressa     3. Vaginal atrophy (dryness):  Restart 1 gram of estrogen cream in vagina nightly x 2 weeks, then twice a week thereafter.

## 2022-07-15 ENCOUNTER — CLINICAL SUPPORT (OUTPATIENT)
Dept: REHABILITATION | Facility: OTHER | Age: 67
End: 2022-07-15
Attending: OBSTETRICS & GYNECOLOGY
Payer: COMMERCIAL

## 2022-07-15 ENCOUNTER — PATIENT MESSAGE (OUTPATIENT)
Dept: UROGYNECOLOGY | Facility: CLINIC | Age: 67
End: 2022-07-15
Payer: COMMERCIAL

## 2022-07-15 ENCOUNTER — OFFICE VISIT (OUTPATIENT)
Dept: UROGYNECOLOGY | Facility: CLINIC | Age: 67
End: 2022-07-15
Payer: COMMERCIAL

## 2022-07-15 VITALS
BODY MASS INDEX: 24.01 KG/M2 | HEIGHT: 67 IN | DIASTOLIC BLOOD PRESSURE: 70 MMHG | WEIGHT: 153 LBS | SYSTOLIC BLOOD PRESSURE: 120 MMHG

## 2022-07-15 DIAGNOSIS — M62.81 MUSCLE WEAKNESS: Primary | ICD-10-CM

## 2022-07-15 DIAGNOSIS — N39.46 MIXED INCONTINENCE: ICD-10-CM

## 2022-07-15 DIAGNOSIS — R27.9 LACK OF COORDINATION: ICD-10-CM

## 2022-07-15 DIAGNOSIS — N95.2 ATROPHIC VAGINITIS: ICD-10-CM

## 2022-07-15 DIAGNOSIS — N81.6 POSTERIOR VAGINAL WALL PROLAPSE: ICD-10-CM

## 2022-07-15 DIAGNOSIS — N89.8 VAGINAL IRRITATION: Primary | ICD-10-CM

## 2022-07-15 LAB — BACTERIA UR CULT: NO GROWTH

## 2022-07-15 PROCEDURE — 99999 PR PBB SHADOW E&M-EST. PATIENT-LVL IV: ICD-10-PCS | Mod: PBBFAC,,, | Performed by: NURSE PRACTITIONER

## 2022-07-15 PROCEDURE — 97140 MANUAL THERAPY 1/> REGIONS: CPT

## 2022-07-15 PROCEDURE — 99213 OFFICE O/P EST LOW 20 MIN: CPT | Mod: S$GLB,,, | Performed by: NURSE PRACTITIONER

## 2022-07-15 PROCEDURE — 87481 CANDIDA DNA AMP PROBE: CPT | Mod: 59 | Performed by: NURSE PRACTITIONER

## 2022-07-15 PROCEDURE — 99999 PR PBB SHADOW E&M-EST. PATIENT-LVL IV: CPT | Mod: PBBFAC,,, | Performed by: NURSE PRACTITIONER

## 2022-07-15 PROCEDURE — 99213 PR OFFICE/OUTPT VISIT, EST, LEVL III, 20-29 MIN: ICD-10-PCS | Mod: S$GLB,,, | Performed by: NURSE PRACTITIONER

## 2022-07-15 RX ORDER — FLUCONAZOLE 150 MG/1
150 TABLET ORAL
Qty: 3 TABLET | Refills: 0 | Status: SHIPPED | OUTPATIENT
Start: 2022-07-15 | End: 2022-07-20

## 2022-07-15 RX ORDER — METHENAMINE, SODIUM PHOSPHATE, MONOBASIC, MONOHYDRATE, PHENYL SALICYLATE, METHYLENE BLUE, AND HYOSCYAMINE SULFATE 120; 40.8; 36.2; 10.8; .12 MG/1; MG/1; MG/1; MG/1; MG/1
1 TABLET ORAL 4 TIMES DAILY PRN
Qty: 30 TABLET | Refills: 1 | Status: SHIPPED | OUTPATIENT
Start: 2022-07-15 | End: 2022-08-15

## 2022-07-15 NOTE — PATIENT INSTRUCTIONS
1. Cystitis  --urine culture--negative       2.  Prolapse: Stage 1 apical prolapse, Stage 2 anterior and posterior vaginal wall prolapse: no change in her symptoms    --Pessary benefit discussed with patient, s/p pessary fitting will decide if she wants to use it.   --Daily pelvic floor exercises as assigned, Pelvic floor PT  --Non surgical options discussed with patient    --Surgical options discussed such as: with apical suspension: SSF, USLS and SCP with mesh augmentation. Risks/ benefits/ alternatives/ succuss and failure rates were reviewed. Information packets were given detailing surgical options.  She was also given web site information for: www.augs.org and www.[employer].org and http://www.fda.gov/MedicalDevices/UroGynSurgicalMesh/default.htm to review the details of the surgical options discussed and the use of mesh in surgery. She will review the information given and we will discuss further at the follow up visit. She is looking for a non-surgical options for surgery. I think the pessary is a good start for her.      3.  Mixed urinary incontinence, urge > stress: history of MMK   --Bladder diary for 3-7 days, write the number of times you go to the rest room and associated symptoms of urgency or leakage.   --Empty bladder every 3 hours.  Empty well: wait a minute, lean forward on toilet.    --Avoid dietary irritants (see sheet).  Keep diary x 3-5 days to determine your irritants.  --KEGELS: do 10 in AM and 10 in PM, holding each x 10 seconds.  When you feel urge to go, STOP, KEGEL, and when urge has passed, then go to bathroom.   Pelvic floor PT   --URGE: Myrbetriq 50 mg daily.  SE profile reviewed.    Takes 2-4 weeks to see if will have effect.    --STRESS:  Pessary vs. Sling vs impressa      4. Vaginal atrophy (dryness):  Restart 1 gram of estrogen cream in vagina nightly x 2 weeks, then twice a week thereafter.     5. Vaginal discharge   --affirm today  --diflucan 150 mg every other day x 3  doses    6. RTC for pessary insertion

## 2022-07-15 NOTE — PROGRESS NOTES
Urogyn follow up  07/15/2022  .  [facility]  [address] [id number]    Norm Andre  [id number]  1955      Norm Andre is a 66 y.o.  here for a urogyn follow up for pessary insertion  Last HPI from 07/13/2022  Follow-up  Pertinent negatives include no abdominal pain, chest pain, chills, coughing, diaphoresis, fatigue, fever, nausea, rash or vomiting.   Urinary Tract Infection   Associated symptoms include frequency and urgency. Pertinent negatives include no chills, flank pain, hematuria, nausea, vomiting, constipation or rash.    66 Y O  F  has a past medical history of Allergy, Arthritis, Headache(784.0), Herniated disc, History of migraine headaches, Hypothyroidism, Infections of kidney, Low back pain, Migraine headache, Moderate mood disorder, Neck pain, Rotator cuff tear, and Thyroid disease.  Referred by Dr. Oden for evaluation of prolapse. She has had this issue for 3 months  and has noticed that it has worsened over time.  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   She had a hysterectomy and a MMK in her 30's, complicated by recurrent UTI's.     She does do not use pads she changes on average 2 three times a day, uses wipes. The has bowel leakage, did not tolerate metamucil or fibercon      7/13/2022:  Patient presents with complaints of dysuria and urinary urgency for several days. She denies fever chills, significant abdominal or back pain.  Incontinence has worsened.      [facility]     Question 3/24/2022  8:55 AM CDT - Filed by Mary Wheeler MA   General Urogynecology: Are you experiencing the following?     Dysuria (painful urination) Yes, sometimes feels like she has a constant pain and feels like it hurts to urinate   Nocturia:  waking up at night to empty your bladder  Yes   If you answered yes to the previous question, how many times does  "this happen per night? 1-2   Enuresis (urine loss during sleep) Yes   Dribbling urine after you urinate Yes   Hematuria (urine appears red) Yes   Type of stream Sprays   Urinary frequency: How often a day are you going to the bathroom per day?  10-15   Urinary Tract Infections: How many Urinary Tract Infections have you had in the past year? Two (2) in six (6) months   If you have had a UTI in the past year, what treatments have you had so far?  abx    Urinary Incontinence (General): Are you experiencing the following?     Past consultation for incontinence: Have you ever seen someone for the evaluation of incontinence? No   If you answered yes to the previous question, please select all the therapies you have tried.  N/a- I answered no to the previous question   Please note the effectiveness of the therapies.     Need to wear protection to keep clothes dry  No   If you answered yes to the previous question, please bill the protection you use.  N/a- I answered no to the previous question   If you wear protection, how much wetness is typically on each pad? N/a- I do not wear protection   If you wear protection, how often do you have to change per day, if applicable?      Stress Symptoms: Are you experiencing the following? Hx of MMK, and has been more bothersome lately    Leakage of urine with cough, laugh and/or sneeze Yes   If you answered yes to the previous question, what is the frequency in days, weeks and/or months? Several times a day   Leakage of urine with sex No   Leakage of urine with bending/ lifting Yes   Leakage of urine with briskly walking or jogging Yes   If you lose urine for any other reason not previously mentioned, please note it below, if applicable.      Urge Symptoms: Are you experiencing the following?     Urgency ("got to go" feeling) Yes   Urge: How frequently do you feel an urge to urinate (feeling like you "gotta go" to the bathroom and can't wait) Several times a day   Do you experience " a leakage of urine when you have a feeling of urgency?  Yes   Leakage of urine when unaware No   Past use of anticholinergics (medications used to treat overactive bladder) No   If you answered yes to the previous question, please bill the anticholinergics you have used:      Have you ever used Mirbetriq (aka Mirabegron)?  No   Prolapse Symptoms: Are you experiencing any of the following?      Falling out/ Bulging/ Heaviness in the vagina Yes   Vaginal/ Abdominal Pain/ Pressure Yes   Need to strain/ Push to void Yes   Need to wait on the toilet before you void Yes   Unusual position to urinate (using your hands to push back the vaginal bulge) Yes   Sensation of incomplete emptying Yes   Past use of pessary device No   If you answered yes to the previous question, please list the devices you have used below.      Bowel Symptoms: Are you experiencing any of the following?     Constipation Yes   Diarrhea  Yes   Hematochezia (bloody stool) No   Incomplete evacuation of stool No   Involuntary loss of formed stool No   Fecal smearing/urgency No   Involuntary loss of gas No   Vaginal Symptoms: Are you experiencing any of the following?      Abnormal vaginal bleeding  No   Vaginal dryness No   Sexually active  No   Dyspareunia (painful intercourse) No   Estrogen use  Yes                Past Medical History:   Diagnosis Date    Allergy     Arthritis     Headache(784.0)     Herniated disc     x4 in cervical area    History of migraine headaches     Hypothyroidism     Infections of kidney     has had multiple kidney infections in the past.    Low back pain     Migraine headache     Moderate mood disorder     Neck pain     Rotator cuff tear     Thyroid disease        Past Surgical History:   Procedure Laterality Date    COCCYGECTOMY  01/19/2021    Surgeon: Daron Scruggs MD    COLONOSCOPY N/A 05/05/2016    Surgeon: Daron Scruggs MD    COLONOSCOPY N/A 12/28/2020    Surgeon: Daron Scruggs MD     CYSTOSCOPY      ESOPHAGOGASTRODUODENOSCOPY N/A 02/18/2020    Surgeon: Rip Davis MD    EXCISION, CYST, RETRORECTAL OR PELVIC, POSTERIOR APPROACH N/A 01/19/2021    Surgeon: Daron Scruggs MD    EYE SURGERY      FACIAL COSMETIC SURGERY      TONSILLECTOMY      TOTAL ABDOMINAL HYSTERECTOMY W/ BILATERAL SALPINGOOPHORECTOMY  1988    URETHROPEXY  1988    MMK       Family History   Problem Relation Age of Onset    Hypertension Mother     Migraines Mother     Tremor Mother     Hyperlipidemia Mother     Allergies Mother     Hypertension Sister     Uterine cancer Maternal Grandmother     Breast cancer Maternal Aunt     Asthma Brother     Allergies Son     Asthma Son     Colon cancer Neg Hx     Ovarian cancer Neg Hx        Social History     Socioeconomic History    Marital status:     Number of children: 2    Years of education: 18   Occupational History    Occupation: Homemaker/Retired   Tobacco Use    Smoking status: Never Smoker    Smokeless tobacco: Never Used   Substance and Sexual Activity    Alcohol use: No    Drug use: No    Sexual activity: Yes     Partners: Male       Current Outpatient Medications   Medication Sig Dispense Refill    ALPRAZolam (XANAX) 0.25 MG tablet One-two during a flight for anxiety 5 tablet 0    conjugated estrogens (PREMARIN) vaginal cream 1 g with applicator or dime-sized amt with finger in vagina nightly x 2 weeks, then twice a week thereafter 45 g 12    cyanocobalamin, vitamin B-12, 5,000 mcg Subl Take 1 tablet under tongue once a day for 4 weeks, then continue once per week 30 tablet 1    estradioL (VIVELLE-DOT) 0.0375 mg/24 hr APPLY 1 PATCH TOPICALLY TO THE SKIN 2 TIMES A WEEK 24 patch 3    gabapentin (NEURONTIN) 300 MG capsule Take 1 capsule (300 mg total) by mouth 2 (two) times daily. 30 capsule 1    levothyroxine (SYNTHROID) 100 MCG tablet Take 1 tablet (100 mcg total) by mouth before breakfast. 90 tablet 3    mirabegron (MYRBETRIQ) 50  mg Tb24 Take 1 tablet (50 mg total) by mouth once daily. 30 tablet 11    pantoprazole (PROTONIX) 40 MG tablet TAKE 1 TABLET(40 MG) BY MOUTH TWICE DAILY 60 tablet 3    phenazopyridine HCl (PYRIDIUM ORAL) Take by mouth.      sulfamethoxazole-trimethoprim 800-160mg (BACTRIM DS) 800-160 mg Tab Take 1 tablet by mouth 2 (two) times daily. 10 tablet 0    sumatriptan (IMITREX) 100 MG tablet Take 1 tablet (100 mg total) by mouth every 8 (eight) hours as needed for Migraine. No more than 2 tablets per day, no more than 4 days per week. 8 tablet 2    mliwma-ardma-r.blue-sal-naphos (URIMAR-T) 120-0.12-10.8 mg Tab Take 1 capsule by mouth 4 (four) times daily as needed (bladder pain). Take this medication by mouth as directed.  Take each dose with a full glass of water (8 ounces/240 milliliters). Do not lie down for 10 minutes after taking this medication. Drink plenty of fluids while you are being treated with this medication. You may also take it with food, if upsets your stomach. 30 tablet 1     No current facility-administered medications for this visit.       Review of patient's allergies indicates:   Allergen Reactions    Klonopin [clonazepam] Other (See Comments)     Severe mouth ulcers    Rocephin [ceftriaxone]      Severe migraine    Amoxicillin Other (See Comments)     Headache    Abilify [aripiprazole] Other (See Comments)     Tardive dyskinesia  - do not give any other meds that affect dopamine    Clindamycin Other (See Comments)     Nausea, headache    Codeine      Other reaction(s): Unknown    Demerol [meperidine]      Interacts with meds    Erythromycin      Other reaction(s): Unknown    Fioricet  [butalbital-acetaminophen-caff] Other (See Comments)       Well woman:  Pap's: Normal/ normal last Pap:history of HPV  Mammo: BIRADS: 1 ; Last imaging  2021   Colonoscopy:  12/ 2020   Dexa: Normal    ROS:  As per HPI.      Exam  /70 (BP Location: Right arm, Patient Position: Sitting, BP Method: Medium  "(Manual))   Ht 5' 7" (1.702 m)   Wt 69.4 kg (153 lb)   BMI 23.96 kg/m²   General: alert and oriented, no acute distress  Respiratory: normal respiratory effort  Abd: soft, non-tender, non-distended    Pelvic  Ext. Genitalia: normal external genitalia. Normal bartholin's and skeens glands  Vagina: + atrophy. Normal vaginal mucosa without lesions. Thick white discharge noted.   Non-tender bladder base without palpable mass.  Cervix: absent  Uterus:  absent   Urethra: no masses or tenderness  Urethral meatus: no lesions, caruncle or prolapse.    .    Impression  1. Vaginal irritation  fluconazole (DIFLUCAN) 150 MG Tab    Vaginosis Screen by DNA Probe   2. Posterior vaginal wall prolapse     3. Mixed incontinence     4. Atrophic vaginitis       We reviewed the above issues and discussed options for short-term versus long-term management of her problems.   Plan:   1. Cystitis  --urine culture--negative       2.  Prolapse: Stage 1 apical prolapse, Stage 2 anterior and posterior vaginal wall prolapse: no change in her symptoms    --Pessary benefit discussed with patient, s/p pessary fitting will decide if she wants to use it.   --Daily pelvic floor exercises as assigned, Pelvic floor PT  --Non surgical options discussed with patient    --Surgical options discussed such as: with apical suspension: SSF, USLS and SCP with mesh augmentation. Risks/ benefits/ alternatives/ succuss and failure rates were reviewed. Information packets were given detailing surgical options.  She was also given web site information for: www.augs.org and www.MakstrsforpCorMedix.org and http://www.fda.gov/MedicalDevices/UroGynSurgicalMesh/default.htm to review the details of the surgical options discussed and the use of mesh in surgery. She will review the information given and we will discuss further at the follow up visit. She is looking for a non-surgical options for surgery. I think the pessary is a good start for her.      3.  Mixed urinary " incontinence, urge > stress: history of MMK   --Bladder diary for 3-7 days, write the number of times you go to the rest room and associated symptoms of urgency or leakage.   --Empty bladder every 3 hours.  Empty well: wait a minute, lean forward on toilet.    --Avoid dietary irritants (see sheet).  Keep diary x 3-5 days to determine your irritants.  --KEGELS: do 10 in AM and 10 in PM, holding each x 10 seconds.  When you feel urge to go, STOP, KEGEL, and when urge has passed, then go to bathroom.   Pelvic floor PT   --URGE: Myrbetriq 50 mg daily.  SE profile reviewed.    Takes 2-4 weeks to see if will have effect.    --STRESS:  Pessary vs. Sling vs impressa      4. Vaginal atrophy (dryness):  Restart 1 gram of estrogen cream in vagina nightly x 2 weeks, then twice a week thereafter.     5. Vaginal discharge   --affirm today  --diflucan 150 mg every other day x 3 doses    6. RTC for pessary insertion       I spent a total of 20 minutes on the day of the visit.  This includes face to face time and non-face to face time preparing to see the patient (eg, review of tests), obtaining and/or reviewing separately obtained history, documenting clinical information in the electronic or other health record, independently interpreting results and communicating results to the patient/family/caregiver, or care coordinator.    Stefanie Langford, KENIA-BC [facility]  Division of Female Pelvic Medicine and Reconstructive Surgery  Department of Obstetrics & Gynecology

## 2022-07-16 LAB
BACTERIAL VAGINOSIS DNA: NEGATIVE
CANDIDA GLABRATA DNA: NEGATIVE
CANDIDA KRUSEI DNA: NEGATIVE
CANDIDA RRNA VAG QL PROBE: NEGATIVE
T VAGINALIS RRNA GENITAL QL PROBE: NEGATIVE

## 2022-07-17 ENCOUNTER — PATIENT MESSAGE (OUTPATIENT)
Dept: UROGYNECOLOGY | Facility: CLINIC | Age: 67
End: 2022-07-17
Payer: COMMERCIAL

## 2022-07-21 NOTE — PROGRESS NOTES
Pelvic Health Physical Therapy   Treatment Note     Name: Norm Andre  Clinic Number: [id number]    Therapy Diagnosis:   Encounter Diagnoses   Name Primary?    Muscle weakness Yes    Lack of coordination      Physician: Mayra Funez,*    Visit Date: 7/29/2022    Physician Orders: PT Eval and Treat  Medical Diagnosis from Referral: N39.46 (ICD-10-CM) - Mixed incontinence N81.10 (ICD-10-CM) - Prolapse of anterior vaginal wall  Evaluation Date: 4/18/2022  Authorization Period Expiration: 12/31/2022  Plan of Care Expiration: 7/17/2022, updated to 10/13/2022  Visit # / Visits authorized: 5/ 23  FOTO: 1/3    Time In: 1000  Time Out: 1045  Total Billable Time: 45 minutes    Precautions: PMHx low back pain, herniated disc    Subjective     Pt reports: thought she had yeast infection at pessary appointment and was unable to have pessary due to this. She continues to report burning with urination. Pt reports having COVID while on vacation.     She was compliant with home exercise program.  Response to previous treatment: no adverse effects reported  Functional change: none    Pain: 0/10  Location: NA    Constitutional Symptoms Review: The patient denies having any constitutional symptoms.     Objective   Pt verbally consents to intravaginal treatment today.  Signed consent form already on file.     Therapeutic Exercise to develop ROM and flexibility for 15 minutes including:   Long hold kegels in supine - 2x10 (5 sec hold, 10 sec relaxation)  Hip ADD ball squeeze w/ kegel - 2x15  Bridging w/ kegel - 2x15  Clamshells - 1x15 JANE     (not performed this visit)   Child's pose 3x30 sec   Happy Baby stretch 3x30 sec   Piriformis stretch 3x30 sec Jane    Neuromuscular Re-education to develop Coordination and Control for 15 minutes including:   Kegel edu and practice.  Increased time spent on edu on proper technique.  Pt improves with practice and verbal cues.    (not performed this visit)   SEMG EVALUATION:   PF  Electrode placement: external perineal  Pt. Position: supine hooklying    SEMG Finding: irregular baseline, poor holding ability, low net rise and inconsistent contraction  No skin irritation, erythema, or other adverse effects observed from electrode placement.    Treatment: Worked on pelvic floor muscle strength training and coordination using sEMG assist.      BASELINE  Position: supine hooklying  Resting tone: 1.2-2.0 inconsistent  Max contraction: 7.0 uV  Max hold time: 2 sec     10 TRIALS (5 sec on, 10 sec)  Position: supine hooklying  Average contraction: 4.7 uV  Average rest: 2.0 uV     Jolene received the following manual therapy techniques: to develop extensibility and desensitization for 15 minutes including:  Myofascial release pelvic floor mm, OI and endopelvic fascia using PIT and ischemic compression while giving VC/tactile cues for voluntary relaxation of mm    (not performed this visit)  Scar mobilization to  scar using cross friction techniques  Abdominal wall mobilization and MFR    Jolene participated in dynamic functional therapeutic activities to improve functional performance for 00 minutes, including:  Edu on double voiding techniques. Practiced technique with patient and utilized teach back strategy to confirm understanding.     Home Exercises Provided and Patient Education Provided     Education provided:   - vulvar irritants, kegels and behavior modifications  Discussed progression of plan of care with patient; educated pt in activity modification; reviewed HEP with pt. Pt demonstrated and verbalized understanding of all instruction and was provided with a handout of HEP (see Patient Instructions).    Written Home Exercises Provided: yes.  Exercises were reviewed and Jolene was able to demonstrate them prior to the end of the session.  Jolene demonstrated good  understanding of the education provided.     See EMR under Patient Instructions for exercises provided  7/29/22.    Assessment     Noted erythema at labia minora with pt reporting sensitivity since starting to use estrogen cream. Recommended pt speak to physician regarding this. She demonstrates increased tension and TTP with palpation of L PFM and periurethral tissues. Initiated PFM and proximal hip strengthening this visit to address concerns over FI and vaginal bulging. Pt reports difficulty maintaining PFM activation and states that she can feel herself fatiguing over time.      Jolene Is progressing well towards her goals.   Pt prognosis is Excellent.     Pt will continue to benefit from skilled outpatient physical therapy to address the deficits listed in the problem list box on initial evaluation, provide pt/family education and to maximize pt's level of independence in the home and community environment.     Pt's spiritual, cultural and educational needs considered and pt agreeable to plan of care and goals.     Anticipated barriers to physical therapy: none    Goals:   Short Term Goals: 6 weeks   Pt to increase pelvic floor strength to at least 3/5 needed for continence with ADLs and social activities. PROGRESSING  Pt to increase hip and pelvic floor strength by 1/2 a grade to improve stability needed for ADL performance. PROGRESSING  Pt to demonstrate proper body mechanics with lifting, bending and squatting to decrease strain on lumbopelvic structures. PROGRESSING     Long Term Goals: 12 weeks   Pt to be discharged with home plan for carry over after discharge. PROGRESSING  Pt to report a decrease in pelvic pressure and fullness to no more than 10% of the time to demonstrate improving pelvic support of pelvic structures. PROGRESSING  Pt to report a decrease in episodes of FI to no more than 1x every 2 weeks to improve confidence needed to participate in social outings. PROGRESSING    Plan     Plan of care Certification: 4/18/2022 to 7/17/2022, updated to 10/13/2022     Outpatient Physical Therapy 1 times  weekly for 12 weeks to include the following interventions: therapeutic exercises, therapeutic activity, neuromuscular re-education, manual therapy, patient/family education and self care/home management     sEMG for coordination. Continue intravaginal STM and abdominal wall mobilization. Initiate proximal hip and pelvic floor strengthening.     Anna Kimura, PT

## 2022-07-29 ENCOUNTER — CLINICAL SUPPORT (OUTPATIENT)
Dept: REHABILITATION | Facility: OTHER | Age: 67
End: 2022-07-29
Attending: OBSTETRICS & GYNECOLOGY
Payer: COMMERCIAL

## 2022-07-29 DIAGNOSIS — M62.81 MUSCLE WEAKNESS: Primary | ICD-10-CM

## 2022-07-29 DIAGNOSIS — R27.9 LACK OF COORDINATION: ICD-10-CM

## 2022-07-29 PROCEDURE — 97140 MANUAL THERAPY 1/> REGIONS: CPT

## 2022-07-29 PROCEDURE — 97112 NEUROMUSCULAR REEDUCATION: CPT

## 2022-07-29 PROCEDURE — 97110 THERAPEUTIC EXERCISES: CPT

## 2022-07-29 NOTE — PATIENT INSTRUCTIONS
"Home Exercise Program: 07/29/2022    KEGELS:     1. Lie comfortably with legs and buttocks relaxed.  2. Squeeze and LIFT the muscles that stop the flow of urine and passage of gas.  Keep legs and buttock muscles quiet.  3. Hold lift for 5 seconds without holding breath.  4. Release and DROP the pelvic floor muscles for 10 seconds.  5. Repeat 15 times, 2 sets per day. Spread throughout the day.    No Kegels while urinating!      HIP ADDUCTION KNEE SQUEEZE     With ball or folded pillow between knees, tighten pelvic floor then squeeze knees together and hold 5 seconds. Do 2 sets of 15.    BRIDGING W/ KEGEL    While lying on your back with knees bent, tighten your pelvic floor, squeeze your buttocks and then raise your buttocks off the floor/bed as creating a "Bridge" with your body. Hold for 3-5 seconds and then lower your back flat on the ground. Release the Kegel. Repeat 15 times, twice a day.    CLAMSHELLS    Lie on side with knees bent and top of pelvic rolled slightly forward. Keeping ankles together lift top knee without pelvis rolling backwards. Do 2 sets of 15 on each side.     "

## 2022-08-10 ENCOUNTER — DOCUMENTATION ONLY (OUTPATIENT)
Dept: REHABILITATION | Facility: OTHER | Age: 67
End: 2022-08-10
Payer: COMMERCIAL

## 2022-08-10 NOTE — PROGRESS NOTES
Pt arrived for PT appointment reporting discontinuing Premarin due to stabbing, burning pain in vagina with use. She states that she has severe dysuria and new onset bladder pain that is only controlled by taking Urimar-T. She reports that since taking Urimar-T she has noticed breast pain and headache. Pt reports 8/10 bladder pain currently. Pt was able to be rescheduled to see Dr. Funez at 930 am today.     Anna Kimura  08/10/2022

## 2022-08-11 NOTE — PROGRESS NOTES
Pelvic Health Physical Therapy   Treatment Note     Name: Norm Andre  Clinic Number: [id number]    Therapy Diagnosis:   Encounter Diagnoses   Name Primary?    Muscle weakness Yes    Lack of coordination      Physician: Mayra Funez,*    Visit Date: 8/17/2022    Physician Orders: PT Eval and Treat  Medical Diagnosis from Referral: N39.46 (ICD-10-CM) - Mixed incontinence N81.10 (ICD-10-CM) - Prolapse of anterior vaginal wall  Evaluation Date: 4/18/2022  Authorization Period Expiration: 12/31/2022  Plan of Care Expiration: 7/17/2022, updated to 10/13/2022  Visit # / Visits authorized: 6/ 23  FOTO: 1/3    Time In: 1058  Time Out: 1151  Total Billable Time: 53 minutes    Precautions: PMHx low back pain, herniated disc    Subjective     Pt reports: seeing urogyn since last visit. She reports that she has discontinued Urimar-T due to reaction and will be starting oxybutynin today. She states that she will be resuming estrogen cream and will be going for pessary fitting after this session.     She was not compliant with home exercise program.  Response to previous treatment: no adverse effects reported  Functional change: none    Pain: 0/10  Location: NA    Constitutional Symptoms Review: The patient denies having any constitutional symptoms.     Objective   Pt verbally consents to intravaginal treatment today.  Signed consent form already on file.     Therapeutic Exercise to develop ROM and flexibility for 20 minutes including:   Long hold kegels in supine - 2x10 (5 sec hold, 10 sec relaxation)  Hip ADD ball squeeze w/ kegel - 2x15  Bridging w/ kegel - 2x15  Clamshells - 1x15 JANE     (not performed this visit)   Child's pose 3x30 sec   Happy Baby stretch 3x30 sec   Piriformis stretch 3x30 sec Jane    Neuromuscular Re-education to develop Coordination and Control for 15 minutes including:   Kegel edu and practice.  Increased time spent on edu on proper technique.  Pt improves with practice and verbal  cues.    (not performed this visit)   SEMG EVALUATION:   PF Electrode placement: external perineal  Pt. Position: supine hooklying    SEMG Finding: irregular baseline, poor holding ability, low net rise and inconsistent contraction  No skin irritation, erythema, or other adverse effects observed from electrode placement.    Treatment: Worked on pelvic floor muscle strength training and coordination using sEMG assist.      BASELINE  Position: supine hooklying  Resting tone: 1.2-2.0 inconsistent  Max contraction: 7.0 uV  Max hold time: 2 sec     10 TRIALS (5 sec on, 10 sec)  Position: supine hooklying  Average contraction: 4.7 uV  Average rest: 2.0 uV     Jolene received the following manual therapy techniques: to develop extensibility and desensitization for 18 minutes including:  Myofascial release pelvic floor mm, OI and endopelvic fascia using PIT and ischemic compression while giving VC/tactile cues for voluntary relaxation of mm    Scar mobilization to  scar using cross friction techniques  Abdominal wall mobilization and MFR    Jolene participated in dynamic functional therapeutic activities to improve functional performance for 00 minutes, including:  Edu on double voiding techniques. Practiced technique with patient and utilized teach back strategy to confirm understanding.     Home Exercises Provided and Patient Education Provided     Education provided:   - posture/body mechanices, kegels and behavior modifications  Discussed progression of plan of care with patient; educated pt in activity modification; reviewed HEP with pt. Pt demonstrated and verbalized understanding of all instruction and was provided with a handout of HEP (see Patient Instructions).    Written Home Exercises Provided: Patient instructed to cont prior HEP.  Exercises were reviewed and Jolene was able to demonstrate them prior to the end of the session.  Jolene demonstrated good  understanding of the education provided.     See  EMR under Patient Instructions for exercises provided prior visit.    Assessment     Pt reports symptoms of bladder irritation and breast pain have resolved since discontinuing Urimar-T; will be starting oxybutynin to address urinary symptoms today. Pt demonstrates decreased tension and TTP throughout bilateral PFM, however reports more discomfort at L than R. Pt struggles to isolate PFM when performing kegels and mainly uses abdominal brace to achieve PFM activation. Continued previous exercise program with pt requiring verbal cueing for correct performance.     Jolene Is progressing well towards her goals.   Pt prognosis is Excellent.     Pt will continue to benefit from skilled outpatient physical therapy to address the deficits listed in the problem list box on initial evaluation, provide pt/family education and to maximize pt's level of independence in the home and community environment.     Pt's spiritual, cultural and educational needs considered and pt agreeable to plan of care and goals.     Anticipated barriers to physical therapy: none    Goals:   Short Term Goals: 6 weeks   Pt to increase pelvic floor strength to at least 3/5 needed for continence with ADLs and social activities. PROGRESSING  Pt to increase hip and pelvic floor strength by 1/2 a grade to improve stability needed for ADL performance. PROGRESSING  Pt to demonstrate proper body mechanics with lifting, bending and squatting to decrease strain on lumbopelvic structures. PROGRESSING     Long Term Goals: 12 weeks   Pt to be discharged with home plan for carry over after discharge. PROGRESSING  Pt to report a decrease in pelvic pressure and fullness to no more than 10% of the time to demonstrate improving pelvic support of pelvic structures. PROGRESSING  Pt to report a decrease in episodes of FI to no more than 1x every 2 weeks to improve confidence needed to participate in social outings. PROGRESSING    Plan     Plan of care Certification:  4/18/2022 to 7/17/2022, updated to 10/13/2022     Outpatient Physical Therapy 1 times weekly for 12 weeks to include the following interventions: therapeutic exercises, therapeutic activity, neuromuscular re-education, manual therapy, patient/family education and self care/home management     sEMG for coordination. Continue intravaginal STM and abdominal wall mobilization. Initiate proximal hip and pelvic floor strengthening.     Anna Kimura, PT

## 2022-08-15 ENCOUNTER — OFFICE VISIT (OUTPATIENT)
Dept: UROGYNECOLOGY | Facility: CLINIC | Age: 67
End: 2022-08-15
Payer: COMMERCIAL

## 2022-08-15 VITALS
DIASTOLIC BLOOD PRESSURE: 70 MMHG | BODY MASS INDEX: 23.54 KG/M2 | SYSTOLIC BLOOD PRESSURE: 110 MMHG | WEIGHT: 150 LBS | HEIGHT: 67 IN

## 2022-08-15 DIAGNOSIS — R39.89 BLADDER PAIN: Primary | ICD-10-CM

## 2022-08-15 DIAGNOSIS — N81.10 PROLAPSE OF ANTERIOR VAGINAL WALL: ICD-10-CM

## 2022-08-15 DIAGNOSIS — N39.46 MIXED INCONTINENCE: ICD-10-CM

## 2022-08-15 DIAGNOSIS — R39.15 URINARY URGENCY: ICD-10-CM

## 2022-08-15 DIAGNOSIS — N81.6 POSTERIOR VAGINAL WALL PROLAPSE: ICD-10-CM

## 2022-08-15 PROCEDURE — 99999 PR PBB SHADOW E&M-EST. PATIENT-LVL III: ICD-10-PCS | Mod: PBBFAC,,, | Performed by: NURSE PRACTITIONER

## 2022-08-15 PROCEDURE — 99213 PR OFFICE/OUTPT VISIT, EST, LEVL III, 20-29 MIN: ICD-10-PCS | Mod: S$GLB,,, | Performed by: NURSE PRACTITIONER

## 2022-08-15 PROCEDURE — 99213 OFFICE O/P EST LOW 20 MIN: CPT | Mod: S$GLB,,, | Performed by: NURSE PRACTITIONER

## 2022-08-15 PROCEDURE — 99999 PR PBB SHADOW E&M-EST. PATIENT-LVL III: CPT | Mod: PBBFAC,,, | Performed by: NURSE PRACTITIONER

## 2022-08-15 RX ORDER — OXYBUTYNIN CHLORIDE 5 MG/1
TABLET ORAL
Qty: 30 TABLET | Refills: 11 | Status: SHIPPED | OUTPATIENT
Start: 2022-08-15 | End: 2023-04-04

## 2022-08-15 NOTE — PROGRESS NOTES
Urogyn follow up  08/15/2022  .  [facility]  [address] [id number]    Norm Andre  [id number]  1955      Norm Andre is a 66 y.o.  here for a urogyn follow up for vaginal/ urinary burning  Last HPI from 07/13/2022  Follow-up  Pertinent negatives include no abdominal pain, chest pain, chills, coughing, diaphoresis, fatigue, fever, nausea, rash or vomiting.   Urinary Tract Infection   Associated symptoms include frequency and urgency. Pertinent negatives include no chills, flank pain, hematuria, nausea, vomiting, constipation or rash.    66 Y O  F  has a past medical history of Allergy, Arthritis, Headache(784.0), Herniated disc, History of migraine headaches, Hypothyroidism, Infections of kidney, Low back pain, Migraine headache, Moderate mood disorder, Neck pain, Rotator cuff tear, and Thyroid disease.  Referred by Dr. Oden for evaluation of prolapse. She has had this issue for 3 months  and has noticed that it has worsened over time.  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   She had a hysterectomy and a MMK in her 30's, complicated by recurrent UTI's.     She does do not use pads she changes on average 2 three times a day, uses wipes. The has bowel leakage, did not tolerate metamucil or fibercon      7/13/2022:  Patient presents with complaints of dysuria and urinary urgency for several days. She denies fever chills, significant abdominal or back pain.  Incontinence has worsened.      [facility]     Question 3/24/2022  8:55 AM CDT - Filed by Mary Wheeler MA   General Urogynecology: Are you experiencing the following?     Dysuria (painful urination) Yes, sometimes feels like she has a constant pain and feels like it hurts to urinate   Nocturia:  waking up at night to empty your bladder  Yes   If you answered yes to the previous question, how many times  "does this happen per night? 1-2   Enuresis (urine loss during sleep) Yes   Dribbling urine after you urinate Yes   Hematuria (urine appears red) Yes   Type of stream Sprays   Urinary frequency: How often a day are you going to the bathroom per day?  10-15   Urinary Tract Infections: How many Urinary Tract Infections have you had in the past year? Two (2) in six (6) months   If you have had a UTI in the past year, what treatments have you had so far?  abx    Urinary Incontinence (General): Are you experiencing the following?     Past consultation for incontinence: Have you ever seen someone for the evaluation of incontinence? No   If you answered yes to the previous question, please select all the therapies you have tried.  N/a- I answered no to the previous question   Please note the effectiveness of the therapies.     Need to wear protection to keep clothes dry  No   If you answered yes to the previous question, please bill the protection you use.  N/a- I answered no to the previous question   If you wear protection, how much wetness is typically on each pad? N/a- I do not wear protection   If you wear protection, how often do you have to change per day, if applicable?      Stress Symptoms: Are you experiencing the following? Hx of MMK, and has been more bothersome lately    Leakage of urine with cough, laugh and/or sneeze Yes   If you answered yes to the previous question, what is the frequency in days, weeks and/or months? Several times a day   Leakage of urine with sex No   Leakage of urine with bending/ lifting Yes   Leakage of urine with briskly walking or jogging Yes   If you lose urine for any other reason not previously mentioned, please note it below, if applicable.      Urge Symptoms: Are you experiencing the following?     Urgency ("got to go" feeling) Yes   Urge: How frequently do you feel an urge to urinate (feeling like you "gotta go" to the bathroom and can't wait) Several times a day   Do you " experience a leakage of urine when you have a feeling of urgency?  Yes   Leakage of urine when unaware No   Past use of anticholinergics (medications used to treat overactive bladder) No   If you answered yes to the previous question, please bill the anticholinergics you have used:      Have you ever used Mirbetriq (aka Mirabegron)?  No   Prolapse Symptoms: Are you experiencing any of the following?      Falling out/ Bulging/ Heaviness in the vagina Yes   Vaginal/ Abdominal Pain/ Pressure Yes   Need to strain/ Push to void Yes   Need to wait on the toilet before you void Yes   Unusual position to urinate (using your hands to push back the vaginal bulge) Yes   Sensation of incomplete emptying Yes   Past use of pessary device No   If you answered yes to the previous question, please list the devices you have used below.      Bowel Symptoms: Are you experiencing any of the following?     Constipation Yes   Diarrhea  Yes   Hematochezia (bloody stool) No   Incomplete evacuation of stool No   Involuntary loss of formed stool No   Fecal smearing/urgency No   Involuntary loss of gas No   Vaginal Symptoms: Are you experiencing any of the following?      Abnormal vaginal bleeding  No   Vaginal dryness No   Sexually active  No   Dyspareunia (painful intercourse) No   Estrogen use  Yes           05/19/2022  Fit with #5 ring with support pessary        07/13/2022  Patient presents with complaints of dysuria and urinary urgency for several days. She denies fever chills, significant abdominal or back pain.  Incontinence has worsened.         07/15/2022  Seen for vaginal irritation  --treated for vaginal yeast     08/10/2022  Seen for vaginal irritation after using vaginal estrogen cream-- negative exam  + urinary urgency intermittently       Changes since last visit:  Every time she take urimar t has headache/ generalized weakness-- does help urinary burning  Did not tolerate myrbetriq  Stopped vaginal estrogen cream-- vulva was  very inflamed.              Past Medical History:   Diagnosis Date    Allergy     Arthritis     Headache(784.0)     Herniated disc     x4 in cervical area    History of migraine headaches     Hypothyroidism     Infections of kidney     has had multiple kidney infections in the past.    Low back pain     Migraine headache     Moderate mood disorder     Neck pain     Rotator cuff tear     Thyroid disease        Past Surgical History:   Procedure Laterality Date    COCCYGECTOMY  01/19/2021    Surgeon: Daron Scruggs MD    COLONOSCOPY N/A 05/05/2016    Surgeon: Daron Scruggs MD    COLONOSCOPY N/A 12/28/2020    Surgeon: Daron Scruggs MD    CYSTOSCOPY      ESOPHAGOGASTRODUODENOSCOPY N/A 02/18/2020    Surgeon: Rip Davis MD    EXCISION, CYST, RETRORECTAL OR PELVIC, POSTERIOR APPROACH N/A 01/19/2021    Surgeon: Daron Scruggs MD    EYE SURGERY      FACIAL COSMETIC SURGERY      TONSILLECTOMY      TOTAL ABDOMINAL HYSTERECTOMY W/ BILATERAL SALPINGOOPHORECTOMY  1988    URETHROPEXY  1988    MMK       Family History   Problem Relation Age of Onset    Hypertension Mother     Migraines Mother     Tremor Mother     Hyperlipidemia Mother     Allergies Mother     Hypertension Sister     Uterine cancer Maternal Grandmother     Breast cancer Maternal Aunt     Asthma Brother     Allergies Son     Asthma Son     Colon cancer Neg Hx     Ovarian cancer Neg Hx        Social History     Socioeconomic History    Marital status:     Number of children: 2    Years of education: 18   Occupational History    Occupation: Homemaker/Retired   Tobacco Use    Smoking status: Never Smoker    Smokeless tobacco: Never Used   Substance and Sexual Activity    Alcohol use: No    Drug use: No    Sexual activity: Yes     Partners: Male       Current Outpatient Medications   Medication Sig Dispense Refill    ALPRAZolam (XANAX) 0.25 MG tablet One-two during a flight for anxiety 5  tablet 0    conjugated estrogens (PREMARIN) vaginal cream 1 g with applicator or dime-sized amt with finger in vagina nightly x 2 weeks, then twice a week thereafter 45 g 12    cyanocobalamin, vitamin B-12, 5,000 mcg Subl Take 1 tablet under tongue once a day for 4 weeks, then continue once per week 30 tablet 1    estradioL (VIVELLE-DOT) 0.0375 mg/24 hr APPLY 1 PATCH TOPICALLY TO THE SKIN 2 TIMES A WEEK 24 patch 3    gabapentin (NEURONTIN) 300 MG capsule Take 1 capsule (300 mg total) by mouth 2 (two) times daily. 30 capsule 1    levothyroxine (SYNTHROID) 100 MCG tablet Take 1 tablet (100 mcg total) by mouth before breakfast. 90 tablet 3    mirabegron (MYRBETRIQ) 50 mg Tb24 Take 1 tablet (50 mg total) by mouth once daily. 30 tablet 11    pantoprazole (PROTONIX) 40 MG tablet TAKE 1 TABLET(40 MG) BY MOUTH TWICE DAILY 60 tablet 3    phenazopyridine HCl (PYRIDIUM ORAL) Take by mouth.      sumatriptan (IMITREX) 100 MG tablet Take 1 tablet (100 mg total) by mouth every 8 (eight) hours as needed for Migraine. No more than 2 tablets per day, no more than 4 days per week. 8 tablet 2    oxybutynin (DITROPAN) 5 MG Tab Take 1-3 tabs 3 times as needed 30 tablet 11    sulfamethoxazole-trimethoprim 800-160mg (BACTRIM DS) 800-160 mg Tab Take 1 tablet by mouth 2 (two) times daily. 10 tablet 0     No current facility-administered medications for this visit.       Review of patient's allergies indicates:   Allergen Reactions    Klonopin [clonazepam] Other (See Comments)     Severe mouth ulcers    Rocephin [ceftriaxone]      Severe migraine    Amoxicillin Other (See Comments)     Headache    Abilify [aripiprazole] Other (See Comments)     Tardive dyskinesia  - do not give any other meds that affect dopamine    Clindamycin Other (See Comments)     Nausea, headache    Codeine      Other reaction(s): Unknown    Demerol [meperidine]      Interacts with meds    Erythromycin      Other reaction(s): Unknown    Fioricet   "[butalbital-acetaminophen-caff] Other (See Comments)       Well woman:  Pap's: Normal/ normal last Pap:history of HPV  Mammo: BIRADS: 1 ; Last imaging  2021   Colonoscopy:  12/ 2020   Dexa: Normal    ROS:  As per HPI.      Exam  /70 (BP Location: Left arm, Patient Position: Sitting, BP Method: Medium (Manual))   Ht 5' 7" (1.702 m)   Wt 68 kg (150 lb)   BMI 23.49 kg/m²   General: alert and oriented, no acute distress  Respiratory: normal respiratory effort  Abd: soft, non-tender, non-distended    Pelvic  Ext. Genitalia: normal external genitalia. Normal bartholin's and skeens glands  Vagina: + atrophy. Normal vaginal mucosa without lesions. No discharge noted.   Non-tender bladder base without palpable mass.  Cervix: absent  Uterus:  absent   Urethra: no masses or tenderness  Urethral meatus: no lesions, caruncle or prolapse.    .    Impression  1. Bladder pain  oxybutynin (DITROPAN) 5 MG Tab   2. Posterior vaginal wall prolapse     3. Prolapse of anterior vaginal wall     4. Mixed incontinence     5. Urinary urgency       We reviewed the above issues and discussed options for short-term versus long-term management of her problems.   Plan:   1. Cystitis  --urine culture--negative       2.  Prolapse: Stage 1 apical prolapse, Stage 2 anterior and posterior vaginal wall prolapse: no change in her symptoms    --Pessary benefit discussed with patient, s/p pessary fitting will decide if she wants to use it.   --Daily pelvic floor exercises as assigned, Pelvic floor PT  --Non surgical options discussed with patient    --Surgical options discussed such as: with apical suspension: SSF, USLS and SCP with mesh augmentation. Risks/ benefits/ alternatives/ succuss and failure rates were reviewed. Information packets were given detailing surgical options.  She was also given web site information for: www.augs.org and www.[employer].org and http://www.fda.gov/MedicalDevices/UroGynSurgicalMesh/default.htm to review the " details of the surgical options discussed and the use of mesh in surgery. She will review the information given and we will discuss further at the follow up visit. She is looking for a non-surgical options for surgery. I think the pessary is a good start for her.      3.  Mixed urinary incontinence, urge > stress: history of MMK   --Bladder diary for 3-7 days, write the number of times you go to the rest room and associated symptoms of urgency or leakage.   --Empty bladder every 3 hours.  Empty well: wait a minute, lean forward on toilet.    --Avoid dietary irritants (see sheet).  Keep diary x 3-5 days to determine your irritants.  --KEGELS: do 10 in AM and 10 in PM, holding each x 10 seconds.  When you feel urge to go, STOP, KEGEL, and when urge has passed, then go to bathroom.   Pelvic floor PT   --URGE: Myrbetriq 50 mg daily.  SE profile reviewed.    Takes 2-4 weeks to see if will have effect.    --STRESS:  Pessary vs. Sling vs impressa      4. Vaginal atrophy (dryness):  Restart 1 gram of estrogen cream in vagina twice a week      5. RTC for pessary insertion       I spent a total of 20 minutes on the day of the visit.  This includes face to face time and non-face to face time preparing to see the patient (eg, review of tests), obtaining and/or reviewing separately obtained history, documenting clinical information in the electronic or other health record, independently interpreting results and communicating results to the patient/family/caregiver, or care coordinator.    Stefanie Langford, KENIA-BC  [facility]  Division of Female Pelvic Medicine and Reconstructive Surgery  Department of Obstetrics & Gynecology

## 2022-08-17 ENCOUNTER — CLINICAL SUPPORT (OUTPATIENT)
Dept: REHABILITATION | Facility: OTHER | Age: 67
End: 2022-08-17
Attending: OBSTETRICS & GYNECOLOGY
Payer: COMMERCIAL

## 2022-08-17 ENCOUNTER — OFFICE VISIT (OUTPATIENT)
Dept: UROGYNECOLOGY | Facility: CLINIC | Age: 67
End: 2022-08-17
Payer: COMMERCIAL

## 2022-08-17 ENCOUNTER — PATIENT MESSAGE (OUTPATIENT)
Dept: REHABILITATION | Facility: OTHER | Age: 67
End: 2022-08-17

## 2022-08-17 VITALS
HEIGHT: 67 IN | DIASTOLIC BLOOD PRESSURE: 70 MMHG | SYSTOLIC BLOOD PRESSURE: 110 MMHG | BODY MASS INDEX: 23.54 KG/M2 | WEIGHT: 150 LBS

## 2022-08-17 DIAGNOSIS — M62.81 MUSCLE WEAKNESS: Primary | ICD-10-CM

## 2022-08-17 DIAGNOSIS — N81.6 POSTERIOR VAGINAL WALL PROLAPSE: Primary | ICD-10-CM

## 2022-08-17 DIAGNOSIS — N39.46 MIXED INCONTINENCE: ICD-10-CM

## 2022-08-17 DIAGNOSIS — N81.10 PROLAPSE OF ANTERIOR VAGINAL WALL: ICD-10-CM

## 2022-08-17 DIAGNOSIS — R39.89 BLADDER PAIN: ICD-10-CM

## 2022-08-17 DIAGNOSIS — R27.9 LACK OF COORDINATION: ICD-10-CM

## 2022-08-17 PROCEDURE — 99999 PR PBB SHADOW E&M-EST. PATIENT-LVL IV: CPT | Mod: PBBFAC,,, | Performed by: NURSE PRACTITIONER

## 2022-08-17 PROCEDURE — 97110 THERAPEUTIC EXERCISES: CPT

## 2022-08-17 PROCEDURE — 97140 MANUAL THERAPY 1/> REGIONS: CPT

## 2022-08-17 PROCEDURE — 99499 NO LOS: ICD-10-PCS | Mod: S$GLB,,, | Performed by: NURSE PRACTITIONER

## 2022-08-17 PROCEDURE — 99999 PR PBB SHADOW E&M-EST. PATIENT-LVL IV: ICD-10-PCS | Mod: PBBFAC,,, | Performed by: NURSE PRACTITIONER

## 2022-08-17 PROCEDURE — 99499 UNLISTED E&M SERVICE: CPT | Mod: S$GLB,,, | Performed by: NURSE PRACTITIONER

## 2022-08-17 PROCEDURE — 97112 NEUROMUSCULAR REEDUCATION: CPT

## 2022-08-17 NOTE — PATIENT INSTRUCTIONS
"Home Exercise Program: 08/17/2022    KEGELS:     1. Lie comfortably with legs and buttocks relaxed.  2. Squeeze and LIFT the muscles that stop the flow of urine and passage of gas.  Keep legs and buttock muscles quiet.  3. Hold lift for 5 seconds without holding breath.  4. Release and DROP the pelvic floor muscles for 10 seconds.  5. Repeat 15 times, 2 sets per day. Spread throughout the day.    No Kegels while urinating!      HIP ADDUCTION KNEE SQUEEZE     With ball or folded pillow between knees, tighten pelvic floor then squeeze knees together and hold 5 seconds. Do 2 sets of 15.    BRIDGING W/ KEGEL    While lying on your back with knees bent, tighten your pelvic floor, squeeze your buttocks and then raise your buttocks off the floor/bed as creating a "Bridge" with your body. Hold for 3-5 seconds and then lower your back flat on the ground. Release the Kegel. Repeat 15 times, twice a day.    CLAMSHELLS    Lie on side with knees bent and top of pelvic rolled slightly forward. Keeping ankles together lift top knee without pelvis rolling backwards. Do 2 sets of 15 on each side.     "

## 2022-08-17 NOTE — PROGRESS NOTES
Urogyn follow up  08/17/2022  .  [facility] - UROGYNECOLOGY  [address] [address]    Norm Andre  [id number]  1955      Norm Andre is a 66 y.o.  here for a urogyn follow up for pessary insertion.    Last HPI from 03/24/2022  HPI 66 Y O  F  has a past medical history of Allergy, Arthritis, Headache(784.0), Herniated disc, History of migraine headaches, Hypothyroidism, Infections of kidney, Low back pain, Migraine headache, Moderate mood disorder, Neck pain, Rotator cuff tear, and Thyroid disease.  Referred by Dr. Oden for evaluation of prolapse. She has had this issue for 3 months  and has noticed that it has worsened over time.  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   She had a hysterectomy and a MMK in her 30's, complicated by recurrent UTI's.     She does do not use pads she changes on average 2 three times a day, uses wipes. The has bowel leakage, did not tolerate metamucil or fibercon      [facility]     Question 3/24/2022  8:55 AM CDT - Filed by Mary Wheeler MA   General Urogynecology: Are you experiencing the following?     Dysuria (painful urination) Yes, sometimes feels like she has a constant pain and feels like it hurts to urinate   Nocturia:  waking up at night to empty your bladder  Yes   If you answered yes to the previous question, how many times does this happen per night? 1-2   Enuresis (urine loss during sleep) Yes   Dribbling urine after you urinate Yes   Hematuria (urine appears red) Yes   Type of stream Sprays   Urinary frequency: How often a day are you going to the bathroom per day?  10-15   Urinary Tract Infections: How many Urinary Tract Infections have you had in the past year? Two (2) in six (6) months   If you have had a UTI in the past year, what treatments have you had so far?  abx    Urinary Incontinence (General): Are you  "experiencing the following?     Past consultation for incontinence: Have you ever seen someone for the evaluation of incontinence? No   If you answered yes to the previous question, please select all the therapies you have tried.  N/a- I answered no to the previous question   Please note the effectiveness of the therapies.     Need to wear protection to keep clothes dry  No   If you answered yes to the previous question, please bill the protection you use.  N/a- I answered no to the previous question   If you wear protection, how much wetness is typically on each pad? N/a- I do not wear protection   If you wear protection, how often do you have to change per day, if applicable?      Stress Symptoms: Are you experiencing the following? Hx of MMK, and has been more bothersome lately    Leakage of urine with cough, laugh and/or sneeze Yes   If you answered yes to the previous question, what is the frequency in days, weeks and/or months? Several times a day   Leakage of urine with sex No   Leakage of urine with bending/ lifting Yes   Leakage of urine with briskly walking or jogging Yes   If you lose urine for any other reason not previously mentioned, please note it below, if applicable.      Urge Symptoms: Are you experiencing the following?     Urgency ("got to go" feeling) Yes   Urge: How frequently do you feel an urge to urinate (feeling like you "gotta go" to the bathroom and can't wait) Several times a day   Do you experience a leakage of urine when you have a feeling of urgency?  Yes   Leakage of urine when unaware No   Past use of anticholinergics (medications used to treat overactive bladder) No   If you answered yes to the previous question, please bill the anticholinergics you have used:      Have you ever used Mirbetriq (aka Mirabegron)?  No   Prolapse Symptoms: Are you experiencing any of the following?      Falling out/ Bulging/ Heaviness in the vagina Yes   Vaginal/ Abdominal Pain/ Pressure Yes   Need to " strain/ Push to void Yes   Need to wait on the toilet before you void Yes   Unusual position to urinate (using your hands to push back the vaginal bulge) Yes   Sensation of incomplete emptying Yes   Past use of pessary device No   If you answered yes to the previous question, please list the devices you have used below.      Bowel Symptoms: Are you experiencing any of the following?     Constipation Yes   Diarrhea  Yes   Hematochezia (bloody stool) No   Incomplete evacuation of stool No   Involuntary loss of formed stool No   Fecal smearing/urgency No   Involuntary loss of gas No   Vaginal Symptoms: Are you experiencing any of the following?      Abnormal vaginal bleeding  No   Vaginal dryness No   Sexually active  No   Dyspareunia (painful intercourse) No   Estrogen use  Yes        05/19/2022  Fit with #5 ring with support pessary      07/13/2022  Patient presents with complaints of dysuria and urinary urgency for several days. She denies fever chills, significant abdominal or back pain.  Incontinence has worsened.        07/15/2022  Seen for vaginal irritation  --treated for vaginal yeast    08/10/2022  Seen for vaginal irritation after using vaginal estrogen cream-- negative exam  + urinary urgency intermittently    Did not tolerate uribel or myrbetrix  rx oxybutynin 5 mg -- take one- three tablets 3 times daily as needed         Past Medical History:   Diagnosis Date    Allergy     Arthritis     Headache(784.0)     Herniated disc     x4 in cervical area    History of migraine headaches     Hypothyroidism     Infections of kidney     has had multiple kidney infections in the past.    Low back pain     Migraine headache     Moderate mood disorder     Neck pain     Rotator cuff tear     Thyroid disease        Past Surgical History:   Procedure Laterality Date    COCCYGECTOMY  01/19/2021    Surgeon: Daron Scruggs MD    COLONOSCOPY N/A 05/05/2016    Surgeon: Daron Scruggs MD    COLONOSCOPY  N/A 12/28/2020    Surgeon: Daron Scruggs MD    CYSTOSCOPY      ESOPHAGOGASTRODUODENOSCOPY N/A 02/18/2020    Surgeon: Rip Davis MD    EXCISION, CYST, RETRORECTAL OR PELVIC, POSTERIOR APPROACH N/A 01/19/2021    Surgeon: Daron Scruggs MD    EYE SURGERY      FACIAL COSMETIC SURGERY      TONSILLECTOMY      TOTAL ABDOMINAL HYSTERECTOMY W/ BILATERAL SALPINGOOPHORECTOMY  1988    URETHROPEXY  1988    MMK       Family History   Problem Relation Age of Onset    Hypertension Mother     Migraines Mother     Tremor Mother     Hyperlipidemia Mother     Allergies Mother     Hypertension Sister     Uterine cancer Maternal Grandmother     Breast cancer Maternal Aunt     Asthma Brother     Allergies Son     Asthma Son     Colon cancer Neg Hx     Ovarian cancer Neg Hx        Social History     Socioeconomic History    Marital status:     Number of children: 2    Years of education: 18   Occupational History    Occupation: Homemaker/Retired   Tobacco Use    Smoking status: Never Smoker    Smokeless tobacco: Never Used   Substance and Sexual Activity    Alcohol use: No    Drug use: No    Sexual activity: Yes     Partners: Male       Current Outpatient Medications   Medication Sig Dispense Refill    ALPRAZolam (XANAX) 0.25 MG tablet One-two during a flight for anxiety 5 tablet 0    conjugated estrogens (PREMARIN) vaginal cream 1 g with applicator or dime-sized amt with finger in vagina nightly x 2 weeks, then twice a week thereafter 45 g 12    cyanocobalamin, vitamin B-12, 5,000 mcg Subl Take 1 tablet under tongue once a day for 4 weeks, then continue once per week 30 tablet 1    estradioL (VIVELLE-DOT) 0.0375 mg/24 hr APPLY 1 PATCH TOPICALLY TO THE SKIN 2 TIMES A WEEK 24 patch 3    gabapentin (NEURONTIN) 300 MG capsule Take 1 capsule (300 mg total) by mouth 2 (two) times daily. 30 capsule 1    levothyroxine (SYNTHROID) 100 MCG tablet Take 1 tablet (100 mcg total) by mouth before  "breakfast. 90 tablet 3    mirabegron (MYRBETRIQ) 50 mg Tb24 Take 1 tablet (50 mg total) by mouth once daily. 30 tablet 11    oxybutynin (DITROPAN) 5 MG Tab Take 1-3 tabs 3 times as needed 30 tablet 11    pantoprazole (PROTONIX) 40 MG tablet TAKE 1 TABLET(40 MG) BY MOUTH TWICE DAILY 60 tablet 3    phenazopyridine HCl (PYRIDIUM ORAL) Take by mouth.      sulfamethoxazole-trimethoprim 800-160mg (BACTRIM DS) 800-160 mg Tab Take 1 tablet by mouth 2 (two) times daily. 10 tablet 0    sumatriptan (IMITREX) 100 MG tablet Take 1 tablet (100 mg total) by mouth every 8 (eight) hours as needed for Migraine. No more than 2 tablets per day, no more than 4 days per week. 8 tablet 2     No current facility-administered medications for this visit.       Review of patient's allergies indicates:   Allergen Reactions    Klonopin [clonazepam] Other (See Comments)     Severe mouth ulcers    Rocephin [ceftriaxone]      Severe migraine    Amoxicillin Other (See Comments)     Headache    Abilify [aripiprazole] Other (See Comments)     Tardive dyskinesia  - do not give any other meds that affect dopamine    Clindamycin Other (See Comments)     Nausea, headache    Codeine      Other reaction(s): Unknown    Demerol [meperidine]      Interacts with meds    Erythromycin      Other reaction(s): Unknown    Fioricet  [butalbital-acetaminophen-caff] Other (See Comments)       Well woman:  Pap's: Normal/ normal last Pap:history of HPV post hysterectomy  Mammo: BIRADS: 1 ; Last imaging  2021   Colonoscopy:  12/ 2020   Dexa: Normal    ROS:  As per HPI.      Exam  /70 (BP Location: Right arm, Patient Position: Sitting, BP Method: Medium (Manual))   Ht 5' 7" (1.702 m)   Wt 68 kg (150 lb)   BMI 23.49 kg/m²   General: alert and oriented, no acute distress  Respiratory: normal respiratory effort  Abd: soft, non-tender, non-distended    Pelvic  Ext. Genitalia: normal external genitalia. Normal bartholin's and skeens glands  Vagina: + " atrophy. Normal vaginal mucosa without lesions. No discharge noted.   Non-tender bladder base without palpable mass.  Cervix: absent  Uterus:  absent   Urethra: no masses or tenderness  Urethral meatus: no lesions, caruncle or prolapse.    The patient was fit with #4 ring with support pessary.  She was able to tolerate the device comfortabley with bending, squatting, valsalva.  She was able to demonstrate independent removal and placement.  She tolerated the procedure well.      Impression  1. Posterior vaginal wall prolapse     2. Prolapse of anterior vaginal wall     3. Mixed incontinence     4. Bladder pain       We reviewed the above issues and discussed options for short-term versus long-term management of her problems.   Plan:   1.Mixed urinary incontinence, urge > stress: history of MMK   --Bladder diary for 3-7 days, write the number of times you go to the rest room and associated symptoms of urgency or leakage.   --Empty bladder every 3 hours.  Empty well: wait a minute, lean forward on toilet.    --Avoid dietary irritants (see sheet).  Keep diary x 3-5 days to determine your irritants.  --KEGELS: do 10 in AM and 10 in PM, holding each x 10 seconds.  When you feel urge to go, STOP, KEGEL, and when urge has passed, then go to bathroom.   Pelvic floor PT   --URGE: did not tolerate myrbetriq or uribel  --trial oxybutynin 5 mg 1-3 pills up to 3 times daily        2.  Prolapse: Stage 1 apical prolapse, Stage 2 anterior and posterior vaginal wall prolapse: no change in her symptoms    --trial #4 ring with support pessary   --PESSARY CARE: Remove pessary as frequently as nightly and as infrequently as every 2-3 weeks.  Remove before intercourse.  May need to remove before bowel movements if straining dislodges.   Wash with soap and water (no ).  Replace using small amount of water-based lubricant (like KY jelly) at end being introduced into vagina.    --Daily pelvic floor exercises as assigned, Pelvic  floor PT  --Non surgical options discussed with patient    --Surgical options discussed such as: with apical suspension: SSF, USLS and SCP with mesh augmentation. Risks/ benefits/ alternatives/ succuss and failure rates were reviewed. Information packets were given detailing surgical options.  She was also given web site information for: www.augs.org and www.[employer].org and http://www.fda.gov/MedicalDevices/UroGynSurgicalMesh/default.htm to review the details of the surgical options discussed and the use of mesh in surgery. She will review the information given and we will discuss further at the follow up visit. She is looking for a non-surgical options for surgery. I think the pessary is a good start for her.      3. Vaginal atrophy (dryness):  Restart 1 gram of estrogen cream in twice a week      4.  RTC for pessary check in 2 months       20 minutes were spent in face to face time with this patient  90 % of this time was spent in counseling and/or coordination of KENIA Velazquez-BC [facility]  Division of Female Pelvic Medicine and Reconstructive Surgery  Department of Obstetrics & Gynecology

## 2022-08-17 NOTE — PATIENT INSTRUCTIONS
1.Mixed urinary incontinence, urge > stress: history of MMK   --Bladder diary for 3-7 days, write the number of times you go to the rest room and associated symptoms of urgency or leakage.   --Empty bladder every 3 hours.  Empty well: wait a minute, lean forward on toilet.    --Avoid dietary irritants (see sheet).  Keep diary x 3-5 days to determine your irritants.  --KEGELS: do 10 in AM and 10 in PM, holding each x 10 seconds.  When you feel urge to go, STOP, KEGEL, and when urge has passed, then go to bathroom.   Pelvic floor PT   --URGE: did not tolerate myrbetriq or uribel  --trial oxybutynin 5 mg 1-3 pills up to 3 times daily        2.  Prolapse: Stage 1 apical prolapse, Stage 2 anterior and posterior vaginal wall prolapse: no change in her symptoms    --trial #4 ring with support pessary   --PESSARY CARE: Remove pessary as frequently as nightly and as infrequently as every 2-3 weeks.  Remove before intercourse.  May need to remove before bowel movements if straining dislodges.   Wash with soap and water (no ).  Replace using small amount of water-based lubricant (like KY jelly) at end being introduced into vagina.    --Daily pelvic floor exercises as assigned, Pelvic floor PT  --Non surgical options discussed with patient    --Surgical options discussed such as: with apical suspension: SSF, USLS and SCP with mesh augmentation. Risks/ benefits/ alternatives/ succuss and failure rates were reviewed. Information packets were given detailing surgical options.  She was also given web site information for: www.augs.org and www.lynda.comsforpfd.org and http://www.fda.gov/MedicalDevices/UroGynSurgicalMesh/default.htm to review the details of the surgical options discussed and the use of mesh in surgery. She will review the information given and we will discuss further at the follow up visit. She is looking for a non-surgical options for surgery. I think the pessary is a good start for her.      3. Vaginal  atrophy (dryness):  Restart 1 gram of estrogen cream in twice a week      4.  RTC for pessary check in 2 months

## 2022-08-18 NOTE — PATIENT INSTRUCTIONS
1. Cystitis  --urine culture--negative       2.  Prolapse: Stage 1 apical prolapse, Stage 2 anterior and posterior vaginal wall prolapse: no change in her symptoms    --Pessary benefit discussed with patient, s/p pessary fitting will decide if she wants to use it.   --Daily pelvic floor exercises as assigned, Pelvic floor PT  --Non surgical options discussed with patient    --Surgical options discussed such as: with apical suspension: SSF, USLS and SCP with mesh augmentation. Risks/ benefits/ alternatives/ succuss and failure rates were reviewed. Information packets were given detailing surgical options.  She was also given web site information for: www.augs.org and www.[employer].org and http://www.fda.gov/MedicalDevices/UroGynSurgicalMesh/default.htm to review the details of the surgical options discussed and the use of mesh in surgery. She will review the information given and we will discuss further at the follow up visit. She is looking for a non-surgical options for surgery. I think the pessary is a good start for her.      3.  Mixed urinary incontinence, urge > stress: history of MMK   --Bladder diary for 3-7 days, write the number of times you go to the rest room and associated symptoms of urgency or leakage.   --Empty bladder every 3 hours.  Empty well: wait a minute, lean forward on toilet.    --Avoid dietary irritants (see sheet).  Keep diary x 3-5 days to determine your irritants.  --KEGELS: do 10 in AM and 10 in PM, holding each x 10 seconds.  When you feel urge to go, STOP, KEGEL, and when urge has passed, then go to bathroom.   Pelvic floor PT   --URGE: Myrbetriq 50 mg daily.  SE profile reviewed.    Takes 2-4 weeks to see if will have effect.    --STRESS:  Pessary vs. Sling vs impressa      4. Vaginal atrophy (dryness):  Restart 1 gram of estrogen cream in vagina twice a week      5. RTC for pessary insertion

## 2022-08-31 RX ORDER — LEVOTHYROXINE SODIUM 100 UG/1
100 TABLET ORAL
Qty: 90 TABLET | Refills: 3 | Status: SHIPPED | OUTPATIENT
Start: 2022-08-31 | End: 2023-09-03

## 2022-08-31 NOTE — TELEPHONE ENCOUNTER
No new care gaps identified.  [facility] Embedded Care Gaps. Reference number: [id number]. 8/31/2022   2:13:08 PM CDT

## 2022-09-09 ENCOUNTER — TELEPHONE (OUTPATIENT)
Dept: NEUROLOGY | Facility: CLINIC | Age: 67
End: 2022-09-09

## 2022-09-09 NOTE — TELEPHONE ENCOUNTER
CONTACTED pt PER CALL BACK REQUEST AND PT SAID THAT MESSAGE WAS OLD AND WAS FROM AUGUST. SHE NO LONGER NEEDED APPT.

## 2022-11-30 ENCOUNTER — HOSPITAL ENCOUNTER (OUTPATIENT)
Dept: RADIOLOGY | Facility: HOSPITAL | Age: 67
Discharge: HOME | End: 2022-11-30
Attending: INTERNAL MEDICINE
Payer: COMMERCIAL

## 2022-11-30 DIAGNOSIS — Z12.31 SCREENING MAMMOGRAM, ENCOUNTER FOR: ICD-10-CM

## 2022-11-30 PROCEDURE — 77067 SCR MAMMO BI INCL CAD: CPT | Mod: 26,,, | Performed by: RADIOLOGY

## 2022-11-30 PROCEDURE — 77067 MAMMO DIGITAL SCREENING BILAT WITH TOMO: ICD-10-PCS | Mod: 26,,, | Performed by: RADIOLOGY

## 2022-11-30 PROCEDURE — 77063 BREAST TOMOSYNTHESIS BI: CPT | Mod: TC

## 2022-11-30 PROCEDURE — 77063 MAMMO DIGITAL SCREENING BILAT WITH TOMO: ICD-10-PCS | Mod: 26,,, | Performed by: RADIOLOGY

## 2022-11-30 PROCEDURE — 77063 BREAST TOMOSYNTHESIS BI: CPT | Mod: 26,,, | Performed by: RADIOLOGY

## 2022-11-30 PROCEDURE — 77067 SCR MAMMO BI INCL CAD: CPT | Mod: TC

## 2023-01-18 RX ORDER — ESTRADIOL 0.04 MG/D
1 FILM, EXTENDED RELEASE TRANSDERMAL
Qty: 24 PATCH | Refills: 3 | Status: SHIPPED | OUTPATIENT
Start: 2023-01-19

## 2023-01-31 ENCOUNTER — OFFICE VISIT (OUTPATIENT)
Dept: UROLOGY | Facility: CLINIC | Age: 68
End: 2023-01-31
Payer: COMMERCIAL

## 2023-01-31 VITALS
HEART RATE: 77 BPM | HEIGHT: 67 IN | WEIGHT: 155.88 LBS | SYSTOLIC BLOOD PRESSURE: 101 MMHG | BODY MASS INDEX: 24.47 KG/M2 | DIASTOLIC BLOOD PRESSURE: 82 MMHG

## 2023-01-31 DIAGNOSIS — R31.0 GROSS HEMATURIA: Primary | ICD-10-CM

## 2023-01-31 DIAGNOSIS — N39.46 MIXED INCONTINENCE: ICD-10-CM

## 2023-01-31 DIAGNOSIS — R35.1 NOCTURIA: ICD-10-CM

## 2023-01-31 DIAGNOSIS — N30.80 CYSTITIS CYSTICA: ICD-10-CM

## 2023-01-31 PROCEDURE — 99203 PR OFFICE/OUTPT VISIT, NEW, LEVL III, 30-44 MIN: ICD-10-PCS | Mod: S$GLB,,, | Performed by: UROLOGY

## 2023-01-31 PROCEDURE — 1160F PR REVIEW ALL MEDS BY PRESCRIBER/CLIN PHARMACIST DOCUMENTED: ICD-10-PCS | Mod: CPTII,S$GLB,, | Performed by: UROLOGY

## 2023-01-31 PROCEDURE — 3079F DIAST BP 80-89 MM HG: CPT | Mod: CPTII,S$GLB,, | Performed by: UROLOGY

## 2023-01-31 PROCEDURE — 1159F PR MEDICATION LIST DOCUMENTED IN MEDICAL RECORD: ICD-10-PCS | Mod: CPTII,S$GLB,, | Performed by: UROLOGY

## 2023-01-31 PROCEDURE — 88112 PR  CYTOPATH, CELL ENHANCE TECH: ICD-10-PCS | Mod: 26,,, | Performed by: PATHOLOGY

## 2023-01-31 PROCEDURE — 1101F PR PT FALLS ASSESS DOC 0-1 FALLS W/OUT INJ PAST YR: ICD-10-PCS | Mod: CPTII,S$GLB,, | Performed by: UROLOGY

## 2023-01-31 PROCEDURE — 88112 CYTOPATH CELL ENHANCE TECH: CPT | Mod: 26,,, | Performed by: PATHOLOGY

## 2023-01-31 PROCEDURE — 99999 PR PBB SHADOW E&M-EST. PATIENT-LVL IV: CPT | Mod: PBBFAC,,, | Performed by: UROLOGY

## 2023-01-31 PROCEDURE — 99999 PR PBB SHADOW E&M-EST. PATIENT-LVL IV: ICD-10-PCS | Mod: PBBFAC,,, | Performed by: UROLOGY

## 2023-01-31 PROCEDURE — 1101F PT FALLS ASSESS-DOCD LE1/YR: CPT | Mod: CPTII,S$GLB,, | Performed by: UROLOGY

## 2023-01-31 PROCEDURE — 3008F PR BODY MASS INDEX (BMI) DOCUMENTED: ICD-10-PCS | Mod: CPTII,S$GLB,, | Performed by: UROLOGY

## 2023-01-31 PROCEDURE — 1125F AMNT PAIN NOTED PAIN PRSNT: CPT | Mod: CPTII,S$GLB,, | Performed by: UROLOGY

## 2023-01-31 PROCEDURE — 87086 URINE CULTURE/COLONY COUNT: CPT | Performed by: STUDENT IN AN ORGANIZED HEALTH CARE EDUCATION/TRAINING PROGRAM

## 2023-01-31 PROCEDURE — 3074F PR MOST RECENT SYSTOLIC BLOOD PRESSURE < 130 MM HG: ICD-10-PCS | Mod: CPTII,S$GLB,, | Performed by: UROLOGY

## 2023-01-31 PROCEDURE — 3079F PR MOST RECENT DIASTOLIC BLOOD PRESSURE 80-89 MM HG: ICD-10-PCS | Mod: CPTII,S$GLB,, | Performed by: UROLOGY

## 2023-01-31 PROCEDURE — 1160F RVW MEDS BY RX/DR IN RCRD: CPT | Mod: CPTII,S$GLB,, | Performed by: UROLOGY

## 2023-01-31 PROCEDURE — 3008F BODY MASS INDEX DOCD: CPT | Mod: CPTII,S$GLB,, | Performed by: UROLOGY

## 2023-01-31 PROCEDURE — 3074F SYST BP LT 130 MM HG: CPT | Mod: CPTII,S$GLB,, | Performed by: UROLOGY

## 2023-01-31 PROCEDURE — 3288F PR FALLS RISK ASSESSMENT DOCUMENTED: ICD-10-PCS | Mod: CPTII,S$GLB,, | Performed by: UROLOGY

## 2023-01-31 PROCEDURE — 1159F MED LIST DOCD IN RCRD: CPT | Mod: CPTII,S$GLB,, | Performed by: UROLOGY

## 2023-01-31 PROCEDURE — 99203 OFFICE O/P NEW LOW 30 MIN: CPT | Mod: S$GLB,,, | Performed by: UROLOGY

## 2023-01-31 PROCEDURE — 1125F PR PAIN SEVERITY QUANTIFIED, PAIN PRESENT: ICD-10-PCS | Mod: CPTII,S$GLB,, | Performed by: UROLOGY

## 2023-01-31 PROCEDURE — 88112 CYTOPATH CELL ENHANCE TECH: CPT | Performed by: PATHOLOGY

## 2023-01-31 PROCEDURE — 3288F FALL RISK ASSESSMENT DOCD: CPT | Mod: CPTII,S$GLB,, | Performed by: UROLOGY

## 2023-01-31 RX ORDER — ESZOPICLONE 1 MG/1
1 TABLET, FILM COATED ORAL
COMMUNITY
Start: 2022-07-29

## 2023-01-31 RX ORDER — PHENAZOPYRIDINE HYDROCHLORIDE 100 MG/1
100 TABLET, FILM COATED ORAL 3 TIMES DAILY PRN
Qty: 30 TABLET | Refills: 3 | Status: SHIPPED | OUTPATIENT
Start: 2023-01-31 | End: 2023-02-10

## 2023-01-31 RX ORDER — CIPROFLOXACIN 500 MG/1
500 TABLET ORAL 2 TIMES DAILY
Qty: 14 TABLET | Refills: 0 | Status: SHIPPED | OUTPATIENT
Start: 2023-01-31 | End: 2023-02-07

## 2023-01-31 NOTE — PROGRESS NOTES
"[facility] Urology [address]   Clinic Note    SUBJECTIVE:     Chief Complaint: gross hematuria    History of Present Illness:  Norm Andre is a 67 y.o. female who presents to clinic for gross hematuria. She is established to our clinic. Referral from Self, Aaareferral.    History of LAURA, presently doing timed voids as well as kegels. Failed therapy with myrbetriq and uribel.     Has a history of prolapse, fitted for pessary with UroGyn. Has not used pessary. Moderate improvement with PFPT.    Has a history of recurrent UTI and vaginal atrophy. Stopped vaginal estrogen due to irritation.    Two weeks ago had pelvic pain consistent with prior UTI, however was not having any irritative LUTS or dysuria. Progressed to chills, no fevers, had gross hematuria with clots two days ago. Never smoker, father with a history of prostate cancer.    Anticoagulation:  No    OBJECTIVE:     Estimated body mass index is 24.41 kg/m² as calculated from the following:    Height as of this encounter: 5' 7" (1.702 m).    Weight as of this encounter: 70.7 kg (155 lb 13.8 oz).    Vital Signs (Most Recent)  Pulse: 77 (01/31/23 1038)  BP: 101/82 (01/31/23 1038)    Physical Exam  Constitutional:       General: She is not in acute distress.     Appearance: She is well-developed. She is not diaphoretic.   HENT:      Head: Normocephalic and atraumatic.   Eyes:      General: No scleral icterus.  Pulmonary:      Effort: Pulmonary effort is normal. No respiratory distress.      Breath sounds: No stridor.   Abdominal:      General: There is no distension.      Palpations: Abdomen is soft.      Tenderness: There is no abdominal tenderness. There is no right CVA tenderness or left CVA tenderness.   Musculoskeletal:         General: Normal range of motion.      Cervical back: Normal range of motion.   Skin:     General: Skin is warm and dry.   Neurological:      Mental Status: She is alert.   Psychiatric:         Behavior: Behavior normal.       Lab " Results   Component Value Date    BUN 10 01/27/2022    CREATININE 0.7 01/27/2022    WBC 3.10 (L) 01/27/2022    HGB 14.0 01/27/2022    HCT 45.3 01/27/2022     01/27/2022    AST 19 01/27/2022    ALT 10 01/27/2022    ALKPHOS 51 (L) 01/27/2022    ALBUMIN 3.9 01/27/2022    HGBA1C 5.3 12/06/2021      UA pyridium stained    ASSESSMENT     1. Gross hematuria    2. Cystitis cystica    3. Nocturia    4. Mixed incontinence      PLAN:   1. Gross hematuria    2. Cystitis cystica    3. Nocturia    4. Mixed incontinence    - urine for culture  - will arrange CT urogram and cystoscopy for gross hematuria  - will re prescribe pyridium for dysuria    Jose Daniel Thompson MD     Letter to Self, Aaareferral

## 2023-02-01 ENCOUNTER — LAB VISIT (OUTPATIENT)
Dept: LAB | Facility: HOSPITAL | Age: 68
End: 2023-02-01
Payer: COMMERCIAL

## 2023-02-01 DIAGNOSIS — R31.0 GROSS HEMATURIA: ICD-10-CM

## 2023-02-01 LAB
BACTERIA UR CULT: NO GROWTH
CREAT SERPL-MCNC: 0.7 MG/DL (ref 0.5–1.4)
EST. GFR  (NO RACE VARIABLE): >60 ML/MIN/1.73 M^2

## 2023-02-01 PROCEDURE — 82565 ASSAY OF CREATININE: CPT | Performed by: STUDENT IN AN ORGANIZED HEALTH CARE EDUCATION/TRAINING PROGRAM

## 2023-02-01 PROCEDURE — 36415 COLL VENOUS BLD VENIPUNCTURE: CPT | Performed by: STUDENT IN AN ORGANIZED HEALTH CARE EDUCATION/TRAINING PROGRAM

## 2023-02-03 LAB
FINAL PATHOLOGIC DIAGNOSIS: NORMAL
Lab: NORMAL

## 2023-02-09 ENCOUNTER — HOSPITAL ENCOUNTER (OUTPATIENT)
Dept: RADIOLOGY | Facility: OTHER | Age: 68
Discharge: HOME | End: 2023-02-09
Attending: STUDENT IN AN ORGANIZED HEALTH CARE EDUCATION/TRAINING PROGRAM
Payer: COMMERCIAL

## 2023-02-09 DIAGNOSIS — R31.0 GROSS HEMATURIA: ICD-10-CM

## 2023-02-09 PROCEDURE — 25500020 PHARM REV CODE 255: Performed by: STUDENT IN AN ORGANIZED HEALTH CARE EDUCATION/TRAINING PROGRAM

## 2023-02-09 PROCEDURE — 74178 CT ABD&PLV WO CNTR FLWD CNTR: CPT | Mod: 26,,, | Performed by: RADIOLOGY

## 2023-02-09 PROCEDURE — 74178 CT ABD&PLV WO CNTR FLWD CNTR: CPT | Mod: TC

## 2023-02-09 PROCEDURE — 74178 CT UROGRAM ABD PELVIS W WO: ICD-10-PCS | Mod: 26,,, | Performed by: RADIOLOGY

## 2023-02-09 RX ADMIN — IOHEXOL 125 ML: 350 INJECTION, SOLUTION INTRAVENOUS at 10:02

## 2023-02-15 ENCOUNTER — PROCEDURE VISIT (OUTPATIENT)
Dept: UROLOGY | Facility: CLINIC | Age: 68
End: 2023-02-15
Payer: COMMERCIAL

## 2023-02-15 VITALS
DIASTOLIC BLOOD PRESSURE: 69 MMHG | HEIGHT: 67 IN | WEIGHT: 153.56 LBS | TEMPERATURE: 98 F | BODY MASS INDEX: 24.1 KG/M2 | SYSTOLIC BLOOD PRESSURE: 97 MMHG | RESPIRATION RATE: 18 BRPM | HEART RATE: 82 BPM

## 2023-02-15 DIAGNOSIS — R31.0 GROSS HEMATURIA: ICD-10-CM

## 2023-02-15 PROCEDURE — 52000 CYSTOURETHROSCOPY: CPT | Mod: S$GLB,,, | Performed by: UROLOGY

## 2023-02-15 PROCEDURE — 52000 CYSTOSCOPY: ICD-10-PCS | Mod: S$GLB,,, | Performed by: UROLOGY

## 2023-02-15 PROCEDURE — 87086 URINE CULTURE/COLONY COUNT: CPT | Performed by: UROLOGY

## 2023-02-15 RX ORDER — CIPROFLOXACIN 500 MG/1
500 TABLET ORAL
Status: COMPLETED | OUTPATIENT
Start: 2023-02-15 | End: 2023-02-15

## 2023-02-15 RX ORDER — ROSUVASTATIN CALCIUM 5 MG/1
5 TABLET, COATED ORAL
COMMUNITY
Start: 2023-01-19 | End: 2024-01-17

## 2023-02-15 RX ORDER — ESTRADIOL 0.1 MG/G
CREAM VAGINAL
Qty: 42.5 G | Refills: 6 | Status: SHIPPED | OUTPATIENT
Start: 2023-02-15 | End: 2023-03-20 | Stop reason: SDUPTHER

## 2023-02-15 RX ORDER — NITROFURANTOIN 25; 75 MG/1; MG/1
100 CAPSULE ORAL 2 TIMES DAILY
Qty: 14 CAPSULE | Refills: 0 | Status: SHIPPED | OUTPATIENT
Start: 2023-02-15 | End: 2023-03-03 | Stop reason: CLARIF

## 2023-02-15 RX ORDER — LIDOCAINE HYDROCHLORIDE 20 MG/ML
JELLY TOPICAL ONCE
Status: COMPLETED | OUTPATIENT
Start: 2023-02-15 | End: 2023-02-15

## 2023-02-15 RX ORDER — SULFAMETHOXAZOLE AND TRIMETHOPRIM 800; 160 MG/1; MG/1
1 TABLET ORAL 2 TIMES DAILY
Qty: 10 TABLET | Refills: 0 | Status: SHIPPED | OUTPATIENT
Start: 2023-02-15 | End: 2023-02-20

## 2023-02-15 RX ADMIN — CIPROFLOXACIN 500 MG: 500 TABLET ORAL at 09:02

## 2023-02-15 RX ADMIN — LIDOCAINE HYDROCHLORIDE: 20 JELLY TOPICAL at 09:02

## 2023-02-15 NOTE — PATIENT INSTRUCTIONS
What to Expect After a Cystoscopy  For the next 24-48 hours, you may feel a mild burning when you urinate. This burning is normal and expected. Drink plenty of water to dilute the urine to help relieve the burning sensation. You may also see a small amount of blood in your urine after the procedure.    Unless you are already taking antibiotics, you may be given an antibiotic after the test to prevent infection.    Signs and Symptoms to Report  Call the [facility] at [phone] if you develop any of the following:  Fever of 101 degrees or higher  Chills or persistent bleeding  Inability to urinate

## 2023-02-15 NOTE — PROCEDURES
Cystoscopy    Date/Time: 2/15/2023 9:15 AM  Performed by: Sylwia Young MD  Authorized by: Jose Daniel Thompson MD     Consent Done?:  Yes (Written)  Timeout: prior to procedure the correct patient, procedure, and site was verified    Prep: patient was prepped and draped in usual sterile fashion    Anesthesia:  Intraurethral instillation  Indications: hematuria    Position:  Dorsal lithotomy  Anesthesia:  Intraurethral instillation  Preparation: Patient was prepped and draped in usual sterile fashion    Scope type:  Flexible cystoscope  External exam normal: Yes    Urethra normal: Yes    Bladder neck normal: Yes    Bladder normal: Yes     patient tolerated the procedure well with no immediate complications  Comments:      Dried blood particles in bladder, very small. No tumors. Clear efflux.   Urinemicro cocci - will send culture.   Start on macrobid, if positive. If negative, cysto, biopsy, possible right rpg.

## 2023-02-16 LAB — BACTERIA UR CULT: NO GROWTH

## 2023-02-25 ENCOUNTER — PATIENT MESSAGE (OUTPATIENT)
Dept: UROLOGY | Facility: CLINIC | Age: 68
End: 2023-02-25
Payer: COMMERCIAL

## 2023-02-28 ENCOUNTER — TELEPHONE (OUTPATIENT)
Dept: UROLOGY | Facility: CLINIC | Age: 68
End: 2023-02-28
Payer: COMMERCIAL

## 2023-02-28 DIAGNOSIS — R31.0 GROSS HEMATURIA: Primary | ICD-10-CM

## 2023-03-01 ENCOUNTER — TELEPHONE (OUTPATIENT)
Dept: UROLOGY | Facility: CLINIC | Age: 68
End: 2023-03-01
Payer: COMMERCIAL

## 2023-03-01 DIAGNOSIS — R31.0 GROSS HEMATURIA: Primary | ICD-10-CM

## 2023-03-03 RX ORDER — CEFACLOR 500 MG/1
500 TABLET, FILM COATED, EXTENDED RELEASE ORAL 2 TIMES DAILY
COMMUNITY
Start: 2022-12-27 | End: 2023-12-20

## 2023-03-03 RX ORDER — NARATRIPTAN 2.5 MG/1
TABLET ORAL
COMMUNITY
Start: 2022-10-28

## 2023-03-03 RX ORDER — HYDROCORTISONE 25 MG/G
OINTMENT TOPICAL 2 TIMES DAILY
COMMUNITY
Start: 2022-12-30

## 2023-03-03 RX ORDER — GABAPENTIN 100 MG/1
300 CAPSULE ORAL
COMMUNITY
Start: 2023-02-21

## 2023-03-03 NOTE — ANESTHESIA PAT ROS NOTE
03/03/2023  Norm Andre is a 67 y.o., female.      Pre-op Assessment          Review of Systems  Anesthesia Hx:  No problems with previous Anesthesia             Denies Family Hx of Anesthesia complications.    Denies Personal Hx of Anesthesia complications.                    Social:  Non-Smoker, No Alcohol Use       Hematology/Oncology:  Hematology Normal   Oncology Normal                                   EENT/Dental:   Crowns-Top & bottem          Cardiovascular:  Exercise tolerance: good              hyperlipidemia    Walking daily/-x5 wk.Machine-2x per wk./Climbs stairs in home                         Pulmonary:  Pulmonary Normal                       Renal/:  Chronic Renal Disease   Infections of kidney/Gross hematuria             Hepatic/GI:     GERD             Musculoskeletal:  Arthritis   OA-spine/cervical region            OB/GYN/PEDS:  None           Neurological:    Neuromuscular Disease,  Headaches     OA-spine/cervical region/ Migraines                            Endocrine:   Hypothyroidism          Dermatological:  Skin Normal    Psych:  Psychiatric History   Moderate mood disorder              Azalea Peng RN 3/3/23 & 3/6/23    Anesthesia Assessment: Preoperative EQUATION    Planned Procedure: Procedure(s) (LRB):  BIOPSY, BLADDER (N/A)  CYSTOSCOPY (N/A)  CYSTOSCOPY, WITH RETROGRADE PYELOGRAM (Bilateral)  URETEROSCOPY (Bilateral)  Requested Anesthesia Type:Monitor Anesthesia Care  Surgeon: Sylwia Young MD  Service: Urology  Known or anticipated Date of Surgery:3/13/2023    Surgeon notes: reviewed and Gross hematuria    Previous anesthesia records:Not available and 4/28/22-General Surgery @ [facility] [place]-Right C* TF    1/19/21-Excision, Cyst, Rectocele or Pelvic, posterior approach/Coccygectomy-General-Complicating Factors: Poor neck/head extension-no apparent anesthetic complications  and tolerated procedure well-no nausea/vomiting      Anesthesia Hx:  No previous Anesthesia  History of prior surgery of interest to airway management or planning:  Denies Personal Hx of Anesthesia complications.     Airway/Jaw/Neck:  Airway Findings: Mouth Opening: Normal Tongue: Normal  General Airway Assessment: Adult  Mallampati: II  TM Distance: Normal, at least 6 cm       Last PCP note: > 1 year ago , within [facility] , 12/9/21-IM-Kell Ramirez MD-IM-Osteopenia,unspecified location +2 more Dx-Annual Exam  Subspecialty notes: Neurology, C& R visit/OB GYN visit/URO GYN  visit    Other important co-morbidities: Hypothyroid and Gross hematuria     Medical History    Diagnosis Date Comment Source   Allergy      Arthritis      Headache(784.0)      Herniated disc  x4 in cervical area    History of migraine headaches      Hypothyroidism      Infections of kidney  has had multiple kidney infections in the past.    Low back pain      Migraine headache      Moderate mood disorder      Neck pain      Rotator cuff tear      Thyroid disease        Tests already available:  Results have been reviewed. Lab-2/1/23-Creatinine, serum/ CXR-10/20/20        Plan: Phone pending/Phone started & Patient requesting CB on Monday-3/6/23, to finish phone screen     Testing:  BMP/CBC and TSH     Patient  has previously scheduled Medical Appointment:Appointment on 3/7/23, prior to the surgery date.    Navigation: Phone Completed on Monday-3/6/23                        Tests Scheduled. BMP/CBC & TSH done 3/7/23 & reviewed by Dr. Ross.  Received OS EKG from PCPs' office-sent to be scanned into [employer].             Consults scheduled.None needed at this time.              Reviewed plan with Dr. Lyon.     Azalea Peng RN  3/3/23 & 3/6/23 &3/9/23

## 2023-03-06 ENCOUNTER — TELEPHONE (OUTPATIENT)
Dept: UROLOGY | Facility: CLINIC | Age: 68
End: 2023-03-06
Payer: COMMERCIAL

## 2023-03-06 NOTE — TELEPHONE ENCOUNTER
----- Message from Avinash Thompson sent at 3/6/2023  8:33 AM CST -----  Regarding: pt advice; appt 10am  Contact: Pt@ [phone]  Pt is requesting a rosalind back to advise if she needs to repeat these labs; she had this procedure about three weeks ago. Please call to advise, thank you.

## 2023-03-07 ENCOUNTER — LAB VISIT (OUTPATIENT)
Dept: LAB | Facility: HOSPITAL | Age: 68
End: 2023-03-07
Attending: UROLOGY
Payer: COMMERCIAL

## 2023-03-07 DIAGNOSIS — R31.0 GROSS HEMATURIA: ICD-10-CM

## 2023-03-07 PROCEDURE — 87086 URINE CULTURE/COLONY COUNT: CPT | Performed by: UROLOGY

## 2023-03-08 LAB — BACTERIA UR CULT: NO GROWTH

## 2023-03-10 ENCOUNTER — TELEPHONE (OUTPATIENT)
Dept: UROLOGY | Facility: CLINIC | Age: 68
End: 2023-03-10
Payer: COMMERCIAL

## 2023-03-13 ENCOUNTER — HOSPITAL ENCOUNTER (OUTPATIENT)
Facility: HOSPITAL | Age: 68
Discharge: HOME | End: 2023-03-13
Attending: UROLOGY | Admitting: UROLOGY
Payer: COMMERCIAL

## 2023-03-13 ENCOUNTER — ANESTHESIA EVENT (OUTPATIENT)
Dept: SURGERY | Facility: HOSPITAL | Age: 68
End: 2023-03-13
Payer: COMMERCIAL

## 2023-03-13 ENCOUNTER — ANESTHESIA (OUTPATIENT)
Dept: SURGERY | Facility: HOSPITAL | Age: 68
End: 2023-03-13
Payer: COMMERCIAL

## 2023-03-13 VITALS
DIASTOLIC BLOOD PRESSURE: 81 MMHG | TEMPERATURE: 98 F | HEART RATE: 60 BPM | HEIGHT: 67 IN | WEIGHT: 150 LBS | OXYGEN SATURATION: 100 % | BODY MASS INDEX: 23.54 KG/M2 | SYSTOLIC BLOOD PRESSURE: 131 MMHG | RESPIRATION RATE: 16 BRPM

## 2023-03-13 DIAGNOSIS — R31.0 GROSS HEMATURIA: ICD-10-CM

## 2023-03-13 DIAGNOSIS — Z87.898 HISTORY OF GROSS HEMATURIA: Primary | ICD-10-CM

## 2023-03-13 PROCEDURE — 71000015 HC POSTOP RECOV 1ST HR: Performed by: UROLOGY

## 2023-03-13 PROCEDURE — 63600175 PHARM REV CODE 636 W HCPCS

## 2023-03-13 PROCEDURE — 63600175 PHARM REV CODE 636 W HCPCS: Performed by: NURSE ANESTHETIST, CERTIFIED REGISTERED

## 2023-03-13 PROCEDURE — 71000044 HC DOSC ROUTINE RECOVERY FIRST HOUR: Performed by: UROLOGY

## 2023-03-13 PROCEDURE — C1758 CATHETER, URETERAL: HCPCS | Performed by: UROLOGY

## 2023-03-13 PROCEDURE — 25000003 PHARM REV CODE 250

## 2023-03-13 PROCEDURE — 37000009 HC ANESTHESIA EA ADD 15 MINS: Performed by: UROLOGY

## 2023-03-13 PROCEDURE — 25000003 PHARM REV CODE 250: Performed by: UROLOGY

## 2023-03-13 PROCEDURE — C1769 GUIDE WIRE: HCPCS | Performed by: UROLOGY

## 2023-03-13 PROCEDURE — 52204 CYSTOSCOPY W/BIOPSY(S): CPT | Mod: ,,, | Performed by: UROLOGY

## 2023-03-13 PROCEDURE — 25500020 PHARM REV CODE 255: Performed by: UROLOGY

## 2023-03-13 PROCEDURE — 88305 TISSUE EXAM BY PATHOLOGIST: CPT | Performed by: PATHOLOGY

## 2023-03-13 PROCEDURE — D9220A PRA ANESTHESIA: ICD-10-PCS | Mod: CRNA,,, | Performed by: NURSE ANESTHETIST, CERTIFIED REGISTERED

## 2023-03-13 PROCEDURE — 37000008 HC ANESTHESIA 1ST 15 MINUTES: Performed by: UROLOGY

## 2023-03-13 PROCEDURE — D9220A PRA ANESTHESIA: Mod: CRNA,,, | Performed by: NURSE ANESTHETIST, CERTIFIED REGISTERED

## 2023-03-13 PROCEDURE — 36000706: Performed by: UROLOGY

## 2023-03-13 PROCEDURE — 88305 TISSUE EXAM BY PATHOLOGIST: CPT | Mod: 26,,, | Performed by: PATHOLOGY

## 2023-03-13 PROCEDURE — 74420 UROGRAPHY RTRGR +-KUB: CPT | Mod: 26,,, | Performed by: UROLOGY

## 2023-03-13 PROCEDURE — 63600175 PHARM REV CODE 636 W HCPCS: Performed by: ANESTHESIOLOGY

## 2023-03-13 PROCEDURE — 88305 TISSUE EXAM BY PATHOLOGIST: ICD-10-PCS | Mod: 26,,, | Performed by: PATHOLOGY

## 2023-03-13 PROCEDURE — D9220A PRA ANESTHESIA: ICD-10-PCS | Mod: ANES,,, | Performed by: ANESTHESIOLOGY

## 2023-03-13 PROCEDURE — 74420 PR  X-RAY RETROGRADE PYELOGRAM: ICD-10-PCS | Mod: 26,,, | Performed by: UROLOGY

## 2023-03-13 PROCEDURE — 52204 PR CYSTOURETHROSCOPY,BIOPSY: ICD-10-PCS | Mod: ,,, | Performed by: UROLOGY

## 2023-03-13 PROCEDURE — D9220A PRA ANESTHESIA: Mod: ANES,,, | Performed by: ANESTHESIOLOGY

## 2023-03-13 PROCEDURE — 36000707: Performed by: UROLOGY

## 2023-03-13 RX ORDER — ONDANSETRON 2 MG/ML
4 INJECTION INTRAMUSCULAR; INTRAVENOUS DAILY PRN
Status: COMPLETED | OUTPATIENT
Start: 2023-03-13 | End: 2023-03-13

## 2023-03-13 RX ORDER — MIDAZOLAM HYDROCHLORIDE 1 MG/ML
INJECTION, SOLUTION INTRAMUSCULAR; INTRAVENOUS
Status: DISCONTINUED | OUTPATIENT
Start: 2023-03-13 | End: 2023-03-13

## 2023-03-13 RX ORDER — DEXAMETHASONE SODIUM PHOSPHATE 4 MG/ML
INJECTION, SOLUTION INTRA-ARTICULAR; INTRALESIONAL; INTRAMUSCULAR; INTRAVENOUS; SOFT TISSUE
Status: DISCONTINUED | OUTPATIENT
Start: 2023-03-13 | End: 2023-03-13

## 2023-03-13 RX ORDER — SODIUM CHLORIDE 0.9 % (FLUSH) 0.9 %
3 SYRINGE (ML) INJECTION EVERY 30 MIN PRN
Status: DISCONTINUED | OUTPATIENT
Start: 2023-03-13 | End: 2023-03-13 | Stop reason: HOSPADM

## 2023-03-13 RX ORDER — FENTANYL CITRATE 50 UG/ML
25 INJECTION, SOLUTION INTRAMUSCULAR; INTRAVENOUS EVERY 5 MIN PRN
Status: DISCONTINUED | OUTPATIENT
Start: 2023-03-13 | End: 2023-03-13 | Stop reason: HOSPADM

## 2023-03-13 RX ORDER — LIDOCAINE HYDROCHLORIDE 20 MG/ML
JELLY TOPICAL
Status: DISCONTINUED | OUTPATIENT
Start: 2023-03-13 | End: 2023-03-13 | Stop reason: HOSPADM

## 2023-03-13 RX ORDER — SODIUM CHLORIDE 9 MG/ML
INJECTION, SOLUTION INTRAVENOUS CONTINUOUS
Status: DISCONTINUED | OUTPATIENT
Start: 2023-03-13 | End: 2023-03-13 | Stop reason: HOSPADM

## 2023-03-13 RX ADMIN — SODIUM CHLORIDE: 0.9 INJECTION, SOLUTION INTRAVENOUS at 08:03

## 2023-03-13 RX ADMIN — CEFAZOLIN 2 G: 2 INJECTION, POWDER, FOR SOLUTION INTRAMUSCULAR; INTRAVENOUS at 08:03

## 2023-03-13 RX ADMIN — ONDANSETRON 4 MG: 2 INJECTION INTRAMUSCULAR; INTRAVENOUS at 09:03

## 2023-03-13 RX ADMIN — DEXAMETHASONE SODIUM PHOSPHATE 8 MG: 4 INJECTION, SOLUTION INTRAMUSCULAR; INTRAVENOUS at 08:03

## 2023-03-13 RX ADMIN — MIDAZOLAM HYDROCHLORIDE 2 MG: 1 INJECTION, SOLUTION INTRAMUSCULAR; INTRAVENOUS at 08:03

## 2023-03-13 NOTE — TRANSFER OF CARE
"Anesthesia Transfer of Care Note    Patient: Norm LESLIE Andre    Procedure(s) Performed: Procedure(s) (LRB):  BIOPSY, BLADDER (N/A)  CYSTOSCOPY (N/A)  FULGURATION, BLADDER (N/A)  PYELOGRAM, RETROGRADE (Bilateral)    Patient location: PACU    Anesthesia Type: general    Transport from OR: Transported from OR on 6-10 L/min O2 by face mask with adequate spontaneous ventilation    Post pain: adequate analgesia    Post assessment: no apparent anesthetic complications    Post vital signs: stable    Level of consciousness: sedated    Nausea/Vomiting: no nausea/vomiting    Complications: none    Transfer of care protocol was followed      Last vitals:   Visit Vitals  /73 (BP Location: Right arm, Patient Position: Lying)   Pulse 68   Temp 36.3 °C (97.3 °F) (Temporal)   Resp 18   Ht 5' 7" (1.702 m)   Wt 68 kg (150 lb)   SpO2 99%   Breastfeeding No   BMI 23.49 kg/m²     "

## 2023-03-13 NOTE — H&P
[facility] ([facility])  Urology  History & Physical    Patient Name: Norm Andre  MRN: [id number]  Admission Date: 3/13/2023  Code Status: Prior   Attending Provider: Sylwia Young MD   Primary Care Physician: Kell Ramirez MD  Principal Problem:<principal problem not specified>    Subjective:     HPI:  Norm Andre is a 67 y.o. female who presents to clinic for gross hematuria. She is established to our clinic. Referral from Self, Aaareferral.     History of LAURA, presently doing timed voids as well as kegels. Failed therapy with myrbetriq and uribel.      Has a history of prolapse, fitted for pessary with UroGyn. Has not used pessary. Moderate improvement with PFPT.     Has a history of recurrent UTI and vaginal atrophy. Stopped vaginal estrogen due to irritation.     Recent hx of pelvic pain consistent with prior UTI, however was not having any irritative LUTS or dysuria. Progressed to chills, no fevers, had gross hematuria with clots two days ago. Never smoker, father with a history of prostate cancer.    Denies recent fevers, chills, n/v/dm dysuria, and gross hematuria.     Urine dipstick - specific gravity 1.015, pH 6. Negative for all remaining components.        Past Medical History:   Diagnosis Date    Allergy     Arthritis     Headache(784.0)     Herniated disc     x4 in cervical area    History of migraine headaches     Hypothyroidism     Infections of kidney     has had multiple kidney infections in the past.    Low back pain     Migraine headache     Moderate mood disorder     Neck pain     Rotator cuff tear     Thyroid disease        Past Surgical History:   Procedure Laterality Date    COCCYGECTOMY  01/19/2021    Surgeon: Daron Scruggs MD    COLONOSCOPY N/A 05/05/2016    Surgeon: Daron Scruggs MD    COLONOSCOPY N/A 12/28/2020    Surgeon: Daron Scruggs MD    CYSTOSCOPY      ESOPHAGOGASTRODUODENOSCOPY N/A 02/18/2020    Surgeon: Rip Davis  MD    EXCISION, CYST, RETRORECTAL OR PELVIC, POSTERIOR APPROACH N/A 01/19/2021    Surgeon: Daron Scruggs MD    EYE SURGERY      FACIAL COSMETIC SURGERY      HYSTERECTOMY  1988    TONSILLECTOMY      TOTAL ABDOMINAL HYSTERECTOMY W/ BILATERAL SALPINGOOPHORECTOMY  1988    URETHROPEXY  1988    MMK       Review of patient's allergies indicates:   Allergen Reactions    Klonopin [clonazepam] Other (See Comments)     Severe mouth ulcers    Rocephin [ceftriaxone]      Severe migraine    Amoxicillin Other (See Comments)     Headache    Abilify [aripiprazole] Other (See Comments)     Tardive dyskinesia  - do not give any other meds that affect dopamine    Clindamycin Other (See Comments)     Nausea, headache    Codeine      Other reaction(s): Unknown    Demerol [meperidine]      Interacts with meds    Erythromycin      Other reaction(s): Unknown    Fioricet  [butalbital-acetaminophen-caff] Other (See Comments)    Mirabegron Other (See Comments)     Headache, breast pain       Family History       Problem Relation (Age of Onset)    Allergies Mother, Son    Asthma Brother, Son    Breast cancer Maternal Aunt    Hyperlipidemia Mother    Hypertension Mother, Sister    Migraines Mother    Tremor Mother    Uterine cancer Maternal Grandmother            Tobacco Use    Smoking status: Never    Smokeless tobacco: Never   Substance and Sexual Activity    Alcohol use: No    Drug use: No    Sexual activity: Yes     Partners: Male       Review of Systems   Constitutional:  Negative for chills and fever.   HENT:  Negative for sore throat and trouble swallowing.    Eyes:  Negative for pain and discharge.   Respiratory:  Negative for shortness of breath and wheezing.    Cardiovascular:  Negative for chest pain and palpitations.   Gastrointestinal:  Negative for abdominal pain, diarrhea, nausea and vomiting.   Genitourinary:         As per HPI   Musculoskeletal:  Negative for back pain and joint swelling.   Skin:   Negative for rash and wound.   Neurological:  Negative for seizures, weakness and headaches.   Psychiatric/Behavioral:  Negative for hallucinations. The patient is not nervous/anxious.      Objective:     Temp:  [97.3 °F (36.3 °C)] 97.3 °F (36.3 °C)  Pulse:  [68] 68  Resp:  [18] 18  SpO2:  [99 %] 99 %  BP: (109)/(73) 109/73     Body mass index is 23.49 kg/m².    No intake/output data recorded.       Drains       Drain  Duration                  Closed/Suction Drain 01/19/21 1529 Left Buttock Bulb 10 Fr. 782 days                    Physical Exam  Vitals and nursing note reviewed.   Constitutional:       General: She is not in acute distress.  HENT:      Head: Atraumatic.      Nose: Nose normal.   Eyes:      Extraocular Movements: Extraocular movements intact.   Cardiovascular:      Rate and Rhythm: Normal rate.   Pulmonary:      Effort: Pulmonary effort is normal.   Abdominal:      General: Abdomen is flat. There is no distension.      Tenderness: There is no abdominal tenderness. There is no right CVA tenderness or left CVA tenderness.   Musculoskeletal:         General: Normal range of motion.      Cervical back: Normal range of motion.   Skin:     Coloration: Skin is not jaundiced.   Neurological:      General: No focal deficit present.      Mental Status: She is alert and oriented to person, place, and time.   Psychiatric:         Mood and Affect: Mood normal.         Behavior: Behavior normal.       Significant Labs:    BMP:  Recent Labs   Lab 03/07/23  1031   *   K 5.0      CO2 26   BUN 10   CREATININE 0.7   CALCIUM 9.5       CBC:  Recent Labs   Lab 03/07/23  1031   WBC 3.71*   HGB 13.4   HCT 43.1          All pertinent labs results from the past 24 hours have been reviewed.    Significant Imaging:  All pertinent imaging results/findings from the past 24 hours have been reviewed.                  Assessment and Plan:     Gross hematuria  - Plan for cysto with possible bladder Bx and right  RPG.         VTE Risk Mitigation (From admission, onward)         Ordered     IP VTE LOW RISK PATIENT  Once         03/13/23 0650     Place FELICITAS hose  Until discontinued         03/13/23 0650     Place sequential compression device  Until discontinued         03/13/23 0650                Chapin Kearney MD  Urology  [facility] - Surgery (1st Fl)

## 2023-03-13 NOTE — HPI
Norm Andre is a 67 y.o. female who presents to clinic for gross hematuria. She is established to our clinic. Referral from Self, Aaareferral.     History of LAURA, presently doing timed voids as well as kegels. Failed therapy with myrbetriq and uribel.      Has a history of prolapse, fitted for pessary with UroGyn. Has not used pessary. Moderate improvement with PFPT.     Has a history of recurrent UTI and vaginal atrophy. Stopped vaginal estrogen due to irritation.     Recent hx of pelvic pain consistent with prior UTI, however was not having any irritative LUTS or dysuria. Progressed to chills, no fevers, had gross hematuria with clots two days ago. Never smoker, father with a history of prostate cancer.    Denies recent fevers, chills, n/v/dm dysuria, and gross hematuria.     Urine dipstick - specific gravity 1.015, pH 6. Negative for all remaining components.

## 2023-03-13 NOTE — SUBJECTIVE & OBJECTIVE
Past Medical History:   Diagnosis Date    Allergy     Arthritis     Headache(784.0)     Herniated disc     x4 in cervical area    History of migraine headaches     Hypothyroidism     Infections of kidney     has had multiple kidney infections in the past.    Low back pain     Migraine headache     Moderate mood disorder     Neck pain     Rotator cuff tear     Thyroid disease        Past Surgical History:   Procedure Laterality Date    COCCYGECTOMY  01/19/2021    Surgeon: Daron Scruggs MD    COLONOSCOPY N/A 05/05/2016    Surgeon: Daron Scruggs MD    COLONOSCOPY N/A 12/28/2020    Surgeon: Daron Scruggs MD    CYSTOSCOPY      ESOPHAGOGASTRODUODENOSCOPY N/A 02/18/2020    Surgeon: Rip Davis MD    EXCISION, CYST, RETRORECTAL OR PELVIC, POSTERIOR APPROACH N/A 01/19/2021    Surgeon: Daron Scruggs MD    EYE SURGERY      FACIAL COSMETIC SURGERY      HYSTERECTOMY  1988    TONSILLECTOMY      TOTAL ABDOMINAL HYSTERECTOMY W/ BILATERAL SALPINGOOPHORECTOMY  1988    URETHROPEXY  1988    MMK       Review of patient's allergies indicates:   Allergen Reactions    Klonopin [clonazepam] Other (See Comments)     Severe mouth ulcers    Rocephin [ceftriaxone]      Severe migraine    Amoxicillin Other (See Comments)     Headache    Abilify [aripiprazole] Other (See Comments)     Tardive dyskinesia  - do not give any other meds that affect dopamine    Clindamycin Other (See Comments)     Nausea, headache    Codeine      Other reaction(s): Unknown    Demerol [meperidine]      Interacts with meds    Erythromycin      Other reaction(s): Unknown    Fioricet  [butalbital-acetaminophen-caff] Other (See Comments)    Mirabegron Other (See Comments)     Headache, breast pain       Family History       Problem Relation (Age of Onset)    Allergies Mother, Son    Asthma Brother, Son    Breast cancer Maternal Aunt    Hyperlipidemia Mother    Hypertension Mother, Sister    Migraines Mother    Tremor Mother    Uterine cancer  Maternal Grandmother            Tobacco Use    Smoking status: Never    Smokeless tobacco: Never   Substance and Sexual Activity    Alcohol use: No    Drug use: No    Sexual activity: Yes     Partners: Male       Review of Systems   Constitutional:  Negative for chills and fever.   HENT:  Negative for sore throat and trouble swallowing.    Eyes:  Negative for pain and discharge.   Respiratory:  Negative for shortness of breath and wheezing.    Cardiovascular:  Negative for chest pain and palpitations.   Gastrointestinal:  Negative for abdominal pain, diarrhea, nausea and vomiting.   Genitourinary:         As per HPI   Musculoskeletal:  Negative for back pain and joint swelling.   Skin:  Negative for rash and wound.   Neurological:  Negative for seizures, weakness and headaches.   Psychiatric/Behavioral:  Negative for hallucinations. The patient is not nervous/anxious.      Objective:     Temp:  [97.3 °F (36.3 °C)] 97.3 °F (36.3 °C)  Pulse:  [68] 68  Resp:  [18] 18  SpO2:  [99 %] 99 %  BP: (109)/(73) 109/73     Body mass index is 23.49 kg/m².    No intake/output data recorded.       Drains       Drain  Duration                  Closed/Suction Drain 01/19/21 1529 Left Buttock Bulb 10 Fr. 782 days                    Physical Exam  Vitals and nursing note reviewed.   Constitutional:       General: She is not in acute distress.  HENT:      Head: Atraumatic.      Nose: Nose normal.   Eyes:      Extraocular Movements: Extraocular movements intact.   Cardiovascular:      Rate and Rhythm: Normal rate.   Pulmonary:      Effort: Pulmonary effort is normal.   Abdominal:      General: Abdomen is flat. There is no distension.      Tenderness: There is no abdominal tenderness. There is no right CVA tenderness or left CVA tenderness.   Musculoskeletal:         General: Normal range of motion.      Cervical back: Normal range of motion.   Skin:     Coloration: Skin is not jaundiced.   Neurological:      General: No focal deficit  present.      Mental Status: She is alert and oriented to person, place, and time.   Psychiatric:         Mood and Affect: Mood normal.         Behavior: Behavior normal.       Significant Labs:    BMP:  Recent Labs   Lab 03/07/23  1031   *   K 5.0      CO2 26   BUN 10   CREATININE 0.7   CALCIUM 9.5       CBC:  Recent Labs   Lab 03/07/23  1031   WBC 3.71*   HGB 13.4   HCT 43.1          All pertinent labs results from the past 24 hours have been reviewed.    Significant Imaging:  All pertinent imaging results/findings from the past 24 hours have been reviewed.

## 2023-03-13 NOTE — ANESTHESIA POSTPROCEDURE EVALUATION
Anesthesia Post Evaluation    Patient: Norm LESLIE Andre    Procedure(s) Performed: Procedure(s) (LRB):  BIOPSY, BLADDER (N/A)  CYSTOSCOPY (N/A)  FULGURATION, BLADDER (N/A)  PYELOGRAM, RETROGRADE (Bilateral)    Final Anesthesia Type: general      Patient location during evaluation: PACU  Patient participation: Yes- Able to Participate  Level of consciousness: awake and alert  Post-procedure vital signs: reviewed and stable  Pain management: adequate  Airway patency: patent    PONV status at discharge: No PONV  Anesthetic complications: no      Cardiovascular status: blood pressure returned to baseline  Respiratory status: unassisted, room air and spontaneous ventilation  Hydration status: euvolemic  Follow-up not needed.          Vitals Value Taken Time   /81 03/13/23 0945   Temp 36.6 °C (97.8 °F) 03/13/23 0945   Pulse 60 03/13/23 0945   Resp 16 03/13/23 0945   SpO2 100 % 03/13/23 0945         No case tracking events are documented in the log.      Pain/Obi Score: Obi Score: 9 (3/13/2023  9:15 AM)

## 2023-03-13 NOTE — ANESTHESIA PREPROCEDURE EVALUATION
03/13/2023  Norm Andre is a 67 y.o., female.    Past Medical History:   Diagnosis Date    Allergy     Arthritis     Headache(784.0)     Herniated disc     x4 in cervical area    History of migraine headaches     Hypothyroidism     Infections of kidney     has had multiple kidney infections in the past.    Low back pain     Migraine headache     Moderate mood disorder     Neck pain     Rotator cuff tear     Thyroid disease        Past Surgical History:   Procedure Laterality Date    COCCYGECTOMY  01/19/2021    Surgeon: Daron Scruggs MD    COLONOSCOPY N/A 05/05/2016    Surgeon: Daron Scruggs MD    COLONOSCOPY N/A 12/28/2020    Surgeon: Daron Scruggs MD    CYSTOSCOPY      ESOPHAGOGASTRODUODENOSCOPY N/A 02/18/2020    Surgeon: Rip Davis MD    EXCISION, CYST, RETRORECTAL OR PELVIC, POSTERIOR APPROACH N/A 01/19/2021    Surgeon: Daron Scruggs MD    EYE SURGERY      FACIAL COSMETIC SURGERY      HYSTERECTOMY  1988    TONSILLECTOMY      TOTAL ABDOMINAL HYSTERECTOMY W/ BILATERAL SALPINGOOPHORECTOMY  1988    URETHROPEXY  1988    MMK           Pre-op Assessment          Review of Systems  Anesthesia Hx:  No problems with previous Anesthesia  History of prior surgery of interest to airway management or planning: Denies Family Hx of Anesthesia complications.   Denies Personal Hx of Anesthesia complications.   Cardiovascular:  Cardiovascular Normal Exercise tolerance: good   Denies Angina.  Denies PRO.    Pulmonary:  Pulmonary Normal  Denies COPD.  Denies Asthma.  Denies Recent URI.    Hepatic/GI:   GERD    Musculoskeletal:   Arthritis     Neurological:   Headaches    Endocrine:   Hypothyroidism        Physical Exam  General: Alert, Oriented and Well nourished    Airway:  Mallampati: II   Mouth Opening: Normal  TM Distance: Normal  Neck ROM: Normal  ROM    Chest/Lungs:  Normal Respiratory Rate    Heart:  Rate: Normal  Rhythm: Regular Rhythm        Anesthesia Plan  Type of Anesthesia, risks & benefits discussed:    Anesthesia Type: Gen Natural Airway, MAC  Intra-op Monitoring Plan: Standard ASA Monitors  Post Op Pain Control Plan: IV/PO Opioids PRN and multimodal analgesia  Induction:  Inhalation  Informed Consent: Informed consent signed with the Patient representative and all parties understand the risks and agree with anesthesia plan.  All questions answered.   ASA Score: 2  Day of Surgery Review of History & Physical: H&P Update referred to the surgeon/provider.    Ready For Surgery From Anesthesia Perspective.     .

## 2023-03-13 NOTE — PROGRESS NOTES
Patient discharged to home with daughter. Discharge instructions verbally given and hard copy provided to patient and daughter. No new Prescriptions ordered. Removed IV with cath tip in place. Patient tolerated IV removal well with bleeding controlled. Patient remains free of falls with no acute pain or distress noted. Pain controlled. VSS on RA. Tolerated clears, slight nausea, provided prn zofran - now controlled.     MD spoke to daughter via phone about procedure.  Patient voided 350 clear/pink tinged prior to discharge.

## 2023-03-13 NOTE — DISCHARGE SUMMARY
[facility] (1st Fl)  Discharge Note  Short Stay    Procedure(s) (LRB):  BIOPSY, BLADDER (N/A)  CYSTOSCOPY (N/A)  FULGURATION, BLADDER (N/A)  PYELOGRAM, RETROGRADE (Bilateral)      OUTCOME: Patient tolerated treatment/procedure well without complication and is now ready for discharge.    DISPOSITION: Home or Self Care    FINAL DIAGNOSIS:  Gross hematuria    FOLLOWUP: In clinic    DISCHARGE INSTRUCTIONS:    Discharge Procedure Orders   No dressing needed     Notify your health care provider if you experience any of the following:  temperature >100.4     Notify your health care provider if you experience any of the following:  persistent nausea and vomiting or diarrhea     Notify your health care provider if you experience any of the following:  severe uncontrolled pain     Notify your health care provider if you experience any of the following:  difficulty breathing or increased cough     Notify your health care provider if you experience any of the following:  severe persistent headache     Notify your health care provider if you experience any of the following:  worsening rash     Notify your health care provider if you experience any of the following:  persistent dizziness, light-headedness, or visual disturbances     Activity as tolerated        TIME SPENT ON DISCHARGE: 15 minutes

## 2023-03-13 NOTE — OP NOTE
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  Cystoscopy with bladder biopsy and fulguration  2.  Right retrograde pyelogram    Specimen(s): posterior bladder wall biopsies    Staff Surgeon: Sylwia Young MD    Assistant Surgeon: Chapin Kearney MD, Aakash Jacinto MD    Anesthesia: General LMA anesthesia    Indications: Norm Andre is a 67 y.o. female with gross hematuria. She presents today for cystoscopy with right RPG, bladder biopsy and fulguration. Previous CT urogram with poor visualization on the right upper tracts. Normal left upper tracts on the left.     Findings:   1) right RPG with normal ureter in course and caliber, no filling defects, extra-renal pelvis, delicate calices without blunting   2) small areas of bladder erythema on the posterior bladder wall, biopsied and fulgurated    Estimated Blood Loss: min    Drains: none    Procedure in Detail:  The risks, benefits and alternatives of the procedure were explained, and all questions were answered in the rocio-operative area. The patient was transferred to the cystoscopy suite and placed in the supine position. SCDs were applied and working. Anesthesia was administered. Once sedated, the patient  was placed in the dorsal lithotomy position and prepped and draped in the usual sterile fashion. Time out was performed, rocio-procedural antibiotics were confirmed.    A rigid cystoscope in a 22 Fr sheath was introduced into the bladder per urethra. This passed easily. The entire urethra was visualized which showed no masses or strictures. The right and left ureteral orifices were identified in the normal anatomic position. Cystoscopy was performed which revealed small lesions located on the posterior bladder. There were no trabeculations, no bladder stones and no bladder diverticula.    The right UO was identified and cannulated with a 5 Fr open-ended ureteral catheter. Using fluoroscopy, a RPG was performed which showed ureters normal in course and caliber with no hydronephrosis or fillings defects.    The lesion was then biopsied with cold cup biopsy forceps and passed off as a specimen. The bugbee electrocautery was introduced through the scope and the bleeding areas were fulgurated. Hemostasis was achieved. The bladder was drained and refilled. Hemostasis was confirmed.    The bladder was drained, and the patient was removed from lithotomy.     The patient tolerated the procedure well and was transferred to recovery in stable condition.    Disposition:  The patient will follow up with Dr. Young via phone call with path results. The patient was given prescriptions for ditropan and pyridium.     Chapin Kearney MD

## 2023-03-13 NOTE — PATIENT INSTRUCTIONS

## 2023-03-17 LAB
FINAL PATHOLOGIC DIAGNOSIS: NORMAL
GROSS: NORMAL
Lab: NORMAL

## 2023-03-20 ENCOUNTER — TELEPHONE (OUTPATIENT)
Dept: UROLOGY | Facility: CLINIC | Age: 68
End: 2023-03-20
Payer: COMMERCIAL

## 2023-03-20 ENCOUNTER — LAB VISIT (OUTPATIENT)
Dept: LAB | Facility: HOSPITAL | Age: 68
End: 2023-03-20
Payer: COMMERCIAL

## 2023-03-20 DIAGNOSIS — R31.0 GROSS HEMATURIA: Primary | ICD-10-CM

## 2023-03-20 DIAGNOSIS — R31.0 GROSS HEMATURIA: ICD-10-CM

## 2023-03-20 PROCEDURE — 87088 URINE BACTERIA CULTURE: CPT

## 2023-03-20 PROCEDURE — 87186 SC STD MICRODIL/AGAR DIL: CPT

## 2023-03-20 PROCEDURE — 87086 URINE CULTURE/COLONY COUNT: CPT

## 2023-03-20 PROCEDURE — 87077 CULTURE AEROBIC IDENTIFY: CPT

## 2023-03-20 RX ORDER — CIPROFLOXACIN 500 MG/1
500 TABLET ORAL 2 TIMES DAILY
Qty: 10 TABLET | Refills: 0 | Status: SHIPPED | OUTPATIENT
Start: 2023-03-20 | End: 2023-03-25

## 2023-03-20 RX ORDER — PHENAZOPYRIDINE HYDROCHLORIDE 200 MG/1
200 TABLET, FILM COATED ORAL 3 TIMES DAILY PRN
Qty: 30 TABLET | Refills: 0 | Status: SHIPPED | OUTPATIENT
Start: 2023-03-20 | End: 2023-03-30

## 2023-03-20 RX ORDER — KETOROLAC TROMETHAMINE 10 MG/1
10 TABLET, FILM COATED ORAL EVERY 6 HOURS
Qty: 20 TABLET | Refills: 0 | Status: SHIPPED | OUTPATIENT
Start: 2023-03-20 | End: 2023-03-25

## 2023-03-20 RX ORDER — ESTRADIOL 0.1 MG/G
CREAM VAGINAL
Qty: 42.5 G | Refills: 6 | Status: SHIPPED | OUTPATIENT
Start: 2023-03-20 | End: 2023-12-20 | Stop reason: SDUPTHER

## 2023-03-20 NOTE — TELEPHONE ENCOUNTER
I spoke with patient and then called  who participated on her case with . stated her would discuss patient's complains and then discuss them with . then call patient back himself, with plan of action.

## 2023-03-22 LAB — BACTERIA UR CULT: ABNORMAL

## 2023-03-28 ENCOUNTER — LAB VISIT (OUTPATIENT)
Dept: LAB | Facility: HOSPITAL | Age: 68
End: 2023-03-28
Attending: UROLOGY
Payer: COMMERCIAL

## 2023-03-28 DIAGNOSIS — R31.0 GROSS HEMATURIA: ICD-10-CM

## 2023-03-28 DIAGNOSIS — R31.0 GROSS HEMATURIA: Primary | ICD-10-CM

## 2023-03-28 PROCEDURE — 87086 URINE CULTURE/COLONY COUNT: CPT | Performed by: UROLOGY

## 2023-03-29 LAB — BACTERIA UR CULT: NO GROWTH

## 2023-04-04 ENCOUNTER — TELEPHONE (OUTPATIENT)
Dept: UROLOGY | Facility: CLINIC | Age: 68
End: 2023-04-04
Payer: COMMERCIAL

## 2023-04-04 RX ORDER — HYOSCYAMINE SULFATE, METHENAMINE, METHYLENE BLUE, PHENYL SALICYLATE, AND SODIUM PHOSPHATE, MONOBASIC, MONOHYDRATE .12; 81; 10.8; 32.4; 40.8 MG/1; MG/1; MG/1; MG/1; MG/1
1 TABLET ORAL 4 TIMES DAILY PRN
Qty: 120 EACH | Refills: 3 | Status: SHIPPED | OUTPATIENT
Start: 2023-04-04

## 2023-12-15 ENCOUNTER — HOSPITAL ENCOUNTER (OUTPATIENT)
Dept: RADIOLOGY | Facility: HOSPITAL | Age: 68
Discharge: HOME | End: 2023-12-15
Attending: INTERNAL MEDICINE
Payer: COMMERCIAL

## 2023-12-15 DIAGNOSIS — Z12.31 ENCOUNTER FOR SCREENING MAMMOGRAM FOR BREAST CANCER: ICD-10-CM

## 2023-12-15 PROCEDURE — 77067 MAMMO DIGITAL SCREENING BILAT WITH TOMO: ICD-10-PCS | Mod: 26,,, | Performed by: RADIOLOGY

## 2023-12-15 PROCEDURE — 77063 BREAST TOMOSYNTHESIS BI: CPT | Mod: 26,,, | Performed by: RADIOLOGY

## 2023-12-15 PROCEDURE — 77067 SCR MAMMO BI INCL CAD: CPT | Mod: TC

## 2023-12-15 PROCEDURE — 77067 SCR MAMMO BI INCL CAD: CPT | Mod: 26,,, | Performed by: RADIOLOGY

## 2023-12-15 PROCEDURE — 77063 MAMMO DIGITAL SCREENING BILAT WITH TOMO: ICD-10-PCS | Mod: 26,,, | Performed by: RADIOLOGY

## 2023-12-18 NOTE — PROGRESS NOTES
[facility] - UROGYNECOLOGY  [address] [address]  2023     Norm LESLIE Hiko  [id number]  1955    UROGYN FOLLOW-UP  2023     68 y.o. female,   presents for urogyn follow up. She c/o pain with urination and urination leakage   .     Initial HPI from 2022  66 Y O  F  has a past medical history of Allergy, Arthritis, Headache(784.0), Herniated disc, History of migraine headaches, Hypothyroidism, Infections of kidney, Low back pain, Migraine headache, Moderate mood disorder, Neck pain, Rotator cuff tear, and Thyroid disease.  Referred by Dr. Oden for evaluation of prolapse. She has had this issue for 3 months  and has noticed that it has worsened over time.  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   She had a hysterectomy and a MMK in her 30's, complicated by recurrent UTI's.     She does do not use pads she changes on average 2 three times a day, uses wipes. The has bowel leakage, did not tolerate metamucil or fibercon     2022  Fit with #5 ring with support pessary     2022  Patient presents with complaints of dysuria and urinary urgency for several days. She denies fever chills, significant abdominal or back pain. Incontinence has worsened.         07/15/2022  Seen for vaginal irritation  --treated for vaginal yeast     08/10/2022  Seen for vaginal irritation after using vaginal estrogen cream-- negative exam  + urinary urgency intermittently     Did not tolerate uribel or myrbetrix  rx oxybutynin 5 mg -- take one- three tablets 3 times daily as needed      Changes from last visit:  Patient started having increased urinary leakage yesterday, which progressed to full incontinence. Has been going through multiple pads per day and still having to change her clothes. Last UTI was over the summer, treated at [facility]. Last +culture on file was  3/2023, pan-sensitive. She has not been using vaginal estrogen or wearing her pessary. She does not feel like she has an issue emptying her bladder. She does has a long-history of fecal incontinence. She does not use fiber.     Past Medical History:   Diagnosis Date    Allergy     Arthritis     Headache(784.0)     Herniated disc     x4 in cervical area    History of migraine headaches     Hypothyroidism     Infections of kidney     has had multiple kidney infections in the past.    Low back pain     Migraine headache     Moderate mood disorder     Neck pain     Rotator cuff tear     Thyroid disease        Past Surgical History:   Procedure Laterality Date    BIOPSY OF BLADDER N/A 3/13/2023    Procedure: BIOPSY, BLADDER;  Surgeon: Sylwia Young MD;  Location: [facility] OR [address] [place] [address];  Service: Urology;  Laterality: N/A;  1 hr    BLADDER FULGURATION N/A 3/13/2023    Procedure: FULGURATION, BLADDER;  Surgeon: Sylwia Young MD;  Location: [facility] OR [address] [place] [address];  Service: Urology;  Laterality: N/A;    COCCYGECTOMY  01/19/2021    Surgeon: Daron Scruggs MD    COLONOSCOPY N/A 05/05/2016    Surgeon: Daron Scruggs MD    COLONOSCOPY N/A 12/28/2020    Surgeon: Daron Scruggs MD    CYSTOSCOPY      CYSTOSCOPY N/A 3/13/2023    Procedure: CYSTOSCOPY;  Surgeon: Sylwia Young MD;  Location: [facility] OR [address] [place] [address];  Service: Urology;  Laterality: N/A;    ESOPHAGOGASTRODUODENOSCOPY N/A 02/18/2020    Surgeon: Rip Davis MD    EXCISION, CYST, RETRORECTAL OR PELVIC, POSTERIOR APPROACH N/A 01/19/2021    Surgeon: Daron Scruggs MD    EYE SURGERY      FACIAL COSMETIC SURGERY      HYSTERECTOMY  1988    RETROGRADE PYELOGRAPHY Bilateral 3/13/2023    Procedure: PYELOGRAM, RETROGRADE;  Surgeon: Sylwia Young MD;  Location: [facility] OR [address] [place] [address];  Service: Urology;  Laterality: Bilateral;    TONSILLECTOMY      TOTAL ABDOMINAL HYSTERECTOMY W/ BILATERAL SALPINGOOPHORECTOMY  1988    URETHROPEXY  1988    MMK        Family History   Problem Relation Age of Onset    Hypertension Mother     Migraines Mother     Tremor Mother     Hyperlipidemia Mother     Allergies Mother     Hypertension Sister     Uterine cancer Maternal Grandmother     Breast cancer Maternal Aunt     Asthma Brother     Allergies Son     Asthma Son     Colon cancer Neg Hx     Ovarian cancer Neg Hx        Social History     Socioeconomic History    Marital status:     Number of children: 2    Years of education: 18   Occupational History    Occupation: Homemaker/Retired   Tobacco Use    Smoking status: Never    Smokeless tobacco: Never   Substance and Sexual Activity    Alcohol use: No    Drug use: No    Sexual activity: Yes     Partners: Male       Current Outpatient Medications   Medication Sig Dispense Refill    ALPRAZolam (XANAX) 0.25 MG tablet One-two during a flight for anxiety 5 tablet 0    cefaclor (CECLOR CD) 500 mg 12 hr tablet Take 500 mg by mouth 2 (two) times daily.      conjugated estrogens (PREMARIN) vaginal cream 1 g with applicator or dime-sized amt with finger in vagina nightly x 2 weeks, then twice a week thereafter (Patient not taking: Reported on 1/31/2023) 45 g 12    cyanocobalamin, vitamin B-12, 5,000 mcg Subl Take 1 tablet under tongue once a day for 4 weeks, then continue once per week (Patient not taking: Reported on 1/31/2023) 30 tablet 1    estradioL (ESTRACE) 0.01 % (0.1 mg/gram) vaginal cream Pea size amount to urethral area every day for 2 weeks, then 3x/week 42.5 g 6    estradioL (VIVELLE-DOT) 0.0375 mg/24 hr Place 1 patch onto the skin twice a week. 24 patch 3    eszopiclone (LUNESTA) 1 MG Tab Take 1 mg by mouth. Takes at bedtime      gabapentin (NEURONTIN) 100 MG capsule Take 300 mg by mouth. Takes x2 tablet night      hydrocortisone 2.5 % ointment Apply topically 2 (two) times daily.      methen-m.blue-s.phos-phsal-hyo (URELLE) 81-10.8-40.8 mg Tab Take 1 tablet by mouth 4 (four) times daily as needed. 120 each 3     mirabegron (MYRBETRIQ) 50 mg Tb24 Take 1 tablet (50 mg total) by mouth once daily. (Patient taking differently: Take 50 mg by mouth once daily.) 30 tablet 11    naratriptan (AMERGE) 2.5 MG tablet Take by mouth.      pantoprazole (PROTONIX) 40 MG tablet TAKE 1 TABLET(40 MG) BY MOUTH TWICE DAILY 60 tablet 3    phenazopyridine HCl (PYRIDIUM ORAL) Take by mouth.      rosuvastatin (CRESTOR) 5 MG tablet Take 5 mg by mouth.      sumatriptan (IMITREX) 100 MG tablet Take 1 tablet (100 mg total) by mouth every 8 (eight) hours as needed for Migraine. No more than 2 tablets per day, no more than 4 days per week. 8 tablet 2    SYNTHROID 100 mcg tablet TAKE 1 TABLET BY MOUTH BEFORE BREAKFAST 90 tablet 1     No current facility-administered medications for this visit.       Review of patient's allergies indicates:   Allergen Reactions    Klonopin [clonazepam] Other (See Comments)     Severe mouth ulcers    Rocephin [ceftriaxone]      Severe migraine    Amoxicillin Other (See Comments)     Headache    Abilify [aripiprazole] Other (See Comments)     Tardive dyskinesia  - do not give any other meds that affect dopamine    Clindamycin Other (See Comments)     Nausea, headache    Codeine      Other reaction(s): Unknown    Demerol [meperidine]      Interacts with meds    Erythromycin      Other reaction(s): Unknown    Fioricet  [butalbital-acetaminophen-caff] Other (See Comments)    Mirabegron Other (See Comments)     Headache, breast pain       OB History          3    Para   2    Term   2            AB   1    Living   2         SAB   1    IAB        Ectopic        Multiple        Live Births                      Well Woman  No LMP recorded. Patient has had a hysterectomy.   Pap:  Mammo:  Colonoscopy:  Dexa:    ROS  As per HPI.      Physical Exam  There were no vitals taken for this visit.  General: alert and oriented, no acute distress  Respiratory: normal respiratory effort  Abd: soft, non-tender,  non-distended  Pelvic:  Ext. Genitalia: normal external genitalia. Normal bartholin's and skeens glands  Vagina: + atrophy. Normal vaginal mucosa without lesions. +White  discharge noted.   Non-tender bladder base without palpable mass.  Cervix: absent  Uterus:  surgically absent, vaginal cuff well healed   Urethra: no masses or tenderness  Urethral meatus: no lesions, caruncle or prolapse.    PVR 20 cc concentrated urine    Impression  1. Dysuria  POCT URINE DIPSTICK WITHOUT MICROSCOPE    estradioL (ESTRACE) 0.01 % (0.1 mg/gram) vaginal cream      2. Vaginal atrophy  estradioL (ESTRACE) 0.01 % (0.1 mg/gram) vaginal cream      3. Mixed incontinence  Ambulatory referral/consult to Physical/Occupational Therapy    oxybutynin (DITROPAN) 5 MG Tab      4. Fecal smearing  Ambulatory referral/consult to Physical/Occupational Therapy      5. Acute cystitis with hematuria  sulfamethoxazole-trimethoprim 800-160mg (BACTRIM DS) 800-160 mg Tab      6. Yeast vaginitis  fluconazole (DIFLUCAN) 150 MG Tab    Vaginosis Screen by DNA Probe    Urine culture        We reviewed the above issues and discussed options for short-term versus long-term management of her problems.     Plan:   Mixed urinary incontinence, urge > stress: history of MMK   --Empty bladder every 3 hours.  Empty well: wait a minute, lean forward on toilet.    --Avoid dietary irritants (see sheet).  Keep diary x 3-5 days to determine your irritants.  --KEGELS: do 10 in AM and 10 in PM, holding each x 10 seconds.  When you feel urge to go, STOP, KEGEL, and when urge has passed, then go to bathroom.  Re-start Pelvic floor PT.  --URGE: did not tolerate myrbetriq or Uribel. Can tolerate pyridium and Urelle.   --also tolerated oxybutynin 5 mg 1-3 pills up to 3 times daily     2.  Frequent UTIs (bladder infections):  --urine C&S sent today from cath specimen  --Start bactrim DS BID x 10 days  --If you feel like you have a UTI, please call our office so that we can place an  order for you to drop off a urine specimen at the closest [facility] lab (we will arrange).     --We will call in antibiotics for you to start right after you drop off specimen.     --In this way, we can determine:     1)  Do you have a UTI?      2) If you have a UTI, is it sensitive to the antibiotics we prescribed?   --follow UTI prevention tips (see attached)  --control bowel movements/fecal cross-contamination. Start fiber!   Loose bowels:  -- Controlling bowels may help bladder urgency/leakage and fiber may better control cholesterol and blood glucose.   -- Increase daily fiber.  Start taking 1 tsp of fiber powder (psyllium or other sugar-free powder, ex. Benefiber or Metamucil) or fiber gummies or fiber pills.  Mix in 8 oz of water.  Take x 3-5 days.  Then, increase fiber by 1 tsp every 3-5 days until stool is easy to pass.  Stop and continue at that dose.   **Do not exceed 6 tsps/day.   -- Drink plenty of water!  -- Get a SQUATTY POTTY  --treat vaginal atrophy (dryness) -- see below  --empty bladder before and after intercourse  --consider taking 1 cranberry tablet daily, 1 probiotic pill (any with lactobacillus and/or acidophilus) daily, and D-mannose  --consider need for further evaluation (pelvic imaging and cystoscopy) if issue persists    3. Prolapse: Stage 1 apical prolapse, Stage 2 anterior and posterior vaginal wall prolapse: no change in her symptoms    --trial #4 ring with support pessary   --PESSARY CARE: Remove pessary as frequently as nightly and as infrequently as every 2-3 weeks.  Remove before intercourse.  May need to remove before bowel movements if straining dislodges.  Wash with soap and water (no ).  Replace using small amount of water-based lubricant (like KY jelly) at end being introduced into vagina.    --Daily pelvic floor exercises as assigned, Pelvic floor PT  --Non surgical options discussed with patient    --Surgical options discussed such as: with apical suspension: SSF,  USLS and SCP with mesh augmentation. Risks/ benefits/ alternatives/ succuss and failure rates were reviewed. Information packets were given detailing surgical options.  She was also given web site information for: www.augs.org and www.[employer].org and http://www.fda.gov/MedicalDevices/UroGynSurgicalMesh/default.htm to review the details of the surgical options discussed and the use of mesh in surgery. She will review the information given and we will discuss further at the follow up visit. She is looking for a non-surgical options for surgery. I think the pessary is a good start for her.    --pessary was not ideal for her, she is ready to talk about surgery. She understands that surgery may not help with UTIs and bladder leakage.     4. Vaginal atrophy (dryness):  Restart 1 gram of estrogen cream in twice a week after complete yeast medication. If allergic to it, we can try compounding pharmacy.   Non-estrogen options for vaginal dryness to use until you can restart estrogen cream:  --Use REPLENS or REFRESH OTC: 1/2 applicator full in vagina twice a week.    --coconut oil: dime-sized amount with finger (as far as can reach internally) and around the vaginal opening and inner lips up to nightly as needed  --Uberlube, [place], [place] liquibeads, Satin by Sliquid Natural Intimate Moisturizer    RTC in 6 weeks. Given ED precautions for pyelonephritis.     25 minutes were spent in face to face time with this patient  90 % of this time was spent in counseling and/or coordination of care    Gustavo Winter PA-C  Division of Female Pelvic Medicine and Reconstructive Surgery  Department of Obstetrics & Gynecology  [facility] [place], [place]

## 2023-12-20 ENCOUNTER — OFFICE VISIT (OUTPATIENT)
Dept: UROGYNECOLOGY | Facility: CLINIC | Age: 68
End: 2023-12-20
Payer: COMMERCIAL

## 2023-12-20 VITALS
SYSTOLIC BLOOD PRESSURE: 94 MMHG | DIASTOLIC BLOOD PRESSURE: 72 MMHG | WEIGHT: 148.56 LBS | HEIGHT: 67 IN | BODY MASS INDEX: 23.32 KG/M2

## 2023-12-20 DIAGNOSIS — N30.01 ACUTE CYSTITIS WITH HEMATURIA: ICD-10-CM

## 2023-12-20 DIAGNOSIS — B37.31 YEAST VAGINITIS: ICD-10-CM

## 2023-12-20 DIAGNOSIS — R30.0 DYSURIA: Primary | ICD-10-CM

## 2023-12-20 DIAGNOSIS — N39.46 MIXED INCONTINENCE: ICD-10-CM

## 2023-12-20 DIAGNOSIS — R15.1 FECAL SMEARING: ICD-10-CM

## 2023-12-20 DIAGNOSIS — N95.2 VAGINAL ATROPHY: ICD-10-CM

## 2023-12-20 LAB
BILIRUB SERPL-MCNC: NORMAL MG/DL
BLOOD URINE, POC: 250
CLARITY, POC UA: NORMAL
COLOR, POC UA: NORMAL
GLUCOSE UR QL STRIP: NORMAL
KETONES UR QL STRIP: NORMAL
LEUKOCYTE ESTERASE URINE, POC: 1
NITRITE, POC UA: POSITIVE
PH, POC UA: 5
PROTEIN, POC: 500
SPECIFIC GRAVITY, POC UA: 1.03
UROBILINOGEN, POC UA: NORMAL

## 2023-12-20 PROCEDURE — 87077 CULTURE AEROBIC IDENTIFY: CPT | Performed by: PHYSICIAN ASSISTANT

## 2023-12-20 PROCEDURE — 87186 SC STD MICRODIL/AGAR DIL: CPT | Performed by: PHYSICIAN ASSISTANT

## 2023-12-20 PROCEDURE — 81002 POCT URINE DIPSTICK WITHOUT MICROSCOPE: ICD-10-PCS | Mod: S$GLB,,, | Performed by: PHYSICIAN ASSISTANT

## 2023-12-20 PROCEDURE — 99214 PR OFFICE/OUTPT VISIT, EST, LEVL IV, 30-39 MIN: ICD-10-PCS | Mod: S$GLB,,, | Performed by: PHYSICIAN ASSISTANT

## 2023-12-20 PROCEDURE — 81002 URINALYSIS NONAUTO W/O SCOPE: CPT | Mod: S$GLB,,, | Performed by: PHYSICIAN ASSISTANT

## 2023-12-20 PROCEDURE — 99999 PR PBB SHADOW E&M-EST. PATIENT-LVL III: ICD-10-PCS | Mod: PBBFAC,,, | Performed by: PHYSICIAN ASSISTANT

## 2023-12-20 PROCEDURE — 87086 URINE CULTURE/COLONY COUNT: CPT | Performed by: PHYSICIAN ASSISTANT

## 2023-12-20 PROCEDURE — 81514 NFCT DS BV&VAGINITIS DNA ALG: CPT | Performed by: PHYSICIAN ASSISTANT

## 2023-12-20 PROCEDURE — 99999 PR PBB SHADOW E&M-EST. PATIENT-LVL III: CPT | Mod: PBBFAC,,, | Performed by: PHYSICIAN ASSISTANT

## 2023-12-20 PROCEDURE — 87088 URINE BACTERIA CULTURE: CPT | Performed by: PHYSICIAN ASSISTANT

## 2023-12-20 PROCEDURE — 99214 OFFICE O/P EST MOD 30 MIN: CPT | Mod: S$GLB,,, | Performed by: PHYSICIAN ASSISTANT

## 2023-12-20 RX ORDER — SULFAMETHOXAZOLE AND TRIMETHOPRIM 800; 160 MG/1; MG/1
1 TABLET ORAL 2 TIMES DAILY
Qty: 20 TABLET | Refills: 0 | Status: SHIPPED | OUTPATIENT
Start: 2023-12-20 | End: 2023-12-30

## 2023-12-20 RX ORDER — FLUCONAZOLE 150 MG/1
TABLET ORAL
Qty: 2 TABLET | Refills: 0 | Status: SHIPPED | OUTPATIENT
Start: 2023-12-20

## 2023-12-20 RX ORDER — ESTRADIOL 0.1 MG/G
CREAM VAGINAL
Qty: 42.5 G | Refills: 6 | Status: SHIPPED | OUTPATIENT
Start: 2023-12-20 | End: 2024-01-17 | Stop reason: ALTCHOICE

## 2023-12-20 RX ORDER — OXYBUTYNIN CHLORIDE 5 MG/1
5 TABLET ORAL 3 TIMES DAILY PRN
Qty: 90 TABLET | Refills: 11 | Status: SHIPPED | OUTPATIENT
Start: 2023-12-20 | End: 2024-12-19

## 2023-12-22 ENCOUNTER — TELEPHONE (OUTPATIENT)
Dept: UROGYNECOLOGY | Facility: CLINIC | Age: 68
End: 2023-12-22
Payer: COMMERCIAL

## 2023-12-22 LAB — BACTERIA UR CULT: ABNORMAL

## 2023-12-22 NOTE — TELEPHONE ENCOUNTER
Attempted to reach patient to check on her UTI symptoms before long weekend because sensitivity is not back.

## 2023-12-27 ENCOUNTER — TELEPHONE (OUTPATIENT)
Dept: UROGYNECOLOGY | Facility: CLINIC | Age: 68
End: 2023-12-27
Payer: COMMERCIAL

## 2024-01-17 ENCOUNTER — OFFICE VISIT (OUTPATIENT)
Dept: UROGYNECOLOGY | Facility: CLINIC | Age: 69
End: 2024-01-17
Payer: COMMERCIAL

## 2024-01-17 VITALS
WEIGHT: 140 LBS | SYSTOLIC BLOOD PRESSURE: 105 MMHG | RESPIRATION RATE: 18 BRPM | BODY MASS INDEX: 21.97 KG/M2 | HEART RATE: 97 BPM | HEIGHT: 67 IN | OXYGEN SATURATION: 98 % | DIASTOLIC BLOOD PRESSURE: 57 MMHG

## 2024-01-17 DIAGNOSIS — N81.9 VAGINAL VAULT PROLAPSE: Primary | ICD-10-CM

## 2024-01-17 DIAGNOSIS — N81.6 POSTERIOR VAGINAL WALL PROLAPSE: ICD-10-CM

## 2024-01-17 DIAGNOSIS — N95.2 ATROPHIC VAGINITIS: ICD-10-CM

## 2024-01-17 DIAGNOSIS — N81.10 PROLAPSE OF ANTERIOR VAGINAL WALL: ICD-10-CM

## 2024-01-17 DIAGNOSIS — K64.8 OTHER HEMORRHOIDS: ICD-10-CM

## 2024-01-17 PROCEDURE — 99215 OFFICE O/P EST HI 40 MIN: CPT | Mod: S$GLB,,, | Performed by: OBSTETRICS & GYNECOLOGY

## 2024-01-17 PROCEDURE — 99999 PR PBB SHADOW E&M-EST. PATIENT-LVL V: CPT | Mod: PBBFAC,,, | Performed by: OBSTETRICS & GYNECOLOGY

## 2024-01-17 NOTE — PROGRESS NOTES
Subjective:       Patient ID: Norm Andre is a 68 y.o. female.    Chief Complaint:  Vaginal Prolapse      History of Present Illness  Follow-up  Pertinent negatives include no abdominal pain, chest pain, chills, coughing, diaphoresis, fatigue, fever, nausea, rash or vomiting.   Urinary Tract Infection   Associated symptoms include frequency and urgency. Pertinent negatives include no chills, flank pain, hematuria, nausea, vomiting, constipation or rash.    68 Y O  F  has a past medical history of Allergy, Arthritis, Headache(784.0), Herniated disc, History of migraine headaches, Hypothyroidism, Infections of kidney, Low back pain, Migraine headache, Moderate mood disorder, Neck pain, Rotator cuff tear, and Thyroid disease.  Referred by Dr. Oden for evaluation of prolapse. She has had this issue for 3 months  and has noticed that it has worsened over time.  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    She had a hysterectomy and a MMK in her 30's, complicated by recurrent UTI's.     She does do not use pads she changes on average 2 three times a day, uses wipes. She has bowel leakage, did not tolerate metamucil or fibercon     7/13/2022:  Patient presents with complaints of dysuria and urinary urgency for several days. She denies fever chills, significant abdominal or back pain.  Incontinence has worsened.     1/17/2024:  Patient here for follow up she was unhappy with the pessary and has not used it  She did not feel like the Myrbetriq helped.   She started pelvic floor PT which was helpful but got interrupted because the PT left  She wants to discuss surgical options.         [facility]    Question 3/24/2022  8:55 AM CDT - Filed by Mary Wheeler MA   General Urogynecology: Are you experiencing the following?    Dysuria (painful urination) Yes, sometimes feels like she has a constant pain  "and feels like it hurts to urinate   Nocturia:  waking up at night to empty your bladder  Yes   If you answered yes to the previous question, how many times does this happen per night? 1-2   Enuresis (urine loss during sleep) Yes   Dribbling urine after you urinate Yes   Hematuria (urine appears red) Yes   Type of stream Sprays   Urinary frequency: How often a day are you going to the bathroom per day?  10-15   Urinary Tract Infections: How many Urinary Tract Infections have you had in the past year? Two (2) in six (6) months   If you have had a UTI in the past year, what treatments have you had so far?  abx    Urinary Incontinence (General): Are you experiencing the following?    Past consultation for incontinence: Have you ever seen someone for the evaluation of incontinence? No   If you answered yes to the previous question, please select all the therapies you have tried.  N/a- I answered no to the previous question   Please note the effectiveness of the therapies.    Need to wear protection to keep clothes dry  No   If you answered yes to the previous question, please bill the protection you use.  N/a- I answered no to the previous question   If you wear protection, how much wetness is typically on each pad? N/a- I do not wear protection   If you wear protection, how often do you have to change per day, if applicable?     Stress Symptoms: Are you experiencing the following? Hx of MMK, and has been more bothersome lately    Leakage of urine with cough, laugh and/or sneeze Yes   If you answered yes to the previous question, what is the frequency in days, weeks and/or months? Several times a day   Leakage of urine with sex No   Leakage of urine with bending/ lifting Yes   Leakage of urine with briskly walking or jogging Yes   If you lose urine for any other reason not previously mentioned, please note it below, if applicable.     Urge Symptoms: Are you experiencing the following?    Urgency ("got to go" feeling) " "Yes   Urge: How frequently do you feel an urge to urinate (feeling like you "gotta go" to the bathroom and can't wait) Several times a day   Do you experience a leakage of urine when you have a feeling of urgency?  Yes   Leakage of urine when unaware No   Past use of anticholinergics (medications used to treat overactive bladder) No   If you answered yes to the previous question, please bill the anticholinergics you have used:     Have you ever used Mirbetriq (aka Mirabegron)?  No   Prolapse Symptoms: Are you experiencing any of the following?     Falling out/ Bulging/ Heaviness in the vagina Yes   Vaginal/ Abdominal Pain/ Pressure Yes   Need to strain/ Push to void Yes   Need to wait on the toilet before you void Yes   Unusual position to urinate (using your hands to push back the vaginal bulge) Yes   Sensation of incomplete emptying Yes   Past use of pessary device No   If you answered yes to the previous question, please list the devices you have used below.     Bowel Symptoms: Are you experiencing any of the following?    Constipation Yes   Diarrhea  Yes   Hematochezia (bloody stool) No   Incomplete evacuation of stool No   Involuntary loss of formed stool No   Fecal smearing/urgency No   Involuntary loss of gas No   Vaginal Symptoms: Are you experiencing any of the following?     Abnormal vaginal bleeding  No   Vaginal dryness No   Sexually active  No   Dyspareunia (painful intercourse) No   Estrogen use  Yes       GYN & OB History  No LMP recorded. Patient has had a hysterectomy.   Date of Last Pap: No result found    OB History    Para Term  AB Living   3 2 2   1 2   SAB IAB Ectopic Multiple Live Births   1              # Outcome Date GA Lbr Marlo/2nd Weight Sex Delivery Anes PTL Lv   3 SAB            2 Term            1 Term              OB     x 2  C/s x 0     Largest: 9 # 2 oz   Forceps: yes  Episiotomy:  yes  Degree: 3rd or 4th no    GYN  Menarche:  17  Menstrual cycle:n/a  Menopause: No "   Hysterectomy: Yes,  Open:  with MMK done at the same time   Ovaries:  Present  Tubal ligation: No   Other abdominal surgeries:   Presacral mass     Review of Systems  Review of Systems   Constitutional: Negative.  Negative for activity change, appetite change, chills, diaphoresis, fatigue, fever and unexpected weight change.   HENT: Negative.     Eyes: Negative.    Respiratory: Negative.  Negative for apnea, cough and wheezing.    Cardiovascular: Negative.  Negative for chest pain and palpitations.   Gastrointestinal:  Negative for abdominal distention, abdominal pain, anal bleeding, blood in stool, constipation, diarrhea, nausea, rectal pain and vomiting.   Endocrine: Negative.    Genitourinary:  Positive for frequency and urgency. Negative for decreased urine volume, difficulty urinating, dyspareunia, dysuria, enuresis, flank pain, genital sores, hematuria, menstrual problem, pelvic pain, vaginal bleeding, vaginal discharge and vaginal pain.        Prolapse    Musculoskeletal:  Negative for back pain and gait problem.   Skin:  Negative for color change, pallor, rash and wound.   Allergic/Immunologic: Negative for immunocompromised state.   Neurological: Negative.  Negative for dizziness and speech difficulty.   Hematological:  Negative for adenopathy.   Psychiatric/Behavioral:  Negative for agitation, behavioral problems, confusion and sleep disturbance.            Objective:     Physical Exam   Constitutional: She is oriented to person, place, and time. She appears well-developed.   HENT:   Head: Normocephalic and atraumatic.   Eyes: Conjunctivae and EOM are normal.   Cardiovascular: Normal rate, regular rhythm, S1 normal, S2 normal, normal heart sounds and intact distal pulses.   Pulmonary/Chest: Effort normal and breath sounds normal. She exhibits no tenderness.   Abdominal: Soft. Bowel sounds are normal. She exhibits no distension and no mass. There is no splenomegaly or hepatomegaly. There is no abdominal  tenderness. There is no rigidity, no rebound and no guarding. Hernia confirmed negative in the right inguinal area and confirmed negative in the left inguinal area.   Genitourinary: Pelvic exam was performed with patient supine. Vagina normal, skenes normal and bartholins normal. Right labia normal and left labia normal. Urethra exhibits hypermobility. Urethra exhibits no urethral caruncle, no urethral diverticulum and no urethral mass. Right bartholin is not enlarged and not tender. Left bartholin is not enlarged and not tender. Rectal exam shows external hemorrhoid, resting tone normal and active tone normal. Rectal exam shows no fissure, no tenderness, anal tone normal and no dovetailing. Guaiac negative stool. There is a rectocele, a cystocele and atrophy in the vagina. No foreign body, tenderness, bleeding, unspecified prolapse of vaginal walls, fistula, mesh exposure or lavator tenderness in the vagina. Right adnexum displays no tenderness. Left adnexum displays no tenderness. Uterus is absent.   PVR: 10 ML  Empty cough stress test: Negative.  Kegel: 2/5    POP-Q  Aa: 2 Ba: 2 C: -1   GH: 4 PB: 2 TVL: 9   Ap: 0 Bp: 0 D:                      Musculoskeletal:         General: Normal range of motion.      Cervical back: Normal range of motion and neck supple.   Neurological: She is alert and oriented to person, place, and time. She has normal strength, normal reflexes and intact cranial nerves (2-12). Cranial nerves II through XII intact. No cranial nerve deficit.   Skin: Skin is warm and dry.   Psychiatric: She has a normal mood and affect. Her speech is normal and behavior is normal. Judgment normal.          HCM  Pap's: Normal/ normal last Pap:history of HPV  Mammo: BIRADS: 1 ; Last imaging  2021   Colonoscopy:  12/ 2020   Dexa: Normal    Assessment:        1. Vaginal vault prolapse    2. Other hemorrhoids    3. Prolapse of anterior vaginal wall    4. Posterior vaginal wall prolapse    5. Atrophic vaginitis                  Plan:      1.   Prolapse: Stage 2 apical prolapse, Stage 3 anterior and posterior vaginal wall prolapse: no change in her symptoms    --Pessary benefit discussed with patient, s/p pessary fitting was unhappy with he pessary   --Daily pelvic floor exercises as assigned, Pelvic floor PT  --Non surgical options discussed with patient    --Surgical options discussed such as: with apical suspension: SSF, USLS and SCP with mesh augmentation. Risks/ benefits/ alternatives/ succuss and failure rates were reviewed. Information packets were given detailing surgical options.  She was also given web site information for: www.augs.org and wwwStratavia.org and http://www.fda.gov/MedicalDevices/UroGynSurgicalMesh/default.htm to review the details of the surgical options discussed and the use of mesh in surgery. She will review the information given and we will discuss further at the follow up visit. Reviewed the options she is very active and I think the SCP would be the best option for her. She has a history of a pre-sacral mass excised by Dr. Scruggs in 2021- benign pathology. I've asked her to see Dr. Scruggs again as her hemorrhoidal tags are very bothersome to her.  Unlikely to have significant scaring along the promontory but reviewed the risks with her. 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  Looking to proceed with surgery March 18, 2024.     2.  Mixed urinary incontinence, urge > stress: history of MMK - not very bothersome to her   --Bladder diary for 3-7 days, write the number of times you go to the rest room and associated symptoms of urgency or leakage.   --Empty bladder every 3 hours.  Empty well: wait a minute, lean forward on toilet.    --Avoid dietary irritants (see sheet).  Keep diary x 3-5 days to determine your irritants.  --KEGELS: do 10 in AM and  10 in PM, holding each x 10 seconds.  When you feel urge to go, STOP, KEGEL, and when urge has passed, then go to bathroom.   Pelvic floor PT - appointment is currently in April looking to see if she can get an earlier appointment. Ideally would like to get a few sessions prior to surgery then restart after.   --URGE: discontinued - Myrbetriq .  SE profile reviewed.    Takes 2-4 weeks to see if will have effect.    --STRESS:  Pessary vs. Sling vs impressa     3. Vaginal atrophy (dryness):  trial intrarosa nightly     4. Hemorrhoids   To see Dr. Scruggs - referral placed       Approximately 55 minutes of total time spent in consult, 75 % in discussion. This includes face to face time and non-face to face time preparing to see the patient, obtaining and/or reviewing separately obtained history, documenting clinical information in the electronic or other health record, independently interpreting results (not separately reported) and communicating results to the patient/family/caregiver, or care coordination (not separately reported).          Mayra uFnez DO  Female Pelvic Medicine and Reconstructive Surgery  [facility] [place], [place]

## 2024-02-05 ENCOUNTER — TELEPHONE (OUTPATIENT)
Dept: SURGERY | Facility: CLINIC | Age: 69
End: 2024-02-05
Payer: COMMERCIAL

## 2024-02-05 NOTE — TELEPHONE ENCOUNTER
----- Message from Elisa Pat sent at 2/5/2024  3:32 PM CST -----  Regarding: Appt  Contact: [phone]  RESCHEDULE  Pt is calling to reschedule  her appt schedule on 2/8 Pt needs new appt after 2/23 No appt in  Epic pls call pt at  [phone] hm

## 2024-02-05 NOTE — TELEPHONE ENCOUNTER
Spoke with patient and appointment rescheduled for later date per request. Details confirmed verbally over phone and viewable in portal.

## 2024-02-19 ENCOUNTER — TELEPHONE (OUTPATIENT)
Dept: UROGYNECOLOGY | Facility: CLINIC | Age: 69
End: 2024-02-19
Payer: COMMERCIAL

## 2024-02-19 NOTE — TELEPHONE ENCOUNTER
LVM for patient to call back to schedule pre-op appt for consent signing, bladder testing?, pre-admit with anesthesia, and clearance from PCP.

## 2024-02-20 ENCOUNTER — TELEPHONE (OUTPATIENT)
Dept: UROGYNECOLOGY | Facility: CLINIC | Age: 69
End: 2024-02-20
Payer: COMMERCIAL

## 2024-02-20 NOTE — TELEPHONE ENCOUNTER
----- Message from Gustavo Winter PA-C sent at 2/19/2024  3:29 PM CST -----  Regarding: Pre-op appts  This patient is having surgery 3/18 with Dr. VILLALOBOS. I just left her a VM. Can you please call her back to schedule a consent signing appt with me or Stefanie and a pre-admit appt with anesthesia? Also please remind her that we araseli clearance her from PCP. I just sent him a fax.     Thanks,   Gustavo

## 2024-02-28 ENCOUNTER — TELEPHONE (OUTPATIENT)
Dept: UROGYNECOLOGY | Facility: CLINIC | Age: 69
End: 2024-02-28
Payer: COMMERCIAL

## 2024-02-28 NOTE — TELEPHONE ENCOUNTER
VM left requesting the patient  to call the office back.     Ceci     ----- Message from Avani Felix MA sent at 2/28/2024 12:06 PM CST -----  Name of Who is Calling:DANIELA CONWAY [[id number]]                What is the request in detail: Pt is requesting a call back to cancel procedure and discuss doing PT as discussed before. Please assist.                Can the clinic reply by [facility]: No                What Number to Call Back if not in [facility]: [phone]

## 2024-02-29 NOTE — TELEPHONE ENCOUNTER
Patient will start pelvic floor PT on 04/05/2024.  Would like to go to pelvic floor PT. Would like to schedule surgery sometime in June possibly. She will call after she has her PT appointments scheduled to pick a new date. KENIA Hill-BC

## 2024-03-13 ENCOUNTER — CLINICAL SUPPORT (OUTPATIENT)
Dept: REHABILITATION | Facility: OTHER | Age: 69
End: 2024-03-13
Payer: COMMERCIAL

## 2024-03-13 DIAGNOSIS — N39.46 MIXED INCONTINENCE: ICD-10-CM

## 2024-03-13 DIAGNOSIS — R15.1 FECAL SMEARING: ICD-10-CM

## 2024-03-13 DIAGNOSIS — M62.89 PELVIC FLOOR DYSFUNCTION: Primary | ICD-10-CM

## 2024-03-13 PROCEDURE — 97162 PT EVAL MOD COMPLEX 30 MIN: CPT

## 2024-03-13 PROCEDURE — 97112 NEUROMUSCULAR REEDUCATION: CPT

## 2024-03-13 PROCEDURE — 97530 THERAPEUTIC ACTIVITIES: CPT

## 2024-03-14 ENCOUNTER — TELEPHONE (OUTPATIENT)
Dept: SURGERY | Facility: CLINIC | Age: 69
End: 2024-03-14
Payer: COMMERCIAL

## 2024-03-14 NOTE — TELEPHONE ENCOUNTER
Spoke with patient regarding rescheduling appointment to a different date. Details confirmed verbally over phone and viewable in portal.

## 2024-03-14 NOTE — TELEPHONE ENCOUNTER
----- Message from Mike Cm sent at 3/14/2024 10:11 AM CDT -----  Regarding: Appt  Contact: pt [phone]  Pt is scheduled to see provider 3/21, pt would like to be seen in the afternoon if possible, please call pt @[phone]

## 2024-03-18 ENCOUNTER — TELEPHONE (OUTPATIENT)
Dept: SURGERY | Facility: CLINIC | Age: 69
End: 2024-03-18
Payer: COMMERCIAL

## 2024-03-18 NOTE — TELEPHONE ENCOUNTER
Pt wanted to move back to spot where she had her appt. Informed her it is full now. She wants to stay where she is.

## 2024-03-18 NOTE — TELEPHONE ENCOUNTER
----- Message from Madelaine Benjamin sent at 3/18/2024 10:21 AM CDT -----  Regarding: reschedule  Contact: [phone]  DANIELA CONWAY calling regarding Appointment Access  (message) for #ov/kh/hemorrhoids/discuss skin tag removal.  Pt want to know if she could be schedule back on 3/21/24, if it's still available.  Please advise so she could make arrangements.

## 2024-03-22 PROBLEM — M62.89 PELVIC FLOOR DYSFUNCTION: Status: ACTIVE | Noted: 2024-03-22

## 2024-03-27 ENCOUNTER — PATIENT MESSAGE (OUTPATIENT)
Dept: REHABILITATION | Facility: OTHER | Age: 69
End: 2024-03-27
Payer: COMMERCIAL

## 2024-03-27 ENCOUNTER — TELEPHONE (OUTPATIENT)
Dept: SURGERY | Facility: CLINIC | Age: 69
End: 2024-03-27
Payer: COMMERCIAL

## 2024-03-27 NOTE — TELEPHONE ENCOUNTER
Spoke with patient and informed that there are no openings at this time. Explained to patient that if anything changes, I will call her and she will receive a text for a sooner appointment.Patient verbalizes understanding.

## 2024-03-27 NOTE — TELEPHONE ENCOUNTER
----- Message from Dilcia Wheat sent at 3/27/2024  8:07 AM CDT -----  Regarding: any cancelation  Contact: [phone]  Pt is calling to speak with someone in provider office in regards to checking to see if there is a cancellation pt  is asking for a return call please call pt at   [phone]

## 2024-04-18 ENCOUNTER — OFFICE VISIT (OUTPATIENT)
Dept: SURGERY | Facility: CLINIC | Age: 69
End: 2024-04-18
Payer: COMMERCIAL

## 2024-04-18 VITALS
BODY MASS INDEX: 21.66 KG/M2 | HEART RATE: 75 BPM | WEIGHT: 138 LBS | RESPIRATION RATE: 18 BRPM | SYSTOLIC BLOOD PRESSURE: 109 MMHG | HEIGHT: 67 IN | DIASTOLIC BLOOD PRESSURE: 70 MMHG

## 2024-04-18 DIAGNOSIS — K64.8 OTHER HEMORRHOIDS: ICD-10-CM

## 2024-04-18 DIAGNOSIS — K64.8 INTERNAL HEMORRHOIDS WITH COMPLICATION: Primary | ICD-10-CM

## 2024-04-18 PROCEDURE — 99213 OFFICE O/P EST LOW 20 MIN: CPT | Mod: 25,S$GLB,, | Performed by: COLON & RECTAL SURGERY

## 2024-04-18 PROCEDURE — 99999 PR PBB SHADOW E&M-EST. PATIENT-LVL V: CPT | Mod: PBBFAC,,, | Performed by: COLON & RECTAL SURGERY

## 2024-04-18 PROCEDURE — 46221 LIGATION OF HEMORRHOID(S): CPT | Mod: S$GLB,,, | Performed by: COLON & RECTAL SURGERY

## 2024-04-18 NOTE — PROGRESS NOTES
"Subjective     Patient ID: Norm Andre is a 68 y.o. female.    Chief Complaint: Hemorrhoids    History of Present Illness    REASON FOR VISIT:  68 year old patient presents today for bleeding, mucus, leakage, inflammation, and difficulty cleaning.    INTERVAL HISTORY:  Patient reports bright red bleeding and mucus in her stool. She experienced daily liquid-like leakage, cramping, and inflammation in the anal region. Difficulty in cleaning after bowel movements and a decrease in appetite were also noted. The patient underwent a colonoscopy in 2020 and an MRI in September 2021, both of which showed no abnormalities. The leakage was described as a yellowish fluid, and the patient denied experiencing constipation.      IMAGING/RADIOLOGY:  - MRI: September 2021    MEDICATIONS:  - FiberCon, caused constipation    PAST MEDICAL HISTORY:  - Hemorrhoids    SOCIAL HISTORY:  - Lives with mother    OB/GYN HISTORY:  - Menopause: Yes  ROS:  Constitutional: +loss in appetite  Gastrointestinal: +change in bowel habits            Objective     /70 (BP Location: Left arm, Patient Position: Sitting, BP Method: Large (Automatic))   Pulse 75   Resp 18   Ht 5' 7" (1.702 m)   Wt 62.6 kg (138 lb)   BMI 21.61 kg/m²   Well appearing    Anorectal Exam: RAL chronically prolapsing int/ext hemorrhoid, normal tone.  Post Rubber Band Ligation    1. Pt gave verbal consent for rubber band ligation, which was performed on 1 hemorrhoid(s), pt tolerated Well tolerated by patient.. Injected with 0.25% Marcaine.     2. Pt verbalized understanding to increase fiber to 25-50gms/day, drink plenty of water and to have 4-5 sitz baths daily, and that in 3-5 days may have minimal bleeding.    3. Pt will f/u in prn .    4. Understands to seek immediate treatment for fever, bleeding, pelvic pain, or difficulty urinating.           Assessment and Plan     1. Internal hemorrhoids with complication    2. Other hemorrhoids  -     Ambulatory " referral/consult to Colorectal Surgery      Assessment & Plan    K64.9 Unspecified hemorrhoids  RECTAL BLEEDING:    Banded today.  She is to contact me via portal in 6-8 weeks.  If ongoing symptoms, consider cscope and additional RBL>           This note was generated with the assistance of ambient listening technology. Verbal consent was obtained by the patient and accompanying visitor(s) for the recording of patient appointment to facilitate this note. I attest to having reviewed and edited the generated note for accuracy, though some syntax or spelling errors may persist. Please contact the author of this note for any clarification.

## 2024-04-19 ENCOUNTER — TELEPHONE (OUTPATIENT)
Dept: SURGERY | Facility: CLINIC | Age: 69
End: 2024-04-19
Payer: COMMERCIAL

## 2024-04-19 NOTE — TELEPHONE ENCOUNTER
Spoke with pt regarding exercising following hemorrhoid banding completed on 4/18 with Dr. Scruggs. Advised to avoid strenuous activity and heavy lifting for at least a week. Advised to introduce at least 25-50 mg of fiber into diet with increased water intake. Can introduce prunes slowly into diet to avoid cramping or loose stools. Advised to do 4-5 warm sitz baths and to expect mild bleeding from 3-5 days. Informed that with improved symptoms, she can resume normal activities and exercise. Informed that she is to return to clinic in 6-8 weeks with no improvements to plan next steps. Can contact office in 4 weeks if not better.         ----- Message from Jenny Flores sent at 4/19/2024 12:34 PM CDT -----  Regarding: Procedure  Contact: Pt [phone]  Pt is calling to speak to nurse about questions on procedure from yesterday 4/18 please call

## 2024-04-23 ENCOUNTER — CLINICAL SUPPORT (OUTPATIENT)
Dept: REHABILITATION | Facility: OTHER | Age: 69
End: 2024-04-23
Payer: COMMERCIAL

## 2024-04-23 DIAGNOSIS — M62.89 PELVIC FLOOR DYSFUNCTION: ICD-10-CM

## 2024-04-23 DIAGNOSIS — R27.9 LACK OF COORDINATION: Primary | ICD-10-CM

## 2024-04-23 PROCEDURE — 97112 NEUROMUSCULAR REEDUCATION: CPT

## 2024-04-23 PROCEDURE — 97530 THERAPEUTIC ACTIVITIES: CPT

## 2024-04-23 NOTE — PROGRESS NOTES
Pelvic Health Physical Therapy   Treatment Note     Name: Norm LESLIE Andre  Clinic Number: [id number]    Therapy Diagnosis:   Encounter Diagnoses   Name Primary?    Lack of coordination Yes    Pelvic floor dysfunction      Physician: Gustavo Kimball PA*    Visit Date: 4/23/2024    Physician Orders: PT Eval and Treat - Pelvic Floor PT   Medical Diagnosis from Referral:   N39.46 (ICD-10-CM) - Mixed incontinence   R15.1 (ICD-10-CM) - Fecal smearing   Evaluation Date: 3/13/2024  Authorization Period Expiration: 12/19/2024  Plan of Care Expiration: 6/13/2024  Visit # / Visits authorized: 1/ 25    Cancelled Visits: 0  No Show Visits: 0    Time In: 1420  Time Out: 1500  Total Billable Time: 40 minutes    Precautions: Standard    Subjective     Pt reports: Continues with light fecal leakage. 4/18 hemorrhoid band removal performed. Has not been consistent with Intrarosa use, but has been wearing pessary more often. Feels very aware of it, especially on right side.     She was not compliant with home exercise program.  Response to previous treatment: Good  Functional change: Increased pain following hemorrhoid band removal       Objective     FOTO 1/3 - Most recently administered 3/13    VAGINAL PELVIC FLOOR EXAM        INTERNAL ASSESSMENT  Pain: widespread tenderness    Sensation: able to localized pressure appropriately   Vaginal vault: roomy   Muscle Bulk: slightly increased tone  Muscle Power: 2/5    Quality of contraction: WNL   Specificity: WNL   Coordination: tends to hold breath during PFM contration   Prolapse: NP   Comments: Fair mobility with       Jolene received therapeutic exercises to develop  strength, endurance, ROM, flexibility, posture, and core stabilization for 00 minutes including:     Jolene received the following manual therapy techniques: to develop flexibility, extensibility, and desensitization for 5 minutes including: trigger point/myofascial release of pelvic floor muscle     Internal  sustained ischemic pressure + pelvic floor muscle relaxation     Jolene participated in neuromuscular re-education activities to develop Coordination, Control, and Down training for 25 minutes including: pelvic floor relaxation/bulging training, pelvic floor relaxation speed after performing a kegel, diaphragmatic breathing with Kegel, and diaphragmatic breathing    Diaphragmatic breathing review  - focus on abdominal relaxation    + pelvic floor muscle relaxation - general focus    In supine, sitting + hip internal rotation, external rotation for improved relaxation  + pelvic floor muscle mobility - gentle recruitment with focus on complete relaxation      Jolene participated in dynamic functional therapeutic activities to improve functional performance for 10 minutes, including:    Exercise modification to reduce pelvic floor muscle, abdominal, ER tension        Home Exercises Provided and Patient Education Provided     Education provided:   - anatomy/physiology of pelvic floor, posture/body mechanices, diaphragmatic breathing, kegels, proper bearing down techniques, and behavior modifications  Discussed progression of plan of care with patient; educated pt in activity modification; reviewed HEP with pt. Pt demonstrated and verbalized understanding of all instruction and was provided with a handout of HEP (see Patient Instructions).    Written Home Exercises Provided: yes.  Exercises were reviewed and Jolene was able to demonstrate them prior to the end of the session.  Jolene demonstrated good  understanding of the education provided.     See EMR under Patient Instructions for exercises provided 4/23/2024.    Assessment     Pelvic floor muscle assessment continued this session, with Jolene demonstrating significant pelvic floor muscle tension and tenderness as well as functional weakness. Limited recruitment likely affected by muscle tension, so complete strength assessment could not be performed. Muscle tension  not completely reduced, despite introduction of relaxation training. In addition to persistent pelvic floor muscle tension, Jolene also demonstrated limited relaxation of abdominals. Based on performance, she would benefit from continued pelvic floor relaxation and coordination training.     Jolene Is progressing well towards her goals.   Pt prognosis is Good.     Pt will continue to benefit from skilled outpatient physical therapy to address the deficits listed in the problem list box on initial evaluation, provide pt/family education and to maximize pt's level of independence in the home and community environment.     Pt's spiritual, cultural and educational needs considered and pt agreeable to plan of care and goals.     Anticipated barriers to physical therapy: chronicity of condition     Goals:   Short Term Goals: 6 weeks -progressing  Pt to demonstrate being able to correctly and consistently perform a kegel which is needed  to increase pelvic floor muscle coordination and strength needed for continence.  Pt to demonstrate proper diaphragmatic breathing to help with calming the nervous system in order to decrease pain and to improve abdominal wall and pelvic floor musculature extensibility.   Pt to demonstrate good body mechanics when performing ADLs such as lifting and bending to decrease strain to lumbopelvic structures and reduce risk of further injury.  Pt will report minimal to no pain with single digit pelvic assessment and display relaxation demonstrating improving pelvic floor muscle relaxation and coordination.   Pt to demonstrate proper positioning on commode with breathing techniques to decrease strain with BM to enable pt to feel empty after BM.   Pt to be able to bulge pelvic floor which is needed for comfortable BM and complete evacuation.      Long Term Goals: 12 weeks -progressing  Pt to be discharged with home plan for carry over after discharge.    Pt to report a decrease in pelvic pressure  and fullness to no more than 10% of the time to demonstrate improving pelvic support of pelvic structures.  Pt to increase pelvic floor strength to at least 4/5 to demonstrate improved strength needed for continence with ADLs.   Pt will be trained and compliant with postural strategies in sitting and standing to improve alignment and decrease pain and muscle fatigue  Pt to report being able to have a comfortable vaginal exam without significant increase in pelvic pain.  Pt to increase abdominal wall strength to at least 4/5 to improve lumbopelvic stability with ADLs that would normally increase pain.  Pt to report being able to have a BM without straining 90% of the time to demonstrate improving PF coordination.     Plan     Plan for next visit: reassess pelvic floor muscle tension, review and progress relaxation training, reinforce activity modification    LEXI MCCABE, PT   05/21/2024
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
## 2024-04-25 ENCOUNTER — TELEPHONE (OUTPATIENT)
Dept: SURGERY | Facility: CLINIC | Age: 69
End: 2024-04-25
Payer: COMMERCIAL

## 2024-04-25 NOTE — TELEPHONE ENCOUNTER
----- Message from Roberto Brown sent at 4/25/2024 10:50 AM CDT -----  Regarding: Nurse Call requested  Contact: [phone]  Hi pt called to request a call from the nurse to discuss scheduling a surgery Per Dr. Scruggs. Pls call the pt at  [phone].  Thank you.

## 2024-04-29 ENCOUNTER — CLINICAL SUPPORT (OUTPATIENT)
Dept: REHABILITATION | Facility: OTHER | Age: 69
End: 2024-04-29
Payer: COMMERCIAL

## 2024-04-29 DIAGNOSIS — M62.89 PELVIC FLOOR DYSFUNCTION: ICD-10-CM

## 2024-04-29 DIAGNOSIS — R27.9 LACK OF COORDINATION: Primary | ICD-10-CM

## 2024-04-29 PROCEDURE — 97112 NEUROMUSCULAR REEDUCATION: CPT

## 2024-04-29 PROCEDURE — 97530 THERAPEUTIC ACTIVITIES: CPT

## 2024-04-29 NOTE — PROGRESS NOTES
Pelvic Health Physical Therapy   Treatment Note     Name: Norm LESLIE [place]  Clinic Number: [id number]    Therapy Diagnosis:   Encounter Diagnoses   Name Primary?    Lack of coordination Yes    Pelvic floor dysfunction      Physician: Gustavo Kimball PA*    Visit Date: 4/29/2024    Physician Orders: PT Eval and Treat - Pelvic Floor PT   Medical Diagnosis from Referral:   N39.46 (ICD-10-CM) - Mixed incontinence   R15.1 (ICD-10-CM) - Fecal smearing   Evaluation Date: 3/13/2024  Authorization Period Expiration: 12/19/2024  Plan of Care Expiration: 6/13/2024  Visit # / Visits authorized: 2/ 25    Cancelled Visits: 0  No Show Visits: 0    Time In: 1330  Time Out: 1420  Total Billable Time: 50 minutes    Precautions: Standard    Subjective     Pt reports: Has not worn pessary the last few days due to continued discomfort. Has not been using intrarosa regularly.   Finds that driving back and forth to [place] made this slightly worse.   Feels like bowel movements are fluctuating between soft and small, narrow firmer bowel movements. Continues with feels of incomplete emptying. Feels pressure and pain at tailbone.   Fecal leakage has improved.   Has been trying to use relaxation exercises sporadically throughout the day. Has been using to reduce rectal pain.    She was not compliant with home exercise program.  Response to previous treatment: Good  Functional change: Increased pain following hemorrhoid band removal       Objective     FOTO 1/3 - Most recently administered 3/13      Jolene received therapeutic exercises to develop  strength, endurance, ROM, flexibility, posture, and core stabilization for 00 minutes including:     Jolene received the following manual therapy techniques: to develop flexibility, extensibility, and desensitization for 00 minutes including: trigger point/myofascial release of pelvic floor muscle       Jolene participated in neuromuscular re-education activities to develop Coordination,  Control, and Down training for 20 minutes including: pelvic floor relaxation/bulging training, pelvic floor relaxation speed after performing a kegel, diaphragmatic breathing with Kegel, and diaphragmatic breathing    Diaphragmatic breathing review  - focus on abdominal relaxation    + pelvic floor muscle relaxation - general focus    In supine, sitting + hip internal rotation, external rotation for improved relaxation   + cat/cow    Jolene participated in dynamic functional therapeutic activities to improve functional performance for 30 minutes, including:    Exercise modification to reduce pelvic floor muscle, abdominal, ER tension     Avoid abdominal strain - Trial planks instead    Resume lower body strengthening machines at lower weight     Importance of pessary, Intrarosa regular use    Modification to pessary routine      Home Exercises Provided and Patient Education Provided     Education provided:   - anatomy/physiology of pelvic floor, posture/body mechanices, diaphragmatic breathing, kegels, proper bearing down techniques, and behavior modifications  Discussed progression of plan of care with patient; educated pt in activity modification; reviewed HEP with pt. Pt demonstrated and verbalized understanding of all instruction and was provided with a handout of HEP (see Patient Instructions).    Written Home Exercises Provided: yes.  Exercises were reviewed and Jolene was able to demonstrate them prior to the end of the session.  Jolene demonstrated good  understanding of the education provided.     See EMR under Patient Instructions for exercises provided 4/23/2024.    Assessment     Focus increased on pelvic floor muscle relaxation and continued focus on relaxation during exercise, as Jolene expressed desire to return to pilates and core-specific training. At this time, Jolene continues with limited pelvic floor muscle mobility and poor pressure management, which would like result in increased muscular  dysfunction and pain following strenuous exercise. Modifications recommended for return to gentle activity without strain as abdominopelvic mobility and control improves.     Jolene Is progressing well towards her goals.   Pt prognosis is Good.     Pt will continue to benefit from skilled outpatient physical therapy to address the deficits listed in the problem list box on initial evaluation, provide pt/family education and to maximize pt's level of independence in the home and community environment.     Pt's spiritual, cultural and educational needs considered and pt agreeable to plan of care and goals.     Anticipated barriers to physical therapy: chronicity of condition     Goals:   Short Term Goals: 6 weeks -progressing  Pt to demonstrate being able to correctly and consistently perform a kegel which is needed  to increase pelvic floor muscle coordination and strength needed for continence.  Pt to demonstrate proper diaphragmatic breathing to help with calming the nervous system in order to decrease pain and to improve abdominal wall and pelvic floor musculature extensibility.   Pt to demonstrate good body mechanics when performing ADLs such as lifting and bending to decrease strain to lumbopelvic structures and reduce risk of further injury.  Pt will report minimal to no pain with single digit pelvic assessment and display relaxation demonstrating improving pelvic floor muscle relaxation and coordination.   Pt to demonstrate proper positioning on commode with breathing techniques to decrease strain with BM to enable pt to feel empty after BM.   Pt to be able to bulge pelvic floor which is needed for comfortable BM and complete evacuation.      Long Term Goals: 12 weeks -progressing  Pt to be discharged with home plan for carry over after discharge.    Pt to report a decrease in pelvic pressure and fullness to no more than 10% of the time to demonstrate improving pelvic support of pelvic structures.  Pt to  increase pelvic floor strength to at least 4/5 to demonstrate improved strength needed for continence with ADLs.   Pt will be trained and compliant with postural strategies in sitting and standing to improve alignment and decrease pain and muscle fatigue  Pt to report being able to have a comfortable vaginal exam without significant increase in pelvic pain.  Pt to increase abdominal wall strength to at least 4/5 to improve lumbopelvic stability with ADLs that would normally increase pain.  Pt to report being able to have a BM without straining 90% of the time to demonstrate improving PF coordination.     Plan     Plan for next visit: reassess pelvic floor muscle tension, review and progress relaxation training, reinforce activity modification    LEXI MCCABE, PT   05/21/2024

## 2024-05-01 ENCOUNTER — TELEPHONE (OUTPATIENT)
Dept: SURGERY | Facility: CLINIC | Age: 69
End: 2024-05-01
Payer: COMMERCIAL

## 2024-05-01 NOTE — TELEPHONE ENCOUNTER
Called pt to let her know Dr Scruggs feels she should get scheduled for a colonoscopy. She said she wasn't   calling for surgery she just wanted to know how to proceed. She wanted to know if she could get back to her exercises? Told her she can do what ever exercises she wanted at this point and since she has some discomfort I will put the order in for the scope.

## 2024-05-01 NOTE — TELEPHONE ENCOUNTER
----- Message from Stephenie Lopez LPN sent at 4/29/2024  5:15 PM CDT -----  Regarding: FW: Scheduling  Contact: [phone].    ----- Message -----  From: Sainya Aguial RN  Sent: 4/29/2024   9:01 AM CDT  To: Stephenie Lopez LPN  Subject: FW: Scheduling                                     ----- Message -----  From: Samantha Piper  Sent: 4/29/2024   8:43 AM CDT  To: [facility]  Subject: Scheduling                                       Good morning the  pt calling to request a call from the nurse to discuss scheduling a surgery Per Dr. Scruggs. Pls call the pt at [phone] thank you.

## 2024-05-06 ENCOUNTER — CLINICAL SUPPORT (OUTPATIENT)
Dept: REHABILITATION | Facility: OTHER | Age: 69
End: 2024-05-06
Payer: COMMERCIAL

## 2024-05-06 DIAGNOSIS — M62.89 PELVIC FLOOR DYSFUNCTION: ICD-10-CM

## 2024-05-06 DIAGNOSIS — R27.9 LACK OF COORDINATION: Primary | ICD-10-CM

## 2024-05-06 PROCEDURE — 97530 THERAPEUTIC ACTIVITIES: CPT

## 2024-05-06 PROCEDURE — 97112 NEUROMUSCULAR REEDUCATION: CPT

## 2024-05-06 NOTE — PROGRESS NOTES
Pelvic Health Physical Therapy   Treatment Note     Name: Norm LESLIE [place]  Clinic Number: [id number]    Therapy Diagnosis:   Encounter Diagnoses   Name Primary?    Lack of coordination Yes    Pelvic floor dysfunction      Physician: Gustavo Kimball PA*    Visit Date: 5/6/2024    Physician Orders: PT Eval and Treat - Pelvic Floor PT   Medical Diagnosis from Referral:   N39.46 (ICD-10-CM) - Mixed incontinence   R15.1 (ICD-10-CM) - Fecal smearing   Evaluation Date: 3/13/2024  Authorization Period Expiration: 12/19/2024  Plan of Care Expiration: 6/13/2024  Visit # / Visits authorized: 2/ 25    Cancelled Visits: 0  No Show Visits: 0    Time In: 1335  Time Out: 1420  Total Billable Time: 45 minutes    Precautions: Standard    Subjective     Pt reports: Currently has a migraine.   Continues with rectal pain; has been recommended for colonoscopy. Has been cleared to return to exercise. Feels stinging at tailbone area, just inside anus. Has not been using posterior relaxation   Has been using pessary and removing nightly; kept out for today's session.   Has been using cushion to use in the car to ease sacral pressure.   Did not notice lasting changes with KT Tape use.   Has not returned to PT yet, but plans to this week.     She was not compliant with home exercise program.  Response to previous treatment: Good  Functional change: Increased pain following hemorrhoid band removal       Objective     FOTO 1/3 - Most recently administered 3/13      VAGINAL PELVIC FLOOR EXAM      INTERNAL ASSESSMENT  Pain: widespread tenderness    Sensation: able to localized pressure appropriately, hypersensitivity to movement   Vaginal vault: WNL   Muscle Bulk: increased tone  Muscle Power: 1/5    Quality of contraction: slow relaxation and incomplete relaxation   Specificity: WNL   Coordination: GWENL     Jolene received therapeutic exercises to develop  strength, endurance, ROM, flexibility, posture, and core stabilization for 00  minutes including:     Jolene received the following manual therapy techniques: to develop flexibility, extensibility, and desensitization for 00 minutes including: trigger point/myofascial release of pelvic floor muscle       Jolene participated in neuromuscular re-education activities to develop Coordination, Control, and Down training for 35 minutes including: pelvic floor relaxation/bulging training, pelvic floor relaxation speed after performing a kegel, diaphragmatic breathing with Kegel, and diaphragmatic breathing    Pelvic floor muscle relaxation    Region - specific focus    Hip external rotation - anterior focus   Hip internal rotation - posterior focus    Single knee to chest - unilateral focus     Jolene participated in dynamic functional therapeutic activities to improve functional performance for 10 minutes, including:    Exercise modification to reduce pelvic floor muscle, abdominal, ER tension     Avoid abdominal strain, but resume hip strengthening with focus on complete relaxation    Home Exercises Provided and Patient Education Provided     Education provided:   - anatomy/physiology of pelvic floor, posture/body mechanices, diaphragmatic breathing, kegels, proper bearing down techniques, and behavior modifications  Discussed progression of plan of care with patient; educated pt in activity modification; reviewed HEP with pt. Pt demonstrated and verbalized understanding of all instruction and was provided with a handout of HEP (see Patient Instructions).    Written Home Exercises Provided: yes.  Exercises were reviewed and Jolene was able to demonstrate them prior to the end of the session.  Jolene demonstrated good  understanding of the education provided.     See EMR under Patient Instructions for exercises provided 4/23/2024.    Assessment     Pelvic floor reassessed this session with Jolene demonstrating continued pelvic floor muscle tension, with active insufficiency and incomplete relaxation  demonstrated with recruitment. Based on performance, Jolene advised to avoid abdominal strain with return to exercise and poor abdominopelvic relaxation has likely contributed to limited pressure management, pelvic pain.     Jolene Is progressing well towards her goals.   Pt prognosis is Good.     Pt will continue to benefit from skilled outpatient physical therapy to address the deficits listed in the problem list box on initial evaluation, provide pt/family education and to maximize pt's level of independence in the home and community environment.     Pt's spiritual, cultural and educational needs considered and pt agreeable to plan of care and goals.     Anticipated barriers to physical therapy: chronicity of condition     Goals:   Short Term Goals: 6 weeks -progressing  Pt to demonstrate being able to correctly and consistently perform a kegel which is needed  to increase pelvic floor muscle coordination and strength needed for continence.  Pt to demonstrate proper diaphragmatic breathing to help with calming the nervous system in order to decrease pain and to improve abdominal wall and pelvic floor musculature extensibility.   Pt to demonstrate good body mechanics when performing ADLs such as lifting and bending to decrease strain to lumbopelvic structures and reduce risk of further injury.  Pt will report minimal to no pain with single digit pelvic assessment and display relaxation demonstrating improving pelvic floor muscle relaxation and coordination.   Pt to demonstrate proper positioning on commode with breathing techniques to decrease strain with BM to enable pt to feel empty after BM.   Pt to be able to bulge pelvic floor which is needed for comfortable BM and complete evacuation.      Long Term Goals: 12 weeks -progressing  Pt to be discharged with home plan for carry over after discharge.    Pt to report a decrease in pelvic pressure and fullness to no more than 10% of the time to demonstrate improving  pelvic support of pelvic structures.  Pt to increase pelvic floor strength to at least 4/5 to demonstrate improved strength needed for continence with ADLs.   Pt will be trained and compliant with postural strategies in sitting and standing to improve alignment and decrease pain and muscle fatigue  Pt to report being able to have a comfortable vaginal exam without significant increase in pelvic pain.  Pt to increase abdominal wall strength to at least 4/5 to improve lumbopelvic stability with ADLs that would normally increase pain.  Pt to report being able to have a BM without straining 90% of the time to demonstrate improving PF coordination.     Plan     Plan for next visit: review and progress relaxation training + abdominopelvic mobilization     LEXI MCCABE, PT   05/27/2024

## 2024-05-21 ENCOUNTER — CLINICAL SUPPORT (OUTPATIENT)
Dept: REHABILITATION | Facility: OTHER | Age: 69
End: 2024-05-21
Payer: COMMERCIAL

## 2024-05-21 DIAGNOSIS — M62.89 PELVIC FLOOR DYSFUNCTION: ICD-10-CM

## 2024-05-21 DIAGNOSIS — R27.9 LACK OF COORDINATION: Primary | ICD-10-CM

## 2024-05-21 PROCEDURE — 97530 THERAPEUTIC ACTIVITIES: CPT

## 2024-05-21 PROCEDURE — 97112 NEUROMUSCULAR REEDUCATION: CPT

## 2024-05-21 NOTE — PROGRESS NOTES
Pelvic Health Physical Therapy   Treatment Note     Name: Norm LESLIE [place]  Clinic Number: [id number]    Therapy Diagnosis:   Encounter Diagnoses   Name Primary?    Lack of coordination Yes    Pelvic floor dysfunction        Physician: Gustavo Kimball PA*    Visit Date: 5/21/2024    Physician Orders: PT Eval and Treat - Pelvic Floor PT   Medical Diagnosis from Referral:   N39.46 (ICD-10-CM) - Mixed incontinence   R15.1 (ICD-10-CM) - Fecal smearing   Evaluation Date: 3/13/2024  Authorization Period Expiration: 12/19/2024  Plan of Care Expiration: 6/13/2024  Visit # / Visits authorized: 2/ 25    Cancelled Visits: 0  No Show Visits: 0    Time In: 0940  Time Out: 1020  Total Billable Time: 40 minutes    Precautions: Standard    Subjective     Pt reports: Continues with increased tailbone pain, especially with prolonged sitting. Also continues with rectal pain.     She was not compliant with home exercise program.  Response to previous treatment: Good  Functional change: Increased pain following hemorrhoid band removal       Objective     FOTO 1/3 - Most recently administered 3/13      Jolene received therapeutic exercises to develop  strength, endurance, ROM, flexibility, posture, and core stabilization for 00 minutes including:     Jolene received the following manual therapy techniques: to develop flexibility, extensibility, and desensitization for 00 minutes including: trigger point/myofascial release of pelvic floor muscle       Jolene participated in neuromuscular re-education activities to develop Coordination, Control, and Down training for 30 minutes including: pelvic floor relaxation/bulging training, pelvic floor relaxation speed after performing a kegel, diaphragmatic breathing with Kegel, and diaphragmatic breathing    Pelvic floor muscle relaxation    Region - specific focus    Anterior focus >> butterfly pose supine, sitting   Posterior focus >> hip internal rotation supine, sitting, quadruped     Unilateral focus >> single knee to chest in supine, sitting   Abdominal relaxation    Supported sitting, modified quadruped     Jolene participated in dynamic functional therapeutic activities to improve functional performance for 10 minutes, including:    Exercise modification to reduce pelvic floor muscle, abdominal, ER tension     Avoid abdominal strain  Resume hip strengthening with focus on complete relaxation    Home Exercises Provided and Patient Education Provided     Education provided:   - anatomy/physiology of pelvic floor, posture/body mechanices, diaphragmatic breathing, kegels, proper bearing down techniques, and behavior modifications  Discussed progression of plan of care with patient; educated pt in activity modification; reviewed HEP with pt. Pt demonstrated and verbalized understanding of all instruction and was provided with a handout of HEP (see Patient Instructions).    Written Home Exercises Provided: yes.  Exercises were reviewed and Jolene was able to demonstrate them prior to the end of the session.  Jolene demonstrated good  understanding of the education provided.     See EMR under Patient Instructions for exercises provided 4/23/2024.    Assessment     Pelvic floor and abdominal relaxation training progressed this session, as Jolene continues with incomplete relaxation. Slight improvement noted, though Jolene would benefit from continued review. To date, home exercise program use remains limited, and Jolene remains hesitant of activity modification to limit deep core activation. Despite this, she has demonstrated improvement in regular estrogen use and pessary management.     Jolene Is progressing well towards her goals.   Pt prognosis is Good.     Pt will continue to benefit from skilled outpatient physical therapy to address the deficits listed in the problem list box on initial evaluation, provide pt/family education and to maximize pt's level of independence in the home and  community environment.     Pt's spiritual, cultural and educational needs considered and pt agreeable to plan of care and goals.     Anticipated barriers to physical therapy: chronicity of condition     Goals:   Short Term Goals: 6 weeks -progressing  Pt to demonstrate being able to correctly and consistently perform a kegel which is needed  to increase pelvic floor muscle coordination and strength needed for continence.  Pt to demonstrate proper diaphragmatic breathing to help with calming the nervous system in order to decrease pain and to improve abdominal wall and pelvic floor musculature extensibility.   Pt to demonstrate good body mechanics when performing ADLs such as lifting and bending to decrease strain to lumbopelvic structures and reduce risk of further injury.  Pt will report minimal to no pain with single digit pelvic assessment and display relaxation demonstrating improving pelvic floor muscle relaxation and coordination.   Pt to demonstrate proper positioning on commode with breathing techniques to decrease strain with BM to enable pt to feel empty after BM.   Pt to be able to bulge pelvic floor which is needed for comfortable BM and complete evacuation.      Long Term Goals: 12 weeks -progressing  Pt to be discharged with home plan for carry over after discharge.    Pt to report a decrease in pelvic pressure and fullness to no more than 10% of the time to demonstrate improving pelvic support of pelvic structures.  Pt to increase pelvic floor strength to at least 4/5 to demonstrate improved strength needed for continence with ADLs.   Pt will be trained and compliant with postural strategies in sitting and standing to improve alignment and decrease pain and muscle fatigue  Pt to report being able to have a comfortable vaginal exam without significant increase in pelvic pain.  Pt to increase abdominal wall strength to at least 4/5 to improve lumbopelvic stability with ADLs that would normally increase  pain.  Pt to report being able to have a BM without straining 90% of the time to demonstrate improving PF coordination.     Plan     Plan for next visit: review and progress relaxation training + abdominopelvic mobilization     LEXI MCCABE, PT   05/27/2024

## 2024-05-26 ENCOUNTER — PATIENT MESSAGE (OUTPATIENT)
Dept: SURGERY | Facility: CLINIC | Age: 69
End: 2024-05-26
Payer: COMMERCIAL

## 2024-05-27 ENCOUNTER — CLINICAL SUPPORT (OUTPATIENT)
Dept: REHABILITATION | Facility: OTHER | Age: 69
End: 2024-05-27
Payer: COMMERCIAL

## 2024-05-27 DIAGNOSIS — R27.9 LACK OF COORDINATION: Primary | ICD-10-CM

## 2024-05-27 DIAGNOSIS — M62.89 PELVIC FLOOR DYSFUNCTION: ICD-10-CM

## 2024-05-27 DIAGNOSIS — K64.8 INTERNAL HEMORRHOIDS WITH COMPLICATION: Primary | ICD-10-CM

## 2024-05-27 PROCEDURE — 97530 THERAPEUTIC ACTIVITIES: CPT

## 2024-05-27 PROCEDURE — 97112 NEUROMUSCULAR REEDUCATION: CPT

## 2024-05-27 NOTE — PROGRESS NOTES
Pelvic Health Physical Therapy   Treatment Note     Name: Norm LESLIE Andre  Clinic Number: [id number]    Therapy Diagnosis:   Encounter Diagnoses   Name Primary?    Lack of coordination Yes    Pelvic floor dysfunction          Physician: Gustavo Kimball PA*    Visit Date: 5/27/2024    Physician Orders: PT Eval and Treat - Pelvic Floor PT   Medical Diagnosis from Referral:   N39.46 (ICD-10-CM) - Mixed incontinence   R15.1 (ICD-10-CM) - Fecal smearing   Evaluation Date: 3/13/2024  Authorization Period Expiration: 12/19/2024  Plan of Care Expiration: 6/13/2024  Visit # / Visits authorized: 2/ 25    Cancelled Visits: 0  No Show Visits: 0    Time In: 1338  Time Out: 1420  Total Billable Time: 42 minutes    Precautions: Standard    Subjective     Pt reports: Has been using relaxation exercises, but continues with increased rectal pain. Has also been experiencing increased bilateral LE pain down posterior leg, which worsens with increased sitting, driving.     She was not compliant with home exercise program.  Response to previous treatment: Good  Functional change: Increased pain following hemorrhoid band removal       Objective     FOTO 1/3 - Most recently administered 3/13    Jolene received therapeutic exercises to develop  strength, endurance, ROM, flexibility, posture, and core stabilization for 00 minutes including:     Jolene received the following manual therapy techniques: to develop flexibility, extensibility, and desensitization for 00 minutes including: trigger point/myofascial release of pelvic floor muscle     Jolene participated in neuromuscular re-education activities to develop Coordination, Control, and Down training for 30 minutes including: pelvic floor relaxation/bulging training, pelvic floor relaxation speed after performing a kegel, diaphragmatic breathing with Kegel, and diaphragmatic breathing    Pelvic floor muscle relaxation    Region - specific focus    Anterior focus >> butterfly  pose supine, sitting   Posterior focus >> hip internal rotation supine, sitting, quadruped    Unilateral focus >> single knee to chest in supine, sitting   Abdominal relaxation    Supported sitting, modified quadruped     Jolene participated in dynamic functional therapeutic activities to improve functional performance for 10 minutes, including:    Exercise modification to reduce pelvic floor muscle, abdominal, ER tension      Modification to sitting posture    Adding lumbar support     Urge control   Fluid intake modification     Home Exercises Provided and Patient Education Provided     Education provided:   - anatomy/physiology of pelvic floor, posture/body mechanices, diaphragmatic breathing, kegels, proper bearing down techniques, and behavior modifications  Discussed progression of plan of care with patient; educated pt in activity modification; reviewed HEP with pt. Pt demonstrated and verbalized understanding of all instruction and was provided with a handout of HEP (see Patient Instructions).    Written Home Exercises Provided: yes.  Exercises were reviewed and Jolene was able to demonstrate them prior to the end of the session.  Jolene demonstrated good  understanding of the education provided.     See EMR under Patient Instructions for exercises provided 4/23/2024.    Assessment     Pelvic floor and abdominal relaxation training progressed this session, as Jolene continues with incomplete relaxation. Slight improvement noted, though Jolene would benefit from continued review. To date, home exercise program use remains limited, and Jolene remains hesitant of activity modification to limit deep core activation. Despite this, she has demonstrated improvement in regular estrogen use and pessary management.     Jolene Is progressing well towards her goals.   Pt prognosis is Good.     Pt will continue to benefit from skilled outpatient physical therapy to address the deficits listed in the problem list box on  initial evaluation, provide pt/family education and to maximize pt's level of independence in the home and community environment.     Pt's spiritual, cultural and educational needs considered and pt agreeable to plan of care and goals.     Anticipated barriers to physical therapy: chronicity of condition     Goals:   Short Term Goals: 6 weeks -progressing  Pt to demonstrate being able to correctly and consistently perform a kegel which is needed  to increase pelvic floor muscle coordination and strength needed for continence.  Pt to demonstrate proper diaphragmatic breathing to help with calming the nervous system in order to decrease pain and to improve abdominal wall and pelvic floor musculature extensibility.   Pt to demonstrate good body mechanics when performing ADLs such as lifting and bending to decrease strain to lumbopelvic structures and reduce risk of further injury.  Pt will report minimal to no pain with single digit pelvic assessment and display relaxation demonstrating improving pelvic floor muscle relaxation and coordination.   Pt to demonstrate proper positioning on commode with breathing techniques to decrease strain with BM to enable pt to feel empty after BM.   Pt to be able to bulge pelvic floor which is needed for comfortable BM and complete evacuation.      Long Term Goals: 12 weeks -progressing  Pt to be discharged with home plan for carry over after discharge.    Pt to report a decrease in pelvic pressure and fullness to no more than 10% of the time to demonstrate improving pelvic support of pelvic structures.  Pt to increase pelvic floor strength to at least 4/5 to demonstrate improved strength needed for continence with ADLs.   Pt will be trained and compliant with postural strategies in sitting and standing to improve alignment and decrease pain and muscle fatigue  Pt to report being able to have a comfortable vaginal exam without significant increase in pelvic pain.  Pt to increase  abdominal wall strength to at least 4/5 to improve lumbopelvic stability with ADLs that would normally increase pain.  Pt to report being able to have a BM without straining 90% of the time to demonstrate improving PF coordination.     Plan     Plan for next visit: review and progress relaxation training + abdominopelvic mobilization     LEXI MCCABE, PT   06/28/2024

## 2024-06-03 ENCOUNTER — CLINICAL SUPPORT (OUTPATIENT)
Dept: ENDOSCOPY | Facility: HOSPITAL | Age: 69
End: 2024-06-03
Attending: COLON & RECTAL SURGERY
Payer: COMMERCIAL

## 2024-06-03 ENCOUNTER — TELEPHONE (OUTPATIENT)
Dept: ENDOSCOPY | Facility: HOSPITAL | Age: 69
End: 2024-06-03

## 2024-06-03 DIAGNOSIS — K64.8 INTERNAL HEMORRHOIDS WITH COMPLICATION: ICD-10-CM

## 2024-06-03 DIAGNOSIS — Z12.11 COLON CANCER SCREENING: ICD-10-CM

## 2024-06-03 DIAGNOSIS — K64.8 INTERNAL HEMORRHOID: Primary | ICD-10-CM

## 2024-06-03 RX ORDER — SOD SULF/POT CHLORIDE/MAG SULF 1.479 G
12 TABLET ORAL DAILY
Qty: 24 TABLET | Refills: 0 | Status: SHIPPED | OUTPATIENT
Start: 2024-06-03

## 2024-06-03 NOTE — TELEPHONE ENCOUNTER
Spoke to patient to schedule procedure(s) Colonoscopy       Physician to perform procedure(s) Dr. PABLO Scruggs  Date of Procedure (s) 6/10/24  Arrival Time 7:00 AM  Time of Procedure(s) 8:00 AM   Location of Procedure(s) [address]  Type of Rx Prep sent to patient: Sutab  Instructions provided to patient via [facility]    Patient was informed on the following information and verbalized understanding. Screening questionnaire reviewed with patient and complete. If procedure requires anesthesia, a responsible adult needs to be present to accompany the patient home, patient cannot drive after receiving anesthesia. Appointment details are tentative, especially check-in time. Patient will receive a prep-op call 7 days prior to confirm check-in time for procedure. If applicable the patient should contact their pharmacy to verify Rx for procedure prep is ready for pick-up. Patient was advised to call the scheduling department at [phone] if pharmacy states no Rx is available. Patient was advised to call the endoscopy scheduling department if any questions or concerns arise.       Endoscopy Scheduling Department             Colonoscopy Procedure Prep Instructions     Date of procedure: 6/10/24 Arrive at: 7:00 AM     Location of Department:   [facility] [address] [place] [address]  Take the [facility] Elevators to 4th Floor Endoscopy Lab     As soon as possible:   your prep from the pharmacy           On the day before your procedure   What NOT to do:   Do not EAT solid food, drink milk or anything   colored red.  Do not drink alcohol.  Do not take other laxatives while taking SUTAB.  Do not take oral medications within 1 hour of starting   each dose of SUTAB.  No gum chewing or candy morning of procedure  If taking tetracycline or fluoroquinolone antibiotics, iron, digoxin, chlorpromazine, or penicillamine, take these medications at least 2 hours before and not less than 6 hours  after administration of each dose of SUTAB.     DO:   Drink clear liquids ONLY - Drink at least 6 to 8 glasses of clear liquids from time you wake up until you begin your prep to avoid dehydration.      Liquids That Are OK to Drink:   Water  Sports drinks (Gatorade, Power-Aid)  Coffee or tea (no cream or nondairy creamer)  Clear juices without pulp (apple, white grape)  Gelatin desserts (no fruit or toppings)  Clear soda (sprite, coke, ginger ale)  Chicken broth (until 12 midnight the night before procedure)     (Please disregard the insert instructions from pharmacy and follow these instructions).     Note:   SUTAB is an osmotic laxative indicated for cleansing of the colon in preparation for colonoscopy in adults.  Medication taken by mouth may not be absorbed properly when taken within 1 hour before the start of each dose of   SUTAB.     IMPORTANT: If you experience preparation-related symptoms (for example, nausea, bloating, or cramping), pause, or slow the rate of drinking the additional water until your symptoms decrease.     How to take prep:     SUTAB is a (2-day) prep. A total of  24 tablets are required for complete preparation for   colonoscopy.      DOSE 1--Day Before Colonoscopy 6/9/24  at 5:00 pm     STEP 1 Open 1 bottle of 12 tablets.     STEP 2 Fill the provided container with 16 ounces of water (up to the fill line). Swallow 1 tablet every 1 to 2 minutes.  You should finish the 12 tablets and the entire 16 ounces of water within 20 minutes.     STEP 3 After 1 hour of completing step 2, drink at least   32 ounces of additional water/clear liquids until bedtime, but more is better.      DOSE 2--Day of the Colonoscopy 6/10/24 at 12 AM (midnight)-3 AM.     STEP 1 Open 1 bottle of 12 tablets.     STEP 2 Fill the provided container with 16 ounces of water (up to the fill line). Swallow 1 tablet every 1 to 2 minutes.  You should finish the 12 tablets and the entire 16 ounces of water within 20 minutes.      STEP 3 After 1 hour of completing step 2, drink at least 32 ounces of additional water/clear liquids until 4 hours before your colonoscopy then nothing else by mouth or as directed by the scheduling nurse 4:00AM .       For information about your procedure, two (2) things to view prior to colonoscopy:  Please watch this informational video. It is important to watch this animated consent video prior to your arrival. If you haven't watched the video prior to arriving, you are required to watch it during admission which can causes delays.     Options for viewing:   Using a keyboard:  press and hold the control tab (Ctrl) and left mouse click to follow links.            Colonoscopy Instructional Video                                                                                   OR     Type link address into your web browser's address bar:  [email or web address]        Educational Booklet with pictures:        Colonoscopy Prep - Tablet              IMPORTANT INFORMATION TO KNOW BEFORE YOUR PROCEDURE     [facility] [place] 4th Floor     If your procedure requires the administration of anesthesia, it is necessary for a responsible adult to drive you home. (Medical Transportation, Uber, Lyft, Taxi, etc. may ONLY be used if a responsible adult is present to accompany you home.  The responsible adult CAN'T be the  of the service).       person must be available to return to pick you up within 15 minutes of being notified of discharge.         Please bring a picture ID, insurance card, & copayment        Take Medications as directed below:        If you begin taking any blood thinning medications or injectable weight loss/diabetes medications (other than insulin) , please contact the endoscopy scheduling department listed below as soon as possible.     If you are diabetic see the attached instruction sheet regarding your medication.      If you take HEART, BLOOD  PRESSURE, SEIZURE, PAIN, LUNG (including inhalers/nebulizers), ANTI-REJECTION (transplant patients), or PSYCHIATRIC medications, please take at your regular times with a sip of water or as directed by the scheduling nurse.      Important contact information:     Endoscopy [phone] Hours of operation Monday-Friday 8:00-4:30pm.     Questions about insurance or financial obligations call [phone] or [phone].     If you have questions regarding the prep or need to reschedule, please call [phone]. After hours questions requiring immediate assistance, contact [facility] nurse line at [phone] or [phone].   NOTE:      On occasion, unforeseen circumstances may cause a delay in your procedure start time. We respect your time and appreciate your patience during these circumstances.            Comments:        Sutab Coupon

## 2024-06-07 ENCOUNTER — TELEPHONE (OUTPATIENT)
Dept: ENDOSCOPY | Facility: HOSPITAL | Age: 69
End: 2024-06-07
Payer: COMMERCIAL

## 2024-06-10 ENCOUNTER — ANESTHESIA (OUTPATIENT)
Dept: ENDOSCOPY | Facility: HOSPITAL | Age: 69
End: 2024-06-10
Payer: COMMERCIAL

## 2024-06-10 ENCOUNTER — ANESTHESIA EVENT (OUTPATIENT)
Dept: ENDOSCOPY | Facility: HOSPITAL | Age: 69
End: 2024-06-10
Payer: COMMERCIAL

## 2024-06-10 ENCOUNTER — HOSPITAL ENCOUNTER (OUTPATIENT)
Facility: HOSPITAL | Age: 69
Discharge: HOME | End: 2024-06-10
Attending: COLON & RECTAL SURGERY | Admitting: COLON & RECTAL SURGERY
Payer: COMMERCIAL

## 2024-06-10 VITALS
DIASTOLIC BLOOD PRESSURE: 68 MMHG | HEIGHT: 67 IN | TEMPERATURE: 98 F | WEIGHT: 132 LBS | BODY MASS INDEX: 20.72 KG/M2 | SYSTOLIC BLOOD PRESSURE: 119 MMHG | HEART RATE: 66 BPM | OXYGEN SATURATION: 100 % | RESPIRATION RATE: 16 BRPM

## 2024-06-10 DIAGNOSIS — Z12.11 COLON CANCER SCREENING: Primary | ICD-10-CM

## 2024-06-10 PROCEDURE — 63600175 PHARM REV CODE 636 W HCPCS: Performed by: REGISTERED NURSE

## 2024-06-10 PROCEDURE — 25000003 PHARM REV CODE 250: Performed by: COLON & RECTAL SURGERY

## 2024-06-10 PROCEDURE — 37000008 HC ANESTHESIA 1ST 15 MINUTES: Performed by: COLON & RECTAL SURGERY

## 2024-06-10 PROCEDURE — 45378 DIAGNOSTIC COLONOSCOPY: CPT | Performed by: COLON & RECTAL SURGERY

## 2024-06-10 PROCEDURE — 25000003 PHARM REV CODE 250: Performed by: REGISTERED NURSE

## 2024-06-10 PROCEDURE — 37000009 HC ANESTHESIA EA ADD 15 MINS: Performed by: COLON & RECTAL SURGERY

## 2024-06-10 PROCEDURE — E9220 PRA ENDO ANESTHESIA: HCPCS | Mod: ,,, | Performed by: REGISTERED NURSE

## 2024-06-10 PROCEDURE — 45378 DIAGNOSTIC COLONOSCOPY: CPT | Mod: ,,, | Performed by: COLON & RECTAL SURGERY

## 2024-06-10 RX ORDER — LIDOCAINE HYDROCHLORIDE 20 MG/ML
INJECTION INTRAVENOUS
Status: DISCONTINUED | OUTPATIENT
Start: 2024-06-10 | End: 2024-06-10

## 2024-06-10 RX ORDER — PROPOFOL 10 MG/ML
INJECTION, EMULSION INTRAVENOUS CONTINUOUS PRN
Status: DISCONTINUED | OUTPATIENT
Start: 2024-06-10 | End: 2024-06-10

## 2024-06-10 RX ORDER — SODIUM CHLORIDE 9 MG/ML
INJECTION, SOLUTION INTRAVENOUS CONTINUOUS
Status: DISCONTINUED | OUTPATIENT
Start: 2024-06-10 | End: 2024-06-10 | Stop reason: HOSPADM

## 2024-06-10 RX ORDER — OXYCODONE HYDROCHLORIDE 5 MG/1
5 TABLET ORAL ONCE
Status: DISCONTINUED | OUTPATIENT
Start: 2024-06-10 | End: 2024-06-10 | Stop reason: HOSPADM

## 2024-06-10 RX ORDER — PROPOFOL 10 MG/ML
VIAL (ML) INTRAVENOUS
Status: DISCONTINUED | OUTPATIENT
Start: 2024-06-10 | End: 2024-06-10

## 2024-06-10 RX ADMIN — PROPOFOL 180 MCG/KG/MIN: 10 INJECTION, EMULSION INTRAVENOUS at 08:06

## 2024-06-10 RX ADMIN — LIDOCAINE HYDROCHLORIDE 40 MG: 20 INJECTION INTRAVENOUS at 08:06

## 2024-06-10 RX ADMIN — SODIUM CHLORIDE: 0.9 INJECTION, SOLUTION INTRAVENOUS at 08:06

## 2024-06-10 RX ADMIN — PROPOFOL 70 MG: 10 INJECTION, EMULSION INTRAVENOUS at 08:06

## 2024-06-10 NOTE — ANESTHESIA POSTPROCEDURE EVALUATION
Anesthesia Post Evaluation    Patient: Norm LESLIE Andre    Procedure(s) Performed: Procedure(s) (LRB):  COLONOSCOPY (N/A)    Final Anesthesia Type: general      Patient location during evaluation: GI PACU  Patient participation: Yes- Able to Participate  Level of consciousness: awake and alert  Post-procedure vital signs: reviewed and stable  Pain management: adequate  Airway patency: patent    PONV status at discharge: No PONV  Anesthetic complications: no      Cardiovascular status: hemodynamically stable  Respiratory status: unassisted and spontaneous ventilation  Hydration status: euvolemic  Follow-up not needed.              Vitals Value Taken Time   /68 06/10/24 0928   Temp 36.8 °C (98.2 °F) 06/10/24 0840   Pulse 66 06/10/24 0928   Resp 16 06/10/24 0928   SpO2 100 % 06/10/24 0928         No case tracking events are documented in the log.      Pain/Obi Score: Obi Score: 10 (6/10/2024  8:55 AM)

## 2024-06-10 NOTE — ANESTHESIA PREPROCEDURE EVALUATION
06/10/2024  Norm Andre is a 68 y.o., female. [facility]  Anesthesia Pre-Operative Evaluation       Patient Name: Norm Andre  YOB: 1955  MRN: [id number]  CSN: [id number]      Code Status: Prior   Date of Procedure: 6/10/2024  Anesthesia: Choice Procedure: Procedure(s) (LRB):  COLONOSCOPY (N/A)  Pre-Operative Diagnosis: Internal hemorrhoids with complication [K64.8]  Proceduralist: Surgeons and Role:     * Daron Scruggs MD - Primary Nurse: (Unknown)      SUBJECTIVE:   Norm Andre is a 68 y.o. female who  has a past medical history of Allergy, Arthritis, Headache(784.0), Herniated disc, History of migraine headaches, Hypothyroidism, Infections of kidney, Low back pain, Migraine headache, Moderate mood disorder, Neck pain, Rotator cuff tear, and Thyroid disease. No notes on file    No current facility-administered medications for this encounter.       she has a current medication list which includes the following long-term medication(s): alprazolam, estradiol, gabapentin, hydrocortisone, naratriptan, oxybutynin, pantoprazole, rosuvastatin, sumatriptan, and synthroid.   ALLERGIES:     Review of patient's allergies indicates:   Allergen Reactions    Klonopin [clonazepam] Other (See Comments)     Severe mouth ulcers    Rocephin [ceftriaxone]      Severe migraine    Amoxicillin Other (See Comments)     Headache    Abilify [aripiprazole] Other (See Comments)     Tardive dyskinesia  - do not give any other meds that affect dopamine    Clindamycin Other (See Comments)     Nausea, headache    Codeine      Other reaction(s): Unknown    Demerol [meperidine]      Interacts with meds    Erythromycin      Other reaction(s): Unknown    Fioricet  [butalbital-acetaminophen-caff] Other (See Comments)    Mirabegron Other (See Comments)     Headache, breast pain     Premarin [conjugated estrogens] Rash     LDA:          Lines/Drains/Airways       Drain  Duration                  Closed/Suction Drain 01/19/21 1529 Left Buttock Bulb 10 Fr. 1237 days                  MEDICATIONS:     Antibiotics (From admission, onward)      None          VTE Risk Mitigation (From admission, onward)      None          No current facility-administered medications for this encounter.     Current Outpatient Medications   Medication Sig Dispense Refill    ALPRAZolam (XANAX) 0.25 MG tablet One-two during a flight for anxiety (Patient not taking: Reported on 4/18/2024) 5 tablet 0    cyanocobalamin, vitamin B-12, 5,000 mcg Subl Take 1 tablet under tongue once a day for 4 weeks, then continue once per week 30 tablet 1    estradioL (VIVELLE-DOT) 0.0375 mg/24 hr Place 1 patch onto the skin twice a week. 24 patch 3    eszopiclone (LUNESTA) 1 MG Tab Take 1 mg by mouth. Takes at bedtime (Patient not taking: Reported on 4/18/2024)      fluconazole (DIFLUCAN) 150 MG Tab Take 1 today then another in 48 hours (Patient not taking: Reported on 1/17/2024) 2 tablet 0    gabapentin (NEURONTIN) 100 MG capsule Take 300 mg by mouth. Takes x2 tablet night      hydrocortisone 2.5 % ointment Apply topically 2 (two) times daily. (Patient not taking: Reported on 4/18/2024)      methen-m.blue-s.phos-phsal-hyo (URELLE) 81-10.8-40.8 mg Tab Take 1 tablet by mouth 4 (four) times daily as needed. (Patient not taking: Reported on 1/17/2024) 120 each 3    naratriptan (AMERGE) 2.5 MG tablet Take by mouth. (Patient not taking: Reported on 4/18/2024)      oxybutynin (DITROPAN) 5 MG Tab Take 1 tablet (5 mg total) by mouth 3 (three) times daily as needed (bladder spasms). (Patient not taking: Reported on 1/17/2024) 90 tablet 11    pantoprazole (PROTONIX) 40 MG tablet TAKE 1 TABLET(40 MG) BY MOUTH TWICE DAILY (Patient not taking: Reported on 4/18/2024) 60 tablet 3    phenazopyridine HCl (PYRIDIUM ORAL) Take by mouth. (Patient not taking:  Reported on 4/18/2024)      prasterone, dhea, (INTRAROSA) 6.5 mg Inst Place 1 suppository vaginally nightly. (Patient not taking: Reported on 4/18/2024) 28 each 11    rosuvastatin (CRESTOR) 5 MG tablet Take 5 mg by mouth.      sod sulf-pot chloride-mag sulf (SUTAB) 1.479-0.188- 0.225 gram tablet Take 12 tablets by mouth once daily. Take according to package instructions with indicated amount of water. 24 tablet 0    sumatriptan (IMITREX) 100 MG tablet Take 1 tablet (100 mg total) by mouth every 8 (eight) hours as needed for Migraine. No more than 2 tablets per day, no more than 4 days per week. (Patient not taking: Reported on 4/18/2024) 8 tablet 2    SYNTHROID 100 mcg tablet TAKE 1 TABLET BY MOUTH BEFORE BREAKFAST (Patient taking differently: Take 0.125 mcg by mouth before breakfast.) 90 tablet 1          History:   There are no hospital problems to display for this patient.    Surgical History:    has a past surgical history that includes Tonsillectomy; Cystoscopy; Colonoscopy (N/A, 05/05/2016); Esophagogastroduodenoscopy (N/A, 02/18/2020); Eye surgery; Colonoscopy (N/A, 12/28/2020); excision, cyst, retrorectal or pelvic, posterior approach (N/A, 01/19/2021); coccygectomy (01/19/2021); Facial cosmetic surgery; Total abdominal hysterectomy w/ bilateral salpingoophorectomy (1988); Urethropexy (1988); Hysterectomy (1988); Biopsy of bladder (N/A, 3/13/2023); Cystoscopy (N/A, 3/13/2023); Bladder fulguration (N/A, 3/13/2023); and Retrograde pyelography (Bilateral, 3/13/2023).   Social History:    reports being sexually active and has had partner(s) who are male.  reports that she has never smoked. She has never used smokeless tobacco. She reports that she does not drink alcohol and does not use drugs.     OBJECTIVE:     Vital Signs (Most Recent):    Vital Signs Range (Last 24H):          There is no height or weight on file to calculate BMI.   Wt Readings from Last 4 Encounters:   04/18/24 62.6 kg (138 lb)   01/17/24  "63.5 kg (140 lb)   12/20/23 67.4 kg (148 lb 9.4 oz)   03/06/23 68 kg (150 lb)     Significant Labs:  Lab Results   Component Value Date    WBC 3.71 (L) 03/07/2023    HGB 13.4 03/07/2023    HCT 43.1 03/07/2023     03/07/2023     (L) 03/07/2023    K 5.0 03/07/2023     03/07/2023    CREATININE 0.7 03/07/2023    BUN 10 03/07/2023    CO2 26 03/07/2023    GLU 65 (L) 03/07/2023    CALCIUM 9.5 03/07/2023    MG 2.1 01/27/2022    PHOS 3.5 01/20/2021    ALKPHOS 51 (L) 01/27/2022    ALT 10 01/27/2022    AST 19 01/27/2022    ALBUMIN 3.9 01/27/2022    INR 1.0 12/23/2020    APTT 25.9 01/27/2022    HGBA1C 5.3 12/06/2021    TROPONINI <0.006 10/25/2017    BNP 14 10/21/2020     No LMP recorded. Patient has had a hysterectomy.  No results found for this or any previous visit (from the past 72 hour(s)).    EKG:   Results for orders placed or performed during the hospital encounter of 01/15/21   SCHEDULED EKG 12-LEAD (to Muse)    Collection Time: 01/15/21  2:50 PM    Narrative    Test Reason : Z01.818,    Vent. Rate : 064 BPM     Atrial Rate : 064 BPM     P-R Int : 164 ms          QRS Dur : 082 ms      QT Int : 414 ms       P-R-T Axes : 067 034 047 degrees     QTc Int : 427 ms    Normal sinus rhythm  Normal ECG  When compared with ECG of 26-OCT-2020 13:52,  No significant change was found  Confirmed by MATTIE NOGUERA MD ([id number]) on 1/15/2021 4:02:25 PM    Referred By: VALERIA KNOWLES           Confirmed By:MATTIE NOGUERA MD       TTE:  No results found for this or any previous visit.  No results found for: "EF"   No results found for this or any previous visit.  CHARLES:  No results found for this or any previous visit.  Stress Test:  No results found for this or any previous visit.     LHC:  No results found for this or any previous visit.     PFT:  No results found for: "FEV1", "FVC", "EBT5HSC", "TLC", "DLCO"   ASSESSMENT/PLAN:          Pre-op Assessment    I have reviewed the Patient Summary Reports.    I have reviewed " the NPO Status.   I have reviewed the Medications.     Review of Systems  Anesthesia Hx:   History of prior surgery of interest to airway management or planning:             Hematology/Oncology:  Hematology Normal   Oncology Normal                                   EENT/Dental:  EENT/Dental Normal           Cardiovascular:  Cardiovascular Normal                                            Pulmonary:  Pulmonary Normal                       Renal/:  Chronic Renal Disease                Hepatic/GI:     GERD             Musculoskeletal:  Arthritis               Neurological:    Neuromuscular Disease,  Headaches                                 Endocrine:   Hypothyroidism          Dermatological:  Skin Normal    Psych:  Psychiatric History                  Physical Exam  General: Well nourished    Airway:  Mallampati: II   Mouth Opening: Normal  TM Distance: Normal  Tongue: Normal  Neck ROM: Normal ROM    Dental:  Intact        Anesthesia Plan  Type of Anesthesia, risks & benefits discussed:    Anesthesia Type: Gen ETT, Gen Natural Airway  Intra-op Monitoring Plan: Standard ASA Monitors  Post Op Pain Control Plan: multimodal analgesia  Induction:  IV  ASA Score: 2  Day of Surgery Review of History & Physical: H&P Update referred to the surgeon/provider.I have interviewed and examined the patient. I have reviewed the patient's H&P dated:     Ready For Surgery From Anesthesia Perspective.     .

## 2024-06-10 NOTE — PROGRESS NOTES
" Ms. Andre c/o left upper quadrant abd pain before and after procedure.  No exacerbating or relieving factors.  She has been doing some physical therapy as an example of a difference in physical activity.  I examined her after the procedure.  She was comfortable appearing.  BP 99/66 (BP Location: Left arm)   Pulse 68   Temp 98.2 °F (36.8 °C) (Temporal)   Resp 16   Ht 5' 7" (1.702 m)   Wt 59.9 kg (132 lb)   SpO2 98%   Breastfeeding No   BMI 20.67 kg/m²     Abd soft. Tender in LUQ - with head left and leg left her tenderness persists .  I think this is most c/w musculoskeletal pain.      Advised acetaminophen or ibuprofen.   I am going to refer her to one of our gastroenterologist to evaluate her ongoing abdominal pain.  She also c/o tailbone pain.  Given her PSH I am going to order MRI pevlis.   "

## 2024-06-10 NOTE — TRANSFER OF CARE
"Anesthesia Transfer of Care Note    Patient: Norm Andre    Procedure(s) Performed: Procedure(s) (LRB):  COLONOSCOPY (N/A)    Patient location: GI    Anesthesia Type: general    Transport from OR: Transported from OR on room air with adequate spontaneous ventilation    Post pain: adequate analgesia    Post assessment: no apparent anesthetic complications and tolerated procedure well    Post vital signs: stable    Level of consciousness: awake and alert    Nausea/Vomiting: no nausea/vomiting    Complications: none    Transfer of care protocol was followedComments: Nurse at bedside, VSS, spont reg resp noted      Last vitals: Visit Vitals  BP (!) 113/58   Pulse 91   Temp 36.6 °C (97.9 °F)   Resp 17   Ht 5' 7" (1.702 m)   Wt 59.9 kg (132 lb)   SpO2 97%   Breastfeeding No   BMI 20.67 kg/m²     "

## 2024-06-10 NOTE — H&P
"    COLONOSCOPY HISTORY AND PHYSICAL EXAM    Procedure : Colonoscopy      INDICATIONS: abdominal pain    Family Hx of CRC: none    Last Colonoscopy:  2020  Findings: diverticulosis       Past Medical History:   Diagnosis Date    Allergy     Arthritis     Headache(784.0)     Herniated disc     x4 in cervical area    History of migraine headaches     Hypothyroidism     Infections of kidney     has had multiple kidney infections in the past.    Low back pain     Migraine headache     Moderate mood disorder     Neck pain     Rotator cuff tear     Thyroid disease      Sedation Problems: NO  Family History   Problem Relation Name Age of Onset    Hypertension Mother      Migraines Mother      Tremor Mother      Hyperlipidemia Mother      Allergies Mother      Hypertension Sister      Uterine cancer Maternal Grandmother      Breast cancer Maternal Aunt      Asthma Brother      Allergies Son      Asthma Son      Colon cancer Neg Hx      Ovarian cancer Neg Hx       Fam Hx of Sedation Problems: NO  Social History     Socioeconomic History    Marital status:     Number of children: 2    Years of education: 18   Occupational History    Occupation: Homemaker/Retired   Tobacco Use    Smoking status: Never    Smokeless tobacco: Never   Substance and Sexual Activity    Alcohol use: No    Drug use: No    Sexual activity: Yes     Partners: Male       Review of Systems - Negative except   Respiratory ROS: no dyspnea  Cardiovascular ROS: no exertional chest pain  Gastrointestinal ROS: NO abdominal discomfort,  NO rectal bleeding  Musculoskeletal ROS: no muscular pain  Neurological ROS: no recent stroke    Physical Exam:  Pulse 90   Temp 97.9 °F (36.6 °C)   Resp 15   Ht 5' 7" (1.702 m)   Wt 59.9 kg (132 lb)   SpO2 97%   Breastfeeding No   BMI 20.67 kg/m²   General: no distress  Head: normocephalic  Mallampati Score   Neck: supple, symmetrical, trachea midline  Lungs:  normal respiratory effort  Heart: regular rate and " rhythm  Abdomen: soft, non-tender non-distented; bowel sounds normal; no masses,  no organomegaly  Extremities: no cyanosis or edema, or clubbing    ASA:  II    PLAN  COLONOSCOPY.    SedationPlan :MAC    The details of the procedure, the possible need for biopsy or polypectomy and the potential risks including bleeding, perforation, missed polyps were discussed in detail.

## 2024-07-01 ENCOUNTER — TELEPHONE (OUTPATIENT)
Dept: SURGERY | Facility: CLINIC | Age: 69
End: 2024-07-01
Payer: COMMERCIAL

## 2024-07-01 ENCOUNTER — TELEPHONE (OUTPATIENT)
Dept: GASTROENTEROLOGY | Facility: CLINIC | Age: 69
End: 2024-07-01
Payer: COMMERCIAL

## 2024-07-01 DIAGNOSIS — R10.84 GENERALIZED ABDOMINAL PAIN: Primary | ICD-10-CM

## 2024-07-01 NOTE — TELEPHONE ENCOUNTER
----- Message from Saniya Aguila RN sent at 7/1/2024 11:09 AM CDT -----  August Reddy,    I spoke with this lady today and she has asked to see Dr. Davis. I have placed a referral for her from Dr. Scruggs. There was availability on 8/7 with Dr. Davis. I placed it on hold but I am not able to schedule her. Would you mind reaching out to her and scheduling an appointment?    Thanks,  Saniya

## 2024-07-01 NOTE — TELEPHONE ENCOUNTER
Spoke with patient.  Scheduled to see Dr.James Davis on 8/13 for 8:30. Confirmation mailed.   Maureen

## 2024-07-01 NOTE — TELEPHONE ENCOUNTER
Spoke with patient regarding plan for follow up with Dr. Scruggs for banding, referral to Gastro and MRI. Orders placed for MRI and GI referral (per Dr. Scruggs's notes), and MRI scheduled. Message sent to Dr. Rip Davis and staff for appointment availability. Slot on 8/6 placed on hold. Explained to patient that I will have his staff reach out to her regarding appointment. Patient states that she will callback for appointment with Dr. Scruggs for banding.  Painet verbalizes understanding.

## 2024-07-01 NOTE — TELEPHONE ENCOUNTER
----- Message from Avinash Thompson sent at 7/1/2024  8:40 AM CDT -----  Regarding: pt advice  Contact: pt @  [phone]  Pt is requesting a call from, the Rn in the office to be further advised on procedure that was recommenced to her and she has questions on colonoscopy summary. Please call to further advise pt. Thank you for all all you are doing are doing.

## 2024-07-02 ENCOUNTER — HOSPITAL ENCOUNTER (OUTPATIENT)
Dept: RADIOLOGY | Facility: HOSPITAL | Age: 69
Discharge: HOME | End: 2024-07-02
Attending: COLON & RECTAL SURGERY
Payer: COMMERCIAL

## 2024-07-02 DIAGNOSIS — R10.84 GENERALIZED ABDOMINAL PAIN: ICD-10-CM

## 2024-07-02 PROCEDURE — 72197 MRI PELVIS W/O & W/DYE: CPT | Mod: TC

## 2024-07-02 PROCEDURE — 74183 MRI ABD W/O CNTR FLWD CNTR: CPT | Mod: 26,,, | Performed by: RADIOLOGY

## 2024-07-02 PROCEDURE — 25500020 PHARM REV CODE 255: Performed by: COLON & RECTAL SURGERY

## 2024-07-02 PROCEDURE — 72197 MRI PELVIS W/O & W/DYE: CPT | Mod: 26,,, | Performed by: RADIOLOGY

## 2024-07-02 PROCEDURE — A9585 GADOBUTROL INJECTION: HCPCS | Performed by: COLON & RECTAL SURGERY

## 2024-07-02 RX ORDER — GADOBUTROL 604.72 MG/ML
10 INJECTION INTRAVENOUS
Status: COMPLETED | OUTPATIENT
Start: 2024-07-02 | End: 2024-07-02

## 2024-07-02 RX ADMIN — GADOBUTROL 10 ML: 604.72 INJECTION INTRAVENOUS at 07:07

## 2024-08-13 ENCOUNTER — OFFICE VISIT (OUTPATIENT)
Dept: GASTROENTEROLOGY | Facility: CLINIC | Age: 69
End: 2024-08-13
Payer: COMMERCIAL

## 2024-08-13 VITALS
HEIGHT: 67 IN | BODY MASS INDEX: 20.72 KG/M2 | HEART RATE: 91 BPM | WEIGHT: 132 LBS | SYSTOLIC BLOOD PRESSURE: 94 MMHG | DIASTOLIC BLOOD PRESSURE: 75 MMHG

## 2024-08-13 DIAGNOSIS — K21.00 GASTROESOPHAGEAL REFLUX DISEASE WITH ESOPHAGITIS WITHOUT HEMORRHAGE: Primary | ICD-10-CM

## 2024-08-13 DIAGNOSIS — R10.84 GENERALIZED ABDOMINAL PAIN: ICD-10-CM

## 2024-08-13 PROCEDURE — 99204 OFFICE O/P NEW MOD 45 MIN: CPT | Mod: S$GLB,,, | Performed by: INTERNAL MEDICINE

## 2024-08-13 PROCEDURE — 99999 PR PBB SHADOW E&M-EST. PATIENT-LVL V: CPT | Mod: PBBFAC,,, | Performed by: INTERNAL MEDICINE

## 2024-08-13 RX ORDER — DICYCLOMINE HYDROCHLORIDE 20 MG/1
1 TABLET ORAL EVERY 6 HOURS
COMMUNITY
Start: 2024-07-16

## 2024-08-13 RX ORDER — ONDANSETRON 4 MG/1
4 TABLET, ORALLY DISINTEGRATING ORAL EVERY 6 HOURS PRN
COMMUNITY
Start: 2024-07-16

## 2024-08-13 RX ORDER — PANTOPRAZOLE SODIUM 40 MG/1
40 TABLET, DELAYED RELEASE ORAL DAILY
Qty: 30 TABLET | Refills: 12 | Status: SHIPPED | OUTPATIENT
Start: 2024-08-13

## 2024-08-13 NOTE — PROGRESS NOTES
Subjective:       Patient ID: Norm Andre is a 68 y.o. female.    Chief Complaint: Abdominal Pain    Abdominal Pain    68-year-old lady.  I have seen in the past for an EGD which I did in February of 2020 for reflux.  At that time I found a small hiatal hernia and grade B esophagitis.  And she was put on pantoprazole.  But she takes that as needed and has really not been taking that very frequently at all.    Since my appointment she has had a number of issues and surgeries with Dr. Scruggs.  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  He has recently done a colonoscopy in the summer of this year which showed diverticulosis.  It also showed a perianal sinus tract.    She comes here today because she has been having some left-sided abdominal pain.  This is actually left mid abdomen but almost encompassing the entire left side of the abdomen from the left upper quadrant even down to the left lower quadrant it is a fairly constant pain.  There is it is somewhat intermittent but in general it is constant.  It can be burning.  It is worse after spicy food or tomato gravy.  It does not relate to bowel movements.  It is slightly worse after eating.  She does have irregular bowel movements but the passage of a bowel movement does not necessarily make the pain better.  She had nice any typical heartburn although she does have some dyspepsia symptoms like belching.  She has occasional dysphagia.  She has had some unintentional weight loss over the past several years.    Social history   Very healthy overall   Rare alcohol   No carbonated beverage   One cup of coffee in the morning   A lot of stress in general        Workup she did have a recent MRI of the abdomen which showed a normal pancreas along with other normal organs    Past Medical History:   Diagnosis Date    Allergy     Arthritis     Headache(784.0)     Herniated disc     x4 in cervical area     "History of migraine headaches     Hypothyroidism     Infections of kidney     has had multiple kidney infections in the past.    Low back pain     Migraine headache     Moderate mood disorder     Neck pain     Rotator cuff tear     Thyroid disease        Review of patient's allergies indicates:   Allergen Reactions    Klonopin [clonazepam] Other (See Comments)     Severe mouth ulcers    Rocephin [ceftriaxone]      Severe migraine    Amoxicillin Other (See Comments)     Headache    Abilify [aripiprazole] Other (See Comments)     Tardive dyskinesia  - do not give any other meds that affect dopamine    Clindamycin Other (See Comments)     Nausea, headache    Codeine      Other reaction(s): Unknown    Demerol [meperidine]      Interacts with meds    Erythromycin      Other reaction(s): Unknown    Fioricet  [butalbital-acetaminophen-caff] Other (See Comments)    Mirabegron Other (See Comments)     Headache, breast pain    Premarin [conjugated estrogens] Rash        Family History   Problem Relation Name Age of Onset    Hypertension Mother      Migraines Mother      Tremor Mother      Hyperlipidemia Mother      Allergies Mother      Hypertension Sister      Uterine cancer Maternal Grandmother      Breast cancer Maternal Aunt      Asthma Brother      Allergies Son      Asthma Son      Colon cancer Neg Hx      Ovarian cancer Neg Hx         Social History     Tobacco Use    Smoking status: Never    Smokeless tobacco: Never   Substance Use Topics    Alcohol use: No    Drug use: No        Review of Systems   Gastrointestinal:  Positive for abdominal pain.       CMP No results found for: "NA", "K", "CL", "CO2", "GLU", "BUN", "CREATININE", "CALCIUM", "PROT", "ALBUMIN", "BILITOT", "ALKPHOS", "AST", "ALT", "ANIONGAP", "ESTGFRAFRICA", "EGFRNONAA" and CBC No results found for: "WBC", "HGB", "HCT", "PLT"    No results found for this or any previous visit from the past 365 days.             Objective:      Physical Exam  Abdominal:    "   General: Abdomen is flat. Bowel sounds are normal. There is no distension or abdominal bruit.      Palpations: Abdomen is soft. There is no shifting dullness, hepatomegaly or mass.      Tenderness: There is abdominal tenderness in the left upper quadrant.         Assessment & Plan:       Gastroesophageal reflux disease with esophagitis without hemorrhage    Generalized abdominal pain  -     Ambulatory referral/consult to Gastroenterology    Other orders  -     pantoprazole (PROTONIX) 40 MG tablet; Take 1 tablet (40 mg total) by mouth once daily.  Dispense: 30 tablet; Refill: 12     Assessment left-sided abdominal pain.  This is unusual pain based on the constant aspect of it.  The consistency almost sounds like a neuropathy.  It does not sound like typical IBS because it is so constant and because it does not very according to bowel movements.  It could somehow be related to gastritis as it does get worse after certain foods.  I recommended that she try the pantoprazole every day for the next month, taking it every morning.  Now during the day she can clearly also take a Bentyl it at and I encouraged her to do that just to see if it helps.  If the pain is not relieved after 4 weeks then I will probably set up an EGD.  I do not think we need do any other imaging.  I will message her primary doctor about some additional labs as she is due for labs shortly.  Reassurance given.  I do not think it relates anything with the colon for instance it is definitely not diverticulitis.    This note was created with voice recognition dictation technology.  There may be errors that I did not see, detect or correct.      Rip Davis MD

## 2024-09-27 NOTE — PATIENT INSTRUCTIONS
"Home Exercise Program: 05/18/2022     SCAR MOBILIZATION  - Gently massage your scar in all directions both superficially along the skin and deeply.   - Massage the area AROUND your scar, as well as directly on it.   - Do not pull your scar apart with both hands - Use one hand/finger to anchor and use the other to pull along or across the scar.  - Apply only as much pressure as you can tolerate, so that you can do this again the next day.     Do for 5 minutes every day.       ABDOMINAL WALL MOBILIZATION  - Gently massage your abdominal wall muscles in all directions both superficially and deeply.   - Apply as much pressure as you are able to tolerate without more than a slight increase in discomfort.    - While applying pressure you can perform circular motions or you can "scoop and pull" your tissue up for a sustained stretch.      Do for 5-10 minutes every day.      "
dentures

## 2024-11-26 NOTE — [employer] PATIENT INSTRUCTIONS
Discharge Summary/Instructions after an Endoscopic Procedure  Patient Name: Norm Andre  Patient MRN: [id number]  Patient YOB: 1955  Monday, Harika 10, 2024  Daron Scruggs MD  Dear patient,  As a result of recent federal legislation (The Federal Cures Act), you may   receive lab or pathology results from your procedure in your [facility]   account before your physician is able to contact you. Your physician or   their representative will relay the results to you with their   recommendations at their soonest availability.  Thank you,  RESTRICTIONS:  During your procedure today, you received medications for sedation.  These   medications may affect your judgment, balance and coordination.  Therefore,   for 24 hours, you have the following restrictions:   - DO NOT drive a car, operate machinery, make legal/financial decisions,   sign important papers or drink alcohol.    ACTIVITY:  Today: no heavy lifting, straining or running due to procedural   sedation/anesthesia.  The following day: return to full activity including work.  DIET:  Eat and drink normally unless instructed otherwise.     TREATMENT FOR COMMON SIDE EFFECTS:  - Mild abdominal pain, nausea, belching, bloating or excessive gas:  rest,   eat lightly and use a heating pad.  - Sore Throat: treat with throat lozenges and/or gargle with warm salt   water.  - Because air was used during the procedure, expelling large amounts of air   from your rectum or belching is normal.  - If a bowel prep was taken, you may not have a bowel movement for 1-3 days.    This is normal.  SYMPTOMS TO WATCH FOR AND REPORT TO YOUR PHYSICIAN:  1. Abdominal pain or bloating, other than gas cramps.  2. Chest pain.  3. Back pain.  4. Signs of infection such as: chills or fever occurring within 24 hours   after the procedure.  5. Rectal bleeding, which would show as bright red, maroon, or black stools.   (A tablespoon of blood from the rectum is not serious, especially  if   hemorrhoids are present.)  6. Vomiting.  7. Weakness or dizziness.  GO DIRECTLY TO THE NEAREST EMERGENCY ROOM IF YOU HAVE ANY OF THE FOLLOWING:      Difficulty breathing              Chills and/or fever over 101 F   Persistent vomiting and/or vomiting blood   Severe abdominal pain   Severe chest pain   Black, tarry stools   Bleeding- more than one tablespoon   Any other symptom or condition that you feel may need urgent attention  Your doctor recommends these additional instructions:  If any biopsies were taken, your doctors clinic will contact you in 1 to 2   weeks with any results.  - Discharge patient to home (ambulatory).   - Patient has a contact number available for emergencies.  The signs and   symptoms of potential delayed complications were discussed with the   patient.  Return to normal activities tomorrow.  Written discharge   instructions were provided to the patient.   - Resume previous diet.   - Continue present medications.   - Return to primary care physician as previously scheduled.   - Repeat colonoscopy in 10 years for screening purposes.  For questions, problems or results please call your physician - Daron Scruggs MD at Work:  [phone].  [facility] [place], EMERGENCY ROOM PHONE NUMBER: [phone]  IF A COMPLICATION OR EMERGENCY SITUATION ARISES AND YOU ARE UNABLE TO REACH   YOUR PHYSICIAN - GO DIRECTLY TO THE EMERGENCY ROOM.  Daron Scruggs MD  6/10/2024 8:38:30 AM  This report has been verified and signed electronically.  Dear patient,  As a result of recent federal legislation (The Federal Cures Act), you may   receive lab or pathology results from your procedure in your [facility]   account before your physician is able to contact you. Your physician or   their representative will relay the results to you with their   recommendations at their soonest availability.  Thank you,  [employer]   home

## 2024-12-28 ENCOUNTER — HOSPITAL ENCOUNTER (OUTPATIENT)
Dept: RADIOLOGY | Facility: HOSPITAL | Age: 69
Discharge: HOME | End: 2024-12-28
Attending: INTERNAL MEDICINE
Payer: COMMERCIAL

## 2024-12-28 DIAGNOSIS — Z12.31 ENCOUNTER FOR SCREENING MAMMOGRAM FOR BREAST CANCER: ICD-10-CM

## 2024-12-28 PROCEDURE — 77063 BREAST TOMOSYNTHESIS BI: CPT | Mod: 26,,, | Performed by: RADIOLOGY

## 2024-12-28 PROCEDURE — 77067 SCR MAMMO BI INCL CAD: CPT | Mod: TC

## 2024-12-28 PROCEDURE — 77067 SCR MAMMO BI INCL CAD: CPT | Mod: 26,,, | Performed by: RADIOLOGY

## 2025-02-03 DIAGNOSIS — Z12.31 SCREENING MAMMOGRAM FOR BREAST CANCER: Primary | ICD-10-CM

## 2025-09-02 ENCOUNTER — TELEPHONE (OUTPATIENT)
Dept: UROGYNECOLOGY | Facility: CLINIC | Age: 70
End: 2025-09-02
Payer: COMMERCIAL

## (undated) DEVICE — HEMOSTAT SURGICEL 2X14IN

## (undated) DEVICE — SEE MEDLINE ITEM 146417

## (undated) DEVICE — CATH POLLACK OPEN-END FLEXI-TI

## (undated) DEVICE — GAUZE SPONGE 4X4 12PLY

## (undated) DEVICE — SEE MEDLINE ITEM 157181

## (undated) DEVICE — UNDERGLOVES BIOGEL PI SZ 6 LF

## (undated) DEVICE — BELLOW CANN HEMOBLAST 1.65GR

## (undated) DEVICE — HOOK STAY ELAS 5MM 8EA/PK

## (undated) DEVICE — GOWN SMARTGOWN LVL4 X-LONG XL

## (undated) DEVICE — GUIDE WIRE MOTION .035 X 150CM

## (undated) DEVICE — ELECTRODE REM PLYHSV RETURN 9

## (undated) DEVICE — SOL IRR NACL .9% 3000ML

## (undated) DEVICE — DRAIN BLAKE HUBLS 10F RD

## (undated) DEVICE — SYR 10CC LUER LOCK

## (undated) DEVICE — BOVIE SUCTION

## (undated) DEVICE — PANTIES FEMININE NAPKIN LG/XLG

## (undated) DEVICE — TRAY CYSTO BASIN OMC

## (undated) DEVICE — SET IRR URLGY 2LINE UNIV SPIKE

## (undated) DEVICE — ELECTRODE NEEDLE 2.8IN

## (undated) DEVICE — ADAPTER HOSE 10FT 8MM

## (undated) DEVICE — GOWN X-LG STERILE BACK

## (undated) DEVICE — SOL IRR WATER STRL 3000 ML

## (undated) DEVICE — EVACUATOR WOUND BULB 100CC

## (undated) DEVICE — PACK CYSTO

## (undated) DEVICE — TRAY FOLEY 16FR INFECTION CONT

## (undated) DEVICE — LUBRICANT SURGILUBE 2 OZ

## (undated) DEVICE — SEE MEDLINE ITEM 157148

## (undated) DEVICE — SPONGE IV DRAIN 4X4 STERILE

## (undated) DEVICE — TRAY MINOR GEN SURG

## (undated) DEVICE — BRIEF MESH LARGE

## (undated) DEVICE — GUIDEWIRE STR TIP HIWIRE 150CM

## (undated) DEVICE — TAPE SILK 3IN

## (undated) DEVICE — CATH MALECOT 12FR 6/BX

## (undated) DEVICE — ADHESIVE DERMABOND ADVANCED

## (undated) DEVICE — GOWN SMART IMP BREATHABLE XXLG